# Patient Record
Sex: FEMALE | Race: WHITE | Employment: OTHER | URBAN - METROPOLITAN AREA
[De-identification: names, ages, dates, MRNs, and addresses within clinical notes are randomized per-mention and may not be internally consistent; named-entity substitution may affect disease eponyms.]

---

## 2017-03-09 PROBLEM — M06.4 INFLAMMATORY POLYARTHRITIS (HCC): Status: ACTIVE | Noted: 2017-03-09

## 2017-06-22 PROBLEM — M06.09 RHEUMATOID ARTHRITIS OF MULTIPLE SITES WITH NEGATIVE RHEUMATOID FACTOR (HCC): Status: ACTIVE | Noted: 2017-06-22

## 2017-07-05 PROCEDURE — 86200 CCP ANTIBODY: CPT | Performed by: INTERNAL MEDICINE

## 2017-07-05 PROCEDURE — 86480 TB TEST CELL IMMUN MEASURE: CPT | Performed by: INTERNAL MEDICINE

## 2018-03-04 PROBLEM — S53.104S: Status: ACTIVE | Noted: 2018-03-04

## 2018-03-19 PROBLEM — M06.9: Status: ACTIVE | Noted: 2018-03-19

## 2018-07-12 PROCEDURE — 86480 TB TEST CELL IMMUN MEASURE: CPT | Performed by: INTERNAL MEDICINE

## 2018-08-02 PROCEDURE — 89051 BODY FLUID CELL COUNT: CPT | Performed by: INTERNAL MEDICINE

## 2018-08-02 PROCEDURE — 89060 EXAM SYNOVIAL FLUID CRYSTALS: CPT | Performed by: INTERNAL MEDICINE

## 2018-10-17 PROBLEM — M54.50 CHRONIC BILATERAL LOW BACK PAIN WITHOUT SCIATICA: Status: RESOLVED | Noted: 2018-10-17 | Resolved: 2018-10-17

## 2018-10-17 PROBLEM — G89.29 CHRONIC BILATERAL LOW BACK PAIN WITHOUT SCIATICA: Status: RESOLVED | Noted: 2018-10-17 | Resolved: 2018-10-17

## 2018-10-17 PROBLEM — M54.50 CHRONIC BILATERAL LOW BACK PAIN WITHOUT SCIATICA: Status: ACTIVE | Noted: 2018-10-17

## 2018-10-17 PROBLEM — G89.29 CHRONIC BILATERAL LOW BACK PAIN WITHOUT SCIATICA: Status: ACTIVE | Noted: 2018-10-17

## 2018-11-15 PROCEDURE — 89051 BODY FLUID CELL COUNT: CPT | Performed by: INTERNAL MEDICINE

## 2018-11-15 PROCEDURE — 89060 EXAM SYNOVIAL FLUID CRYSTALS: CPT | Performed by: INTERNAL MEDICINE

## 2019-02-13 PROBLEM — Z96.641 S/P HIP REPLACEMENT, RIGHT: Status: ACTIVE | Noted: 2019-02-13

## 2019-02-28 PROCEDURE — 83010 ASSAY OF HAPTOGLOBIN QUANT: CPT | Performed by: INTERNAL MEDICINE

## 2019-04-05 ENCOUNTER — APPOINTMENT (OUTPATIENT)
Dept: LAB | Age: 59
End: 2019-04-05
Attending: ORTHOPAEDIC SURGERY
Payer: MEDICARE

## 2019-04-05 DIAGNOSIS — Z01.818 PREOP TESTING: ICD-10-CM

## 2019-04-05 PROCEDURE — 87641 MR-STAPH DNA AMP PROBE: CPT

## 2019-04-05 RX ORDER — MULTIVIT WITH MINERALS/LUTEIN
1000 TABLET ORAL DAILY
COMMUNITY

## 2019-04-12 PROCEDURE — 89051 BODY FLUID CELL COUNT: CPT | Performed by: INTERNAL MEDICINE

## 2019-04-12 PROCEDURE — 89060 EXAM SYNOVIAL FLUID CRYSTALS: CPT | Performed by: INTERNAL MEDICINE

## 2019-04-14 PROBLEM — D84.9 IMMUNOSUPPRESSED STATUS (HCC): Status: ACTIVE | Noted: 2019-04-14

## 2019-04-16 NOTE — PAT NURSING NOTE
Discussed pt's history of MRSA with Tashi Infante in Dr Rancho Schneider office. Per Tashi Armendariz wants pt to have 2 gm of Ancef and 1 gm of Vancomycin preop.

## 2019-04-17 ENCOUNTER — HOSPITAL ENCOUNTER (OUTPATIENT)
Facility: HOSPITAL | Age: 59
Discharge: HOME HEALTH | End: 2019-04-18
Attending: ORTHOPAEDIC SURGERY | Admitting: ORTHOPAEDIC SURGERY
Payer: MEDICARE

## 2019-04-17 ENCOUNTER — ANESTHESIA (OUTPATIENT)
Dept: SURGERY | Facility: HOSPITAL | Age: 59
End: 2019-04-17
Payer: MEDICARE

## 2019-04-17 ENCOUNTER — APPOINTMENT (OUTPATIENT)
Dept: GENERAL RADIOLOGY | Facility: HOSPITAL | Age: 59
End: 2019-04-17
Attending: ORTHOPAEDIC SURGERY
Payer: MEDICARE

## 2019-04-17 ENCOUNTER — ANESTHESIA EVENT (OUTPATIENT)
Dept: SURGERY | Facility: HOSPITAL | Age: 59
End: 2019-04-17
Payer: MEDICARE

## 2019-04-17 DIAGNOSIS — Z01.818 PREOP TESTING: Primary | ICD-10-CM

## 2019-04-17 DIAGNOSIS — M06.9: ICD-10-CM

## 2019-04-17 DIAGNOSIS — M06.09 RHEUMATOID ARTHRITIS OF MULTIPLE SITES WITH NEGATIVE RHEUMATOID FACTOR (HCC): ICD-10-CM

## 2019-04-17 DIAGNOSIS — M19.029 ARTHRITIS OF ELBOW: ICD-10-CM

## 2019-04-17 PROCEDURE — 64417 NJX AA&/STRD AX NERVE IMG: CPT | Performed by: ORTHOPAEDIC SURGERY

## 2019-04-17 PROCEDURE — 76942 ECHO GUIDE FOR BIOPSY: CPT | Performed by: ORTHOPAEDIC SURGERY

## 2019-04-17 PROCEDURE — 0RRL0JZ REPLACEMENT OF RIGHT ELBOW JOINT WITH SYNTHETIC SUBSTITUTE, OPEN APPROACH: ICD-10-PCS | Performed by: ORTHOPAEDIC SURGERY

## 2019-04-17 PROCEDURE — 01N40ZZ RELEASE ULNAR NERVE, OPEN APPROACH: ICD-10-PCS | Performed by: ORTHOPAEDIC SURGERY

## 2019-04-17 PROCEDURE — 99152 MOD SED SAME PHYS/QHP 5/>YRS: CPT | Performed by: ORTHOPAEDIC SURGERY

## 2019-04-17 PROCEDURE — 3E0T3BZ INTRODUCTION OF ANESTHETIC AGENT INTO PERIPHERAL NERVES AND PLEXI, PERCUTANEOUS APPROACH: ICD-10-PCS | Performed by: ANESTHESIOLOGY

## 2019-04-17 DEVICE — CEMENT BONE RADIOPAQ HOWMEDICA: Type: IMPLANTABLE DEVICE | Site: ELBOW | Status: FUNCTIONAL

## 2019-04-17 DEVICE — IMPLANTABLE DEVICE: Type: IMPLANTABLE DEVICE | Site: ELBOW | Status: FUNCTIONAL

## 2019-04-17 RX ORDER — ONDANSETRON 2 MG/ML
INJECTION INTRAMUSCULAR; INTRAVENOUS AS NEEDED
Status: DISCONTINUED | OUTPATIENT
Start: 2019-04-17 | End: 2019-04-17 | Stop reason: SURG

## 2019-04-17 RX ORDER — SODIUM CHLORIDE, SODIUM LACTATE, POTASSIUM CHLORIDE, CALCIUM CHLORIDE 600; 310; 30; 20 MG/100ML; MG/100ML; MG/100ML; MG/100ML
INJECTION, SOLUTION INTRAVENOUS CONTINUOUS
Status: DISCONTINUED | OUTPATIENT
Start: 2019-04-17 | End: 2019-04-17

## 2019-04-17 RX ORDER — ACETAMINOPHEN 325 MG/1
650 TABLET ORAL EVERY 4 HOURS PRN
Status: DISCONTINUED | OUTPATIENT
Start: 2019-04-17 | End: 2019-04-18

## 2019-04-17 RX ORDER — MORPHINE SULFATE 10 MG/ML
6 INJECTION, SOLUTION INTRAMUSCULAR; INTRAVENOUS EVERY 10 MIN PRN
Status: DISCONTINUED | OUTPATIENT
Start: 2019-04-17 | End: 2019-04-17 | Stop reason: HOSPADM

## 2019-04-17 RX ORDER — BISACODYL 10 MG
10 SUPPOSITORY, RECTAL RECTAL
Status: DISCONTINUED | OUTPATIENT
Start: 2019-04-17 | End: 2019-04-18

## 2019-04-17 RX ORDER — AMLODIPINE BESYLATE 5 MG/1
5 TABLET ORAL DAILY
Status: DISCONTINUED | OUTPATIENT
Start: 2019-04-18 | End: 2019-04-18

## 2019-04-17 RX ORDER — FLUPHENAZINE HYDROCHLORIDE 2.5 MG/1
5 TABLET ORAL 2 TIMES DAILY WITH MEALS
Status: DISCONTINUED | OUTPATIENT
Start: 2019-04-17 | End: 2019-04-18

## 2019-04-17 RX ORDER — MELATONIN
325
Status: DISCONTINUED | OUTPATIENT
Start: 2019-04-17 | End: 2019-04-18

## 2019-04-17 RX ORDER — PANTOPRAZOLE SODIUM 40 MG/1
40 TABLET, DELAYED RELEASE ORAL
Status: DISCONTINUED | OUTPATIENT
Start: 2019-04-18 | End: 2019-04-18

## 2019-04-17 RX ORDER — DEXAMETHASONE SODIUM PHOSPHATE 10 MG/ML
INJECTION, SOLUTION INTRAMUSCULAR; INTRAVENOUS
Status: COMPLETED | OUTPATIENT
Start: 2019-04-17 | End: 2019-04-17

## 2019-04-17 RX ORDER — SODIUM CHLORIDE 0.9 % (FLUSH) 0.9 %
10 SYRINGE (ML) INJECTION AS NEEDED
Status: DISCONTINUED | OUTPATIENT
Start: 2019-04-17 | End: 2019-04-18

## 2019-04-17 RX ORDER — SODIUM PHOSPHATE, DIBASIC AND SODIUM PHOSPHATE, MONOBASIC 7; 19 G/133ML; G/133ML
1 ENEMA RECTAL ONCE AS NEEDED
Status: DISCONTINUED | OUTPATIENT
Start: 2019-04-17 | End: 2019-04-18

## 2019-04-17 RX ORDER — ONDANSETRON 2 MG/ML
4 INJECTION INTRAMUSCULAR; INTRAVENOUS ONCE AS NEEDED
Status: DISCONTINUED | OUTPATIENT
Start: 2019-04-17 | End: 2019-04-17 | Stop reason: HOSPADM

## 2019-04-17 RX ORDER — CEFAZOLIN SODIUM/WATER 2 G/20 ML
2 SYRINGE (ML) INTRAVENOUS EVERY 8 HOURS
Status: COMPLETED | OUTPATIENT
Start: 2019-04-17 | End: 2019-04-17

## 2019-04-17 RX ORDER — HYDROCODONE BITARTRATE AND ACETAMINOPHEN 5; 325 MG/1; MG/1
1 TABLET ORAL EVERY 4 HOURS PRN
Status: DISCONTINUED | OUTPATIENT
Start: 2019-04-17 | End: 2019-04-18

## 2019-04-17 RX ORDER — MORPHINE SULFATE 4 MG/ML
2 INJECTION, SOLUTION INTRAMUSCULAR; INTRAVENOUS EVERY 10 MIN PRN
Status: DISCONTINUED | OUTPATIENT
Start: 2019-04-17 | End: 2019-04-17 | Stop reason: HOSPADM

## 2019-04-17 RX ORDER — DOCUSATE SODIUM 100 MG/1
100 CAPSULE, LIQUID FILLED ORAL 2 TIMES DAILY
Status: DISCONTINUED | OUTPATIENT
Start: 2019-04-17 | End: 2019-04-18

## 2019-04-17 RX ORDER — MAGNESIUM HYDROXIDE 1200 MG/15ML
LIQUID ORAL CONTINUOUS PRN
Status: COMPLETED | OUTPATIENT
Start: 2019-04-17 | End: 2019-04-17

## 2019-04-17 RX ORDER — HYDROCODONE BITARTRATE AND ACETAMINOPHEN 5; 325 MG/1; MG/1
1 TABLET ORAL EVERY 6 HOURS PRN
Qty: 30 TABLET | Refills: 0 | Status: SHIPPED | OUTPATIENT
Start: 2019-04-17 | End: 2019-04-18

## 2019-04-17 RX ORDER — POLYETHYLENE GLYCOL 3350 17 G/17G
17 POWDER, FOR SOLUTION ORAL DAILY PRN
Status: DISCONTINUED | OUTPATIENT
Start: 2019-04-17 | End: 2019-04-18

## 2019-04-17 RX ORDER — LORAZEPAM 0.5 MG/1
0.5 TABLET ORAL
Status: DISCONTINUED | OUTPATIENT
Start: 2019-04-17 | End: 2019-04-18

## 2019-04-17 RX ORDER — BENZTROPINE MESYLATE 0.5 MG/1
0.5 TABLET ORAL 2 TIMES DAILY
Status: DISCONTINUED | OUTPATIENT
Start: 2019-04-17 | End: 2019-04-18

## 2019-04-17 RX ORDER — NALOXONE HYDROCHLORIDE 0.4 MG/ML
80 INJECTION, SOLUTION INTRAMUSCULAR; INTRAVENOUS; SUBCUTANEOUS AS NEEDED
Status: DISCONTINUED | OUTPATIENT
Start: 2019-04-17 | End: 2019-04-17 | Stop reason: HOSPADM

## 2019-04-17 RX ORDER — MORPHINE SULFATE 4 MG/ML
4 INJECTION, SOLUTION INTRAMUSCULAR; INTRAVENOUS EVERY 10 MIN PRN
Status: DISCONTINUED | OUTPATIENT
Start: 2019-04-17 | End: 2019-04-17 | Stop reason: HOSPADM

## 2019-04-17 RX ORDER — ROPIVACAINE HYDROCHLORIDE 5 MG/ML
INJECTION, SOLUTION EPIDURAL; INFILTRATION; PERINEURAL
Status: COMPLETED | OUTPATIENT
Start: 2019-04-17 | End: 2019-04-17

## 2019-04-17 RX ORDER — PREDNISONE 1 MG/1
5 TABLET ORAL DAILY
Status: DISCONTINUED | OUTPATIENT
Start: 2019-04-17 | End: 2019-04-18

## 2019-04-17 RX ORDER — HALOPERIDOL 5 MG/ML
0.25 INJECTION INTRAMUSCULAR ONCE AS NEEDED
Status: DISCONTINUED | OUTPATIENT
Start: 2019-04-17 | End: 2019-04-17 | Stop reason: HOSPADM

## 2019-04-17 RX ORDER — HYDROCODONE BITARTRATE AND ACETAMINOPHEN 5; 325 MG/1; MG/1
1 TABLET ORAL AS NEEDED
Status: DISCONTINUED | OUTPATIENT
Start: 2019-04-17 | End: 2019-04-17 | Stop reason: HOSPADM

## 2019-04-17 RX ORDER — LIDOCAINE HYDROCHLORIDE 10 MG/ML
INJECTION, SOLUTION INFILTRATION; PERINEURAL
Status: COMPLETED | OUTPATIENT
Start: 2019-04-17 | End: 2019-04-17

## 2019-04-17 RX ORDER — ACETAMINOPHEN 500 MG
1000 TABLET ORAL ONCE
Status: COMPLETED | OUTPATIENT
Start: 2019-04-17 | End: 2019-04-17

## 2019-04-17 RX ORDER — DIVALPROEX SODIUM 500 MG/1
500 TABLET, EXTENDED RELEASE ORAL NIGHTLY
Status: DISCONTINUED | OUTPATIENT
Start: 2019-04-17 | End: 2019-04-18

## 2019-04-17 RX ORDER — ROCURONIUM BROMIDE 10 MG/ML
INJECTION, SOLUTION INTRAVENOUS AS NEEDED
Status: DISCONTINUED | OUTPATIENT
Start: 2019-04-17 | End: 2019-04-17 | Stop reason: SURG

## 2019-04-17 RX ORDER — GLYCOPYRROLATE 0.2 MG/ML
INJECTION INTRAMUSCULAR; INTRAVENOUS AS NEEDED
Status: DISCONTINUED | OUTPATIENT
Start: 2019-04-17 | End: 2019-04-17 | Stop reason: SURG

## 2019-04-17 RX ORDER — HYDROCODONE BITARTRATE AND ACETAMINOPHEN 5; 325 MG/1; MG/1
2 TABLET ORAL AS NEEDED
Status: DISCONTINUED | OUTPATIENT
Start: 2019-04-17 | End: 2019-04-17 | Stop reason: HOSPADM

## 2019-04-17 RX ORDER — FAMOTIDINE 20 MG/1
20 TABLET ORAL ONCE
Status: DISCONTINUED | OUTPATIENT
Start: 2019-04-17 | End: 2019-04-17 | Stop reason: HOSPADM

## 2019-04-17 RX ORDER — TRAMADOL HYDROCHLORIDE 50 MG/1
50 TABLET ORAL EVERY 6 HOURS PRN
Status: DISCONTINUED | OUTPATIENT
Start: 2019-04-17 | End: 2019-04-18

## 2019-04-17 RX ORDER — METOCLOPRAMIDE 10 MG/1
10 TABLET ORAL ONCE
Status: DISCONTINUED | OUTPATIENT
Start: 2019-04-17 | End: 2019-04-17 | Stop reason: HOSPADM

## 2019-04-17 RX ORDER — MIDAZOLAM HYDROCHLORIDE 1 MG/ML
INJECTION INTRAMUSCULAR; INTRAVENOUS
Status: COMPLETED | OUTPATIENT
Start: 2019-04-17 | End: 2019-04-17

## 2019-04-17 RX ORDER — HYDROCODONE BITARTRATE AND ACETAMINOPHEN 5; 325 MG/1; MG/1
2 TABLET ORAL EVERY 4 HOURS PRN
Status: DISCONTINUED | OUTPATIENT
Start: 2019-04-17 | End: 2019-04-18

## 2019-04-17 RX ORDER — NEOSTIGMINE METHYLSULFATE 0.5 MG/ML
INJECTION INTRAVENOUS AS NEEDED
Status: DISCONTINUED | OUTPATIENT
Start: 2019-04-17 | End: 2019-04-17 | Stop reason: SURG

## 2019-04-17 RX ORDER — CEFAZOLIN SODIUM/WATER 2 G/20 ML
2 SYRINGE (ML) INTRAVENOUS ONCE
Status: DISCONTINUED | OUTPATIENT
Start: 2019-04-17 | End: 2019-04-17 | Stop reason: HOSPADM

## 2019-04-17 RX ORDER — EPHEDRINE SULFATE 50 MG/ML
INJECTION, SOLUTION INTRAVENOUS AS NEEDED
Status: DISCONTINUED | OUTPATIENT
Start: 2019-04-17 | End: 2019-04-17 | Stop reason: SURG

## 2019-04-17 RX ORDER — LIDOCAINE HYDROCHLORIDE 10 MG/ML
INJECTION, SOLUTION EPIDURAL; INFILTRATION; INTRACAUDAL; PERINEURAL AS NEEDED
Status: DISCONTINUED | OUTPATIENT
Start: 2019-04-17 | End: 2019-04-17 | Stop reason: SURG

## 2019-04-17 RX ORDER — CEFAZOLIN SODIUM/WATER 2 G/20 ML
SYRINGE (ML) INTRAVENOUS AS NEEDED
Status: DISCONTINUED | OUTPATIENT
Start: 2019-04-17 | End: 2019-04-17 | Stop reason: SURG

## 2019-04-17 RX ADMIN — ROCURONIUM BROMIDE 5 MG: 10 INJECTION, SOLUTION INTRAVENOUS at 08:32:00

## 2019-04-17 RX ADMIN — MIDAZOLAM HYDROCHLORIDE 1 MG: 1 INJECTION INTRAMUSCULAR; INTRAVENOUS at 07:16:00

## 2019-04-17 RX ADMIN — ROCURONIUM BROMIDE 5 MG: 10 INJECTION, SOLUTION INTRAVENOUS at 09:09:00

## 2019-04-17 RX ADMIN — LIDOCAINE HYDROCHLORIDE 3 ML: 10 INJECTION, SOLUTION INFILTRATION; PERINEURAL at 07:16:00

## 2019-04-17 RX ADMIN — DEXAMETHASONE SODIUM PHOSPHATE 4 MG: 10 INJECTION, SOLUTION INTRAMUSCULAR; INTRAVENOUS at 07:16:00

## 2019-04-17 RX ADMIN — ONDANSETRON 4 MG: 2 INJECTION INTRAMUSCULAR; INTRAVENOUS at 10:42:00

## 2019-04-17 RX ADMIN — LIDOCAINE HYDROCHLORIDE 25 MG: 10 INJECTION, SOLUTION EPIDURAL; INFILTRATION; INTRACAUDAL; PERINEURAL at 07:45:00

## 2019-04-17 RX ADMIN — NEOSTIGMINE METHYLSULFATE 2.5 MG: 0.5 INJECTION INTRAVENOUS at 10:35:00

## 2019-04-17 RX ADMIN — EPHEDRINE SULFATE 5 MG: 50 INJECTION, SOLUTION INTRAVENOUS at 09:37:00

## 2019-04-17 RX ADMIN — ROPIVACAINE HYDROCHLORIDE 30 ML: 5 INJECTION, SOLUTION EPIDURAL; INFILTRATION; PERINEURAL at 07:16:00

## 2019-04-17 RX ADMIN — GLYCOPYRROLATE 0.5 MG: 0.2 INJECTION INTRAMUSCULAR; INTRAVENOUS at 10:35:00

## 2019-04-17 RX ADMIN — EPHEDRINE SULFATE 10 MG: 50 INJECTION, SOLUTION INTRAVENOUS at 08:08:00

## 2019-04-17 RX ADMIN — SODIUM CHLORIDE, SODIUM LACTATE, POTASSIUM CHLORIDE, CALCIUM CHLORIDE: 600; 310; 30; 20 INJECTION, SOLUTION INTRAVENOUS at 07:34:00

## 2019-04-17 RX ADMIN — ROCURONIUM BROMIDE 25 MG: 10 INJECTION, SOLUTION INTRAVENOUS at 07:46:00

## 2019-04-17 RX ADMIN — SODIUM CHLORIDE, SODIUM LACTATE, POTASSIUM CHLORIDE, CALCIUM CHLORIDE: 600; 310; 30; 20 INJECTION, SOLUTION INTRAVENOUS at 10:45:00

## 2019-04-17 RX ADMIN — CEFAZOLIN SODIUM/WATER 2 G: 2 G/20 ML SYRINGE (ML) INTRAVENOUS at 07:40:00

## 2019-04-17 NOTE — ANESTHESIA PROCEDURE NOTES
Peripheral Block  Date/Time: 4/17/2019 7:16 AM    Anesthesiologist:  Ella Fields MD  Patient Location:  PACU  Start Time:  4/17/2019 7:11 AM  End Time:  4/17/2019 7:16 AM  Site Identification: ultrasound guided, real time ultrasound guided and nerve sti

## 2019-04-17 NOTE — H&P
Update, no changes noted    Expand All Collapse All     CHIEF COMPLAINT  Patient presents with:  Elbow Pain: right elbow         HISTORY OF PRESENT ILLNESS     Ashely Blackman is a 65year old female with a history of rheumatoid arthritis, depression, who pr History  Occupation          Employer            Comment               unemployed                                   Social History Main Topics    Smoking status: Former Smoker                                                                Packs/day: 0.00 identified.     Sensory Exam:  Light touch was intact in both forearms and hands.       Motor Exam:  There was no extrinsic or intrinsic muscular atrophy.   Active motion of the fingers was intact     IMAGING  Reviewed      IMPRESSIONS     Aleshia Andrade is

## 2019-04-17 NOTE — OPERATIVE REPORT
Operative Note    Patient: Mitra Born MRN: [id number]     YOB: 1960  Age: 63year old  Sex: female    Unit: [address] [place] [address] PACU Room/Bed: [address] [place] [address] PACU/[facility] PACU Location: [address] [place] [address]     Date of Procedure: 4/17/2019   Preoperative Diagnosis: Severe Rheu distal humerus was then evaluated, known to be significantly denuded of cartilage, as well as loss of the medial condyle,  The canal was then opened,  A  was utilized, for positioning of the humeral component And sequential reaming was performed 3IN XSML RT - [id number]  C/M Mariah Renae RT  [employer] [id number] Right 1 Implanted

## 2019-04-17 NOTE — CM/SW NOTE
4/18 1134am: Additional updated orders have been sent to [facility] and SW notified them of discharge home today 4/18.    ------------------------------------------  4/18 810am: The pt. Has been accepted by [facility].   [facility] orders and face to face have b

## 2019-04-17 NOTE — ANESTHESIA POSTPROCEDURE EVALUATION
Patient: Hemal Shoulders    Procedure Summary     Date:  04/17/19 Room / Location:  [address] [place] [address] MAIN OR 02 / [address] [place] [address] OR    Anesthesia Start:  2555 Anesthesia Stop:  2988    Procedure:  ELBOW TOTAL REPLACEMENT (Right Elbow) Diagnosis:       Arthritis of elbow      R

## 2019-04-17 NOTE — H&P
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 Disp:  Rfl:    Ascorbic Acid (VITAMIN C) 1000 MG Oral Tab Take 1,000 mg by mouth daily. Disp:  Rfl:    Multiple Vitamins-Minerals (MULTIVITAL) Oral Tab Take 1 tablet by mouth daily.  Disp:  Rfl:    FERROUS SULFATE OR Take 1 tablet by mouth jessica 04/12/19  1249   WBC 8.80   HGB 11.9*   MCV 90.5          Recent Labs   Lab 04/12/19  1249   BUN 7.0*   CREATSERUM 0.56       Recent Labs   Lab 04/12/19  1249   ALT 23   AST 18   ALB 4.1       No results for input(s): TROP in the last 168 hours.

## 2019-04-17 NOTE — ANESTHESIA PROCEDURE NOTES
Airway  Urgency: elective      General Information and Staff    Patient location during procedure: OR  Anesthesiologist: Stan Newton MD  Resident/CRNA: Grecia Ojeda CRNA  Performed: CRNA     Indications and Patient Condition  Indications for airwa

## 2019-04-18 VITALS
WEIGHT: 99 LBS | HEIGHT: 57 IN | OXYGEN SATURATION: 100 % | TEMPERATURE: 98 F | HEART RATE: 60 BPM | SYSTOLIC BLOOD PRESSURE: 136 MMHG | RESPIRATION RATE: 16 BRPM | BODY MASS INDEX: 21.36 KG/M2 | DIASTOLIC BLOOD PRESSURE: 67 MMHG

## 2019-04-18 PROCEDURE — 80048 BASIC METABOLIC PNL TOTAL CA: CPT | Performed by: NURSE PRACTITIONER

## 2019-04-18 PROCEDURE — 97161 PT EVAL LOW COMPLEX 20 MIN: CPT

## 2019-04-18 PROCEDURE — 97166 OT EVAL MOD COMPLEX 45 MIN: CPT

## 2019-04-18 PROCEDURE — 97116 GAIT TRAINING THERAPY: CPT

## 2019-04-18 PROCEDURE — 85025 COMPLETE CBC W/AUTO DIFF WBC: CPT | Performed by: NURSE PRACTITIONER

## 2019-04-18 PROCEDURE — 97535 SELF CARE MNGMENT TRAINING: CPT

## 2019-04-18 RX ORDER — POTASSIUM CHLORIDE 20 MEQ/1
40 TABLET, EXTENDED RELEASE ORAL EVERY 4 HOURS
Status: DISCONTINUED | OUTPATIENT
Start: 2019-04-18 | End: 2019-04-18

## 2019-04-18 RX ORDER — HYDROCODONE BITARTRATE AND ACETAMINOPHEN 5; 325 MG/1; MG/1
1 TABLET ORAL EVERY 6 HOURS PRN
Qty: 30 TABLET | Refills: 0 | Status: SHIPPED | OUTPATIENT
Start: 2019-04-18 | End: 2019-05-22 | Stop reason: ALTCHOICE

## 2019-04-18 NOTE — OCCUPATIONAL THERAPY NOTE
OCCUPATIONAL THERAPY EVALUATION - INPATIENT     Room Number: 438/438-A  Evaluation Date: 4/18/2019  Type of Evaluation: Initial  Presenting Problem: (closed dislocation of R elbow)    Physician Order: IP Consult to Occupational Therapy  Reason for Therapy: to have someone nearby as well. Toilet hygiene at mod I level. The patient's Approx Degree of Impairment: 38.32% has been calculated based on documentation in the [facility] '6 clicks' Inpatient Daily Activity Short Form.   Research supports that patients Right  Drives: No  Patient Regularly Uses: None    Stairs in Home: 0  Use of Assistive Device(s): none, owns a cane and RW     Prior Level of Wright: pt lives in an apartment alone, independent with self care and fx mobility, does not drive/own a car good  Dynamic Sitting: good  Static Standing: good   Dynamic Standing: good    FUNCTIONAL ADL ASSESSMENT  Grooming: mod I   Feeding: mod I   Bathing: min a   Toileting: spv  Upper Extremity Dressing: min a   Lower Extremity Dressing: spv    Patient End of

## 2019-04-18 NOTE — FACE TO FACE NOTE
[facility]   Face to Face Encounter Note    Humaira Escalera Patient Status:  Outpatient in a Bed    1960 MRN [id number]   Location [facility] 4W/SW/SE Attending Radha Tomlinson MD   Hosp Day # 0 PCP Sanya Puentes MD       I, or a non-physic will periodically review the plan of care. The findings from this face-to-face encounter have been communicated with the patient's community-based physician who will be assuming this patient's home health plan of care.     Mónica Hooks RN, NP  [facility]

## 2019-04-18 NOTE — PLAN OF CARE
Problem: Patient Centered Care  Goal: Patient preferences are identified and integrated in the patient's plan of care  Description  Interventions:    - Provide timely, complete, and accurate information to patient/family  - Incorporate patient and family period  Description  INTERVENTIONS  - Monitor WBC  - Administer growth factors as ordered  - Implement neutropenic guidelines  Outcome: Progressing    VSS, afebrile. Ancef given. Tramadol covering pain.  Rt arm in sling, CMS intact, although  is still w

## 2019-04-18 NOTE — PHYSICAL THERAPY NOTE
PHYSICAL THERAPY QUICK EVALUATION - INPATIENT    Room Number: 438/438-A  Evaluation Date: 4/18/2019  Presenting Problem: Closed dislocation of right elbow s/p total elbow replacement  Physician Order: PT Eval and Treat    Problem List  Principal Problem: strength are within functional limits ; right elbow not tested    Lower extremity ROM is within functional limits     Lower extremity strength is within functional limits    NEUROLOGICAL FINDINGS  Neurological Findings: None                   ACTIVITY TOLE with decreased step length, foot flat contact, and narrow base of support. Patient with no loss of balance while ambulating. Ambulates with slower pace for safety.  She was left in bedside chair at end of session with all needs in reach, chair alarm applied

## 2019-04-18 NOTE — PLAN OF CARE
Problem: Patient/Family Goals  Goal: Patient/Family Long Term Goal  Description  Patient's Long Term Goal: achieve independence again    Interventions:  - OT and PT today  -encourage mobility  -review care with patient with lt arm  - See additional Care Encourage pt to monitor pain and request assistance  - Assess pain using appropriate pain scale  - Administer analgesics based on type and severity of pain and evaluate response  - Implement non-pharmacological measures as appropriate and evaluate response care.  Last dose of zosyn given this am.  Chair and bed alarm in use. Needs reminders to call for assistance.

## 2019-04-18 NOTE — DISCHARGE SUMMARY
[facility] Hospitalist Discharge Summary   Patient ID:  Loy Guardian  [id number]  60 year old  9/28/1960    Admit date: 4/17/2019  Discharge date: 4/18/2019    Primary Care Physician: Carine Muller MD   Attending Physician: Vita Cerda MD   Consults:   Shmuel Lockwood Disorder/Anxiety  -continue home meds     GERD  -PPI     EXAM:   GENERAL: no apparent distress, overweight  NEURO: A/A Ox3  RESP: non labored, CTA  CARDIO: Regular, no murmur  ABD: soft, NT, ND, BS+  EXTREMITIES:right arm with sling    Operative Procedures medications  · HYDROcodone-acetaminophen 5-325 MG Tabs  · methotrexate 2.5 MG Tabs       Important follow up: Follow-up Information     Karyle Linear, MD. Schedule an appointment as soon as possible for a visit in 2 weeks.     Specialty:  SURGERY, ORTHOPEDIC sling  Neuro: Grossly normal, CN intact, sensory intact  Psych: Affect- normal  SKIN: warm, dry  EXT: no edema    Assessment/Plan     S/P Right Total Elbow Replacement  -pathology pending  -IV/PO prn pain meds.   Monitor mental status and vitals closely  -e

## 2019-05-15 ENCOUNTER — ORDER TRANSCRIPTION (OUTPATIENT)
Dept: WOUND CARE | Facility: HOSPITAL | Age: 59
End: 2019-05-15

## 2019-05-15 DIAGNOSIS — S81.802A OPEN WOUND OF BOTH LEGS WITH COMPLICATION: Primary | ICD-10-CM

## 2019-05-15 DIAGNOSIS — S81.801A OPEN WOUND OF BOTH LEGS WITH COMPLICATION: Primary | ICD-10-CM

## 2019-05-17 ENCOUNTER — OFFICE VISIT (OUTPATIENT)
Dept: WOUND CARE | Facility: HOSPITAL | Age: 59
End: 2019-05-17
Attending: CLINICAL NURSE SPECIALIST
Payer: MEDICARE

## 2019-05-17 DIAGNOSIS — M06.9: ICD-10-CM

## 2019-05-17 DIAGNOSIS — M06.9 RHEUMATOID ARTHRITIS INVOLVING BOTH ELBOWS, UNSPECIFIED RHEUMATOID FACTOR PRESENCE: ICD-10-CM

## 2019-05-17 DIAGNOSIS — S51.001A OPEN WOUND OF RIGHT ELBOW, INITIAL ENCOUNTER: Primary | ICD-10-CM

## 2019-05-17 DIAGNOSIS — M24.321: ICD-10-CM

## 2019-05-17 PROCEDURE — 99214 OFFICE O/P EST MOD 30 MIN: CPT

## 2019-05-17 NOTE — PROGRESS NOTES
Subjective    Chief Complaint  This information was obtained from the patient  The patient was seen today for follow up and management of difficult to heal wound(s).  5/17/19 patient has right elbow surgery 4/17/19 the wound has been draining and now referr Former smoker - quit 3 years ago 2 packs per days 37 years, Marital Status - single, Occupation - disability, Children - none, Caffeine Use - 4-5 cups per day, Alcohol Use - no, Smokeless Tobacco (deprecated) - never used, Financial Concerns - Celena pascal Additional Information  PHQ2 Depression Screen scale: 0=not at all, 1=several days, 2=more than half the days, 3=nearly every day: 0  Little interest or pleasure in doing things (over the last 2 weeks)?: 0  Feeling down, depressed, or hopeless (over the la Height/Length: 58 in (147.32 cm), Weight: 107 lbs (48.64 kgs), BMI: 22.4, Temperature: 97.0 °F (36.11 °C), Pulse: 62 bpm, Respiratory Rate: 17 breaths/min, Blood Pressure: 120/85 mmHg, Pulse Oximetry: 100 %. Respiratory:  Respiration regular.  clear on Patient presents to the outpatient wound clinic with her mother. Patient has a right elbow trauma wound from fall. Right elbow wound dehiscence with 2 sutures in place. Linear long incision site proximal and distal to wound healed.   Old bloody drainage

## 2019-05-23 ENCOUNTER — TELEPHONE (OUTPATIENT)
Dept: SURGERY | Facility: CLINIC | Age: 59
End: 2019-05-23

## 2019-05-23 NOTE — TELEPHONE ENCOUNTER
Spoke with ROXIE and he stated that this is not his pt and to call the Wound clinic to investigate why they are send this msg to him. I called [facility] [phone] and EMANUEL for VICENTE Brown to c/b.

## 2019-05-24 ENCOUNTER — OFFICE VISIT (OUTPATIENT)
Dept: WOUND CARE | Facility: HOSPITAL | Age: 59
End: 2019-05-24
Attending: CLINICAL NURSE SPECIALIST
Payer: MEDICARE

## 2019-05-24 DIAGNOSIS — S51.001D OPEN WOUND OF RIGHT ELBOW, SUBSEQUENT ENCOUNTER: Primary | ICD-10-CM

## 2019-05-24 PROBLEM — S51.001A OPEN WOUND OF RIGHT ELBOW: Status: ACTIVE | Noted: 2019-05-24

## 2019-05-24 PROCEDURE — 99214 OFFICE O/P EST MOD 30 MIN: CPT

## 2019-05-24 NOTE — PROGRESS NOTES
Subjective    Chief Complaint  This information was obtained from the patient  The patient was seen today for follow up and management of difficult to heal wound(s). 5/24/19 no additional concerns for today.      Allergies  nuts, peanut    HPI  This informa Musculoskeletal: Assistive Devices (walker/cane as needed), Joint Swelling (Left elbow/ wears bilateral arm braces), Joint Pain (HX: Rheumatoid arthritis /Osteoarthritis), Other (HX: bilateral hip replacement), Decreased Activity, Muscle Wasting, Muscle We Appropriate judgement and insight. Oriented to time, place and person. Appropriate mood and affect.     Assessment    Active Problems    ICD-10  (Encounter Diagnosis) M24.321 - Pathological dislocation of right elbow, not elsewhere classified  (Encounter Graeme Memory 5/24/2019 11:42:09 AM Version Signed By: Date:  John Steele 5/24/2019 11:42:50 AM    Entered By: John Steele on 05/24/2019 11:48:17 AM

## 2019-05-31 ENCOUNTER — OFFICE VISIT (OUTPATIENT)
Dept: WOUND CARE | Facility: HOSPITAL | Age: 59
End: 2019-05-31
Attending: CLINICAL NURSE SPECIALIST
Payer: MEDICARE

## 2019-05-31 DIAGNOSIS — S51.001D ELBOW WOUND, RIGHT, SUBSEQUENT ENCOUNTER: Primary | ICD-10-CM

## 2019-05-31 PROCEDURE — 99213 OFFICE O/P EST LOW 20 MIN: CPT

## 2019-05-31 NOTE — PROGRESS NOTES
Subjective    Chief Complaint  This information was obtained from the patient  The patient was seen today for follow up and management of difficult to heal wound(s). 5/24/19 no additional concerns for today.  5/31/19: No additional concerns today    Allergi Musculoskeletal: Assistive Devices (walker/cane as needed), Joint Swelling (Left elbow/ wears bilateral arm braces), Joint Pain (HX: Rheumatoid arthritis /Osteoarthritis), Other (HX: bilateral hip replacement), Decreased Activity, Muscle Wasting, Muscle We The periwound skin texture is normal. The periwound skin moisture is normal. The periwound skin color is normal. The temperature of the periwound skin is WNL. Periwound skin does not exhibit signs or symptoms of infection.     Psychiatric:  Appropriate mood Return Appointment in 1 week. - APN visit weekly 30 minutes weekly    Wound Cleansing & Dressings  Clean wound with Normal Saline or Wound Cleanser.   Collagen - fibracol moisten with saline  Hydrofera ready  Other: - secure with medipore tape  Change dress

## 2019-06-07 ENCOUNTER — OFFICE VISIT (OUTPATIENT)
Dept: WOUND CARE | Facility: HOSPITAL | Age: 59
End: 2019-06-07
Attending: CLINICAL NURSE SPECIALIST
Payer: MEDICARE

## 2019-06-07 DIAGNOSIS — S51.001D OPEN WOUND OF RIGHT ELBOW, SUBSEQUENT ENCOUNTER: ICD-10-CM

## 2019-06-07 PROCEDURE — 99213 OFFICE O/P EST LOW 20 MIN: CPT

## 2019-06-07 NOTE — PROGRESS NOTES
Subjective    Chief Complaint  This information was obtained from the patient  The patient was seen today for follow up and management of difficult to heal wound(s).  6/7/19: No additional concerns today    Allergies  nuts, peanut    HPI  This information w Musculoskeletal: Assistive Devices (walker/cane as needed), Joint Swelling (Left elbow/ wears bilateral arm braces), Joint Pain (HX: Rheumatoid arthritis /Osteoarthritis), Other (HX: bilateral hip replacement), Decreased Activity, Muscle Wasting, Muscle We Assessment    Active Problems    ICD-10  (Encounter Diagnosis) M24.321 - Pathological dislocation of right elbow, not elsewhere classified  (Encounter Diagnosis) M06.9 - Rheumatoid arthritis, unspecified  (Encounter Diagnosis) S51.001D - Unspecified open w Entered By: Jonelle Donis on 06/07/2019 12:01:47 PM   Signature(s): Date(s):

## 2019-06-14 ENCOUNTER — OFFICE VISIT (OUTPATIENT)
Dept: WOUND CARE | Facility: HOSPITAL | Age: 59
End: 2019-06-14
Attending: CLINICAL NURSE SPECIALIST
Payer: MEDICARE

## 2019-06-14 DIAGNOSIS — S51.001D OPEN WOUND OF RIGHT ELBOW, SUBSEQUENT ENCOUNTER: ICD-10-CM

## 2019-06-14 PROCEDURE — 99213 OFFICE O/P EST LOW 20 MIN: CPT

## 2019-06-14 NOTE — PROGRESS NOTES
Subjective    Chief Complaint  This information was obtained from the patient  The patient was seen today for follow up and management of difficult to heal wound(s).  6/14/19: No additional concerns today    Allergies  nuts, peanut    HPI  This information Musculoskeletal: Assistive Devices (walker/cane as needed), Joint Swelling (Left elbow/ wears bilateral arm braces), Joint Pain (HX: Rheumatoid arthritis /Osteoarthritis), Other (HX: bilateral hip replacement), Decreased Activity, Muscle Wasting, Muscle We Active Problems    ICD-10  (Encounter Diagnosis) M24.321 - Pathological dislocation of right elbow, not elsewhere classified  (Encounter Diagnosis) M06.9 - Rheumatoid arthritis, unspecified  (Encounter Diagnosis) S51.001D - Unspecified open wound of right Entered By: Tracie Pierce on 06/14/2019 12:17:58 PM   Signature(s): Date(s):

## 2019-06-21 ENCOUNTER — OFFICE VISIT (OUTPATIENT)
Dept: WOUND CARE | Facility: HOSPITAL | Age: 59
End: 2019-06-21
Attending: INTERNAL MEDICINE
Payer: MEDICARE

## 2019-06-21 DIAGNOSIS — S51.001D OPEN WOUND OF RIGHT ELBOW, SUBSEQUENT ENCOUNTER: Primary | ICD-10-CM

## 2019-06-21 PROCEDURE — 99213 OFFICE O/P EST LOW 20 MIN: CPT

## 2019-06-21 NOTE — PROGRESS NOTES
Subjective    Chief Complaint  This information was obtained from the patient  The patient was seen today for follow up and management of difficult to heal wound(s).  6/14/19: No additional concerns today 6/21/19: No additional concerns for today    Kimberlyi Musculoskeletal: Assistive Devices (walker/cane as needed), Joint Swelling (Left elbow/ wears bilateral arm braces), Joint Pain (HX: Rheumatoid arthritis /Osteoarthritis), Other (HX: bilateral hip replacement), Decreased Activity, Muscle Wasting, Muscle We (Encounter Diagnosis) M06.9 - Rheumatoid arthritis, unspecified  (Encounter Diagnosis) S51.001D - Unspecified open wound of right elbow, subsequent encounter    Diagnoses    ICD-10  M24.321: Pathological dislocation of right elbow, not elsewhere classified

## 2019-07-11 PROCEDURE — 83930 ASSAY OF BLOOD OSMOLALITY: CPT | Performed by: INTERNAL MEDICINE

## 2019-07-11 PROCEDURE — 83935 ASSAY OF URINE OSMOLALITY: CPT | Performed by: INTERNAL MEDICINE

## 2019-07-11 PROCEDURE — 84300 ASSAY OF URINE SODIUM: CPT | Performed by: INTERNAL MEDICINE

## 2019-09-04 PROCEDURE — 83930 ASSAY OF BLOOD OSMOLALITY: CPT | Performed by: INTERNAL MEDICINE

## 2019-09-04 PROCEDURE — 84300 ASSAY OF URINE SODIUM: CPT | Performed by: INTERNAL MEDICINE

## 2019-09-04 PROCEDURE — 83935 ASSAY OF URINE OSMOLALITY: CPT | Performed by: INTERNAL MEDICINE

## 2019-09-09 PROBLEM — S53.104S: Status: RESOLVED | Noted: 2018-03-04 | Resolved: 2019-09-09

## 2019-09-09 PROBLEM — Z96.641 S/P HIP REPLACEMENT, RIGHT: Status: RESOLVED | Noted: 2019-02-13 | Resolved: 2019-09-09

## 2019-09-09 PROBLEM — S51.001A OPEN WOUND OF RIGHT ELBOW: Status: RESOLVED | Noted: 2019-05-24 | Resolved: 2019-09-09

## 2019-09-23 NOTE — PAT NURSING NOTE
Dr. Pietro Dancer office notified and Kwadwo Blanco in the OR aware that patient has a reaction to metals of unknown type.

## 2019-09-24 NOTE — H&P
[facility]    PATIENT'S NAME: Joeljoseph Nirali   ATTENDING PHYSICIAN: Pilo Villegas MD   PATIENT ACCOUNT#:   [id number]    LOCATION:    MEDICAL RECORD #:   [id number]       YOB: 1960  ADMISSION DATE:       09/26/2019    HISTORY A EXAMINATION:    VITAL SIGNS:  Blood pressure 120/80, pulse 61. Weight 96 pounds. BMI 21. HEENT:  Age appropriate, within normal limits. NECK:  There is no pain with range of motion of the cervical spine. No radicular symptoms.   EXTREMITIES:  Upper ext

## 2019-09-26 ENCOUNTER — APPOINTMENT (OUTPATIENT)
Dept: GENERAL RADIOLOGY | Facility: HOSPITAL | Age: 59
DRG: 470 | End: 2019-09-26
Attending: ORTHOPAEDIC SURGERY
Payer: MEDICARE

## 2019-09-26 ENCOUNTER — ANESTHESIA EVENT (OUTPATIENT)
Dept: SURGERY | Facility: HOSPITAL | Age: 59
DRG: 470 | End: 2019-09-26
Payer: MEDICARE

## 2019-09-26 ENCOUNTER — HOSPITAL ENCOUNTER (INPATIENT)
Facility: HOSPITAL | Age: 59
LOS: 4 days | Discharge: NURSING, REHAB OR LONG-TERM CARE | DRG: 470 | End: 2019-09-30
Attending: ORTHOPAEDIC SURGERY | Admitting: ORTHOPAEDIC SURGERY
Payer: MEDICARE

## 2019-09-26 ENCOUNTER — ANESTHESIA (OUTPATIENT)
Dept: SURGERY | Facility: HOSPITAL | Age: 59
DRG: 470 | End: 2019-09-26
Payer: MEDICARE

## 2019-09-26 DIAGNOSIS — M17.12 PRIMARY OSTEOARTHRITIS OF LEFT KNEE: ICD-10-CM

## 2019-09-26 PROCEDURE — 86850 RBC ANTIBODY SCREEN: CPT | Performed by: ORTHOPAEDIC SURGERY

## 2019-09-26 PROCEDURE — 84295 ASSAY OF SERUM SODIUM: CPT | Performed by: NURSE ANESTHETIST, CERTIFIED REGISTERED

## 2019-09-26 PROCEDURE — 86901 BLOOD TYPING SEROLOGIC RH(D): CPT | Performed by: ORTHOPAEDIC SURGERY

## 2019-09-26 PROCEDURE — 86900 BLOOD TYPING SEROLOGIC ABO: CPT | Performed by: ORTHOPAEDIC SURGERY

## 2019-09-26 PROCEDURE — 73560 X-RAY EXAM OF KNEE 1 OR 2: CPT | Performed by: ORTHOPAEDIC SURGERY

## 2019-09-26 PROCEDURE — 0SRD0J9 REPLACEMENT OF LEFT KNEE JOINT WITH SYNTHETIC SUBSTITUTE, CEMENTED, OPEN APPROACH: ICD-10-PCS | Performed by: ORTHOPAEDIC SURGERY

## 2019-09-26 PROCEDURE — 88311 DECALCIFY TISSUE: CPT | Performed by: ORTHOPAEDIC SURGERY

## 2019-09-26 PROCEDURE — 88305 TISSUE EXAM BY PATHOLOGIST: CPT | Performed by: ORTHOPAEDIC SURGERY

## 2019-09-26 PROCEDURE — 36415 COLL VENOUS BLD VENIPUNCTURE: CPT | Performed by: ORTHOPAEDIC SURGERY

## 2019-09-26 DEVICE — PSN STR HYB ST 14X+30 M: Type: IMPLANTABLE DEVICE | Site: KNEE | Status: FUNCTIONAL

## 2019-09-26 DEVICE — BIOMET BC R 1X40 US: Type: IMPLANTABLE DEVICE | Site: KNEE | Status: FUNCTIONAL

## 2019-09-26 DEVICE — PSNA CM FM/CM TB/CPS VE SUR/ST: Type: IMPLANTABLE DEVICE | Status: FUNCTIONAL

## 2019-09-26 DEVICE — PSN TIB STM 5 DEG SZ C L: Type: IMPLANTABLE DEVICE | Site: KNEE | Status: FUNCTIONAL

## 2019-09-26 DEVICE — PSN ALL POLY PAT PLY 32MM: Type: IMPLANTABLE DEVICE | Site: KNEE | Status: FUNCTIONAL

## 2019-09-26 RX ORDER — DIPHENHYDRAMINE HYDROCHLORIDE 50 MG/ML
12.5 INJECTION INTRAMUSCULAR; INTRAVENOUS EVERY 4 HOURS PRN
Status: DISCONTINUED | OUTPATIENT
Start: 2019-09-26 | End: 2019-09-30

## 2019-09-26 RX ORDER — ONDANSETRON 2 MG/ML
4 INJECTION INTRAMUSCULAR; INTRAVENOUS EVERY 4 HOURS PRN
Status: DISCONTINUED | OUTPATIENT
Start: 2019-09-26 | End: 2019-09-30

## 2019-09-26 RX ORDER — DIVALPROEX SODIUM 500 MG/1
500 TABLET, EXTENDED RELEASE ORAL NIGHTLY
Status: DISCONTINUED | OUTPATIENT
Start: 2019-09-26 | End: 2019-09-30

## 2019-09-26 RX ORDER — HYDROCODONE BITARTRATE AND ACETAMINOPHEN 10; 325 MG/1; MG/1
1 TABLET ORAL EVERY 4 HOURS PRN
Status: DISCONTINUED | OUTPATIENT
Start: 2019-09-26 | End: 2019-09-30

## 2019-09-26 RX ORDER — SODIUM CHLORIDE 0.9 % (FLUSH) 0.9 %
10 SYRINGE (ML) INJECTION AS NEEDED
Status: DISCONTINUED | OUTPATIENT
Start: 2019-09-26 | End: 2019-09-30

## 2019-09-26 RX ORDER — METOPROLOL TARTRATE 5 MG/5ML
1 INJECTION INTRAVENOUS
Status: ACTIVE | OUTPATIENT
Start: 2019-09-26 | End: 2019-09-26

## 2019-09-26 RX ORDER — CELECOXIB 200 MG/1
200 CAPSULE ORAL EVERY 12 HOURS SCHEDULED
Status: DISCONTINUED | OUTPATIENT
Start: 2019-09-27 | End: 2019-09-30

## 2019-09-26 RX ORDER — ACETAMINOPHEN 500 MG
1000 TABLET ORAL ONCE
Status: DISCONTINUED | OUTPATIENT
Start: 2019-09-26 | End: 2019-09-26 | Stop reason: HOSPADM

## 2019-09-26 RX ORDER — FAMOTIDINE 20 MG/1
20 TABLET ORAL ONCE
Status: DISCONTINUED | OUTPATIENT
Start: 2019-09-26 | End: 2019-09-26 | Stop reason: HOSPADM

## 2019-09-26 RX ORDER — SODIUM CHLORIDE, SODIUM LACTATE, POTASSIUM CHLORIDE, CALCIUM CHLORIDE 600; 310; 30; 20 MG/100ML; MG/100ML; MG/100ML; MG/100ML
INJECTION, SOLUTION INTRAVENOUS CONTINUOUS
Status: DISCONTINUED | OUTPATIENT
Start: 2019-09-26 | End: 2019-09-30

## 2019-09-26 RX ORDER — HYDROCODONE BITARTRATE AND ACETAMINOPHEN 5; 325 MG/1; MG/1
1 TABLET ORAL AS NEEDED
Status: DISCONTINUED | OUTPATIENT
Start: 2019-09-26 | End: 2019-09-26 | Stop reason: HOSPADM

## 2019-09-26 RX ORDER — POLYETHYLENE GLYCOL 3350 17 G/17G
17 POWDER, FOR SOLUTION ORAL DAILY PRN
Status: DISCONTINUED | OUTPATIENT
Start: 2019-09-26 | End: 2019-09-30

## 2019-09-26 RX ORDER — ONDANSETRON 2 MG/ML
4 INJECTION INTRAMUSCULAR; INTRAVENOUS ONCE AS NEEDED
Status: DISCONTINUED | OUTPATIENT
Start: 2019-09-26 | End: 2019-09-26 | Stop reason: HOSPADM

## 2019-09-26 RX ORDER — MORPHINE SULFATE 10 MG/ML
6 INJECTION, SOLUTION INTRAMUSCULAR; INTRAVENOUS EVERY 10 MIN PRN
Status: DISCONTINUED | OUTPATIENT
Start: 2019-09-26 | End: 2019-09-26 | Stop reason: HOSPADM

## 2019-09-26 RX ORDER — HYDROMORPHONE HYDROCHLORIDE 1 MG/ML
0.6 INJECTION, SOLUTION INTRAMUSCULAR; INTRAVENOUS; SUBCUTANEOUS EVERY 5 MIN PRN
Status: DISCONTINUED | OUTPATIENT
Start: 2019-09-26 | End: 2019-09-26 | Stop reason: HOSPADM

## 2019-09-26 RX ORDER — MORPHINE SULFATE 2 MG/ML
2 INJECTION, SOLUTION INTRAMUSCULAR; INTRAVENOUS EVERY 10 MIN PRN
Status: DISCONTINUED | OUTPATIENT
Start: 2019-09-26 | End: 2019-09-26 | Stop reason: HOSPADM

## 2019-09-26 RX ORDER — ONDANSETRON 2 MG/ML
INJECTION INTRAMUSCULAR; INTRAVENOUS AS NEEDED
Status: DISCONTINUED | OUTPATIENT
Start: 2019-09-26 | End: 2019-09-26 | Stop reason: SURG

## 2019-09-26 RX ORDER — HYDROMORPHONE HYDROCHLORIDE 1 MG/ML
1.2 INJECTION, SOLUTION INTRAMUSCULAR; INTRAVENOUS; SUBCUTANEOUS EVERY 2 HOUR PRN
Status: DISCONTINUED | OUTPATIENT
Start: 2019-09-26 | End: 2019-09-30

## 2019-09-26 RX ORDER — BISACODYL 10 MG
10 SUPPOSITORY, RECTAL RECTAL
Status: DISCONTINUED | OUTPATIENT
Start: 2019-09-26 | End: 2019-09-30

## 2019-09-26 RX ORDER — MORPHINE SULFATE 4 MG/ML
4 INJECTION, SOLUTION INTRAMUSCULAR; INTRAVENOUS EVERY 10 MIN PRN
Status: DISCONTINUED | OUTPATIENT
Start: 2019-09-26 | End: 2019-09-26 | Stop reason: HOSPADM

## 2019-09-26 RX ORDER — CEFAZOLIN SODIUM/WATER 2 G/20 ML
2 SYRINGE (ML) INTRAVENOUS EVERY 8 HOURS
Status: COMPLETED | OUTPATIENT
Start: 2019-09-26 | End: 2019-09-27

## 2019-09-26 RX ORDER — GABAPENTIN 300 MG/1
300 CAPSULE ORAL NIGHTLY
Status: DISCONTINUED | OUTPATIENT
Start: 2019-09-26 | End: 2019-09-30

## 2019-09-26 RX ORDER — SODIUM CHLORIDE 9 MG/ML
INJECTION, SOLUTION INTRAVENOUS CONTINUOUS
Status: DISCONTINUED | OUTPATIENT
Start: 2019-09-26 | End: 2019-09-27

## 2019-09-26 RX ORDER — HYDROCODONE BITARTRATE AND ACETAMINOPHEN 5; 325 MG/1; MG/1
1 TABLET ORAL EVERY 4 HOURS PRN
Status: DISCONTINUED | OUTPATIENT
Start: 2019-09-26 | End: 2019-09-30

## 2019-09-26 RX ORDER — ASPIRIN 325 MG
325 TABLET ORAL 2 TIMES DAILY
Status: DISCONTINUED | OUTPATIENT
Start: 2019-09-26 | End: 2019-09-30

## 2019-09-26 RX ORDER — HYDROMORPHONE HYDROCHLORIDE 1 MG/ML
0.4 INJECTION, SOLUTION INTRAMUSCULAR; INTRAVENOUS; SUBCUTANEOUS EVERY 2 HOUR PRN
Status: DISCONTINUED | OUTPATIENT
Start: 2019-09-26 | End: 2019-09-30

## 2019-09-26 RX ORDER — HYDROMORPHONE HYDROCHLORIDE 1 MG/ML
0.4 INJECTION, SOLUTION INTRAMUSCULAR; INTRAVENOUS; SUBCUTANEOUS EVERY 5 MIN PRN
Status: DISCONTINUED | OUTPATIENT
Start: 2019-09-26 | End: 2019-09-26 | Stop reason: HOSPADM

## 2019-09-26 RX ORDER — HYDROCODONE BITARTRATE AND ACETAMINOPHEN 5; 325 MG/1; MG/1
2 TABLET ORAL AS NEEDED
Status: DISCONTINUED | OUTPATIENT
Start: 2019-09-26 | End: 2019-09-26 | Stop reason: HOSPADM

## 2019-09-26 RX ORDER — METOCLOPRAMIDE 10 MG/1
10 TABLET ORAL ONCE
Status: DISCONTINUED | OUTPATIENT
Start: 2019-09-26 | End: 2019-09-26 | Stop reason: HOSPADM

## 2019-09-26 RX ORDER — PROCHLORPERAZINE EDISYLATE 5 MG/ML
10 INJECTION INTRAMUSCULAR; INTRAVENOUS EVERY 6 HOURS PRN
Status: ACTIVE | OUTPATIENT
Start: 2019-09-26 | End: 2019-09-28

## 2019-09-26 RX ORDER — METOCLOPRAMIDE HYDROCHLORIDE 5 MG/ML
10 INJECTION INTRAMUSCULAR; INTRAVENOUS EVERY 6 HOURS PRN
Status: ACTIVE | OUTPATIENT
Start: 2019-09-26 | End: 2019-09-28

## 2019-09-26 RX ORDER — DIPHENHYDRAMINE HCL 25 MG
25 CAPSULE ORAL EVERY 4 HOURS PRN
Status: DISCONTINUED | OUTPATIENT
Start: 2019-09-26 | End: 2019-09-30

## 2019-09-26 RX ORDER — NALOXONE HYDROCHLORIDE 0.4 MG/ML
80 INJECTION, SOLUTION INTRAMUSCULAR; INTRAVENOUS; SUBCUTANEOUS AS NEEDED
Status: DISCONTINUED | OUTPATIENT
Start: 2019-09-26 | End: 2019-09-26 | Stop reason: HOSPADM

## 2019-09-26 RX ORDER — HYDROMORPHONE HYDROCHLORIDE 1 MG/ML
0.2 INJECTION, SOLUTION INTRAMUSCULAR; INTRAVENOUS; SUBCUTANEOUS EVERY 5 MIN PRN
Status: DISCONTINUED | OUTPATIENT
Start: 2019-09-26 | End: 2019-09-26 | Stop reason: HOSPADM

## 2019-09-26 RX ORDER — SENNOSIDES 8.6 MG
17.2 TABLET ORAL NIGHTLY
Status: DISCONTINUED | OUTPATIENT
Start: 2019-09-26 | End: 2019-09-30

## 2019-09-26 RX ORDER — SODIUM PHOSPHATE, DIBASIC AND SODIUM PHOSPHATE, MONOBASIC 7; 19 G/133ML; G/133ML
1 ENEMA RECTAL ONCE AS NEEDED
Status: DISCONTINUED | OUTPATIENT
Start: 2019-09-26 | End: 2019-09-30

## 2019-09-26 RX ORDER — DIPHENHYDRAMINE HYDROCHLORIDE 50 MG/ML
25 INJECTION INTRAMUSCULAR; INTRAVENOUS ONCE AS NEEDED
Status: ACTIVE | OUTPATIENT
Start: 2019-09-26 | End: 2019-09-26

## 2019-09-26 RX ORDER — EPHEDRINE SULFATE 50 MG/ML
INJECTION, SOLUTION INTRAVENOUS AS NEEDED
Status: DISCONTINUED | OUTPATIENT
Start: 2019-09-26 | End: 2019-09-26 | Stop reason: SURG

## 2019-09-26 RX ORDER — LABETALOL HYDROCHLORIDE 5 MG/ML
INJECTION, SOLUTION INTRAVENOUS AS NEEDED
Status: DISCONTINUED | OUTPATIENT
Start: 2019-09-26 | End: 2019-09-26 | Stop reason: SURG

## 2019-09-26 RX ORDER — SODIUM CHLORIDE, SODIUM LACTATE, POTASSIUM CHLORIDE, CALCIUM CHLORIDE 600; 310; 30; 20 MG/100ML; MG/100ML; MG/100ML; MG/100ML
INJECTION, SOLUTION INTRAVENOUS CONTINUOUS
Status: DISCONTINUED | OUTPATIENT
Start: 2019-09-26 | End: 2019-09-26 | Stop reason: HOSPADM

## 2019-09-26 RX ORDER — ACETAMINOPHEN 500 MG
1000 TABLET ORAL EVERY 6 HOURS PRN
COMMUNITY
End: 2020-02-26

## 2019-09-26 RX ORDER — MAGNESIUM HYDROXIDE 1200 MG/15ML
LIQUID ORAL CONTINUOUS PRN
Status: COMPLETED | OUTPATIENT
Start: 2019-09-26 | End: 2019-09-26

## 2019-09-26 RX ORDER — DOCUSATE SODIUM 100 MG/1
100 CAPSULE, LIQUID FILLED ORAL 2 TIMES DAILY
Status: DISCONTINUED | OUTPATIENT
Start: 2019-09-26 | End: 2019-09-30

## 2019-09-26 RX ORDER — HYDROMORPHONE HYDROCHLORIDE 1 MG/ML
0.8 INJECTION, SOLUTION INTRAMUSCULAR; INTRAVENOUS; SUBCUTANEOUS EVERY 2 HOUR PRN
Status: DISCONTINUED | OUTPATIENT
Start: 2019-09-26 | End: 2019-09-30

## 2019-09-26 RX ORDER — LIDOCAINE HYDROCHLORIDE 10 MG/ML
INJECTION, SOLUTION EPIDURAL; INFILTRATION; INTRACAUDAL; PERINEURAL AS NEEDED
Status: DISCONTINUED | OUTPATIENT
Start: 2019-09-26 | End: 2019-09-26 | Stop reason: SURG

## 2019-09-26 RX ORDER — FLUPHENAZINE HYDROCHLORIDE 2.5 MG/1
5 TABLET ORAL 2 TIMES DAILY WITH MEALS
Status: DISCONTINUED | OUTPATIENT
Start: 2019-09-26 | End: 2019-09-30

## 2019-09-26 RX ORDER — AMLODIPINE BESYLATE 5 MG/1
5 TABLET ORAL DAILY
Status: DISCONTINUED | OUTPATIENT
Start: 2019-09-26 | End: 2019-09-30

## 2019-09-26 RX ORDER — DEXAMETHASONE SODIUM PHOSPHATE 4 MG/ML
VIAL (ML) INJECTION AS NEEDED
Status: DISCONTINUED | OUTPATIENT
Start: 2019-09-26 | End: 2019-09-26 | Stop reason: SURG

## 2019-09-26 RX ADMIN — DEXAMETHASONE SODIUM PHOSPHATE 4 MG: 4 MG/ML VIAL (ML) INJECTION at 12:17:00

## 2019-09-26 RX ADMIN — LIDOCAINE HYDROCHLORIDE 50 MG: 10 INJECTION, SOLUTION EPIDURAL; INFILTRATION; INTRACAUDAL; PERINEURAL at 12:03:00

## 2019-09-26 RX ADMIN — ONDANSETRON 4 MG: 2 INJECTION INTRAMUSCULAR; INTRAVENOUS at 12:17:00

## 2019-09-26 RX ADMIN — EPHEDRINE SULFATE 10 MG: 50 INJECTION, SOLUTION INTRAVENOUS at 13:37:00

## 2019-09-26 RX ADMIN — SODIUM CHLORIDE, SODIUM LACTATE, POTASSIUM CHLORIDE, CALCIUM CHLORIDE: 600; 310; 30; 20 INJECTION, SOLUTION INTRAVENOUS at 13:46:00

## 2019-09-26 RX ADMIN — LABETALOL HYDROCHLORIDE 5 MG: 5 INJECTION, SOLUTION INTRAVENOUS at 12:44:00

## 2019-09-26 NOTE — H&P
[facility] Hospitalist H&P       CC: No chief complaint on file.        PCP: Christine Vasquez MD    History of Present Illness:   64 y/o f w x of depression, htn, gerd, hl, bipolar, hx of cdiff, RA on mtx s/p left total knee replacement for OA, denies cp/sob/f/c/s, SIP OF WATER, 1 CAP DAILY AFTER SURGERY Disp: 34 capsule Rfl: 0   gabapentin 300 MG Oral Cap 1 capsule the morning prior to surgery, 1 capsule the morning of surgery, 1 capsule at bedtime each evening after surgery.  Disp: 32 capsule Rfl: 0   HYDROcodone-ac No      Alcohol/week: 0.0 standard drinks       Fam Hx  History reviewed. No pertinent family history. Review of Systems  Comprehensive ROS reviewed and negative except for what's stated above.      OBJECTIVE:  BP (!) 160/99   Pulse 78   Temp 98.5 °F (36 s/p L TKR  -monitor for acute blood loss  -cont iv/po pain meds    RA: hold mtx    Bipolar: cont olanzapine/prolixin and other psych meds    HTN: norvasc    Hx of Cdiff    FN:  - IVF: per ortho  - Diet:cardiac    DVT Prophy:per ortho      Dispo: pending cl

## 2019-09-26 NOTE — PLAN OF CARE
Problem: PAIN - ADULT  Goal: Verbalizes/displays adequate comfort level or patient's stated pain goal  Description  INTERVENTIONS:  - Encourage pt to monitor pain and request assistance  - Assess pain using appropriate pain scale  - Administer analgesics patient's ability to be responsible for managing their own health  - Refer to Case Management Department for coordinating discharge planning if the patient needs post-hospital services based on physician/LIP order or complex needs related to functional sta mobility and gait  - Educate and engage patient/family in tolerated activity level and precautions    Outcome: Progressing     Problem: Impaired Activities of Daily Living  Goal: Achieve highest/safest level of independence in self care  Description  Inter

## 2019-09-26 NOTE — INTERVAL H&P NOTE
Pre-op Diagnosis: Primary osteoarthritis of left knee [M17.12]    The above referenced H&P was reviewed by Olga Jones MD on 9/26/2019, the patient was examined and no significant changes have occurred in the patient's condition since the H&P was pe

## 2019-09-26 NOTE — ANESTHESIA PROCEDURE NOTES
Airway  Urgency: elective    Airway not difficult    General Information and Staff    Patient location during procedure: OR  Anesthesiologist: Demetrice Alba MD  Resident/CRNA: Mika Milian CRNA  Performed: anesthesiologist and CRNA     [place]

## 2019-09-26 NOTE — ANESTHESIA POSTPROCEDURE EVALUATION
Patient: Aleshia Andrade    Procedure Summary     Date:  09/26/19 Room / Location:  [facility] OR  / [facility] OR    Anesthesia Start:  1108 Anesthesia Stop:  1687    Procedure:  KNEE TOTAL REPLACEMENT (Left Knee) Diagnosis:       Primary osteoarthritis of le

## 2019-09-26 NOTE — OPERATIVE REPORT
[facility]    PATIENT'S NAME: Mulu Mahmood   ATTENDING PHYSICIAN: Pilo Santiago MD   OPERATING PHYSICIAN: Pilo Santiago MD   PATIENT ACCOUNT#:   [id number]    LOCATION:  [facility] [address] PACU [address] [place] [address]  MEDICAL RECORD #:   [id number]       D by the placement of a Grissom catheter. Smooth Webril was then placed around the left thigh, followed by a pneumatic pressure Tourni-Cot. The left lower extremity was prepped and draped in the usual sterile fashion. A time-out was performed.   Preoperative polyethylene. With the trials in place, it was found a 14 mm poly provided good stability with the constraint and full extension. The appropriate orientation of the tibia was assured and the tibial tray was marked.   With the components in place, patella

## 2019-09-26 NOTE — ANESTHESIA PREPROCEDURE EVALUATION
Anesthesia PreOp Note    HPI:     David Viera is a 63year old female who presents for preoperative consultation requested by: Jv Sky MD    Date of Surgery: 9/26/2019    Procedure(s):  KNEE TOTAL REPLACEMENT  Indication: Primary osteoarthrit • MRSA (methicillin resistant Staphylococcus aureus) infection 5/20/2013   • OSTEOARTHRITIS     hands and right hip   • Osteoarthritis    • OTHER DISEASES     bipolar disorder   • Prolonged Q-T interval on ECG 1/12/2016   • S/P hip replacement, right 2/13/ Calcium Citrate (CITRACAL OR) Take 2 capsules by mouth daily. Disp:  Rfl:  9/19/2019   Cholecalciferol (D3 VITAMIN OR) Take 1 tablet by mouth daily. Disp:  Rfl:  9/19/2019   Ascorbic Acid (VITAMIN C) 1000 MG Oral Tab Take 1,000 mg by mouth daily.  Disp:  Rf Tranexamic Acid (CYKLOKAPRON) 3,000 mg in sodium chloride 0.9% 100 mL IRRIGATION ONLY (NOT FOR IV USE) 3,000 mg Irrigation Once (Intra-Op) Jose Luis Chan MD       No current [facility]-ordered outpatient medications on file.       Nuts                    OTH Emotionally abused: Not on file        Physically abused: Not on file        Forced sexual activity: Not on file    Other Topics      Concerns:        Not on file    Social History Narrative      Unemployed. Single.       Available pre-op labs reviewe Post-op Pain Management: IV analgesics  Plan Comments: Spinal vs general discussed. Pt refuses spinal, states she had a bad experience with it in the past.   Will proceed with GA.    Informed Consent Plan and Risks Discussed With:  Patient  Use of Blood Pro

## 2019-09-27 PROCEDURE — 97530 THERAPEUTIC ACTIVITIES: CPT

## 2019-09-27 PROCEDURE — 97166 OT EVAL MOD COMPLEX 45 MIN: CPT

## 2019-09-27 PROCEDURE — 80048 BASIC METABOLIC PNL TOTAL CA: CPT | Performed by: PHYSICIAN ASSISTANT

## 2019-09-27 PROCEDURE — 85027 COMPLETE CBC AUTOMATED: CPT | Performed by: PHYSICIAN ASSISTANT

## 2019-09-27 PROCEDURE — 84132 ASSAY OF SERUM POTASSIUM: CPT | Performed by: HOSPITALIST

## 2019-09-27 PROCEDURE — 97162 PT EVAL MOD COMPLEX 30 MIN: CPT

## 2019-09-27 PROCEDURE — 97116 GAIT TRAINING THERAPY: CPT

## 2019-09-27 RX ORDER — POTASSIUM CHLORIDE 20 MEQ/1
40 TABLET, EXTENDED RELEASE ORAL EVERY 4 HOURS
Status: COMPLETED | OUTPATIENT
Start: 2019-09-27 | End: 2019-09-27

## 2019-09-27 NOTE — PROGRESS NOTES
POD#1 s/p Left TKA  Pain well controlled on orals  NO nausea    Xray reviewed  Lab with acute on chronic anemia do to acute blood loss    Dressing clean and dry  Calf's non tender, DNVI    Imp: good early POD#1 recovery from Left TKA    Plan: 325 ASA for 4

## 2019-09-27 NOTE — PHYSICAL THERAPY NOTE
PHYSICAL THERAPY KNEE EVALUATION - INPATIENT       Room Number: 413/413-A  Evaluation Date: 9/27/2019  Type of Evaluation: Initial  Physician Order: PT Eval and Treat    Presenting Problem: pt with severe end stage left knee OA ---pt is s/p left TKA.  pt is and WB limiting tolerance for ambulation to 25 ft for PM .    Pt also with new onset in Intermittent pain at left knee / patella area with observed  increase ? ?  Abnormal appearing lateral tracking of patella with attempt at knee flexion exercise therefore poor tolerance due to pain and symptoms. Pt is reluctant and nervous to WB on left LE with mobility. Pt with a step to gait pattern,  decrease step length B and decrease stance time on left. .      In AM pt tolerated 3-4 ft .       In PM session pt gregory lives alone . Pt with mild cogn deficit noted and decrease safety awareness. Pt does report her mother lives close by but unable to provide support she would need at d/c . At this time therapy does recommend GABBY as next level of care .   Pt will require From primary care MD Note  62 y/o f w x of depression, htn, gerd, hl, bipolar, hx of cdiff, RA on mtx s/p left total knee replacement for OA     OA s/p L TKR  -cont iv/po pain meds     Anemia: acute blood loss     RA: hold mtx     Bipolar: cont olanzap Pt reports recent fall    Pt reports indep ambulation with intermittent use of AD prior to admission .  Pt does not drive    SUBJECTIVE  See assessment and pain reports     Pt reports agreeable to GABBY as next level of care   Pt does report in pain some \"kn +  Static Standing: Poor +  Dynamic Standing: Poor +                                                                       ADDITIONAL TESTS      left Knee in ace wrapped dressing   In AM session did report numbness directly over knee   Intact sensation at patient questions and concerns addressed;Call light within reach;RN aware of session/findings; Alarm set;SCDs in place    CURRENT GOALS    Goals to be met by: 1 -2 weeks   Patient Goal Patient's self-stated goal is: agreeable to rehab    Goal #1 Patient is

## 2019-09-27 NOTE — PLAN OF CARE
Problem: Patient/Family Goals  Goal: Patient/Family Long Term Goal  Description  Patient's Long Term Goal:     Interventions:  -   - See additional Care Plan goals for specific interventions  Outcome: Progressing  Goal: Patient/Family Short Term Goal  Josias Prime falls.  - [place] fall precautions as indicated by assessment.  - Educate pt/family on patient safety including physical limitations  - Instruct pt to call for assistance with activity based on assessment  - Modify environment to reduce risk of injury  - patient stability and activity tolerance for standing, transferring and ambulating w/ or w/o assistive devices  - Assist with transfers and ambulation using safe patient handling equipment as needed  - Ensure adequate protection for wounds/incisions during

## 2019-09-27 NOTE — PLAN OF CARE
Mattie Niño, noticed her knee cap would move side to side when working with therapy. Per Je Snadoval, physical therapist, Dr. Mili Interiano said to avoid knee flexion at this time. No change in WB status. No knee immobilizer needed.  Dr. Mili Interiano will assess pt in morning

## 2019-09-27 NOTE — CM/SW NOTE
SW received MDO to discuss discharge planning with pt. SW met with pt in pt's room. Pt stated she lives in an apartment alone.  Pt states she has no friends or family around that would be helpful to her in the form of support    Pt is agreeable to [facility]

## 2019-09-27 NOTE — PROGRESS NOTES
[facility] Hospitalist Progress Note     CC: Hospital Follow up    PCP: Dianne Edwards MD       Assessment/Plan:     Active Problems:    Osteoarthritis    64 y/o f w x of depression, htn, gerd, hl, bipolar, hx of cdiff, RA on mtx s/p left total knee replacement for 98  --  117*   BUN 5.0*  --  16   CREATSERUM 0.30*  --  0.82   GFRAA  --   --  92   GFRNAA  --   --  80   CA 9.9  --  8.7   * 137 139   K 3.76  --  3.5   CL 89*  --  102   CO2 28.2  --  28.0       No results for input(s): ALT, AST, ALB, AMYLASE, LIPA

## 2019-09-27 NOTE — PLAN OF CARE
Problem: PAIN - ADULT  Goal: Verbalizes/displays adequate comfort level or patient's stated pain goal  Description  INTERVENTIONS:  - Encourage pt to monitor pain and request assistance  - Assess pain using appropriate pain scale  - Administer analgesics b patient's ability to be responsible for managing their own health  - Refer to Case Management Department for coordinating discharge planning if the patient needs post-hospital services based on physician/LIP order or complex needs related to functional sta mobility and gait  - Educate and engage patient/family in tolerated activity level and precautions  Outcome: Progressing     Problem: Impaired Activities of Daily Living  Goal: Achieve highest/safest level of independence in self care  Description  Interve

## 2019-09-27 NOTE — OCCUPATIONAL THERAPY NOTE
OCCUPATIONAL THERAPY EVALUATION - INPATIENT      Room Number: 413/413-A  Evaluation Date: 9/27/2019  Type of Evaluation: Initial  Presenting Problem: (l tkr)    Physician Order: IP Consult to Occupational Therapy  Reason for Therapy: ADL/IADL Dysfunction a rehabilitation  OT Device Recommendations: TBD    PLAN  OT Treatment Plan: Patient/Family education;Patient/Family training; Endurance training;Functional transfer training;ADL training    OCCUPATIONAL THERAPY MEDICAL/SOCIAL HISTORY     Problem List   Activ for ambulation.  Pt struggled w/ bathing and dressing and has been sponge bathing because she was unable to perform tub transfers    SUBJECTIVE  \"My thighs are so fat\"    OCCUPATIONAL THERAPY EXAMINATION     OBJECTIVE  Precautions: Bed/chair alarm;Limb al i  Lower Body Dressing: supervision    Education Provided: Tim Couch dressing and transfer techniques discussed    Patient End of Session: Up in chair;Needs met;Call light within reach;RN aware of session/findings; All patient questions and concerns addressed; Charles

## 2019-09-28 PROCEDURE — 85027 COMPLETE CBC AUTOMATED: CPT | Performed by: PHYSICIAN ASSISTANT

## 2019-09-28 PROCEDURE — 97116 GAIT TRAINING THERAPY: CPT

## 2019-09-28 PROCEDURE — 97110 THERAPEUTIC EXERCISES: CPT

## 2019-09-28 RX ORDER — ASPIRIN 325 MG
325 TABLET ORAL 2 TIMES DAILY
Qty: 60 TABLET | Refills: 0 | Status: SHIPPED | OUTPATIENT
Start: 2019-09-28 | End: 2019-10-22 | Stop reason: ALTCHOICE

## 2019-09-28 NOTE — PROGRESS NOTES
POD#2 s/p Left TKA  Some problems with lateral tracking of patella with flexion in PT    Pain controled on orals    Labs stable    Slight drainage on dressing  Incision clean and dry, minimal edema  Calf's non tender, DNVI  Patella tracking well with ROM,

## 2019-09-28 NOTE — PLAN OF CARE
Taking  Norco prn pain control. Up with one assist and walker. Call light and belongings in reach. Instructed not to get up without staff assist.  Call light and belongings in reach.   Plan is to go to rehab [address] [place] [address] when discharged  Problem evaluate response  - Consider cultural and social influences on pain and pain management  - Manage/alleviate anxiety  - Utilize distraction and/or relaxation techniques  - Monitor for opioid side effects  - Notify MD/LIP if interventions unsuccessful or pa ADULT  Goal: Incision(s), wounds(s) or drain site(s) healing without S/S of infection  Description  INTERVENTIONS:  - Assess and document risk factors for pressure ulcer development  - Assess and document skin integrity  - Assess and document dressing/inci position while performing ADLs such as feeding, grooming, and bathing  - Educate and encourage patient/family in tolerated functional activity level and precautions during self-care    Outcome: Progressing     Problem: Patient/Family Goals  Goal: Patient/F management  - Manage/alleviate anxiety  - Utilize distraction and/or relaxation techniques  - Monitor for opioid side effects  - Notify MD/LIP if interventions unsuccessful or patient reports new pain  - Anticipate increased pain with activity and pre-medi infection  Description  INTERVENTIONS:  - Assess and document risk factors for pressure ulcer development  - Assess and document skin integrity  - Assess and document dressing/incision, wound bed, drain sites and surrounding tissue  - Implement wound care Educate and encourage patient/family in tolerated functional activity level and precautions during self-care    Outcome: Progressing

## 2019-09-28 NOTE — PHYSICAL THERAPY NOTE
PHYSICAL THERAPY KNEE TREATMENT NOTE - INPATIENT     Room Number: 423/423-A             Presenting Problem: pt with severe end stage left knee OA ---pt is s/p left TKA. pt is WBAT left LE post sx .  PMH sign  for Bipolar / Schizophrenia   B RICHARD and Right el motion;Transfer training    SUBJECTIVE  I like to work with you. You so nice to me.     OBJECTIVE  Precautions: Bed/chair alarm;Limb alert - left(after PM session  per ortho no knee flexion )    WEIGHT BEARING STATUS  Weight Bearing Restriction: L lower ext knee flexion 0 reps 0 reps   Extension stretch  1x 1x         Knee ROM      L Knee Flexion (degrees): 95     L Knee Extension (degrees): -12    Patient End of Session: Needs met; In bed;RN aware of session/findings; All patient questions and concerns address

## 2019-09-28 NOTE — PROGRESS NOTES
[facility] Hospitalist Progress Note     CC: Hospital Follow up    PCP: Amrita Geronimo MD       Assessment/Plan:     Active Problems:    Osteoarthritis    62 y/o f w x of depression, htn, gerd, hl, bipolar, hx of cdiff, RA on mtx s/p left total knee replacement for Labs   Lab 09/24/19  1148 09/26/19  1412 09/27/19  0442 09/27/19  0931   GLU 98  --  117*  --    BUN 5.0*  --  16  --    CREATSERUM 0.30*  --  0.82  --    GFRAA  --   --  92  --    GFRNAA  --   --  80  --    CA 9.9  --  8.7  --    * 137 139  --    K

## 2019-09-29 PROCEDURE — 97110 THERAPEUTIC EXERCISES: CPT

## 2019-09-29 PROCEDURE — 85027 COMPLETE CBC AUTOMATED: CPT | Performed by: PHYSICIAN ASSISTANT

## 2019-09-29 PROCEDURE — 97116 GAIT TRAINING THERAPY: CPT

## 2019-09-29 NOTE — PROGRESS NOTES
[facility] Hospitalist Progress Note     CC: Hospital Follow up    PCP: Jose Mejia MD       Assessment/Plan:     Active Problems:    Osteoarthritis    62 y/o f w x of depression, htn, gerd, hl, bipolar, hx of cdiff, RA on mtx s/p left total knee replacement for 33.0 32.9   RDW 15.0 15.3* 15.0   WBC 9.6 7.5 7.3   .0 301.0 309.0         Recent Labs   Lab 09/24/19  1148 09/26/19  1412 09/27/19  0442 09/27/19  1751   GLU 98  --  117*  --    BUN 5.0*  --  16  --    CREATSERUM 0.30*  --  0.82  --    GFRAA  --

## 2019-09-29 NOTE — PLAN OF CARE
Problem: PAIN - ADULT  Goal: Verbalizes/displays adequate comfort level or patient's stated pain goal  Description  INTERVENTIONS:  - Encourage pt to monitor pain and request assistance  - Assess pain using appropriate pain scale  - Administer analgesics patient's ability to be responsible for managing their own health  - Refer to Case Management Department for coordinating discharge planning if the patient needs post-hospital services based on physician/LIP order or complex needs related to functional sta mobility and gait  - Educate and engage patient/family in tolerated activity level and precautions  Outcome: Progressing     Problem: Impaired Activities of Daily Living  Goal: Achieve highest/safest level of independence in self care  Description  Interve

## 2019-09-29 NOTE — PROGRESS NOTES
POD#3 s/p Left TKA  Pain well controlled on orals  Rehab progressing    Labs pending    Dressing clean and dry  Calf's non tender, DNVI    Imp: ortho stable POD#3 s/p Left TKA    Plan: Rehab as tolerated s/p Left TKA  Transfer to rehab when bed available

## 2019-09-29 NOTE — PHYSICAL THERAPY NOTE
PHYSICAL THERAPY KNEE TREATMENT NOTE - INPATIENT     Room Number: 423/423-A             Presenting Problem: pt with severe end stage left knee OA ---pt is s/p left TKA. pt is WBAT left LE post sx .  PMH sign  for Bipolar / Schizophrenia   B RICHARD and Right el Sitting: Good  Dynamic Sitting: Fair +  Static Standing: Fair -  Dynamic Standing: Poor +    ACTIVITY TOLERANCE                         O2 WALK                  AM-PAC '6-Clicks' INPATIENT SHORT FORM - BASIC MOBILITY  How much difficulty does the patient c #3 Patient is able to ambulate  150  feet with assistive device at assistance level: modified independent    Goal #3   Current Status Min assist 60 ft with RW   Goal #4    Goal #4   Current Status Gentle ROM L knee by MD.    In progress   Goal #5   Current

## 2019-09-30 VITALS
BODY MASS INDEX: 19.17 KG/M2 | RESPIRATION RATE: 16 BRPM | SYSTOLIC BLOOD PRESSURE: 136 MMHG | DIASTOLIC BLOOD PRESSURE: 93 MMHG | HEIGHT: 58 IN | WEIGHT: 91.31 LBS | OXYGEN SATURATION: 99 % | HEART RATE: 74 BPM | TEMPERATURE: 98 F

## 2019-09-30 PROCEDURE — 97530 THERAPEUTIC ACTIVITIES: CPT

## 2019-09-30 PROCEDURE — 97116 GAIT TRAINING THERAPY: CPT

## 2019-09-30 NOTE — PAYOR COMM NOTE
--------------  ADMISSION REVIEW     Payor: [address] #:  [id number]  Authorization Number: [id number]    Admit date: 9/26/19  Admit time: 200       Admitting Physician: Archana Frank MD  Attending Physician:  Ace Atkinson Rfl: 0   traMADol HCl 50 MG Oral Tab Take 1 tablet (50 mg total) by mouth every 6 (six) hours as needed for Pain.  Disp: 90 tablet Rfl: 2   AMLODIPINE BESYLATE 5 MG Oral Tab TAKE 1 TABLET BY MOUTH EVERY DAY Disp: 90 tablet Rfl: 1   OMEPRAZOLE 40 MG Oral Cap enlargment/tenderness/nodules appreciated   Lungs:   Clear to auscultation bilaterally. Normal effort   Chest wall:  No tenderness or deformity.    Heart:  Regular rate and rhythm, S1, S2 normal, no murmur, rub or gallop appreciated   Abdomen:   Soft, non-t    Active Problems:    Osteoarthritis     62 y/o f w x of depression, htn, gerd, hl, bipolar, hx of cdiff, RA on mtx s/p left total knee replacement for OA     OA s/p L TKR  -cont iv/po pain meds     Anemia: acute blood loss, hgb ok     RA: hold mtx    309.0                   Recent Labs   Lab 09/24/19  1148 09/26/19  1412 09/27/19  0442 09/27/19  1751   GLU 98  --  117*  --    BUN 5.0*  --  16  --    CREATSERUM 0.30*  --  0.82  --    GFRAA  --   --  92  --    GFRNAA  --   --  80  --    CA 9.9  --  8.7 well with ROM, no subluxation on exam     Imp: POD#2 s/p Left TKA - stable      Plan: Discussed rehab and need to keep foot pointed forward and prevent Valgus stress to TKA  Dressing changed  325 ASA for 4 weeks  Follow up in 2 weeks  Transfer to rehab whe bladder.     EQUIPMENT USED:  Persona Sydnie Total Knee system with a size 3 narrow left femoral component, a size C tibial component, a 14 mm constrained polyethylene, a 30 mm tibial stem, and a 32 mm round patella.     COMPLICATIONS:  None.     INDICATIO the valgus alignment and abnormalities in the lateral femoral condyle, 5 degrees of external rotation was selected for rotation using [employer]. Distal cuts were made.   Attention was then directed to the tibia where intramedullary and extramedullary followed by the constrained polyethylene being locked into position. The leg was brought out to full extension. The patella was clamped on a fresh bed of cement. All components were held under compression until the cement fully hardened.   During this ti

## 2019-09-30 NOTE — CM/SW NOTE
4:36pm----------------------    MONTSERRAT received call from RN who stated pt is ready for discharge today. MONTSERRAT received insurance authorization for the pt to discharge. Insurance [place] number is [id number]    MONTSERRAT called and spoke with Adrian Stein to arrange a time for dis

## 2019-09-30 NOTE — PLAN OF CARE
Pt pain is best managed by rest, straightening knee, and ice packs. Pt ambulated over 100ft total today. Tolerated well. Pt plan to discharge to [facility]. Authorization complete.  Pt is resting in bed, ice packs on knee, call light within Assess pain using appropriate pain scale  - Administer analgesics based on type and severity of pain and evaluate response  - Implement non-pharmacological measures as appropriate and evaluate response  - Consider cultural and social influences on pain and POLST form as appropriate  - Assess patient's ability to be responsible for managing their own health  - Refer to Case Management Department for coordinating discharge planning if the patient needs post-hospital services based on physician/LIP order or com with patient and family use of appropriate assistive device and precautions (e.g. spinal or hip dislocation precautions)  9/30/2019 1622 by Radha Cai RN  Outcome: Adequate for Discharge  9/30/2019 1622 by Radha Cai RN  Outcome: Progressing

## 2019-09-30 NOTE — PROGRESS NOTES
[facility] Hospitalist Progress Note     CC: Hospital Follow up    PCP: Tony Newsome MD       Assessment/Plan:     Active Problems:    Osteoarthritis    62 y/o f w x of depression, htn, gerd, hl, bipolar, hx of cdiff, RA on mtx s/p left total knee replacement for 33.0 32.9   RDW 15.0 15.3* 15.0   WBC 9.6 7.5 7.3   .0 301.0 309.0         Recent Labs   Lab 09/24/19  1148 09/26/19  1412 09/27/19  0442 09/27/19  1751   GLU 98  --  117*  --    BUN 5.0*  --  16  --    CREATSERUM 0.30*  --  0.82  --    GFRAA  --

## 2019-09-30 NOTE — PLAN OF CARE
Maggi Turner, noticed her knee cap would move side to side when working with therapy. Per Kel mendoza, physical therapist, Dr. Samuel Green said to avoid knee flexion at this time. No change in WB status. No knee immobilizer needed.  Dr. Samuel Green will assess pt in morning

## 2019-09-30 NOTE — PLAN OF CARE
Report given to nurse at [facility].     Problem: Patient/Family Goals  Goal: Patient/Family Long Term Goal  Description  Patient's Long Term Goal:     Interventions:  -   - See additional Care Plan goals for specific interventions  9/30/2019 6010 evaluate response  - Consider cultural and social influences on pain and pain management  - Manage/alleviate anxiety  - Utilize distraction and/or relaxation techniques  - Monitor for opioid side effects  - Notify MD/LIP if interventions unsuccessful or pa patient's ability to be responsible for managing their own health  - Refer to Case Management Department for coordinating discharge planning if the patient needs post-hospital services based on physician/LIP order or complex needs related to functional sta Progressing  Goal: Maintain proper alignment of affected body part  Description  INTERVENTIONS:  - Support and protect limb and body alignment per provider's orders  - Instruct and reinforce with patient and family use of appropriate assistive device and p

## 2019-09-30 NOTE — PHYSICAL THERAPY NOTE
PHYSICAL THERAPY KNEE TREATMENT NOTE - INPATIENT     Room Number: 423/423-A             Presenting Problem: pt with severe end stage left knee OA ---pt is s/p left TKA. pt is WBAT left LE post sx .  PMH sign  for Bipolar / Schizophrenia   B RICHARD and Right el patient currently have. ..  -   Turning over in bed (including adjusting bedclothes, sheets and blankets)?: A Little   -   Sitting down on and standing up from a chair with arms (e.g., wheelchair, bedside commode, etc.): A Little   -   Moving from lying on exercises as appropriate    Goal #6  Current Status IN PROGRESS

## 2019-09-30 NOTE — DIETARY NOTE
Brief Nutrition Note:    Chart review and visit due to BMI of 19.08 kg/m2 and noted wt loss hx. Pt eating 100% of meals. Pt reports she has intentionally been trying to lose wt. \"I am only 4'10\". \" Looks well nourished per visual exam--thin, mild defici

## 2019-09-30 NOTE — PLAN OF CARE
Problem: Patient Centered Care  Goal: Patient preferences are identified and integrated in the patient's plan of care  Description  Interventions  - What would you like us to know as we care for you?   - Provide timely, complete, and accurate informatio Encourage toileting schedule  Outcome: Progressing     Problem: DISCHARGE PLANNING  Goal: Discharge to home or other facility with appropriate resources  Description  INTERVENTIONS:  - Identify barriers to discharge w/pt and caregiver  - Include patient/fa mobilization  - Obtain PT/OT consults as needed  - Advance activity as appropriate  - Communicate ordered activity level and limitations with patient/family  Outcome: Progressing  Goal: Maintain proper alignment of affected body part  Description  INTERVEN

## 2019-10-01 NOTE — PAYOR COMM NOTE
--------------  DISCHARGE REVIEW    Payor: Shannon Arcos [address] [place] [address] #:  [id number]  Authorization Number: [id number]    Admit date: 9/26/19  Admit time:  9761  Discharge Date: 9/30/2019  7:33 PM    Requesting extension to 9/30. Thank you.       Ad MONTSERRAT followed up with [facility] this morning regarding insurance authorization.  [facility] stated they did not receive additional PT/OT information therefore would need SW to fax it over to [phone].      MONTSERRAT faxed over the PT/OT information again for

## 2019-10-09 PROBLEM — Z96.652 S/P TOTAL KNEE REPLACEMENT, LEFT: Status: ACTIVE | Noted: 2019-10-09

## 2019-10-23 NOTE — DISCHARGE SUMMARY
General Medicine Discharge Summary     Patient ID:  Aleshia Andrade  63year old  9/28/1960    Admit date: 9/26/2019    Discharge date and time: 9/30/2019  7:33 PM     Attending Physician: No att. providers found     Consults: IP CONSULT TO CASE MANAGEMENT daily.     gabapentin 300 MG Caps  Commonly known as:  NEURONTIN  1 capsule the morning prior to surgery, 1 capsule the morning of surgery, 1 capsule at bedtime each evening after surgery.      HYDROcodone-acetaminophen 5-325 MG Tabs  Commonly known as:  NO

## 2019-11-18 NOTE — PAT NURSING NOTE
Spoke with patient about need for preadmission testing and went over medication instructions with her.  She also asked that I call Meggan who will be giving her a ride to hospital. A message was left on her VM with instructions of where to check in on day

## 2019-11-19 ENCOUNTER — LAB ENCOUNTER (OUTPATIENT)
Dept: LAB | Age: 59
DRG: 489 | End: 2019-11-19
Attending: ORTHOPAEDIC SURGERY
Payer: MEDICARE

## 2019-11-19 DIAGNOSIS — Z01.818 PREOP TESTING: ICD-10-CM

## 2019-11-19 PROCEDURE — 87641 MR-STAPH DNA AMP PROBE: CPT

## 2019-11-19 PROCEDURE — 86901 BLOOD TYPING SEROLOGIC RH(D): CPT

## 2019-11-19 PROCEDURE — 86850 RBC ANTIBODY SCREEN: CPT

## 2019-11-19 PROCEDURE — 36415 COLL VENOUS BLD VENIPUNCTURE: CPT

## 2019-11-19 PROCEDURE — 86900 BLOOD TYPING SEROLOGIC ABO: CPT

## 2019-11-19 NOTE — H&P
[facility]    PATIENT'S NAME: Елена Fierro   ATTENDING PHYSICIAN: Pilo Eldridge MD   PATIENT ACCOUNT#:   [id number]    LOCATION:    MEDICAL RECORD #:   [id number]       YOB: 1960  ADMISSION DATE:       11/21/2019    HISTORY A Surgeries include the recent left total knee replacement and a left elbow replacement. She has also had bilateral total hips replaced without surgical or anesthetic complications.     MEDICATIONS:  Her medications include Tylenol, Celebrex, gabapentin, and strain. IMPRESSION:  Unstable left knee, status post left total knee replacement, with lateral dislocation of the patella and some medial collateral ligament laxity.     PLAN:  Revision of the left knee, including patellar realignment, possible revision

## 2019-11-21 ENCOUNTER — ANESTHESIA (OUTPATIENT)
Dept: SURGERY | Facility: HOSPITAL | Age: 59
DRG: 489 | End: 2019-11-21
Payer: MEDICARE

## 2019-11-21 ENCOUNTER — ANESTHESIA EVENT (OUTPATIENT)
Dept: SURGERY | Facility: HOSPITAL | Age: 59
DRG: 489 | End: 2019-11-21
Payer: MEDICARE

## 2019-11-21 ENCOUNTER — HOSPITAL ENCOUNTER (INPATIENT)
Facility: HOSPITAL | Age: 59
LOS: 1 days | Discharge: HOME HEALTH | DRG: 489 | End: 2019-11-22
Attending: ORTHOPAEDIC SURGERY | Admitting: ORTHOPAEDIC SURGERY
Payer: MEDICARE

## 2019-11-21 ENCOUNTER — APPOINTMENT (OUTPATIENT)
Dept: GENERAL RADIOLOGY | Facility: HOSPITAL | Age: 59
DRG: 489 | End: 2019-11-21
Attending: PHYSICIAN ASSISTANT
Payer: MEDICARE

## 2019-11-21 DIAGNOSIS — Z96.652 STATUS POST TOTAL KNEE REPLACEMENT, LEFT: ICD-10-CM

## 2019-11-21 DIAGNOSIS — S83.005D DISLOCATION OF LEFT PATELLA, SUBSEQUENT ENCOUNTER: ICD-10-CM

## 2019-11-21 DIAGNOSIS — Z01.818 PREOP TESTING: Primary | ICD-10-CM

## 2019-11-21 DIAGNOSIS — M23.8X2 JOINT LAXITY OF LEFT KNEE: ICD-10-CM

## 2019-11-21 PROBLEM — T84.84XA PAINFUL TOTAL KNEE REPLACEMENT, LEFT (HCC): Status: ACTIVE | Noted: 2019-11-21

## 2019-11-21 PROBLEM — T84.84XA PAINFUL TOTAL KNEE REPLACEMENT, LEFT: Status: ACTIVE | Noted: 2019-11-21

## 2019-11-21 PROCEDURE — 0SPD09Z REMOVAL OF LINER FROM LEFT KNEE JOINT, OPEN APPROACH: ICD-10-PCS | Performed by: ORTHOPAEDIC SURGERY

## 2019-11-21 PROCEDURE — 0QSF0ZZ REPOSITION LEFT PATELLA, OPEN APPROACH: ICD-10-PCS | Performed by: ORTHOPAEDIC SURGERY

## 2019-11-21 PROCEDURE — 88300 SURGICAL PATH GROSS: CPT | Performed by: ORTHOPAEDIC SURGERY

## 2019-11-21 PROCEDURE — 0SUW09Z SUPPLEMENT LEFT KNEE JOINT, TIBIAL SURFACE WITH LINER, OPEN APPROACH: ICD-10-PCS | Performed by: ORTHOPAEDIC SURGERY

## 2019-11-21 PROCEDURE — 73560 X-RAY EXAM OF KNEE 1 OR 2: CPT | Performed by: PHYSICIAN ASSISTANT

## 2019-11-21 RX ORDER — ONDANSETRON 2 MG/ML
4 INJECTION INTRAMUSCULAR; INTRAVENOUS ONCE AS NEEDED
Status: DISCONTINUED | OUTPATIENT
Start: 2019-11-21 | End: 2019-11-21 | Stop reason: HOSPADM

## 2019-11-21 RX ORDER — SODIUM PHOSPHATE, DIBASIC AND SODIUM PHOSPHATE, MONOBASIC 7; 19 G/133ML; G/133ML
1 ENEMA RECTAL ONCE AS NEEDED
Status: DISCONTINUED | OUTPATIENT
Start: 2019-11-21 | End: 2019-11-22

## 2019-11-21 RX ORDER — ASPIRIN 325 MG
325 TABLET ORAL 2 TIMES DAILY
Status: DISCONTINUED | OUTPATIENT
Start: 2019-11-21 | End: 2019-11-22

## 2019-11-21 RX ORDER — HYDROMORPHONE HYDROCHLORIDE 1 MG/ML
0.8 INJECTION, SOLUTION INTRAMUSCULAR; INTRAVENOUS; SUBCUTANEOUS EVERY 2 HOUR PRN
Status: DISCONTINUED | OUTPATIENT
Start: 2019-11-21 | End: 2019-11-22

## 2019-11-21 RX ORDER — ONDANSETRON 2 MG/ML
INJECTION INTRAMUSCULAR; INTRAVENOUS AS NEEDED
Status: DISCONTINUED | OUTPATIENT
Start: 2019-11-21 | End: 2019-11-21 | Stop reason: SURG

## 2019-11-21 RX ORDER — HYDROCODONE BITARTRATE AND ACETAMINOPHEN 10; 325 MG/1; MG/1
1 TABLET ORAL EVERY 4 HOURS PRN
Status: DISCONTINUED | OUTPATIENT
Start: 2019-11-21 | End: 2019-11-22

## 2019-11-21 RX ORDER — CELECOXIB 200 MG/1
200 CAPSULE ORAL EVERY 12 HOURS SCHEDULED
Status: DISCONTINUED | OUTPATIENT
Start: 2019-11-22 | End: 2019-11-22

## 2019-11-21 RX ORDER — HYDROMORPHONE HYDROCHLORIDE 1 MG/ML
0.4 INJECTION, SOLUTION INTRAMUSCULAR; INTRAVENOUS; SUBCUTANEOUS EVERY 2 HOUR PRN
Status: DISCONTINUED | OUTPATIENT
Start: 2019-11-21 | End: 2019-11-22

## 2019-11-21 RX ORDER — SODIUM CHLORIDE 0.9 % (FLUSH) 0.9 %
10 SYRINGE (ML) INJECTION AS NEEDED
Status: DISCONTINUED | OUTPATIENT
Start: 2019-11-21 | End: 2019-11-22

## 2019-11-21 RX ORDER — HYDROMORPHONE HYDROCHLORIDE 1 MG/ML
0.4 INJECTION, SOLUTION INTRAMUSCULAR; INTRAVENOUS; SUBCUTANEOUS EVERY 5 MIN PRN
Status: DISCONTINUED | OUTPATIENT
Start: 2019-11-21 | End: 2019-11-21 | Stop reason: HOSPADM

## 2019-11-21 RX ORDER — SENNOSIDES 8.6 MG
17.2 TABLET ORAL NIGHTLY
Status: DISCONTINUED | OUTPATIENT
Start: 2019-11-21 | End: 2019-11-22

## 2019-11-21 RX ORDER — HYDROCODONE BITARTRATE AND ACETAMINOPHEN 5; 325 MG/1; MG/1
2 TABLET ORAL AS NEEDED
Status: DISCONTINUED | OUTPATIENT
Start: 2019-11-21 | End: 2019-11-21 | Stop reason: HOSPADM

## 2019-11-21 RX ORDER — MORPHINE SULFATE 4 MG/ML
4 INJECTION, SOLUTION INTRAMUSCULAR; INTRAVENOUS EVERY 10 MIN PRN
Status: DISCONTINUED | OUTPATIENT
Start: 2019-11-21 | End: 2019-11-21 | Stop reason: HOSPADM

## 2019-11-21 RX ORDER — PROCHLORPERAZINE EDISYLATE 5 MG/ML
10 INJECTION INTRAMUSCULAR; INTRAVENOUS EVERY 6 HOURS PRN
Status: DISCONTINUED | OUTPATIENT
Start: 2019-11-21 | End: 2019-11-22

## 2019-11-21 RX ORDER — HYDROCODONE BITARTRATE AND ACETAMINOPHEN 10; 325 MG/1; MG/1
2 TABLET ORAL EVERY 4 HOURS PRN
Status: DISCONTINUED | OUTPATIENT
Start: 2019-11-21 | End: 2019-11-22

## 2019-11-21 RX ORDER — HYDROMORPHONE HYDROCHLORIDE 1 MG/ML
1.2 INJECTION, SOLUTION INTRAMUSCULAR; INTRAVENOUS; SUBCUTANEOUS EVERY 2 HOUR PRN
Status: DISCONTINUED | OUTPATIENT
Start: 2019-11-21 | End: 2019-11-22

## 2019-11-21 RX ORDER — CEFAZOLIN SODIUM/WATER 2 G/20 ML
2 SYRINGE (ML) INTRAVENOUS ONCE
Status: COMPLETED | OUTPATIENT
Start: 2019-11-21 | End: 2019-11-21

## 2019-11-21 RX ORDER — LIDOCAINE HYDROCHLORIDE 10 MG/ML
INJECTION, SOLUTION EPIDURAL; INFILTRATION; INTRACAUDAL; PERINEURAL AS NEEDED
Status: DISCONTINUED | OUTPATIENT
Start: 2019-11-21 | End: 2019-11-21 | Stop reason: SURG

## 2019-11-21 RX ORDER — MAGNESIUM HYDROXIDE 1200 MG/15ML
LIQUID ORAL CONTINUOUS PRN
Status: COMPLETED | OUTPATIENT
Start: 2019-11-21 | End: 2019-11-21

## 2019-11-21 RX ORDER — MORPHINE SULFATE 4 MG/ML
2 INJECTION, SOLUTION INTRAMUSCULAR; INTRAVENOUS EVERY 10 MIN PRN
Status: DISCONTINUED | OUTPATIENT
Start: 2019-11-21 | End: 2019-11-21 | Stop reason: HOSPADM

## 2019-11-21 RX ORDER — POLYETHYLENE GLYCOL 3350 17 G/17G
17 POWDER, FOR SOLUTION ORAL DAILY PRN
Status: DISCONTINUED | OUTPATIENT
Start: 2019-11-21 | End: 2019-11-22

## 2019-11-21 RX ORDER — HALOPERIDOL 5 MG/ML
0.25 INJECTION INTRAMUSCULAR ONCE AS NEEDED
Status: DISCONTINUED | OUTPATIENT
Start: 2019-11-21 | End: 2019-11-21 | Stop reason: HOSPADM

## 2019-11-21 RX ORDER — DIPHENHYDRAMINE HYDROCHLORIDE 50 MG/ML
25 INJECTION INTRAMUSCULAR; INTRAVENOUS ONCE AS NEEDED
Status: ACTIVE | OUTPATIENT
Start: 2019-11-21 | End: 2019-11-21

## 2019-11-21 RX ORDER — ONDANSETRON 2 MG/ML
4 INJECTION INTRAMUSCULAR; INTRAVENOUS EVERY 4 HOURS PRN
Status: DISCONTINUED | OUTPATIENT
Start: 2019-11-21 | End: 2019-11-22

## 2019-11-21 RX ORDER — SODIUM CHLORIDE, SODIUM LACTATE, POTASSIUM CHLORIDE, CALCIUM CHLORIDE 600; 310; 30; 20 MG/100ML; MG/100ML; MG/100ML; MG/100ML
INJECTION, SOLUTION INTRAVENOUS CONTINUOUS
Status: DISCONTINUED | OUTPATIENT
Start: 2019-11-21 | End: 2019-11-22

## 2019-11-21 RX ORDER — METOCLOPRAMIDE HYDROCHLORIDE 5 MG/ML
10 INJECTION INTRAMUSCULAR; INTRAVENOUS EVERY 6 HOURS PRN
Status: DISCONTINUED | OUTPATIENT
Start: 2019-11-21 | End: 2019-11-22

## 2019-11-21 RX ORDER — CEFAZOLIN SODIUM/WATER 2 G/20 ML
2 SYRINGE (ML) INTRAVENOUS EVERY 8 HOURS
Status: COMPLETED | OUTPATIENT
Start: 2019-11-21 | End: 2019-11-22

## 2019-11-21 RX ORDER — GABAPENTIN 300 MG/1
300 CAPSULE ORAL NIGHTLY
Status: DISCONTINUED | OUTPATIENT
Start: 2019-11-21 | End: 2019-11-22

## 2019-11-21 RX ORDER — HYDROMORPHONE HYDROCHLORIDE 1 MG/ML
0.6 INJECTION, SOLUTION INTRAMUSCULAR; INTRAVENOUS; SUBCUTANEOUS EVERY 5 MIN PRN
Status: DISCONTINUED | OUTPATIENT
Start: 2019-11-21 | End: 2019-11-21 | Stop reason: HOSPADM

## 2019-11-21 RX ORDER — ACETAMINOPHEN 500 MG
1000 TABLET ORAL ONCE
Status: COMPLETED | OUTPATIENT
Start: 2019-11-21 | End: 2019-11-21

## 2019-11-21 RX ORDER — DIPHENHYDRAMINE HYDROCHLORIDE 50 MG/ML
12.5 INJECTION INTRAMUSCULAR; INTRAVENOUS EVERY 4 HOURS PRN
Status: DISCONTINUED | OUTPATIENT
Start: 2019-11-21 | End: 2019-11-22

## 2019-11-21 RX ORDER — MORPHINE SULFATE 10 MG/ML
6 INJECTION, SOLUTION INTRAMUSCULAR; INTRAVENOUS EVERY 10 MIN PRN
Status: DISCONTINUED | OUTPATIENT
Start: 2019-11-21 | End: 2019-11-21 | Stop reason: HOSPADM

## 2019-11-21 RX ORDER — HYDROCODONE BITARTRATE AND ACETAMINOPHEN 5; 325 MG/1; MG/1
1 TABLET ORAL AS NEEDED
Status: DISCONTINUED | OUTPATIENT
Start: 2019-11-21 | End: 2019-11-21 | Stop reason: HOSPADM

## 2019-11-21 RX ORDER — FAMOTIDINE 20 MG/1
20 TABLET ORAL ONCE
Status: DISCONTINUED | OUTPATIENT
Start: 2019-11-21 | End: 2019-11-21 | Stop reason: HOSPADM

## 2019-11-21 RX ORDER — NALOXONE HYDROCHLORIDE 0.4 MG/ML
80 INJECTION, SOLUTION INTRAMUSCULAR; INTRAVENOUS; SUBCUTANEOUS AS NEEDED
Status: DISCONTINUED | OUTPATIENT
Start: 2019-11-21 | End: 2019-11-21 | Stop reason: HOSPADM

## 2019-11-21 RX ORDER — HYDROMORPHONE HYDROCHLORIDE 1 MG/ML
0.2 INJECTION, SOLUTION INTRAMUSCULAR; INTRAVENOUS; SUBCUTANEOUS EVERY 5 MIN PRN
Status: DISCONTINUED | OUTPATIENT
Start: 2019-11-21 | End: 2019-11-21 | Stop reason: HOSPADM

## 2019-11-21 RX ORDER — SODIUM CHLORIDE 9 MG/ML
INJECTION, SOLUTION INTRAVENOUS CONTINUOUS
Status: DISCONTINUED | OUTPATIENT
Start: 2019-11-21 | End: 2019-11-22

## 2019-11-21 RX ORDER — PROCHLORPERAZINE EDISYLATE 5 MG/ML
5 INJECTION INTRAMUSCULAR; INTRAVENOUS ONCE AS NEEDED
Status: DISCONTINUED | OUTPATIENT
Start: 2019-11-21 | End: 2019-11-21 | Stop reason: HOSPADM

## 2019-11-21 RX ORDER — DIPHENHYDRAMINE HCL 25 MG
25 CAPSULE ORAL EVERY 4 HOURS PRN
Status: DISCONTINUED | OUTPATIENT
Start: 2019-11-21 | End: 2019-11-22

## 2019-11-21 RX ORDER — DOCUSATE SODIUM 100 MG/1
100 CAPSULE, LIQUID FILLED ORAL 2 TIMES DAILY
Status: DISCONTINUED | OUTPATIENT
Start: 2019-11-21 | End: 2019-11-22

## 2019-11-21 RX ORDER — MIDAZOLAM HYDROCHLORIDE 1 MG/ML
INJECTION INTRAMUSCULAR; INTRAVENOUS AS NEEDED
Status: DISCONTINUED | OUTPATIENT
Start: 2019-11-21 | End: 2019-11-21 | Stop reason: SURG

## 2019-11-21 RX ORDER — BISACODYL 10 MG
10 SUPPOSITORY, RECTAL RECTAL
Status: DISCONTINUED | OUTPATIENT
Start: 2019-11-21 | End: 2019-11-22

## 2019-11-21 RX ORDER — DEXAMETHASONE SODIUM PHOSPHATE 4 MG/ML
VIAL (ML) INJECTION AS NEEDED
Status: DISCONTINUED | OUTPATIENT
Start: 2019-11-21 | End: 2019-11-21 | Stop reason: SURG

## 2019-11-21 RX ORDER — SODIUM CHLORIDE, SODIUM LACTATE, POTASSIUM CHLORIDE, CALCIUM CHLORIDE 600; 310; 30; 20 MG/100ML; MG/100ML; MG/100ML; MG/100ML
INJECTION, SOLUTION INTRAVENOUS CONTINUOUS
Status: DISCONTINUED | OUTPATIENT
Start: 2019-11-21 | End: 2019-11-21 | Stop reason: HOSPADM

## 2019-11-21 RX ADMIN — CEFAZOLIN SODIUM/WATER 2 G: 2 G/20 ML SYRINGE (ML) INTRAVENOUS at 11:38:00

## 2019-11-21 RX ADMIN — DEXAMETHASONE SODIUM PHOSPHATE 4 MG: 4 MG/ML VIAL (ML) INJECTION at 11:39:00

## 2019-11-21 RX ADMIN — MIDAZOLAM HYDROCHLORIDE 1 MG: 1 INJECTION INTRAMUSCULAR; INTRAVENOUS at 11:32:00

## 2019-11-21 RX ADMIN — SODIUM CHLORIDE, SODIUM LACTATE, POTASSIUM CHLORIDE, CALCIUM CHLORIDE: 600; 310; 30; 20 INJECTION, SOLUTION INTRAVENOUS at 12:52:00

## 2019-11-21 RX ADMIN — ONDANSETRON 4 MG: 2 INJECTION INTRAMUSCULAR; INTRAVENOUS at 12:31:00

## 2019-11-21 RX ADMIN — LIDOCAINE HYDROCHLORIDE 50 MG: 10 INJECTION, SOLUTION EPIDURAL; INFILTRATION; INTRACAUDAL; PERINEURAL at 11:36:00

## 2019-11-21 NOTE — ANESTHESIA PREPROCEDURE EVALUATION
Anesthesia PreOp Note    HPI:     Kirk Donovan is a 63year old female who presents for preoperative consultation requested by: Godwin Pratt MD    Date of Surgery: 11/21/2019    Procedure(s):  KNEE TOTAL REVISION  Indication: Dislocation of left pa manic depressive & bypolar disease,, [address]   • Depression    • Esophageal reflux    • GERD (gastroesophageal reflux disease) 8/15/2011   • High blood pressure    • High cholesterol    • MRSA (methicillin resistant Staphylococcus aur AMLODIPINE BESYLATE 5 MG Oral Tab, TAKE 1 TABLET BY MOUTH EVERY DAY, Disp: 90 tablet, Rfl: 1, 11/20/2019 at Unknown time  OMEPRAZOLE 40 MG Oral Capsule Delayed Release, TAKE 1 CAPSULE BY MOUTH EVERY DAY, Disp: 90 capsule, Rfl: 1, 11/21/2019 at Unknown time Tranexamic Acid (CYKLOKAPRON) 3,000 mg in sodium chloride 0.9% 100 mL IRRIGATION ONLY (NOT FOR IV USE), 3,000 mg, Irrigation, Once (Intra-Op), Bernabe Chan MD    No current [facility]-ordered outpatient medications on file.         Nuts Fear of current or ex partner: Not on file        Emotionally abused: Not on file        Physically abused: Not on file        Forced sexual activity: Not on file    Other Topics      Concerns:        Not on file    Social History Narrative      Unem (-) pacemaker, valvular problems/murmurs, past MI, CAD, CABG/stent, dysrhythmias, angina, CHF    Rhythm: regular  Rate: normal    Neuro/Psych    (+)  bipolar disorder, depression,     (-) seizures, TIA, CVA    GI/Hepatic/Renal    (+) GERD,   (-) liver dise

## 2019-11-21 NOTE — ANESTHESIA POSTPROCEDURE EVALUATION
Patient: Laura Bhagat    Procedure Summary     Date:  11/21/19 Room / Location:  [facility] OR  / [facility] OR    Anesthesia Start:  4439 Anesthesia Stop:      Procedure:  KNEE TOTAL REVISION (Left Knee) Diagnosis:       Dislocation of left patella, subseq

## 2019-11-21 NOTE — H&P
[facility] Hospitalist H&P       CC: No chief complaint on file.        PCP: Gilma Jenkins MD    History of Present Illness: 64 y/o f w w hx of depression, htn, gerd, hl, bipolar, hx of cdiff, RA on mtx s/p left total knee replacement for OA 10/23/19 now here for for Pain., Disp: , Rfl:   celecoxib (CELEBREX) 200 MG Oral Cap, TAKE 2 CAPS THE DAY PRIOR TO SURGERY, 2 CAPS THE MORNING OF SURGERY WITH A SIP OF WATER, 1 CAP DAILY AFTER SURGERY, Disp: 34 capsule, Rfl: 0  gabapentin 300 MG Oral Cap, 1 capsule the morning standard drinks       Fam Hx  History reviewed. No pertinent family history. Review of Systems  Comprehensive ROS reviewed and negative except for what's stated above.      OBJECTIVE:  BP (!) 166/95   Pulse 88   Temp 97.9 °F (36.6 °C)   Resp 18   Ht 4' 9 anemia  -monitor while on iv/po pain meds      RA: hold mtx     Bipolar: cont olanzapine/prolixin and other psych meds     HTN: norvasc     Hx of Cdiff      FN:  - IVF:none  - Diet:cardiac    DVT Prophy:per ortho      Dispo: pending clinical course    Outp

## 2019-11-21 NOTE — INTERVAL H&P NOTE
Pre-op Diagnosis: Dislocation of left patella, subsequent encounter [S83.005D]  Status post total knee replacement, left [Z96.652]  Joint laxity of left knee [M23.8X2]    The above referenced H&P was reviewed by Juan Allen MD on 11/21/2019, the pat

## 2019-11-21 NOTE — ANESTHESIA PROCEDURE NOTES
Airway  Date/Time: 11/21/2019 11:40 AM  Urgency: Elective    Airway not difficult    General Information and Staff    Patient location during procedure: OR  Anesthesiologist: Yajaira Torres MD  Performed: anesthesiologist     Indications and Patient Condi

## 2019-11-22 VITALS
TEMPERATURE: 99 F | DIASTOLIC BLOOD PRESSURE: 81 MMHG | SYSTOLIC BLOOD PRESSURE: 141 MMHG | RESPIRATION RATE: 16 BRPM | OXYGEN SATURATION: 97 % | HEIGHT: 57 IN | BODY MASS INDEX: 20.06 KG/M2 | WEIGHT: 93 LBS | HEART RATE: 58 BPM

## 2019-11-22 PROCEDURE — 97116 GAIT TRAINING THERAPY: CPT

## 2019-11-22 PROCEDURE — 97535 SELF CARE MNGMENT TRAINING: CPT

## 2019-11-22 PROCEDURE — 97161 PT EVAL LOW COMPLEX 20 MIN: CPT

## 2019-11-22 PROCEDURE — 80048 BASIC METABOLIC PNL TOTAL CA: CPT | Performed by: PHYSICIAN ASSISTANT

## 2019-11-22 PROCEDURE — 97165 OT EVAL LOW COMPLEX 30 MIN: CPT

## 2019-11-22 PROCEDURE — 85027 COMPLETE CBC AUTOMATED: CPT | Performed by: PHYSICIAN ASSISTANT

## 2019-11-22 RX ORDER — ASPIRIN 325 MG
325 TABLET ORAL 2 TIMES DAILY
Qty: 60 TABLET | Refills: 0 | Status: SHIPPED | OUTPATIENT
Start: 2019-11-22 | End: 2019-12-22

## 2019-11-22 NOTE — PHYSICAL THERAPY NOTE
PHYSICAL THERAPY EVALUATION - INPATIENT     Room Number: 432/432-A  Evaluation Date: 11/22/2019  Type of Evaluation: Initial   Physician Order: PT Eval and Treat    Presenting Problem: Painful total knee replacement s/p Left TKA Revision  Reason for [place] education; Endurance; Energy conservation; Family education;Gait training;Transfer training;Balance training  Rehab Potential : Good  Frequency (Obs): Daily       PHYSICAL THERAPY MEDICAL/SOCIAL HISTORY     History related to current admission: Patient with h of [place]: Prior to admission, patient was independent with functional mobility, ADLs, and driving. Does not have a vehicle. Patient lives alone.  Reports having a walker at home    SUBJECTIVE  Patient agreeable to therapy services     PHYSICAL THERAP Scale): 56.93   CMS Modifier (G-Code): CI    FUNCTIONAL ABILITY STATUS  Gait Assessment   Gait Assistance: Supervision;Modified independent  Distance (ft): 120  Assistive Device: Rolling walker  Pattern: L Decreased stance time;R Steppage;L Steppage;L Foot

## 2019-11-22 NOTE — OCCUPATIONAL THERAPY NOTE
OCCUPATIONAL THERAPY EVALUATION - INPATIENT     Room Number: 432/432-A  Evaluation Date: 11/22/2019  Type of Evaluation: Quick Eval  Presenting Problem: (s/p L TKA revision)    Physician Order: IP Consult to Occupational Therapy  Reason for Therapy: ADL/ Therapy needs  at this time. Patient will be discharged from Occupational Therapy services. Please re-order if a new functional limitation presents during this admission.     OCCUPATIONAL THERAPY MEDICAL/SOCIAL HISTORY     Problem List  Active Problems: )          Drives: No(friends assist or taxi)  Patient Regularly Uses: Glasses    Stairs in Home: 0  Use of Assistive Device(s): RW    Prior Level of [place]: pt was mod I for adls use of RW for balance, her friend ghazal Broderick helps with driving a fair+  Dynamic Standing: fair+    FUNCTIONAL ADL ASSESSMENT  Grooming: SPV standing at sink level   Feeding: INDEP  Bathing: SPV  Toileting: SPV  Upper Extremity Dressing: INDEP  Lower Extremity Dressing: SPV using AE         Patient End of Session: Up in

## 2019-11-22 NOTE — DISCHARGE SUMMARY
General Medicine Discharge Summary     Patient ID:  Chu Valadez  63year old  9/28/1960    Admit date: 11/21/2019    Discharge date and time: 11/22/19.      Attending Physician: Sergio aZbala MD     Consults: IP CONSULT TO HOSPITALIST  IP CONSULT TO STRENGTH     amLODIPine Besylate 5 MG Tabs  Commonly known as:  NORVASC  TAKE 1 TABLET BY MOUTH EVERY DAY     Benztropine Mesylate 1 MG Tabs  Commonly known as:  COGENTIN     celecoxib 200 MG Caps  Commonly known as:  CELEBREX  TAKE 2 CAPS THE DAY PRIOR TO

## 2019-11-22 NOTE — PLAN OF CARE
Problem: PAIN - ADULT  Goal: Verbalizes/displays adequate comfort level or patient's stated pain goal  Description  INTERVENTIONS:  - Encourage pt to monitor pain and request assistance  - Assess pain using appropriate pain scale  - Administer analgesics assist at discharge as needed  - Consider post-discharge preferences of patient/family/discharge partner  - Complete POLST form as appropriate  - Assess patient's ability to be responsible for managing their own health  - Refer to Case Management Renate Gain

## 2019-11-22 NOTE — CM/SW NOTE
MONTSERRAT received MDO for discharge planning with the pt. SW met with the pt at bedside. Pt is known to SW from prior recent visit. SW confirmed pt's information. Pt lives alone in an apt with one level. Pt used a rolling walker prior to hospitalization.  Pt stat

## 2019-11-22 NOTE — PAYOR COMM NOTE
--------------  ADMISSION REVIEW     Payor: [id number] [place] [address] #:  [id number]  Authorization Number: [id number]    Admit date: 11/21/19  Admit time: 5       Admitting Physician: Godwin Pratt MD  Attending Physician:  Lyle Torres reflux    • GERD (gastroesophageal reflux disease) 8/15/2011   • High blood pressure    • High cholesterol    • MRSA (methicillin resistant Staphylococcus aureus) infection 5/20/2013   • OSTEOARTHRITIS     hands and right hip   • Osteoarthritis    • OTHER (50 mg total) by mouth every 6 (six) hours as needed for Pain., Disp: 90 tablet, Rfl: 2  AMLODIPINE BESYLATE 5 MG Oral Tab, TAKE 1 TABLET BY MOUTH EVERY DAY, Disp: 90 tablet, Rfl: 1  OMEPRAZOLE 40 MG Oral Capsule Delayed Release, TAKE 1 CAPSULE BY MOUTH EV Nares normal. Septum midline. Mucosa normal. No drainage.    Throat: Lips, mucosa, and tongue normal. Teeth and gums normal.   Neck: Supple, symmetrical, trachea midline, no cervical or supraclavicular lymph adenopathy, thyroid: no enlargment/tenderness/nod understanding and agreeing with therapeutic plan as outlined    Thank Roland Jean-Baptiste MD    [facility] Hospitalist  Answering Service number: [phone]      Electronically signed by Lia Fierro MD on 11/21/2019  2:10 PM         MEDICATIONS ADMINISTERED IN L 11/21/2019 1355 Given 25 mcg Intravenous Giselle Hector RN    11/21/2019 1335 Given 25 mcg Intravenous Giselle Hector RN      gabapentin (NEURONTIN) cap 300 mg     Date Action Dose Route User    11/21/2019 2004 Given 300 mg Oral Anneliese Wheat, HOMERO      H 2004 Given 17.2 mg Oral Sandi Wheat, RN      sodium chloride 0.9 % irrigation     Date Action Dose Route User    11/21/2019 1236 New Bag 250 mL Irrigation (Left Knee) Donna Chan MD      0.9% NaCl infusion     Date Action Dose Route User

## 2019-11-22 NOTE — OPERATIVE REPORT
[facility]    PATIENT'S NAME: Vickruth ann Nino   ATTENDING PHYSICIAN: Pilo Jiménez MD   OPERATING PHYSICIAN: Pilo Jiménez MD   PATIENT ACCOUNT#:   [id number]    LOCATION:  [address] [place] [address] #:   [id number]       DATE OF BI preoperative antibiotics running. She elected for general anesthesia and underwent general anesthesia after she was carefully positioned on the operating room table in the supine position and all pressure points well padded.   After adequate anesthesia was release was performed. Position to the tibial and femoral components was found to be appropriate and no revision of the components was necessary.   The VMO was reefed and several sutures were placed while the knee was flexed and extended, and the patella t

## 2019-11-22 NOTE — PROGRESS NOTES
POD#1 s/p Left TKA revision  Pain well controlled on orals  No nausea    Lab with mild acute on chronic post op anemia - tolerated    Dressing clean and dry  Calf's non tender,   DNVI    Imp: Good early POD#1 s/p revision of Left TKA    Plan: Rehab in knee

## 2019-11-22 NOTE — PLAN OF CARE
Problem: Patient/Family Goals  Goal: Patient/Family Long Term Goal  Description  Patient's Long Term Goal:     Interventions:  -  - See additional Care Plan goals for specific interventions  Outcome: Progressing  Goal: Patient/Family Short Term Goal  Dustin [place] fall precautions as indicated by assessment.  - Educate pt/family on patient safety including physical limitations  - Instruct pt to call for assistance with activity based on assessment  - Modify environment to reduce risk of injury  - Provide a

## 2019-11-26 NOTE — PAYOR COMM NOTE
--------------  DISCHARGE REVIEW    Payor: [address] [place] [address] #:  [id number]  Authorization Number: [id number]    Admit date: 11/21/19  Admit time:  6757  Discharge Date: 11/22/2019  3:59 PM     Admitting Physician: Yashira Crocker MD (Left)     Imaging: Xr Knee (1 Or 2 Views), Left (cpt=73560)    Result Date: 11/21/2019  CONCLUSION:  1. Left knee arthroplasty.     Dictated by (CST): Tom Nogueira MD on 11/21/2019 at 16:03     Approved by (CST): Tom Nogueira MD on 11/21/2019 at 16:10 [id number] - [address] [facility] AT [address] [place] [address], [phone], [id number] [place] [address], [facility] Valentin Ruiz [address]    Phone:  [phone]   · aspirin 325 MG Tabs         FU      DC instructions:         Total Time Coordinat knee total knee replacement was discussed. Consent has been obtained.       OPERATIVE TECHNIQUE:    The patient was prepared in the preoperative staging area where the surgical site was confirmed and marked. All questions answered.   She was taken to the this.  Copious irrigation was performed. The 18 mm CCK polyethylene was locked into position and an additional locking screw was placed. Stability was excellent with this component and full extension was achievable.   Attention was directed to the patella

## 2020-01-03 RX ORDER — ASPIRIN 325 MG
TABLET, DELAYED RELEASE (ENTERIC COATED) ORAL
Qty: 60 TABLET | Refills: 0 | OUTPATIENT
Start: 2020-01-03

## 2020-01-03 NOTE — TELEPHONE ENCOUNTER
Medication(s) to Refill:   Requested Prescriptions     Pending Prescriptions Disp Refills   • ASPIRIN  MG Oral Tab EC [Pharmacy Med Name: ASPIRIN 325MG EC TABLETS] 60 tablet 0     Sig: TAKE 1 TABLET BY MOUTH TWICE DAILY.          Reason for Medication

## 2020-01-04 NOTE — TELEPHONE ENCOUNTER
Requested Prescriptions     Refused Prescriptions Disp Refills   • ASPIRIN  MG Oral Tab EC [Pharmacy Med Name: ASPIRIN 325MG EC TABLETS] 60 tablet 0     Sig: TAKE 1 TABLET BY MOUTH TWICE DAILY.      Refused By: Jillian Ornelas     Reason for Refusa

## 2020-02-03 PROBLEM — M06.09 RHEUMATOID ARTHRITIS OF MULTIPLE SITES WITH NEGATIVE RHEUMATOID FACTOR (HCC): Status: ACTIVE | Noted: 2020-02-03

## 2020-02-24 ENCOUNTER — HOSPITAL ENCOUNTER (EMERGENCY)
Facility: HOSPITAL | Age: 60
Discharge: HOME | End: 2020-02-24
Attending: EMERGENCY MEDICINE
Payer: MEDICARE

## 2020-02-24 ENCOUNTER — APPOINTMENT (OUTPATIENT)
Dept: GENERAL RADIOLOGY | Facility: HOSPITAL | Age: 60
End: 2020-02-24
Attending: EMERGENCY MEDICINE
Payer: MEDICARE

## 2020-02-24 VITALS
SYSTOLIC BLOOD PRESSURE: 133 MMHG | DIASTOLIC BLOOD PRESSURE: 82 MMHG | TEMPERATURE: 98 F | HEART RATE: 65 BPM | HEIGHT: 58 IN | RESPIRATION RATE: 16 BRPM | WEIGHT: 107 LBS | OXYGEN SATURATION: 100 % | BODY MASS INDEX: 22.46 KG/M2

## 2020-02-24 DIAGNOSIS — M79.631 RIGHT FOREARM PAIN: Primary | ICD-10-CM

## 2020-02-24 DIAGNOSIS — Z87.81 HX OF FRACTURE: ICD-10-CM

## 2020-02-24 PROCEDURE — 73090 X-RAY EXAM OF FOREARM: CPT | Performed by: EMERGENCY MEDICINE

## 2020-02-24 PROCEDURE — 99283 EMERGENCY DEPT VISIT LOW MDM: CPT

## 2020-02-24 RX ORDER — HYDROCODONE BITARTRATE AND ACETAMINOPHEN 5; 325 MG/1; MG/1
1 TABLET ORAL ONCE
Status: COMPLETED | OUTPATIENT
Start: 2020-02-24 | End: 2020-02-24

## 2020-02-25 NOTE — ED NOTES
Assumed care of patient from triage. Patient to ED from home for right elbow pain. Patient reports hx of fracture, scheduled for surgery on 3/4/20. Patient denies known injury.  Patient has +3 swelling to right arm, states that it has not changed since she

## 2020-02-25 NOTE — ED NOTES
Patient left prior to reviewing discharge information. This RN did not see patient leave department.  No IV in.

## 2020-02-25 NOTE — ED INITIAL ASSESSMENT (HPI)
C/o R elbow since today, states she fractured it about a month ago, scheduled surgery on march 4th, pain has increased, radial pulse palpable

## 2020-02-25 NOTE — ED PROVIDER NOTES
Patient Seen in: [facility] AND [facility] Emergency Department      History   Patient presents with:  Pain    Stated Complaint: broken right arm,    HPI    46-year-old female presents the ER with complaint of right forearm pain.   Patient states that she has a h Use      Smoking status: Former Smoker        Packs/day: 1.00        Years: 15.00        Pack years: 13        Quit date: 2000        Years since quittin.9      Smokeless tobacco: Never Used    Alcohol use: No      Alcohol/week: 0.0 standard drink Status: She is alert and oriented to person, place, and time. Mental status is at baseline.    Psychiatric:         Mood and Affect: Mood normal.               ED Course   Labs Reviewed - No data to display       Right forearm x-ray unchanged from previous

## 2020-02-28 ENCOUNTER — APPOINTMENT (OUTPATIENT)
Dept: ULTRASOUND IMAGING | Facility: HOSPITAL | Age: 60
End: 2020-02-28
Attending: EMERGENCY MEDICINE
Payer: MEDICARE

## 2020-02-28 ENCOUNTER — HOSPITAL ENCOUNTER (EMERGENCY)
Facility: HOSPITAL | Age: 60
Discharge: HOME | End: 2020-02-28
Attending: EMERGENCY MEDICINE
Payer: MEDICARE

## 2020-02-28 ENCOUNTER — APPOINTMENT (OUTPATIENT)
Dept: CT IMAGING | Facility: HOSPITAL | Age: 60
End: 2020-02-28
Attending: EMERGENCY MEDICINE
Payer: MEDICARE

## 2020-02-28 VITALS
HEART RATE: 66 BPM | OXYGEN SATURATION: 98 % | RESPIRATION RATE: 18 BRPM | SYSTOLIC BLOOD PRESSURE: 133 MMHG | TEMPERATURE: 98 F | DIASTOLIC BLOOD PRESSURE: 70 MMHG

## 2020-02-28 DIAGNOSIS — T14.8XXA HEMATOMA: Primary | ICD-10-CM

## 2020-02-28 DIAGNOSIS — S52.91XK CLOSED FRACTURE OF RIGHT RADIUS AND ULNA WITH NONUNION, SUBSEQUENT ENCOUNTER: ICD-10-CM

## 2020-02-28 DIAGNOSIS — S52.201K CLOSED FRACTURE OF RIGHT RADIUS AND ULNA WITH NONUNION, SUBSEQUENT ENCOUNTER: ICD-10-CM

## 2020-02-28 DIAGNOSIS — T84.039A PROSTHETIC JOINT LOOSENING, INITIAL ENCOUNTER (HCC): ICD-10-CM

## 2020-02-28 PROCEDURE — 99284 EMERGENCY DEPT VISIT MOD MDM: CPT

## 2020-02-28 PROCEDURE — 96372 THER/PROPH/DIAG INJ SC/IM: CPT

## 2020-02-28 PROCEDURE — 73200 CT UPPER EXTREMITY W/O DYE: CPT | Performed by: EMERGENCY MEDICINE

## 2020-02-28 PROCEDURE — 93971 EXTREMITY STUDY: CPT | Performed by: EMERGENCY MEDICINE

## 2020-02-28 RX ORDER — HYDROCODONE BITARTRATE AND ACETAMINOPHEN 5; 325 MG/1; MG/1
1 TABLET ORAL EVERY 6 HOURS PRN
Qty: 10 TABLET | Refills: 0 | Status: SHIPPED | OUTPATIENT
Start: 2020-02-28 | End: 2020-03-24

## 2020-02-28 RX ORDER — HYDROCODONE BITARTRATE AND ACETAMINOPHEN 5; 325 MG/1; MG/1
1 TABLET ORAL ONCE
Status: COMPLETED | OUTPATIENT
Start: 2020-02-28 | End: 2020-02-28

## 2020-02-28 RX ORDER — MORPHINE SULFATE 4 MG/ML
4 INJECTION, SOLUTION INTRAMUSCULAR; INTRAVENOUS ONCE
Status: COMPLETED | OUTPATIENT
Start: 2020-02-28 | End: 2020-02-28

## 2020-02-28 NOTE — ED INITIAL ASSESSMENT (HPI)
Right comminuted proximal ulna fx sustained about a month ago and scheduled for surgery Monday to address fracture. Pt seen 2/24 at [address] [place] [address] for increased pain to the forearm and swelling. Pt reports pain and swelling has increased since.  Brisk cap refill, palp

## 2020-02-29 NOTE — ED PROVIDER NOTES
Patient Seen in: [facility] AND [facility] Emergency Department      History   Patient presents with:  Swelling Edema  Arm Pain    Stated Complaint: arm swelling after post mold placement monday    HPI    64 y/o female w/ known R radius/ulna fx here for eval of 15.00        Pack years: 13        Quit date: 2000        Years since quittin.9      Smokeless tobacco: Never Used    Alcohol use: No      Alcohol/week: 0.0 standard drinks    Drug use:  No             Review of Systems    Positive for stated compl the proximal ulna with extrusion of the ulnar component. There appears to be subtle areas of callus formation. There is no bony bridging. There is extensive osseous debris seen in the region of the proximal ulna.   There appears to be a Madelung deformit There is a comminuted fracture seen involving the proximal ulna with apex radial angulation. There has been cortical breakthrough and protrusion of the ulnar stem of  the arthroplasty which is now seen within the dorsal soft tissues.   There has been resec venous ultrasound of the right upper extremity was performed in the usual manner. FINDINGS: The internal jugular, subclavian, axillary, brachial, cephalic and basilic veins appear normal.  Flow was demonstrated with color and pulsed Doppler.   Comparison s intact after splint placement. Patient comfortable. Instructed on risk and elevation. Sling placed for comfort as well. Patient tolerated procedure well.

## 2020-03-02 ENCOUNTER — APPOINTMENT (OUTPATIENT)
Dept: GENERAL RADIOLOGY | Facility: HOSPITAL | Age: 60
End: 2020-03-02
Attending: ORTHOPAEDIC SURGERY
Payer: MEDICARE

## 2020-03-02 ENCOUNTER — ANESTHESIA (OUTPATIENT)
Dept: SURGERY | Facility: HOSPITAL | Age: 60
End: 2020-03-02
Payer: MEDICARE

## 2020-03-02 ENCOUNTER — ANESTHESIA EVENT (OUTPATIENT)
Dept: SURGERY | Facility: HOSPITAL | Age: 60
End: 2020-03-02
Payer: MEDICARE

## 2020-03-02 ENCOUNTER — HOSPITAL ENCOUNTER (OUTPATIENT)
Facility: HOSPITAL | Age: 60
Setting detail: OBSERVATION
Discharge: HOME | End: 2020-03-03
Attending: ORTHOPAEDIC SURGERY | Admitting: ORTHOPAEDIC SURGERY
Payer: MEDICARE

## 2020-03-02 DIAGNOSIS — S52.221S: ICD-10-CM

## 2020-03-02 DIAGNOSIS — Z01.818 PREOP TESTING: Primary | ICD-10-CM

## 2020-03-02 PROCEDURE — 87641 MR-STAPH DNA AMP PROBE: CPT | Performed by: ORTHOPAEDIC SURGERY

## 2020-03-02 PROCEDURE — 0PUK0KZ SUPPLEMENT RIGHT ULNA WITH NONAUTOLOGOUS TISSUE SUBSTITUTE, OPEN APPROACH: ICD-10-PCS | Performed by: ORTHOPAEDIC SURGERY

## 2020-03-02 PROCEDURE — 64415 NJX AA&/STRD BRCH PLXS IMG: CPT | Performed by: ORTHOPAEDIC SURGERY

## 2020-03-02 PROCEDURE — 3E0T3BZ INTRODUCTION OF ANESTHETIC AGENT INTO PERIPHERAL NERVES AND PLEXI, PERCUTANEOUS APPROACH: ICD-10-PCS | Performed by: ANESTHESIOLOGY

## 2020-03-02 PROCEDURE — 88311 DECALCIFY TISSUE: CPT | Performed by: ORTHOPAEDIC SURGERY

## 2020-03-02 PROCEDURE — 0RRL0JZ REPLACEMENT OF RIGHT ELBOW JOINT WITH SYNTHETIC SUBSTITUTE, OPEN APPROACH: ICD-10-PCS | Performed by: ORTHOPAEDIC SURGERY

## 2020-03-02 PROCEDURE — 76000 FLUOROSCOPY <1 HR PHYS/QHP: CPT | Performed by: ORTHOPAEDIC SURGERY

## 2020-03-02 PROCEDURE — 88305 TISSUE EXAM BY PATHOLOGIST: CPT | Performed by: ORTHOPAEDIC SURGERY

## 2020-03-02 PROCEDURE — 99152 MOD SED SAME PHYS/QHP 5/>YRS: CPT | Performed by: ORTHOPAEDIC SURGERY

## 2020-03-02 PROCEDURE — 0RPL0JZ REMOVAL OF SYNTHETIC SUBSTITUTE FROM RIGHT ELBOW JOINT, OPEN APPROACH: ICD-10-PCS | Performed by: ORTHOPAEDIC SURGERY

## 2020-03-02 PROCEDURE — 76942 ECHO GUIDE FOR BIOPSY: CPT | Performed by: ORTHOPAEDIC SURGERY

## 2020-03-02 PROCEDURE — 88300 SURGICAL PATH GROSS: CPT | Performed by: ORTHOPAEDIC SURGERY

## 2020-03-02 DEVICE — PLATE LOCK COMM 3.5X14H: Type: IMPLANTABLE DEVICE | Status: FUNCTIONAL

## 2020-03-02 DEVICE — SCREW CORT NON LOCK 3.5X16: Type: IMPLANTABLE DEVICE | Status: FUNCTIONAL

## 2020-03-02 DEVICE — SCREW CORT LOCK PURPLE 3.5X12: Type: IMPLANTABLE DEVICE | Status: FUNCTIONAL

## 2020-03-02 DEVICE — CEMENT BONE RADIOPAQ HOWMEDICA: Type: IMPLANTABLE DEVICE | Status: FUNCTIONAL

## 2020-03-02 DEVICE — IMPLANTABLE DEVICE: Type: IMPLANTABLE DEVICE | Status: FUNCTIONAL

## 2020-03-02 DEVICE — SCREW CORT LOCK PURPLE 3.5X16: Type: IMPLANTABLE DEVICE | Status: FUNCTIONAL

## 2020-03-02 DEVICE — SCREW CORT LOCK PURPLE 3.5X18: Type: IMPLANTABLE DEVICE | Status: FUNCTIONAL

## 2020-03-02 DEVICE — SCREW CORT LOCK PURPLE 3.5X10: Type: IMPLANTABLE DEVICE | Status: FUNCTIONAL

## 2020-03-02 RX ORDER — MORPHINE SULFATE 10 MG/ML
6 INJECTION, SOLUTION INTRAMUSCULAR; INTRAVENOUS EVERY 10 MIN PRN
Status: DISCONTINUED | OUTPATIENT
Start: 2020-03-02 | End: 2020-03-02 | Stop reason: HOSPADM

## 2020-03-02 RX ORDER — FAMOTIDINE 20 MG/1
20 TABLET ORAL ONCE
Status: DISCONTINUED | OUTPATIENT
Start: 2020-03-02 | End: 2020-03-02 | Stop reason: HOSPADM

## 2020-03-02 RX ORDER — DOCUSATE SODIUM 100 MG/1
100 CAPSULE, LIQUID FILLED ORAL 2 TIMES DAILY
Status: DISCONTINUED | OUTPATIENT
Start: 2020-03-02 | End: 2020-03-03

## 2020-03-02 RX ORDER — LIDOCAINE HYDROCHLORIDE 10 MG/ML
INJECTION, SOLUTION EPIDURAL; INFILTRATION; INTRACAUDAL; PERINEURAL AS NEEDED
Status: DISCONTINUED | OUTPATIENT
Start: 2020-03-02 | End: 2020-03-02 | Stop reason: SURG

## 2020-03-02 RX ORDER — HYDROCODONE BITARTRATE AND ACETAMINOPHEN 5; 325 MG/1; MG/1
2 TABLET ORAL AS NEEDED
Status: DISCONTINUED | OUTPATIENT
Start: 2020-03-02 | End: 2020-03-02 | Stop reason: HOSPADM

## 2020-03-02 RX ORDER — GLYCOPYRROLATE 0.2 MG/ML
INJECTION INTRAMUSCULAR; INTRAVENOUS AS NEEDED
Status: DISCONTINUED | OUTPATIENT
Start: 2020-03-02 | End: 2020-03-02 | Stop reason: SURG

## 2020-03-02 RX ORDER — ONDANSETRON 2 MG/ML
4 INJECTION INTRAMUSCULAR; INTRAVENOUS ONCE AS NEEDED
Status: DISCONTINUED | OUTPATIENT
Start: 2020-03-02 | End: 2020-03-02 | Stop reason: HOSPADM

## 2020-03-02 RX ORDER — NALOXONE HYDROCHLORIDE 0.4 MG/ML
80 INJECTION, SOLUTION INTRAMUSCULAR; INTRAVENOUS; SUBCUTANEOUS AS NEEDED
Status: DISCONTINUED | OUTPATIENT
Start: 2020-03-02 | End: 2020-03-02 | Stop reason: HOSPADM

## 2020-03-02 RX ORDER — HYDROMORPHONE HYDROCHLORIDE 1 MG/ML
0.2 INJECTION, SOLUTION INTRAMUSCULAR; INTRAVENOUS; SUBCUTANEOUS EVERY 5 MIN PRN
Status: DISCONTINUED | OUTPATIENT
Start: 2020-03-02 | End: 2020-03-02 | Stop reason: HOSPADM

## 2020-03-02 RX ORDER — PANTOPRAZOLE SODIUM 20 MG/1
20 TABLET, DELAYED RELEASE ORAL
Status: DISCONTINUED | OUTPATIENT
Start: 2020-03-03 | End: 2020-03-03

## 2020-03-02 RX ORDER — LORAZEPAM 1 MG/1
1 TABLET ORAL EVERY 8 HOURS PRN
Status: DISCONTINUED | OUTPATIENT
Start: 2020-03-02 | End: 2020-03-03

## 2020-03-02 RX ORDER — ACETAMINOPHEN 500 MG
1000 TABLET ORAL ONCE
Status: DISCONTINUED | OUTPATIENT
Start: 2020-03-02 | End: 2020-03-02 | Stop reason: HOSPADM

## 2020-03-02 RX ORDER — CEFAZOLIN SODIUM/WATER 2 G/20 ML
2 SYRINGE (ML) INTRAVENOUS ONCE
Status: DISCONTINUED | OUTPATIENT
Start: 2020-03-02 | End: 2020-03-02 | Stop reason: HOSPADM

## 2020-03-02 RX ORDER — POLYETHYLENE GLYCOL 3350 17 G/17G
17 POWDER, FOR SOLUTION ORAL DAILY PRN
Status: DISCONTINUED | OUTPATIENT
Start: 2020-03-02 | End: 2020-03-03

## 2020-03-02 RX ORDER — SODIUM CHLORIDE, SODIUM LACTATE, POTASSIUM CHLORIDE, CALCIUM CHLORIDE 600; 310; 30; 20 MG/100ML; MG/100ML; MG/100ML; MG/100ML
INJECTION, SOLUTION INTRAVENOUS CONTINUOUS
Status: DISCONTINUED | OUTPATIENT
Start: 2020-03-02 | End: 2020-03-03

## 2020-03-02 RX ORDER — AMLODIPINE BESYLATE 5 MG/1
5 TABLET ORAL
Status: DISCONTINUED | OUTPATIENT
Start: 2020-03-03 | End: 2020-03-03

## 2020-03-02 RX ORDER — BENZTROPINE MESYLATE 0.5 MG/1
0.5 TABLET ORAL 2 TIMES DAILY
Status: DISCONTINUED | OUTPATIENT
Start: 2020-03-02 | End: 2020-03-03

## 2020-03-02 RX ORDER — HYDROMORPHONE HYDROCHLORIDE 1 MG/ML
0.4 INJECTION, SOLUTION INTRAMUSCULAR; INTRAVENOUS; SUBCUTANEOUS EVERY 5 MIN PRN
Status: DISCONTINUED | OUTPATIENT
Start: 2020-03-02 | End: 2020-03-02 | Stop reason: HOSPADM

## 2020-03-02 RX ORDER — ACETAMINOPHEN 325 MG/1
650 TABLET ORAL EVERY 4 HOURS PRN
Status: DISCONTINUED | OUTPATIENT
Start: 2020-03-02 | End: 2020-03-03

## 2020-03-02 RX ORDER — SODIUM CHLORIDE, SODIUM LACTATE, POTASSIUM CHLORIDE, CALCIUM CHLORIDE 600; 310; 30; 20 MG/100ML; MG/100ML; MG/100ML; MG/100ML
INJECTION, SOLUTION INTRAVENOUS CONTINUOUS
Status: DISCONTINUED | OUTPATIENT
Start: 2020-03-02 | End: 2020-03-02 | Stop reason: HOSPADM

## 2020-03-02 RX ORDER — MORPHINE SULFATE 4 MG/ML
4 INJECTION, SOLUTION INTRAMUSCULAR; INTRAVENOUS EVERY 10 MIN PRN
Status: DISCONTINUED | OUTPATIENT
Start: 2020-03-02 | End: 2020-03-02 | Stop reason: HOSPADM

## 2020-03-02 RX ORDER — HALOPERIDOL 5 MG/ML
0.25 INJECTION INTRAMUSCULAR ONCE AS NEEDED
Status: DISCONTINUED | OUTPATIENT
Start: 2020-03-02 | End: 2020-03-02 | Stop reason: HOSPADM

## 2020-03-02 RX ORDER — METOCLOPRAMIDE 10 MG/1
10 TABLET ORAL ONCE
Status: DISCONTINUED | OUTPATIENT
Start: 2020-03-02 | End: 2020-03-02 | Stop reason: HOSPADM

## 2020-03-02 RX ORDER — PROCHLORPERAZINE EDISYLATE 5 MG/ML
5 INJECTION INTRAMUSCULAR; INTRAVENOUS ONCE AS NEEDED
Status: DISCONTINUED | OUTPATIENT
Start: 2020-03-02 | End: 2020-03-02 | Stop reason: HOSPADM

## 2020-03-02 RX ORDER — MIDAZOLAM HYDROCHLORIDE 1 MG/ML
INJECTION INTRAMUSCULAR; INTRAVENOUS AS NEEDED
Status: DISCONTINUED | OUTPATIENT
Start: 2020-03-02 | End: 2020-03-02 | Stop reason: SURG

## 2020-03-02 RX ORDER — VANCOMYCIN HYDROCHLORIDE 1 G/20ML
INJECTION, POWDER, LYOPHILIZED, FOR SOLUTION INTRAVENOUS AS NEEDED
Status: DISCONTINUED | OUTPATIENT
Start: 2020-03-02 | End: 2020-03-02 | Stop reason: HOSPADM

## 2020-03-02 RX ORDER — DIVALPROEX SODIUM 500 MG/1
500 TABLET, EXTENDED RELEASE ORAL NIGHTLY
Status: DISCONTINUED | OUTPATIENT
Start: 2020-03-02 | End: 2020-03-03

## 2020-03-02 RX ORDER — MORPHINE SULFATE 2 MG/ML
1 INJECTION, SOLUTION INTRAMUSCULAR; INTRAVENOUS EVERY 2 HOUR PRN
Status: DISCONTINUED | OUTPATIENT
Start: 2020-03-02 | End: 2020-03-03

## 2020-03-02 RX ORDER — DEXAMETHASONE SODIUM PHOSPHATE 10 MG/ML
INJECTION, SOLUTION INTRAMUSCULAR; INTRAVENOUS AS NEEDED
Status: DISCONTINUED | OUTPATIENT
Start: 2020-03-02 | End: 2020-03-02 | Stop reason: SURG

## 2020-03-02 RX ORDER — SODIUM CHLORIDE 0.9 % (FLUSH) 0.9 %
10 SYRINGE (ML) INJECTION AS NEEDED
Status: DISCONTINUED | OUTPATIENT
Start: 2020-03-02 | End: 2020-03-03

## 2020-03-02 RX ORDER — DEXAMETHASONE SODIUM PHOSPHATE 4 MG/ML
VIAL (ML) INJECTION AS NEEDED
Status: DISCONTINUED | OUTPATIENT
Start: 2020-03-02 | End: 2020-03-02 | Stop reason: SURG

## 2020-03-02 RX ORDER — ROPIVACAINE HYDROCHLORIDE 5 MG/ML
INJECTION, SOLUTION EPIDURAL; INFILTRATION; PERINEURAL AS NEEDED
Status: DISCONTINUED | OUTPATIENT
Start: 2020-03-02 | End: 2020-03-02 | Stop reason: SURG

## 2020-03-02 RX ORDER — MORPHINE SULFATE 4 MG/ML
2 INJECTION, SOLUTION INTRAMUSCULAR; INTRAVENOUS EVERY 10 MIN PRN
Status: DISCONTINUED | OUTPATIENT
Start: 2020-03-02 | End: 2020-03-02 | Stop reason: HOSPADM

## 2020-03-02 RX ORDER — HYDROCODONE BITARTRATE AND ACETAMINOPHEN 5; 325 MG/1; MG/1
2 TABLET ORAL EVERY 4 HOURS PRN
Status: DISCONTINUED | OUTPATIENT
Start: 2020-03-02 | End: 2020-03-03

## 2020-03-02 RX ORDER — LIDOCAINE HYDROCHLORIDE 40 MG/ML
SOLUTION TOPICAL AS NEEDED
Status: DISCONTINUED | OUTPATIENT
Start: 2020-03-02 | End: 2020-03-02 | Stop reason: SURG

## 2020-03-02 RX ORDER — HYDROCODONE BITARTRATE AND ACETAMINOPHEN 5; 325 MG/1; MG/1
1 TABLET ORAL EVERY 4 HOURS PRN
Status: DISCONTINUED | OUTPATIENT
Start: 2020-03-02 | End: 2020-03-03

## 2020-03-02 RX ORDER — CEFAZOLIN SODIUM 1 G/3ML
INJECTION, POWDER, FOR SOLUTION INTRAMUSCULAR; INTRAVENOUS AS NEEDED
Status: DISCONTINUED | OUTPATIENT
Start: 2020-03-02 | End: 2020-03-02 | Stop reason: SURG

## 2020-03-02 RX ORDER — SODIUM PHOSPHATE, DIBASIC AND SODIUM PHOSPHATE, MONOBASIC 7; 19 G/133ML; G/133ML
1 ENEMA RECTAL ONCE AS NEEDED
Status: DISCONTINUED | OUTPATIENT
Start: 2020-03-02 | End: 2020-03-03

## 2020-03-02 RX ORDER — ROCURONIUM BROMIDE 10 MG/ML
INJECTION, SOLUTION INTRAVENOUS AS NEEDED
Status: DISCONTINUED | OUTPATIENT
Start: 2020-03-02 | End: 2020-03-02 | Stop reason: SURG

## 2020-03-02 RX ORDER — HYDROMORPHONE HYDROCHLORIDE 1 MG/ML
0.6 INJECTION, SOLUTION INTRAMUSCULAR; INTRAVENOUS; SUBCUTANEOUS EVERY 5 MIN PRN
Status: DISCONTINUED | OUTPATIENT
Start: 2020-03-02 | End: 2020-03-02 | Stop reason: HOSPADM

## 2020-03-02 RX ORDER — CEFAZOLIN SODIUM/WATER 2 G/20 ML
2 SYRINGE (ML) INTRAVENOUS EVERY 8 HOURS
Status: COMPLETED | OUTPATIENT
Start: 2020-03-02 | End: 2020-03-03

## 2020-03-02 RX ORDER — BISACODYL 10 MG
10 SUPPOSITORY, RECTAL RECTAL
Status: DISCONTINUED | OUTPATIENT
Start: 2020-03-02 | End: 2020-03-03

## 2020-03-02 RX ORDER — HYDROCODONE BITARTRATE AND ACETAMINOPHEN 5; 325 MG/1; MG/1
1 TABLET ORAL AS NEEDED
Status: DISCONTINUED | OUTPATIENT
Start: 2020-03-02 | End: 2020-03-02 | Stop reason: HOSPADM

## 2020-03-02 RX ORDER — ONDANSETRON 2 MG/ML
INJECTION INTRAMUSCULAR; INTRAVENOUS AS NEEDED
Status: DISCONTINUED | OUTPATIENT
Start: 2020-03-02 | End: 2020-03-02 | Stop reason: SURG

## 2020-03-02 RX ORDER — FLUPHENAZINE HYDROCHLORIDE 2.5 MG/1
5 TABLET ORAL 2 TIMES DAILY WITH MEALS
Status: DISCONTINUED | OUTPATIENT
Start: 2020-03-02 | End: 2020-03-03

## 2020-03-02 RX ORDER — FOLIC ACID 1 MG/1
1 TABLET ORAL DAILY
Status: DISCONTINUED | OUTPATIENT
Start: 2020-03-03 | End: 2020-03-03

## 2020-03-02 RX ADMIN — ONDANSETRON 4 MG: 2 INJECTION INTRAMUSCULAR; INTRAVENOUS at 17:05:00

## 2020-03-02 RX ADMIN — ROCURONIUM BROMIDE 10 MG: 10 INJECTION, SOLUTION INTRAVENOUS at 13:44:00

## 2020-03-02 RX ADMIN — GLYCOPYRROLATE 0.2 MG: 0.2 INJECTION INTRAMUSCULAR; INTRAVENOUS at 13:44:00

## 2020-03-02 RX ADMIN — CEFAZOLIN SODIUM 2 G: 1 INJECTION, POWDER, FOR SOLUTION INTRAMUSCULAR; INTRAVENOUS at 13:50:00

## 2020-03-02 RX ADMIN — LIDOCAINE HYDROCHLORIDE 4 ML: 40 SOLUTION TOPICAL at 13:44:00

## 2020-03-02 RX ADMIN — SODIUM CHLORIDE, SODIUM LACTATE, POTASSIUM CHLORIDE, CALCIUM CHLORIDE: 600; 310; 30; 20 INJECTION, SOLUTION INTRAVENOUS at 13:44:00

## 2020-03-02 RX ADMIN — MIDAZOLAM HYDROCHLORIDE 2 MG: 1 INJECTION INTRAMUSCULAR; INTRAVENOUS at 12:39:00

## 2020-03-02 RX ADMIN — DEXAMETHASONE SODIUM PHOSPHATE 4 MG: 10 INJECTION, SOLUTION INTRAMUSCULAR; INTRAVENOUS at 12:45:00

## 2020-03-02 RX ADMIN — SODIUM CHLORIDE, SODIUM LACTATE, POTASSIUM CHLORIDE, CALCIUM CHLORIDE: 600; 310; 30; 20 INJECTION, SOLUTION INTRAVENOUS at 17:33:00

## 2020-03-02 RX ADMIN — ROPIVACAINE HYDROCHLORIDE 20 ML: 5 INJECTION, SOLUTION EPIDURAL; INFILTRATION; PERINEURAL at 12:45:00

## 2020-03-02 RX ADMIN — LIDOCAINE HYDROCHLORIDE 5 ML: 10 INJECTION, SOLUTION EPIDURAL; INFILTRATION; INTRACAUDAL; PERINEURAL at 12:42:00

## 2020-03-02 RX ADMIN — LIDOCAINE HYDROCHLORIDE 50 MG: 10 INJECTION, SOLUTION EPIDURAL; INFILTRATION; INTRACAUDAL; PERINEURAL at 13:44:00

## 2020-03-02 RX ADMIN — DEXAMETHASONE SODIUM PHOSPHATE 4 MG: 4 MG/ML VIAL (ML) INJECTION at 13:44:00

## 2020-03-02 NOTE — ANESTHESIA PROCEDURE NOTES
Peripheral Block  Performed by: Leanne Cummins DO  Authorized by: Leanne Cummins DO       General Information and Staff    Start Time:   Anesthesiologist: Leanne Cummins DO  Performed by:   Anesthesiologist  Patient Location:  PACU      Site Identification:

## 2020-03-02 NOTE — ANESTHESIA POSTPROCEDURE EVALUATION
Patient: Olivier Bolden    Procedure Summary     Date:  03/02/20 Room / Location:  [facility] OR  / [facility] OR    Anesthesia Start:  8486 Anesthesia Stop:  6831    Procedure:  ELBOW TOTAL REPLACEMENT (Right ) Diagnosis:       Closed displaced transverse fr

## 2020-03-02 NOTE — ANESTHESIA PREPROCEDURE EVALUATION
Anesthesia PreOp Note    HPI:     Hai Osorio is a 63year old female who presents for preoperative consultation requested by: Peyton Demarco MD    Date of Surgery: 3/2/2020    Procedure(s):  ELBOW TOTAL REPLACEMENT  Indication: Closed displaced transverse Past Medical History:   Diagnosis Date   • Bipolar affective ([facility] [address]    • C. difficile colitis 5/20/2013   • DEPRESSION     manic depressive & bypolar disease,[facility]   • Depression    • Esophageal reflux    • GERD (gastroesophageal re OMEPRAZOLE 40 MG Oral Capsule Delayed Release, TAKE 1 CAPSULE BY MOUTH EVERY DAY, Disp: 90 capsule, Rfl: 1, 3/2/2020 at 0830  Calcium Citrate (CITRACAL OR), Take 2 capsules by mouth daily. , Disp: , Rfl: , Taking  Cholecalciferol (D3 VITAMIN OR), Take 1 tab ceFAZolin sodium (ANCEF/KEFZOL) 2 GM/20ML premix IV syringe 2 g, 2 g, Intravenous, Once, Kwesi Donis MD  mupirocin (BACTROBAN) 2% nasal ointment OINT 1 Application, 1 Application, Each Nare, BID, Kwesi Donis MD    No current [facility]-ordered outpatient medicat Fear of current or ex partner: Not on file        Emotionally abused: Not on file        Physically abused: Not on file        Forced sexual activity: Not on file    Other Topics      Concerns:        Not on file    Social History Narrative      Unem Plan Comments: I have discussed the anesthetic plan, major risks and alternatives with the patient and answered all questions. The patient desires to proceed with surgery and anesthesia as planned.    I have informed Hemal Shoulders  of the risks of regional

## 2020-03-02 NOTE — ANESTHESIA PROCEDURE NOTES
Airway  Date/Time: 3/2/2020 1:46 PM  Urgency: Elective      General Information and Staff    Patient location during procedure: OR  Anesthesiologist: Sharon Ramachandran MD  Performed: anesthesiologist     Indications and Patient Condition  Indications for airway

## 2020-03-03 VITALS
DIASTOLIC BLOOD PRESSURE: 84 MMHG | RESPIRATION RATE: 16 BRPM | BODY MASS INDEX: 22.01 KG/M2 | HEART RATE: 87 BPM | OXYGEN SATURATION: 94 % | HEIGHT: 57 IN | TEMPERATURE: 99 F | WEIGHT: 102 LBS | SYSTOLIC BLOOD PRESSURE: 147 MMHG

## 2020-03-03 PROCEDURE — 80048 BASIC METABOLIC PNL TOTAL CA: CPT | Performed by: INTERNAL MEDICINE

## 2020-03-03 PROCEDURE — 97161 PT EVAL LOW COMPLEX 20 MIN: CPT

## 2020-03-03 PROCEDURE — 85027 COMPLETE CBC AUTOMATED: CPT | Performed by: INTERNAL MEDICINE

## 2020-03-03 PROCEDURE — 97530 THERAPEUTIC ACTIVITIES: CPT

## 2020-03-03 RX ORDER — HYDROCODONE BITARTRATE AND ACETAMINOPHEN 5; 325 MG/1; MG/1
1-2 TABLET ORAL EVERY 6 HOURS PRN
Qty: 30 TABLET | Refills: 0 | OUTPATIENT
Start: 2020-03-03

## 2020-03-03 NOTE — OPERATIVE REPORT
Operative Note    Patient: Ebonie Arriola MRN: [id number]     YOB: 1960  Age: 63year old  Sex: female    Unit: [address]// Room/Bed: 425425-A Location: [facility]     Date of Procedure: 3/2/2020   Preoperative Diagnosis: Right elbow betsy distally to allow for access to the ulnar shaft, skin flaps were elevated, the interval between the ECU and FCU was identified and elongated, this extended proximally, triceps on approach was then utilized to gain access to the ulna proximally, the triceps placed around the area of comminution.      X-rays were taken of the elbow demonstrated stable unchanged positionOf revision total elbow arthroplasty with no sign of loosening, stable fixation of the ulnar stem     Vancomycin powder was then placed into the [employer] N/A Right 2 Implanted   SCREW GELY LOCK PURPLE 3.5X18 - [id number]  SCREW GELY LOCK PURPLE 3.5X18   RU[employer]US [employer] N/A Right 1 Implanted   MULTIDIRECTIONAL SCREW 3.5X14 - [id number]  MULTIDIRECTIONAL SCREW 3.5X14   [employer] N/A Rig

## 2020-03-03 NOTE — PLAN OF CARE
VSS, no acute events overnight. Pt extremely anxious/agitated at start of shift. Pt expressing concerns that her \"genitals aren't right\" and that everything hurts. MD aware, pt medicated with Norco 5mg x2 Q4H PRN with morphine 1mg IV for breakthrough.  Pt appropriate  - Initiate Pressure Ulcer prevention bundle as indicated  Outcome: Progressing     Problem: Impaired Functional Mobility  Goal: Achieve highest/safest level of mobility/gait  Description  Interventions:  - Assess patient's functional ability a fall precautions as indicated by assessment.  - Educate pt/family on patient safety including physical limitations  - Instruct pt to call for assistance with activity based on assessment  - Modify environment to reduce risk of injury  - Provide assistive d

## 2020-03-03 NOTE — H&P
[facility] Hospitalist H&P     CC: No chief complaint on file.        PCP: Dianne Horne MD    Admission Date: 3/2/2020    ASSESSMENT / PLAN:   Ms is a 63year old female with PMH of c diff, bipolar, depression, GERD, HTN, schizophrenia, RA who presented for plan OSTEOARTHRITIS     hands and right hip   • Osteoarthritis    • OTHER DISEASES     bipolar disorder   • Prolonged Q-T interval on ECG 1/12/2016   • Rheumatoid arthritis ([facility] [address]    • S/P hip replacement, right 2/13/2019   • Schizophrenic disorder ([facility]    • Un Cholecalciferol (D3 VITAMIN OR), Take 1 tablet by mouth daily. , Disp: , Rfl:   Ascorbic Acid (VITAMIN C) 1000 MG Oral Tab, Take 1,000 mg by mouth daily. , Disp: , Rfl:   Multiple Vitamins-Minerals (MULTIVITAL) Oral Tab, Take 1 tablet by mouth daily. , Disp for input(s): TROP in the last 168 hours. Radiology: Xr Fluoroscopy C-arm Time <1 Hour  (cpt=76000)    Result Date: 3/2/2020  CONCLUSION:  Intraoperative fluoroscopy utilized during elbow arthroplasty revision.  Please see operative report for furthe

## 2020-03-03 NOTE — PLAN OF CARE
Problem: Patient Centered Care  Goal: Patient preferences are identified and integrated in the patient's plan of care  Description  Interventions:  - What would you like us to know as we care for you?  I live at home alone  - Provide timely, complete, and care  Description  Interventions:  - Assess ability and encourage patient to participate in ADLs to maximize function  - Promote sitting position while performing ADLs such as feeding, grooming, and bathing  - Educate and encourage patient/family in [place] Include patient/family/discharge partner in discharge planning  - Arrange for needed discharge resources and transportation as appropriate  - Identify discharge learning needs (meds, wound care, etc)  - Arrange for interpreters to assist at discharge as ne

## 2020-03-03 NOTE — CM/SW NOTE
10: 08AM MONTSERRAT received MDO for discharge planning. Pt is known to MONTSERRAT from previous visits. Pt lives alone in an apt with 1 level. Pt uses a rolling walker to ambulate.  MONTSERRAT spoke with RN who states after speaking with pt's mother, pt would like services to [employer]

## 2020-03-03 NOTE — PHYSICAL THERAPY NOTE
PHYSICAL THERAPY EVALUATION - INPATIENT     Room Number: 425/425-A  Evaluation Date: 3/3/2020  Type of Evaluation: Initial   Physician Order: PT Eval and Treat    Presenting Problem: right elbow athroplasty revision  Reason for Therapy: Mobility Dysfuncti disease) 8/15/2011   • High blood pressure    • High cholesterol    • MRSA (methicillin resistant Staphylococcus aureus) infection 5/20/2013   • OSTEOARTHRITIS     hands and right hip   • Osteoarthritis    • OTHER DISEASES     bipolar disorder   • Prolonge OF MOTION AND STRENGTH ASSESSMENT  Upper extremity ROM and strength are within functional limits except for the following:  right ue  Not evaluated, in sling, nwb.      Lower extremity ROM is within functional limits\    Lower extremity strength is within f

## 2020-03-03 NOTE — PROGRESS NOTES
Patient seen at bedside. Doing well and medically stable this morning.    Pain is well controlled     Post op day 1 s/p ORIF of right elbow and revision of right total elbow arthroplasty   Dressing is wet and intact  Dressing was changed by me today She w

## 2020-03-05 NOTE — H&P
Update to history of physical performed 3/2/2020, No changes    No acute distress, lungs nonlabored, heart regular rate and rhythm    Plan for revision right total elbow arthroplasty

## 2020-03-07 ENCOUNTER — HOSPITAL ENCOUNTER (EMERGENCY)
Facility: HOSPITAL | Age: 60
Discharge: HOME | End: 2020-03-08
Attending: EMERGENCY MEDICINE
Payer: MEDICARE

## 2020-03-07 DIAGNOSIS — L02.416 ABSCESS OF LEFT KNEE: Primary | ICD-10-CM

## 2020-03-07 PROCEDURE — 85025 COMPLETE CBC W/AUTO DIFF WBC: CPT | Performed by: EMERGENCY MEDICINE

## 2020-03-07 PROCEDURE — 80048 BASIC METABOLIC PNL TOTAL CA: CPT | Performed by: EMERGENCY MEDICINE

## 2020-03-07 PROCEDURE — 96365 THER/PROPH/DIAG IV INF INIT: CPT

## 2020-03-07 PROCEDURE — 99284 EMERGENCY DEPT VISIT MOD MDM: CPT

## 2020-03-07 PROCEDURE — 10061 I&D ABSCESS COMP/MULTIPLE: CPT

## 2020-03-08 VITALS
SYSTOLIC BLOOD PRESSURE: 127 MMHG | BODY MASS INDEX: 22.65 KG/M2 | DIASTOLIC BLOOD PRESSURE: 81 MMHG | TEMPERATURE: 98 F | WEIGHT: 105 LBS | OXYGEN SATURATION: 99 % | RESPIRATION RATE: 20 BRPM | HEIGHT: 57 IN | HEART RATE: 72 BPM

## 2020-03-08 LAB
ANION GAP SERPL CALC-SCNC: 6 MMOL/L (ref 0–18)
BASOPHILS # BLD AUTO: 0.09 X10(3) UL (ref 0–0.2)
BASOPHILS NFR BLD AUTO: 0.9 %
BUN BLD-MCNC: 19 MG/DL (ref 7–18)
BUN/CREAT SERPL: 33.9 (ref 10–20)
CALCIUM BLD-MCNC: 9.2 MG/DL (ref 8.5–10.1)
CHLORIDE SERPL-SCNC: 98 MMOL/L (ref 98–112)
CO2 SERPL-SCNC: 27 MMOL/L (ref 21–32)
CREAT BLD-MCNC: 0.56 MG/DL (ref 0.55–1.02)
DEPRECATED RDW RBC AUTO: 56.9 FL (ref 35.1–46.3)
EOSINOPHIL # BLD AUTO: 0.46 X10(3) UL (ref 0–0.7)
EOSINOPHIL NFR BLD AUTO: 4.4 %
ERYTHROCYTE [DISTWIDTH] IN BLOOD BY AUTOMATED COUNT: 19.8 % (ref 11–15)
GLUCOSE BLD-MCNC: 93 MG/DL (ref 70–99)
HCT VFR BLD AUTO: 28.3 % (ref 35–48)
HGB BLD-MCNC: 8.9 G/DL (ref 12–16)
IMM GRANULOCYTES # BLD AUTO: 0.05 X10(3) UL (ref 0–1)
IMM GRANULOCYTES NFR BLD: 0.5 %
LYMPHOCYTES # BLD AUTO: 2.26 X10(3) UL (ref 1–4)
LYMPHOCYTES NFR BLD AUTO: 21.7 %
MCH RBC QN AUTO: 25.2 PG (ref 26–34)
MCHC RBC AUTO-ENTMCNC: 31.4 G/DL (ref 31–37)
MCV RBC AUTO: 80.2 FL (ref 80–100)
MONOCYTES # BLD AUTO: 1.6 X10(3) UL (ref 0.1–1)
MONOCYTES NFR BLD AUTO: 15.4 %
NEUTROPHILS # BLD AUTO: 5.96 X10 (3) UL (ref 1.5–7.7)
NEUTROPHILS # BLD AUTO: 5.96 X10(3) UL (ref 1.5–7.7)
NEUTROPHILS NFR BLD AUTO: 57.1 %
OSMOLALITY SERPL CALC.SUM OF ELEC: 274 MOSM/KG (ref 275–295)
PLATELET # BLD AUTO: 615 10(3)UL (ref 150–450)
POTASSIUM SERPL-SCNC: 4.9 MMOL/L (ref 3.5–5.1)
RBC # BLD AUTO: 3.53 X10(6)UL (ref 3.8–5.3)
SODIUM SERPL-SCNC: 131 MMOL/L (ref 136–145)
WBC # BLD AUTO: 10.4 X10(3) UL (ref 4–11)

## 2020-03-08 PROCEDURE — 87205 SMEAR GRAM STAIN: CPT | Performed by: EMERGENCY MEDICINE

## 2020-03-08 PROCEDURE — 87070 CULTURE OTHR SPECIMN AEROBIC: CPT | Performed by: EMERGENCY MEDICINE

## 2020-03-08 RX ORDER — CEPHALEXIN 500 MG/1
500 CAPSULE ORAL 4 TIMES DAILY
Qty: 40 CAPSULE | Refills: 0 | Status: SHIPPED | OUTPATIENT
Start: 2020-03-08 | End: 2020-03-18

## 2020-03-08 NOTE — ED PROVIDER NOTES
Patient Seen in: [facility] AND [facility] Emergency Department      History   Patient presents with:  Cellulitis    Stated Complaint: swelling, redness to L knee    HPI    55-year-old female with history of hypertension, hypercholesterolemia, GERD, rheumatoid a REVISION Left 11/21/2019    Performed by Jv Sky MD at [facility] OR   • OTHER Right 04/17/2019    total elbow replacement   • OTHER SURGICAL HISTORY  5/12/13    REMOVAL OF HARDWARE L HIP WITH INSERTION OF CEMENT SPACER                    Social noted.  The area is tender to palpation. The patient has full range of motion of the joint with minimal tenderness. Bilateral dorsalis pedis pulses intact and symmetric.   Psychiatric: patient is oriented X 3, there is no agitation    ED Course     Labs R Will discharge home with antibiotics and plans to follow-up with her primary physician as scheduled. We will give a dose of IV antibiotics prior to discharge.             Disposition and Plan     Clinical Impression:  Abscess of left knee  (primary encount

## 2020-03-08 NOTE — DISCHARGE SUMMARY
General Medicine Discharge Summary     Patient ID:  Jayleen Muir  63year old  9/28/1960    Admit date: 3/2/2020    Discharge date and time: 3/3/2020 12:23 PM     Attending Physician:  Jose Alvarenga STRENGTH) 500 MG Oral Tab  TAKE 1 TABLET BY MOUTH EVERY 4 HOURS AS NEEDED FOR PAIN.  DO NOT TAKE MORE THAN 6 PILLS PER DAY., Normal, Disp-180 tablet, R-5    METHOTREXATE 2.5 MG Oral Tab  TAKE 8 TABLET BY MOUTH EVERY 7 DAYS, Normal, Disp-96 tablet, R-0    OM

## 2020-03-08 NOTE — ED NOTES
PT STS THAT SHE HAD 2 SURGERIES DONE, LAST ON ON November OF 2019. PER PT'S MOM, PT WAS TOLD THAT HER KNEE WILL BE SWOLLEN FOR A YEAR. PER PT AND HER MOM, THE KNEE HAD SWELLING AND REDNESS X 2D. DENIED FEVERS.

## 2020-03-23 NOTE — H&P
[facility]    PATIENT'S NAME: Sunshine Ricardo   ATTENDING PHYSICIAN: Pilo Green MD   PATIENT ACCOUNT#:   [id number]    LOCATION:    MEDICAL RECORD #:   [id number]       YOB: 1960  ADMISSION DATE:       03/26/2020    HISTORY A recommendations. PAST MEDICAL HISTORY:  Bipolar affective disorder, esophageal reflux disorder, rheumatoid arthritis with multiple joints involved, rheumatoid factor negative.       PAST SURGICAL HISTORY:  A right total hip replacement; history in 2013 o aspect of the left knee. She comes out to near full extension, flexes to greater than 90 degrees with reports of little pain in the left knee. Her calves are nontender. She is neurologically intact distally.   Right lower extremity shows no acute abnorma

## 2020-03-24 NOTE — CM/SW NOTE
MONTSERRAT received call from pt's mother Meggan who was concerned about the pt being able to receive services after pt's surgery. Pt is scheduled for surgery with Dr. Damon Necessary on Thursday. MONTSERRAT informed Meggan it would depend on pt's mobility after her surgery.  P

## 2020-03-26 ENCOUNTER — ANESTHESIA (OUTPATIENT)
Dept: SURGERY | Facility: HOSPITAL | Age: 60
DRG: 486 | End: 2020-03-26
Payer: MEDICARE

## 2020-03-26 ENCOUNTER — ANESTHESIA EVENT (OUTPATIENT)
Dept: SURGERY | Facility: HOSPITAL | Age: 60
DRG: 486 | End: 2020-03-26
Payer: MEDICARE

## 2020-03-26 ENCOUNTER — HOSPITAL ENCOUNTER (INPATIENT)
Facility: HOSPITAL | Age: 60
LOS: 4 days | Discharge: HOME HEALTH | DRG: 486 | End: 2020-03-30
Attending: ORTHOPAEDIC SURGERY | Admitting: ORTHOPAEDIC SURGERY
Payer: MEDICARE

## 2020-03-26 DIAGNOSIS — M06.09 RHEUMATOID ARTHRITIS OF MULTIPLE SITES WITH NEGATIVE RHEUMATOID FACTOR (HCC): ICD-10-CM

## 2020-03-26 DIAGNOSIS — Z96.652 STATUS POST TOTAL KNEE REPLACEMENT, LEFT: ICD-10-CM

## 2020-03-26 DIAGNOSIS — Z01.818 PREOP TESTING: ICD-10-CM

## 2020-03-26 DIAGNOSIS — E87.1 HYPONATREMIA: ICD-10-CM

## 2020-03-26 DIAGNOSIS — T84.54XD INFECTION ASSOCIATED WITH INTERNAL LEFT KNEE PROSTHESIS, SUBSEQUENT ENCOUNTER: Primary | ICD-10-CM

## 2020-03-26 DIAGNOSIS — M00.9 PYOGENIC ARTHRITIS OF LEFT KNEE JOINT, DUE TO UNSPECIFIED ORGANISM (HCC): ICD-10-CM

## 2020-03-26 PROBLEM — T84.54XA INFECTION OF PROSTHETIC LEFT KNEE JOINT (HCC): Status: ACTIVE | Noted: 2020-03-26

## 2020-03-26 LAB — MRSA DNA SPEC QL NAA+PROBE: NEGATIVE

## 2020-03-26 PROCEDURE — 0SPD09Z REMOVAL OF LINER FROM LEFT KNEE JOINT, OPEN APPROACH: ICD-10-PCS | Performed by: ORTHOPAEDIC SURGERY

## 2020-03-26 PROCEDURE — 0QDC0ZZ EXTRACTION OF LEFT LOWER FEMUR, OPEN APPROACH: ICD-10-PCS | Performed by: ORTHOPAEDIC SURGERY

## 2020-03-26 PROCEDURE — 97530 THERAPEUTIC ACTIVITIES: CPT

## 2020-03-26 PROCEDURE — 97162 PT EVAL MOD COMPLEX 30 MIN: CPT

## 2020-03-26 PROCEDURE — 87176 TISSUE HOMOGENIZATION CULTR: CPT | Performed by: ORTHOPAEDIC SURGERY

## 2020-03-26 PROCEDURE — 87641 MR-STAPH DNA AMP PROBE: CPT | Performed by: ORTHOPAEDIC SURGERY

## 2020-03-26 PROCEDURE — 87205 SMEAR GRAM STAIN: CPT | Performed by: ORTHOPAEDIC SURGERY

## 2020-03-26 PROCEDURE — 87186 SC STD MICRODIL/AGAR DIL: CPT | Performed by: ORTHOPAEDIC SURGERY

## 2020-03-26 PROCEDURE — 87070 CULTURE OTHR SPECIMN AEROBIC: CPT | Performed by: ORTHOPAEDIC SURGERY

## 2020-03-26 PROCEDURE — 87075 CULTR BACTERIA EXCEPT BLOOD: CPT | Performed by: ORTHOPAEDIC SURGERY

## 2020-03-26 PROCEDURE — 0SUW09Z SUPPLEMENT LEFT KNEE JOINT, TIBIAL SURFACE WITH LINER, OPEN APPROACH: ICD-10-PCS | Performed by: ORTHOPAEDIC SURGERY

## 2020-03-26 PROCEDURE — 87102 FUNGUS ISOLATION CULTURE: CPT | Performed by: INTERNAL MEDICINE

## 2020-03-26 PROCEDURE — 87077 CULTURE AEROBIC IDENTIFY: CPT | Performed by: ORTHOPAEDIC SURGERY

## 2020-03-26 PROCEDURE — 87206 SMEAR FLUORESCENT/ACID STAI: CPT | Performed by: INTERNAL MEDICINE

## 2020-03-26 PROCEDURE — 97116 GAIT TRAINING THERAPY: CPT

## 2020-03-26 PROCEDURE — 87116 MYCOBACTERIA CULTURE: CPT | Performed by: INTERNAL MEDICINE

## 2020-03-26 PROCEDURE — 88300 SURGICAL PATH GROSS: CPT | Performed by: ORTHOPAEDIC SURGERY

## 2020-03-26 RX ORDER — BENZTROPINE MESYLATE 0.5 MG/1
0.5 TABLET ORAL 2 TIMES DAILY
Status: DISCONTINUED | OUTPATIENT
Start: 2020-03-26 | End: 2020-03-30

## 2020-03-26 RX ORDER — MORPHINE SULFATE 4 MG/ML
4 INJECTION, SOLUTION INTRAMUSCULAR; INTRAVENOUS EVERY 10 MIN PRN
Status: DISCONTINUED | OUTPATIENT
Start: 2020-03-26 | End: 2020-03-26 | Stop reason: HOSPADM

## 2020-03-26 RX ORDER — HYDROMORPHONE HYDROCHLORIDE 1 MG/ML
0.2 INJECTION, SOLUTION INTRAMUSCULAR; INTRAVENOUS; SUBCUTANEOUS EVERY 5 MIN PRN
Status: DISCONTINUED | OUTPATIENT
Start: 2020-03-26 | End: 2020-03-26 | Stop reason: HOSPADM

## 2020-03-26 RX ORDER — VANCOMYCIN HYDROCHLORIDE 125 MG/1
250 CAPSULE ORAL DAILY
Status: DISCONTINUED | OUTPATIENT
Start: 2020-03-26 | End: 2020-03-30

## 2020-03-26 RX ORDER — POLYETHYLENE GLYCOL 3350 17 G/17G
17 POWDER, FOR SOLUTION ORAL DAILY PRN
Status: DISCONTINUED | OUTPATIENT
Start: 2020-03-26 | End: 2020-03-30

## 2020-03-26 RX ORDER — CEFAZOLIN SODIUM/WATER 2 G/20 ML
2 SYRINGE (ML) INTRAVENOUS ONCE
Status: DISCONTINUED | OUTPATIENT
Start: 2020-03-26 | End: 2020-03-26

## 2020-03-26 RX ORDER — DOCUSATE SODIUM 100 MG/1
100 CAPSULE, LIQUID FILLED ORAL 2 TIMES DAILY
Status: DISCONTINUED | OUTPATIENT
Start: 2020-03-26 | End: 2020-03-30

## 2020-03-26 RX ORDER — MAGNESIUM HYDROXIDE 1200 MG/15ML
LIQUID ORAL CONTINUOUS PRN
Status: COMPLETED | OUTPATIENT
Start: 2020-03-26 | End: 2020-03-26

## 2020-03-26 RX ORDER — ASPIRIN 325 MG
325 TABLET ORAL 2 TIMES DAILY
Status: DISCONTINUED | OUTPATIENT
Start: 2020-03-26 | End: 2020-03-30

## 2020-03-26 RX ORDER — METOCLOPRAMIDE 10 MG/1
10 TABLET ORAL ONCE
Status: DISCONTINUED | OUTPATIENT
Start: 2020-03-26 | End: 2020-03-26 | Stop reason: HOSPADM

## 2020-03-26 RX ORDER — HYDROCODONE BITARTRATE AND ACETAMINOPHEN 5; 325 MG/1; MG/1
2 TABLET ORAL AS NEEDED
Status: DISCONTINUED | OUTPATIENT
Start: 2020-03-26 | End: 2020-03-26 | Stop reason: HOSPADM

## 2020-03-26 RX ORDER — METOCLOPRAMIDE HYDROCHLORIDE 5 MG/ML
10 INJECTION INTRAMUSCULAR; INTRAVENOUS EVERY 6 HOURS PRN
Status: ACTIVE | OUTPATIENT
Start: 2020-03-26 | End: 2020-03-28

## 2020-03-26 RX ORDER — SODIUM CHLORIDE 9 MG/ML
INJECTION, SOLUTION INTRAVENOUS CONTINUOUS
Status: DISCONTINUED | OUTPATIENT
Start: 2020-03-26 | End: 2020-03-27

## 2020-03-26 RX ORDER — VANCOMYCIN HYDROCHLORIDE 500 MG/10ML
INJECTION, POWDER, LYOPHILIZED, FOR SOLUTION INTRAVENOUS AS NEEDED
Status: DISCONTINUED | OUTPATIENT
Start: 2020-03-26 | End: 2020-03-26 | Stop reason: SURG

## 2020-03-26 RX ORDER — HYDROCODONE BITARTRATE AND ACETAMINOPHEN 5; 325 MG/1; MG/1
1 TABLET ORAL EVERY 4 HOURS PRN
Status: DISCONTINUED | OUTPATIENT
Start: 2020-03-26 | End: 2020-03-30

## 2020-03-26 RX ORDER — PROCHLORPERAZINE EDISYLATE 5 MG/ML
10 INJECTION INTRAMUSCULAR; INTRAVENOUS EVERY 6 HOURS PRN
Status: ACTIVE | OUTPATIENT
Start: 2020-03-26 | End: 2020-03-28

## 2020-03-26 RX ORDER — PANTOPRAZOLE SODIUM 40 MG/1
40 TABLET, DELAYED RELEASE ORAL
Status: DISCONTINUED | OUTPATIENT
Start: 2020-03-27 | End: 2020-03-30

## 2020-03-26 RX ORDER — ONDANSETRON 2 MG/ML
4 INJECTION INTRAMUSCULAR; INTRAVENOUS ONCE AS NEEDED
Status: DISCONTINUED | OUTPATIENT
Start: 2020-03-26 | End: 2020-03-26 | Stop reason: HOSPADM

## 2020-03-26 RX ORDER — BISACODYL 10 MG
10 SUPPOSITORY, RECTAL RECTAL
Status: DISCONTINUED | OUTPATIENT
Start: 2020-03-26 | End: 2020-03-30

## 2020-03-26 RX ORDER — TRAMADOL HYDROCHLORIDE 50 MG/1
50 TABLET ORAL EVERY 6 HOURS PRN
Status: DISCONTINUED | OUTPATIENT
Start: 2020-03-26 | End: 2020-03-30

## 2020-03-26 RX ORDER — FAMOTIDINE 20 MG/1
20 TABLET ORAL ONCE
Status: DISCONTINUED | OUTPATIENT
Start: 2020-03-26 | End: 2020-03-26 | Stop reason: HOSPADM

## 2020-03-26 RX ORDER — SODIUM CHLORIDE 0.9 % (FLUSH) 0.9 %
10 SYRINGE (ML) INJECTION AS NEEDED
Status: DISCONTINUED | OUTPATIENT
Start: 2020-03-26 | End: 2020-03-30

## 2020-03-26 RX ORDER — HYDROCODONE BITARTRATE AND ACETAMINOPHEN 5; 325 MG/1; MG/1
1 TABLET ORAL AS NEEDED
Status: DISCONTINUED | OUTPATIENT
Start: 2020-03-26 | End: 2020-03-26 | Stop reason: HOSPADM

## 2020-03-26 RX ORDER — DIVALPROEX SODIUM 500 MG/1
500 TABLET, EXTENDED RELEASE ORAL NIGHTLY
Status: DISCONTINUED | OUTPATIENT
Start: 2020-03-26 | End: 2020-03-30

## 2020-03-26 RX ORDER — SODIUM CHLORIDE, SODIUM LACTATE, POTASSIUM CHLORIDE, CALCIUM CHLORIDE 600; 310; 30; 20 MG/100ML; MG/100ML; MG/100ML; MG/100ML
INJECTION, SOLUTION INTRAVENOUS CONTINUOUS
Status: DISCONTINUED | OUTPATIENT
Start: 2020-03-26 | End: 2020-03-26 | Stop reason: HOSPADM

## 2020-03-26 RX ORDER — MORPHINE SULFATE 10 MG/ML
6 INJECTION, SOLUTION INTRAMUSCULAR; INTRAVENOUS EVERY 10 MIN PRN
Status: DISCONTINUED | OUTPATIENT
Start: 2020-03-26 | End: 2020-03-26 | Stop reason: HOSPADM

## 2020-03-26 RX ORDER — DIPHENHYDRAMINE HCL 25 MG
25 CAPSULE ORAL EVERY 4 HOURS PRN
Status: DISCONTINUED | OUTPATIENT
Start: 2020-03-26 | End: 2020-03-30

## 2020-03-26 RX ORDER — SODIUM CHLORIDE, SODIUM LACTATE, POTASSIUM CHLORIDE, CALCIUM CHLORIDE 600; 310; 30; 20 MG/100ML; MG/100ML; MG/100ML; MG/100ML
INJECTION, SOLUTION INTRAVENOUS CONTINUOUS
Status: DISCONTINUED | OUTPATIENT
Start: 2020-03-26 | End: 2020-03-27

## 2020-03-26 RX ORDER — SENNOSIDES 8.6 MG
17.2 TABLET ORAL NIGHTLY
Status: DISCONTINUED | OUTPATIENT
Start: 2020-03-26 | End: 2020-03-30

## 2020-03-26 RX ORDER — HYDROMORPHONE HYDROCHLORIDE 1 MG/ML
0.4 INJECTION, SOLUTION INTRAMUSCULAR; INTRAVENOUS; SUBCUTANEOUS EVERY 5 MIN PRN
Status: DISCONTINUED | OUTPATIENT
Start: 2020-03-26 | End: 2020-03-26 | Stop reason: HOSPADM

## 2020-03-26 RX ORDER — MORPHINE SULFATE 4 MG/ML
2 INJECTION, SOLUTION INTRAMUSCULAR; INTRAVENOUS EVERY 10 MIN PRN
Status: DISCONTINUED | OUTPATIENT
Start: 2020-03-26 | End: 2020-03-26 | Stop reason: HOSPADM

## 2020-03-26 RX ORDER — HYDROMORPHONE HYDROCHLORIDE 1 MG/ML
0.4 INJECTION, SOLUTION INTRAMUSCULAR; INTRAVENOUS; SUBCUTANEOUS EVERY 2 HOUR PRN
Status: DISCONTINUED | OUTPATIENT
Start: 2020-03-26 | End: 2020-03-27

## 2020-03-26 RX ORDER — NALOXONE HYDROCHLORIDE 0.4 MG/ML
80 INJECTION, SOLUTION INTRAMUSCULAR; INTRAVENOUS; SUBCUTANEOUS AS NEEDED
Status: DISCONTINUED | OUTPATIENT
Start: 2020-03-26 | End: 2020-03-26 | Stop reason: HOSPADM

## 2020-03-26 RX ORDER — DIPHENHYDRAMINE HYDROCHLORIDE 50 MG/ML
25 INJECTION INTRAMUSCULAR; INTRAVENOUS ONCE AS NEEDED
Status: ACTIVE | OUTPATIENT
Start: 2020-03-26 | End: 2020-03-26

## 2020-03-26 RX ORDER — MELATONIN
325 2 TIMES DAILY
Status: DISCONTINUED | OUTPATIENT
Start: 2020-03-26 | End: 2020-03-30

## 2020-03-26 RX ORDER — LIDOCAINE HYDROCHLORIDE 10 MG/ML
INJECTION, SOLUTION EPIDURAL; INFILTRATION; INTRACAUDAL; PERINEURAL AS NEEDED
Status: DISCONTINUED | OUTPATIENT
Start: 2020-03-26 | End: 2020-03-26 | Stop reason: SURG

## 2020-03-26 RX ORDER — ONDANSETRON 2 MG/ML
4 INJECTION INTRAMUSCULAR; INTRAVENOUS EVERY 4 HOURS PRN
Status: DISCONTINUED | OUTPATIENT
Start: 2020-03-26 | End: 2020-03-30

## 2020-03-26 RX ORDER — HYDROMORPHONE HYDROCHLORIDE 1 MG/ML
0.8 INJECTION, SOLUTION INTRAMUSCULAR; INTRAVENOUS; SUBCUTANEOUS EVERY 2 HOUR PRN
Status: DISCONTINUED | OUTPATIENT
Start: 2020-03-26 | End: 2020-03-27

## 2020-03-26 RX ORDER — HALOPERIDOL 5 MG/ML
0.25 INJECTION INTRAMUSCULAR ONCE AS NEEDED
Status: DISCONTINUED | OUTPATIENT
Start: 2020-03-26 | End: 2020-03-26 | Stop reason: HOSPADM

## 2020-03-26 RX ORDER — HYDROMORPHONE HYDROCHLORIDE 1 MG/ML
0.6 INJECTION, SOLUTION INTRAMUSCULAR; INTRAVENOUS; SUBCUTANEOUS EVERY 5 MIN PRN
Status: DISCONTINUED | OUTPATIENT
Start: 2020-03-26 | End: 2020-03-26 | Stop reason: HOSPADM

## 2020-03-26 RX ORDER — FLUPHENAZINE HYDROCHLORIDE 2.5 MG/1
5 TABLET ORAL 2 TIMES DAILY WITH MEALS
Status: DISCONTINUED | OUTPATIENT
Start: 2020-03-26 | End: 2020-03-30

## 2020-03-26 RX ORDER — PROCHLORPERAZINE EDISYLATE 5 MG/ML
5 INJECTION INTRAMUSCULAR; INTRAVENOUS ONCE AS NEEDED
Status: DISCONTINUED | OUTPATIENT
Start: 2020-03-26 | End: 2020-03-26 | Stop reason: HOSPADM

## 2020-03-26 RX ORDER — HYDROMORPHONE HYDROCHLORIDE 1 MG/ML
1.2 INJECTION, SOLUTION INTRAMUSCULAR; INTRAVENOUS; SUBCUTANEOUS EVERY 2 HOUR PRN
Status: DISCONTINUED | OUTPATIENT
Start: 2020-03-26 | End: 2020-03-27

## 2020-03-26 RX ORDER — TRAMADOL HYDROCHLORIDE 50 MG/1
100 TABLET ORAL EVERY 6 HOURS PRN
Status: DISCONTINUED | OUTPATIENT
Start: 2020-03-26 | End: 2020-03-30

## 2020-03-26 RX ORDER — GABAPENTIN 300 MG/1
300 CAPSULE ORAL NIGHTLY
Status: DISCONTINUED | OUTPATIENT
Start: 2020-03-26 | End: 2020-03-30

## 2020-03-26 RX ORDER — DIPHENHYDRAMINE HYDROCHLORIDE 50 MG/ML
12.5 INJECTION INTRAMUSCULAR; INTRAVENOUS EVERY 4 HOURS PRN
Status: DISCONTINUED | OUTPATIENT
Start: 2020-03-26 | End: 2020-03-30

## 2020-03-26 RX ORDER — DEXAMETHASONE SODIUM PHOSPHATE 4 MG/ML
VIAL (ML) INJECTION AS NEEDED
Status: DISCONTINUED | OUTPATIENT
Start: 2020-03-26 | End: 2020-03-26 | Stop reason: SURG

## 2020-03-26 RX ORDER — ACETAMINOPHEN 500 MG
1000 TABLET ORAL ONCE
Status: COMPLETED | OUTPATIENT
Start: 2020-03-26 | End: 2020-03-26

## 2020-03-26 RX ORDER — SODIUM PHOSPHATE, DIBASIC AND SODIUM PHOSPHATE, MONOBASIC 7; 19 G/133ML; G/133ML
1 ENEMA RECTAL ONCE AS NEEDED
Status: DISCONTINUED | OUTPATIENT
Start: 2020-03-26 | End: 2020-03-30

## 2020-03-26 RX ADMIN — LIDOCAINE HYDROCHLORIDE 50 MG: 10 INJECTION, SOLUTION EPIDURAL; INFILTRATION; INTRACAUDAL; PERINEURAL at 07:36:00

## 2020-03-26 RX ADMIN — VANCOMYCIN HYDROCHLORIDE 750 MG: 500 INJECTION, POWDER, LYOPHILIZED, FOR SOLUTION INTRAVENOUS at 07:50:00

## 2020-03-26 RX ADMIN — SODIUM CHLORIDE, SODIUM LACTATE, POTASSIUM CHLORIDE, CALCIUM CHLORIDE: 600; 310; 30; 20 INJECTION, SOLUTION INTRAVENOUS at 09:46:00

## 2020-03-26 RX ADMIN — DEXAMETHASONE SODIUM PHOSPHATE 4 MG: 4 MG/ML VIAL (ML) INJECTION at 09:17:00

## 2020-03-26 NOTE — OPERATIVE REPORT
[facility]    PATIENT'S NAME: Colten Miguel   ATTENDING PHYSICIAN: Pilo Mason MD   OPERATING PHYSICIAN: Pilo Mason MD   PATIENT ACCOUNT#:   [id number]    LOCATION:  [address]  MEDICAL RECORD #:   [id number] until cultures were completed. She was taken to the operating room and was self-positioned in the supine position. All pressure points were well padded.   General anesthesia was introduced and after adequate anesthesia was assured, a Grissom catheter was pl tissue within the knee as well as careful curetting and cleaning of the soft tissue bone interface surrounding the components. This included the femoral flange, which was carefully debrided.   Copious irrigation was performed and additional mechanical debr

## 2020-03-26 NOTE — CONSULTS
[facility]    PATIENT'S NAME: Елена Friar   ATTENDING PHYSICIAN: Pilo Eldridge MD   CONSULTING PHYSICIAN: Eliana Erickson MD   PATIENT ACCOUNT#:   [id number]    LOCATION:  [address] [place] [address] #:   [id number]       DATE OF BI somewhat older than her stated age but in no acute distress. VITAL SIGNS:  She is afebrile. Vital signs stable. HEENT:  Pale conjunctivae. No oral lesions. NECK:  Supple. No JVD or adenopathy. LUNGS:  Clear.   CARDIOVASCULAR:  A 1/6 systolic ejecti

## 2020-03-26 NOTE — PLAN OF CARE
Maia Rodriguez \"Dianna\" is aware of POC at this time. Pain managed with PRN dilaudid with plan to transition to oral tramadol. Surgical dressing in place. CMS intact. Hemovac in place.      Plan for patient to go home with possible IV abx pending results fro Communicate ordered activity level and limitations with patient/family  Outcome: Progressing  Goal: Maintain proper alignment of affected body part  Description  INTERVENTIONS:  - Support and protect limb and body alignment per provider's orders  - Instruc to discharge w/pt and caregiver  - Include patient/family/discharge partner in discharge planning  - Arrange for needed discharge resources and transportation as appropriate  - Identify discharge learning needs (meds, wound care, etc)  - Arrange for interp

## 2020-03-26 NOTE — ANESTHESIA PREPROCEDURE EVALUATION
Anesthesia PreOp Note    HPI:     Kim Bryant is a 63year old female who presents for preoperative consultation requested by: Roz Mccain MD    Date of Surgery: 3/26/2020    Procedure(s):  KNEE TOTAL REVISION  Indication: Pyogenic arthritis of l Bipolar affective disorder [facility])         Date Noted: 05/05/2011        Past Medical History:   Diagnosis Date   • Bipolar affective ([facility] [address]    • C. difficile colitis 5/20/2013   • DEPRESSION     manic depressive & bypolar disease,, [address] Ferrous Sulfate (IRON) 325 (65 Fe) MG Oral Tab, Take 325 mg by mouth 2 (two) times daily.  Take in between meals, not with meals, Disp: 60 tablet, Rfl: 3, 3/24/2020 at Unknown time  AMLODIPINE BESYLATE 5 MG Oral Tab, TAKE 1 TABLET BY MOUTH EVERY DAY, Disp: famoTIDine (PEPCID) tab 20 mg, 20 mg, Oral, Once, Pilo Chan MD  Metoclopramide HCl (REGLAN) tab 10 mg, 10 mg, Oral, Once, Pilo Chan MD  mupirocin (BACTROBAN) 2% nasal ointment OINT 1 Application, 1 Application, Each Nare, BID, Dustin Sexual activity: Not on file    Lifestyle      Physical activity:        Days per week: Not on file        Minutes per session: Not on file      Stress: Not on file    Relationships      Social connections:        Talks on phone: Not on file        Get height is 1.473 m (4' 10\") and weight is 49.9 kg (110 lb). Her oral temperature is 97.9 °F (36.6 °C). Her blood pressure is 131/80 and her pulse is 69. Her respiration is 20 and oxygen saturation is 100%.     03/24/20  1605 03/26/20  0600   BP:  131/80

## 2020-03-26 NOTE — CONSULTS
Tayla Bowers is a 63year old female. No chief complaint on file. HPI:    tka 9/26/19 then patella realignment 11/21 after dislocation, now poly exchange for infection  Immunocompromised with hx r.a.     REVIEW OF SYSTEMS:   A comprehensive 11 point Performed by Martha Ken MD at [facility] OR   • KNEE TOTAL REVISION Left 11/21/2019    Performed by Martha Ken MD at [facility]   • OTHER Right 04/17/2019    total elbow replacement   • OTHER SURGICAL HISTORY  5/12/13    REMOVAL OF HARDWAR Authorizing Provider:  Vinny Mg MD     Collected:           03/26/2020 08:27 AM          Ordering Location:     [facility] AND [facility]          Received:            03/26/2020 10:06 AM                                 Operating Room AEROBIC CULTURE   Result Value Ref Range    Tissue Smear 3+ WBCs seen     Tissue Smear No organisms seen    BODY FLUID CULT,AEROBIC AND ANAEROBIC   Result Value Ref Range    Body Fld Smear 3+ WBCs seen     Body Fld Smear No organisms seen     Body Fld Smea

## 2020-03-26 NOTE — BRIEF OP NOTE
Pre-Operative Diagnosis: Pyogenic arthritis of left knee joint, due to unspecified organism [facility]) [M00.9]  Status post total knee replacement, left [Z96.652]     Post-Operative Diagnosis: Pyogenic arthritis of left knee joint, due to unspecified organism (

## 2020-03-26 NOTE — ANESTHESIA PROCEDURE NOTES
Airway  Date/Time: 3/26/2020 7:47 AM  Urgency: Elective    Airway not difficult    General Information and Staff    Patient location during procedure: OR  Anesthesiologist: Dae Lilly MD  Resident/CRNA: Bola Villalpando CRNA  Performed: CRNA     I

## 2020-03-26 NOTE — PROGRESS NOTES
[facility] Dosing Service    Initial Pharmacokinetic Consult for Vancomycin Dosing     Giovani Garcia is a 63year old female who is being treated for pyogenic knee .   Pharmacy has been asked to dose Vancomycin by MUNA Lai    She is allergic to nu

## 2020-03-26 NOTE — INTERVAL H&P NOTE
Pre-op Diagnosis: Pyogenic arthritis of left knee joint, due to unspecified organism [facility]) [M00.9]  Status post total knee replacement, left [Z96.652]    The above referenced H&P was reviewed by Roxie Jimenez MD on 3/26/2020, the patient was examined

## 2020-03-26 NOTE — CM/SW NOTE
MONTSERRAT intern received an MDO for s/p joint. SW intern spoke with pt to discuss eventual discharge planning needs. Pt is currently living alone in an apartment with access to an elevator.  Prior to admission, pt was independent was independent with all ADL's

## 2020-03-26 NOTE — H&P
[facility] Hospitalist Team  History and Physical  Admit Date:  3/26/20     ASSESSMENT / PLAN:   Ms is a 63year old female with PMH of c diff, bipolar, depression, GERD, HTN, schizophrenia, OA, iron def anemia, RA, S/P right elbow replacement and recent left kne °C) (Oral)   Resp 20   Ht 4' 10\" (1.473 m)   Wt 110 lb (49.9 kg)   LMP  (LMP Unknown)   SpO2 99%   BMI 22.99 kg/m²     GENERAL: no apparent distress  NEUROLOGIC: A/A;  Ox3  SKIN: no rashes  HEENT: normocephalic, normal nose, pharynx and TM's, sclera anicte INSERTION OF CEMENT SPACER        ALL:    Nuts                    OTHER (SEE COMMENTS)    Comment:PIMPLES ON FACE AND NECK  Other                   OTHER (SEE COMMENTS)    Comment:METAL JEWELRY CAUSES ITCHY RASH---can't specify             what metal?  Pea daily with meals.   , Disp: , Rfl:   DIVALPROEX SODIUM, MIGRAINE, (DEPAKOTE ER) 500 MG Oral Tablet SR 24 Hr, Take 1 tablet by mouth nightly.  , Disp: , Rfl:         Soc Hx  Social History    Tobacco Use      Smoking status: Former Smoker        Packs/day: 1 who presented for left knee I&D. Follow up OR cultures, ID consulted. Lives alone but may be able to be transported to infusion center if this is a possibility. Rest as above.     Hilda Arriaga MD  [facility] hospitalist

## 2020-03-26 NOTE — PHYSICAL THERAPY NOTE
PHYSICAL THERAPY EVALUATION - INPATIENT     Room Number: 467/467-A  Evaluation Date: 3/26/2020  Type of Evaluation: Initial   Physician Order: PT Eval and Treat    Presenting Problem: s/p L knee I&D and revision on 3/26/2020; recent R elbow replacement w/ Fair  Frequency (Obs): BID       PHYSICAL THERAPY MEDICAL/SOCIAL HISTORY     History related to current admission: Per H&P: \"Ms is a 63year old female with PMH of c diff, bipolar, depression, GERD, HTN, schizophrenia, OA, iron def anemia, RA, S/P right e replacement   • OTHER SURGICAL HISTORY  5/12/13    REMOVAL OF HARDWARE L HIP WITH INSERTION OF CEMENT SPACER       HOME SITUATION  Type of Home: House   Home Layout: One level  Stairs to Enter : 7  Railing: Yes  Stairs to Bedroom: 0       Lives With: (Moth A Little   -   Climbing 3-5 steps with a railing?: A Little     AM-PAC Score:  Raw Score: 18   Approx Degree of Impairment: 46.58%   Standardized Score (AM-PAC Scale): 43.63   CMS Modifier (G-Code): CK    FUNCTIONAL ABILITY STATUS  Gait Assessment   Gait A

## 2020-03-26 NOTE — HOME CARE LIAISON
Referral received from MONTSERRAT/Margaux. [facility] does not accept pt's insurance ([employer]). Notified MONTSERRAT/Margaux of this.

## 2020-03-27 ENCOUNTER — APPOINTMENT (OUTPATIENT)
Dept: PICC SERVICES | Facility: HOSPITAL | Age: 60
DRG: 486 | End: 2020-03-27
Attending: NURSE PRACTITIONER
Payer: MEDICARE

## 2020-03-27 LAB
ANION GAP SERPL CALC-SCNC: 7 MMOL/L (ref 0–18)
ANION GAP SERPL CALC-SCNC: 8 MMOL/L (ref 0–18)
BUN BLD-MCNC: 8 MG/DL (ref 7–18)
BUN BLD-MCNC: 9 MG/DL (ref 7–18)
BUN/CREAT SERPL: 15.4 (ref 10–20)
BUN/CREAT SERPL: 22 (ref 10–20)
CALCIUM BLD-MCNC: 8.3 MG/DL (ref 8.5–10.1)
CALCIUM BLD-MCNC: 8.9 MG/DL (ref 8.5–10.1)
CHLORIDE SERPL-SCNC: 97 MMOL/L (ref 98–112)
CHLORIDE SERPL-SCNC: 98 MMOL/L (ref 98–112)
CO2 SERPL-SCNC: 25 MMOL/L (ref 21–32)
CO2 SERPL-SCNC: 25 MMOL/L (ref 21–32)
CREAT BLD-MCNC: 0.41 MG/DL (ref 0.55–1.02)
CREAT BLD-MCNC: 0.52 MG/DL (ref 0.55–1.02)
CRP SERPL-MCNC: 2.03 MG/DL (ref ?–0.3)
DEPRECATED RDW RBC AUTO: 58.1 FL (ref 35.1–46.3)
ERYTHROCYTE [DISTWIDTH] IN BLOOD BY AUTOMATED COUNT: 19.3 % (ref 11–15)
ERYTHROCYTE [SEDIMENTATION RATE] IN BLOOD: 1 MM/HR (ref 0–30)
GLUCOSE BLD-MCNC: 102 MG/DL (ref 70–99)
GLUCOSE BLD-MCNC: 115 MG/DL (ref 70–99)
HCT VFR BLD AUTO: 26.3 % (ref 35–48)
HGB BLD-MCNC: 8.5 G/DL (ref 12–16)
MCH RBC QN AUTO: 26.4 PG (ref 26–34)
MCHC RBC AUTO-ENTMCNC: 32.3 G/DL (ref 31–37)
MCV RBC AUTO: 81.7 FL (ref 80–100)
OSMOLALITY SERPL CALC.SUM OF ELEC: 269 MOSM/KG (ref 275–295)
OSMOLALITY SERPL CALC.SUM OF ELEC: 270 MOSM/KG (ref 275–295)
PLATELET # BLD AUTO: 363 10(3)UL (ref 150–450)
POTASSIUM SERPL-SCNC: 3.6 MMOL/L (ref 3.5–5.1)
POTASSIUM SERPL-SCNC: 4.3 MMOL/L (ref 3.5–5.1)
POTASSIUM SERPL-SCNC: 4.3 MMOL/L (ref 3.5–5.1)
RBC # BLD AUTO: 3.22 X10(6)UL (ref 3.8–5.3)
SODIUM SERPL-SCNC: 130 MMOL/L (ref 136–145)
SODIUM SERPL-SCNC: 130 MMOL/L (ref 136–145)
WBC # BLD AUTO: 6.7 X10(3) UL (ref 4–11)

## 2020-03-27 PROCEDURE — 86140 C-REACTIVE PROTEIN: CPT | Performed by: INTERNAL MEDICINE

## 2020-03-27 PROCEDURE — 05HY33Z INSERTION OF INFUSION DEVICE INTO UPPER VEIN, PERCUTANEOUS APPROACH: ICD-10-PCS | Performed by: ORTHOPAEDIC SURGERY

## 2020-03-27 PROCEDURE — 97166 OT EVAL MOD COMPLEX 45 MIN: CPT

## 2020-03-27 PROCEDURE — 36410 VNPNXR 3YR/> PHY/QHP DX/THER: CPT

## 2020-03-27 PROCEDURE — 76937 US GUIDE VASCULAR ACCESS: CPT

## 2020-03-27 PROCEDURE — 85652 RBC SED RATE AUTOMATED: CPT | Performed by: INTERNAL MEDICINE

## 2020-03-27 PROCEDURE — 80048 BASIC METABOLIC PNL TOTAL CA: CPT | Performed by: NURSE PRACTITIONER

## 2020-03-27 PROCEDURE — 85027 COMPLETE CBC AUTOMATED: CPT | Performed by: PHYSICIAN ASSISTANT

## 2020-03-27 PROCEDURE — 84132 ASSAY OF SERUM POTASSIUM: CPT | Performed by: ORTHOPAEDIC SURGERY

## 2020-03-27 PROCEDURE — 97110 THERAPEUTIC EXERCISES: CPT

## 2020-03-27 PROCEDURE — 97116 GAIT TRAINING THERAPY: CPT

## 2020-03-27 PROCEDURE — 97530 THERAPEUTIC ACTIVITIES: CPT

## 2020-03-27 PROCEDURE — 80048 BASIC METABOLIC PNL TOTAL CA: CPT | Performed by: PHYSICIAN ASSISTANT

## 2020-03-27 RX ORDER — SODIUM CHLORIDE 0.9 % (FLUSH) 0.9 %
10 SYRINGE (ML) INJECTION AS NEEDED
Status: DISCONTINUED | OUTPATIENT
Start: 2020-03-27 | End: 2020-03-30

## 2020-03-27 RX ORDER — ACETAMINOPHEN 500 MG
1000 TABLET ORAL EVERY 8 HOURS PRN
Status: DISCONTINUED | OUTPATIENT
Start: 2020-03-27 | End: 2020-03-30

## 2020-03-27 RX ORDER — POTASSIUM CHLORIDE 20 MEQ/1
40 TABLET, EXTENDED RELEASE ORAL EVERY 4 HOURS
Status: COMPLETED | OUTPATIENT
Start: 2020-03-27 | End: 2020-03-27

## 2020-03-27 RX ORDER — LIDOCAINE HYDROCHLORIDE 10 MG/ML
0.5 INJECTION, SOLUTION INFILTRATION; PERINEURAL ONCE AS NEEDED
Status: ACTIVE | OUTPATIENT
Start: 2020-03-27 | End: 2020-03-27

## 2020-03-27 NOTE — PROGRESS NOTES
[facility] AND [facility] Infectious Disease Progress Note    Sharla Londono Patient Status:  Inpatient    1960 MRN [id number]   Location [facility] 4W/SW/SE Attending Hollis Brown MD   Hosp Day # 1 PCP Blayne Jean MD     Subjective:  Pa Vancomycin HCl (VANCOCIN) 750 mg in sodium chloride 0.9% 250 mL IVPB, 15 mg/kg, Intravenous, BID  •  gabapentin (NEURONTIN) cap 300 mg, 300 mg, Oral, Nightly  •  traMADol HCl (ULTRAM) tab 50 mg, 50 mg, Oral, Q6H PRN **OR** traMADol HCl (ULTRAM) tab 100 mg, grossly normal.  No focal neurologic deficit.     Labs:  Lab Results   Component Value Date    WBC 6.7 03/27/2020    HGB 8.5 03/27/2020    HCT 26.3 03/27/2020    .0 03/27/2020    CREATSERUM 0.52 03/27/2020    BUN 8 03/27/2020     03/27/2020

## 2020-03-27 NOTE — PROGRESS NOTES
[facility] Hospitalist Team  Progress Note    Ross Larios Patient Status:  Inpatient    1960 MRN [id number]   Location [facility] 4W/SW/SE Attending Solange Giraldo MD   Hosp Day # 1 PCP Lucy Alexandra MD     CC: Follow Up  PCP: Lucy Alexandra MD and she can drive to infusion center. She would not be able to do the infusion nor does she think Jasmyne Byers can as she wouldn't even change her own dressing   PCP: Vijay Genao MD        Concerns regarding plan of care were discussed with patient.  Patient agr mg, 0.8 mg, Intravenous, Q2H PRN    Or  HYDROmorphone HCl (DILAUDID) 1 MG/ML injection 1.2 mg, 1.2 mg, Intravenous, Q2H PRN  Senna (SENOKOT) tab 17.2 mg, 17.2 mg, Oral, Nightly  docusate sodium (COLACE) cap 100 mg, 100 mg, Oral, BID  PEG 3350 (MIRALAX) pow Pulse 63   Temp 97.8 °F (36.6 °C) (Oral)   Resp 16   Ht 4' 10\" (1.473 m)   Wt 110 lb (49.9 kg)   LMP  (LMP Unknown)   SpO2 100%   BMI 22.99 kg/m²     Exam:  GEN: NAD, frail   HEENT: EOMI, PERRLA  Neck: Supple, no JVD  Pulm: CTAB, no crackles or wheezes  C

## 2020-03-27 NOTE — PAYOR COMM NOTE
--------------  CONTINUED STAY REVIEW   3-27-20       Payor: [address] [place] [address] #:  [id number]  Authorization Number: [id number] / [id number]    Admit date: 3/26/20  Admit time: 26    Admitting Physician: Sandy Garcia MD  Attending Irrigation and debridement and polyethylene exchange of a left total knee replacement.      POD#1 s/p I&D with poly exchange left knee  Pain well controlled on orals  No nausea     100 cc's drainage last PM  Dressing clean and dry     Lab with Elevatedm wbc Lisa Franklin, HOMERO      [facility]) tab 17.2 mg     Date Action Dose Route User    3/26/2020 2114 Given 17.2 mg Oral Lisa Norton RN      traMADol HCl (ULTRAM) tab 50 mg     Date Action Dose Route User    3/26/2020 1352 Given 50 mg Oral Jayden Montalvo

## 2020-03-27 NOTE — PROGRESS NOTES
POD#1 s/p I&D with poly exchange left knee  Pain well controlled on orals  No nausea    100 cc's drainage last PM  Dressing clean and dry    Lab with Elevatedm wbc's and gram pos cocci in chains  Cultures pending  Mild post op acute anemia    Calf's non te

## 2020-03-27 NOTE — OCCUPATIONAL THERAPY NOTE
OCCUPATIONAL THERAPY EVALUATION - INPATIENT      Room Number: 467/467-A  Evaluation Date: 3/27/2020  Type of Evaluation: Initial  Presenting Problem: S/p ID poly exchange L knee    Physician Order: IP Consult to Occupational Therapy  Reason for Therapy: AD supports that patients with this level of impairment may benefit from GABBY. Pt is a fall risk. Pt is impulsive with decreased safety awareness --aside from L knee sx of this admission, pt is also NWB RUE w/ shoulder sling.  Pt requires 24 hr spv to maximize Prolonged Q-T interval on ECG 1/12/2016   • Rheumatoid arthritis (HCC)    • S/P hip replacement, right 2/13/2019   • Schizophrenic disorder ([facility] [address]    • Unspecified essential hypertension    • Visual impairment     GLASSES       Past Surgical History  Past S knee  Management Techniques: Activity promotion; Body mechanics;Repositioning    ACTIVITY TOLERANCE  Room air   denied dizziness    COGNITION  Followed simple commands well  Decreased safety awareness, insight into need for assist    RANGE OF MOTION   Upper met;Call light within reach;RN aware of session/findings    OT Goals  Patient self-stated goal is: home      Patient will complete LE dressing with SBA  Comment:     Patient will complete toilet transfer with SBA  Comment:     Patient will complete self ca

## 2020-03-27 NOTE — PLAN OF CARE
No acute events overnight. Denies pain. Denies nausea. Tolerating diet. Reports passing flatus overnight. Up with assist and hemiwalker - tolerates well. Polar Ice to Left Knee. Surgical Dressing in place with hemovac.  Plan for possible discharge home with SAFETY ADULT - FALL  Goal: Free from fall injury  Description  INTERVENTIONS:  - Assess pt frequently for physical needs  - Identify cognitive and physical deficits and behaviors that affect risk of falls.   - [place] fall precautions as indicated by asse

## 2020-03-27 NOTE — PLAN OF CARE
Problem: Patient/Family Goals  Goal: Patient/Family Long Term Goal  Description  Patient's Long Term Goal:     Go home:    -monitor vs  -work with pt/ot to find proper replacement  -apply patient centered care  - See additional Care Plan goals for specif with patient and family use of appropriate assistive device and precautions (e.g. spinal or hip dislocation precautions)  Outcome: Progressing     Problem: PAIN - ADULT  Goal: Verbalizes/displays adequate comfort level or patient's stated pain goal  Yaryri at discharge as needed  - Consider post-discharge preferences of patient/family/discharge partner  - Complete POLST form as appropriate  - Assess patient's ability to be responsible for managing their own health  - Refer to Case Management Department for c

## 2020-03-27 NOTE — PHYSICAL THERAPY NOTE
PHYSICAL THERAPY TREATMENT NOTE - INPATIENT     Room Number: 467/467-A       Presenting Problem: s/p L knee I&D and revision on 3/26/2020; recent R elbow replacement w/ ORIF ulnar shaft fracture on 3/2/2020    Problem List  Principal Problem:    Infection patient currently have. ..  -   Turning over in bed (including adjusting bedclothes, sheets and blankets)?: A Little   -   Sitting down on and standing up from a chair with arms (e.g., wheelchair, bedside commode, etc.): A Little   -   Moving from lying on discharge.    Goal #5   Current Status In progress   Goal #6    Goal #6  Current Status

## 2020-03-27 NOTE — CM/SW NOTE
1400:  Per nursing rounds, pt is having PICC line placed today, 3/27 and tentative plan for DC this weekend. Will need final Rx for discharge to ensure pt has benefit coverage (at home or infusion center).      OMNTSERRAT verified in [facility], [facility] - tentative referral in. Aware no final Rx for IV antibiotics available yet. States she will need this prior to pt discharge as prior authorization will be needed. MONTSERRAT notified RN/Arline.        [id number] Addendum:   MONTSERRAT staley

## 2020-03-28 ENCOUNTER — APPOINTMENT (OUTPATIENT)
Dept: GENERAL RADIOLOGY | Facility: HOSPITAL | Age: 60
DRG: 486 | End: 2020-03-28
Attending: INTERNAL MEDICINE
Payer: MEDICARE

## 2020-03-28 LAB
ANION GAP SERPL CALC-SCNC: 7 MMOL/L (ref 0–18)
BILIRUB UR QL: NEGATIVE
BUN BLD-MCNC: 9 MG/DL (ref 7–18)
BUN/CREAT SERPL: 19.1 (ref 10–20)
CALCIUM BLD-MCNC: 8.3 MG/DL (ref 8.5–10.1)
CHLORIDE SERPL-SCNC: 99 MMOL/L (ref 98–112)
CLARITY UR: CLEAR
CO2 SERPL-SCNC: 25 MMOL/L (ref 21–32)
COLOR UR: YELLOW
CREAT BLD-MCNC: 0.47 MG/DL (ref 0.55–1.02)
DEPRECATED RDW RBC AUTO: 58.4 FL (ref 35.1–46.3)
ERYTHROCYTE [DISTWIDTH] IN BLOOD BY AUTOMATED COUNT: 19.8 % (ref 11–15)
GLUCOSE BLD-MCNC: 92 MG/DL (ref 70–99)
GLUCOSE UR-MCNC: NEGATIVE MG/DL
HCT VFR BLD AUTO: 27.7 % (ref 35–48)
HGB BLD-MCNC: 8.9 G/DL (ref 12–16)
HGB UR QL STRIP.AUTO: NEGATIVE
KETONES UR-MCNC: NEGATIVE MG/DL
LEUKOCYTE ESTERASE UR QL STRIP.AUTO: NEGATIVE
MCH RBC QN AUTO: 26.1 PG (ref 26–34)
MCHC RBC AUTO-ENTMCNC: 32.1 G/DL (ref 31–37)
MCV RBC AUTO: 81.2 FL (ref 80–100)
NITRITE UR QL STRIP.AUTO: NEGATIVE
OSMOLALITY SERPL CALC.SUM OF ELEC: 270 MOSM/KG (ref 275–295)
PH UR: 7 [PH] (ref 5–8)
PLATELET # BLD AUTO: 374 10(3)UL (ref 150–450)
POTASSIUM SERPL-SCNC: 4.1 MMOL/L (ref 3.5–5.1)
PROT UR-MCNC: NEGATIVE MG/DL
RBC # BLD AUTO: 3.41 X10(6)UL (ref 3.8–5.3)
SODIUM SERPL-SCNC: 131 MMOL/L (ref 136–145)
SP GR UR STRIP: 1 (ref 1–1.03)
UROBILINOGEN UR STRIP-ACNC: <2
VANCOMYCIN TROUGH SERPL-MCNC: 7.1 UG/ML (ref 10–20)
WBC # BLD AUTO: 9.5 X10(3) UL (ref 4–11)

## 2020-03-28 PROCEDURE — 80048 BASIC METABOLIC PNL TOTAL CA: CPT | Performed by: NURSE PRACTITIONER

## 2020-03-28 PROCEDURE — 81003 URINALYSIS AUTO W/O SCOPE: CPT | Performed by: INTERNAL MEDICINE

## 2020-03-28 PROCEDURE — 71045 X-RAY EXAM CHEST 1 VIEW: CPT | Performed by: INTERNAL MEDICINE

## 2020-03-28 PROCEDURE — 97116 GAIT TRAINING THERAPY: CPT

## 2020-03-28 PROCEDURE — 80202 ASSAY OF VANCOMYCIN: CPT | Performed by: PHYSICIAN ASSISTANT

## 2020-03-28 PROCEDURE — 87040 BLOOD CULTURE FOR BACTERIA: CPT | Performed by: INTERNAL MEDICINE

## 2020-03-28 PROCEDURE — 97110 THERAPEUTIC EXERCISES: CPT

## 2020-03-28 PROCEDURE — 97530 THERAPEUTIC ACTIVITIES: CPT

## 2020-03-28 PROCEDURE — 85027 COMPLETE CBC AUTOMATED: CPT | Performed by: PHYSICIAN ASSISTANT

## 2020-03-28 RX ORDER — VANCOMYCIN HYDROCHLORIDE
1250 EVERY 12 HOURS
Status: DISCONTINUED | OUTPATIENT
Start: 2020-03-28 | End: 2020-03-29

## 2020-03-28 NOTE — PROGRESS NOTES
[facility] Hospitalist Progress Note     Reason for Admission: left knee washout  PCP: Lela Mckeon MD     Assessment/Plan:     Principal Problem:    Infection of prosthetic left knee joint [facility])  Ms. Jose Elias England is a 64 yo female with PMH of bipolar, depression, schizo 98.1 °F (36.7 °C), temperature source Oral, resp. rate 16, height 4' 10\" (1.473 m), weight 110 lb (49.9 kg), SpO2 100 %, not currently breastfeeding.     Temp:  [97.9 °F (36.6 °C)-102.3 °F (39.1 °C)] 98.1 °F (36.7 °C)  Pulse:  [69-81] 69  Resp:  [16-20] 16 vancomycin  1,250 mg Intravenous Q12H   • aspirin  325 mg Oral BID   • Senna  17.2 mg Oral Nightly   • docusate sodium  100 mg Oral BID   • gabapentin  300 mg Oral Nightly   • Benztropine Mesylate  0.5 mg Oral BID   • divalproex Sodium ER  500 mg Oral Nigh

## 2020-03-28 NOTE — PROGRESS NOTES
[facility] Infectious Disease  Progress Note    Falguni Dorsey Patient Status:  Inpatient    1960 MRN [id number]   Location [facility] 4W/SW/SE Attending Juliocesar Byrd MD   Hosp Day # 2 PCP Jose Mejia MD     Subjective:  Kalli Vaughn her hip  - Required 2 stage procedure in the past, cultures historically with MRSA    4. Disposition - inpatient. Not ready for d/c given fevers.   Anticipate eventual d/c on IV vancomycin for 6 weeks followed by p.o. antibiotics for some time given reten

## 2020-03-28 NOTE — PHYSICAL THERAPY NOTE
PHYSICAL THERAPY TREATMENT NOTE - INPATIENT     Room Number: 467/467-A       Presenting Problem: s/p L knee I&D and revision on 3/26/2020; recent R elbow replacement w/ ORIF ulnar shaft fracture on 3/2/2020    Problem List  Principal Problem:    Infection have...  -   Turning over in bed (including adjusting bedclothes, sheets and blankets)?: A Little   -   Sitting down on and standing up from a chair with arms (e.g., wheelchair, bedside commode, etc.): A Little   -   Moving from lying on back to sitting on Current Status In progress   Goal #6    Goal #6  Current Status

## 2020-03-28 NOTE — PROGRESS NOTES
[facility] dosing service    Follow-up Pharmacokinetic Consult for Vancomycin Dosing     Kelley Dawkins is a 63year old female who is being treated for infected L knee, s/p I&D and polyethylene liner exchange.    Patient is on day 3 of Vancomycin and is c pharmacokinetics, actual body weight of 49.9 kg and renal function). 2.  Will re-check Vancomycin trough levels prior to 4th dose on 3/29 at 2030. Goal trough level 10-15 ug/mL.     3.  Pharmacy will need Scr daily while on Vancomycin to assess renal fu

## 2020-03-28 NOTE — PROGRESS NOTES
POD#2 s/p I&D and poly exchange left knee  Pain well controlled on orals  No nausea    Post op acute anemia - tolerated and stable  Cultures with gram pos in chains- Final result pending    Drains with little discharge    Dressing changed, Clean and dry  C

## 2020-03-29 LAB
ANION GAP SERPL CALC-SCNC: 7 MMOL/L (ref 0–18)
BUN BLD-MCNC: 6 MG/DL (ref 7–18)
BUN/CREAT SERPL: 16.2 (ref 10–20)
CALCIUM BLD-MCNC: 8.4 MG/DL (ref 8.5–10.1)
CHLORIDE SERPL-SCNC: 95 MMOL/L (ref 98–112)
CO2 SERPL-SCNC: 27 MMOL/L (ref 21–32)
CREAT BLD-MCNC: 0.37 MG/DL (ref 0.55–1.02)
DEPRECATED RDW RBC AUTO: 57.7 FL (ref 35.1–46.3)
ERYTHROCYTE [DISTWIDTH] IN BLOOD BY AUTOMATED COUNT: 19.2 % (ref 11–15)
GLUCOSE BLD-MCNC: 86 MG/DL (ref 70–99)
HCT VFR BLD AUTO: 26.3 % (ref 35–48)
HGB BLD-MCNC: 8.5 G/DL (ref 12–16)
MCH RBC QN AUTO: 26.4 PG (ref 26–34)
MCHC RBC AUTO-ENTMCNC: 32.3 G/DL (ref 31–37)
MCV RBC AUTO: 81.7 FL (ref 80–100)
OSMOLALITY SERPL CALC.SUM OF ELEC: 265 MOSM/KG (ref 275–295)
OSMOLALITY UR: 266 MOSM/KG (ref 300–1100)
PLATELET # BLD AUTO: 365 10(3)UL (ref 150–450)
POTASSIUM SERPL-SCNC: 4.1 MMOL/L (ref 3.5–5.1)
RBC # BLD AUTO: 3.22 X10(6)UL (ref 3.8–5.3)
SODIUM SERPL-SCNC: 129 MMOL/L (ref 136–145)
SODIUM SERPL-SCNC: 49 MMOL/L
TSI SER-ACNC: 1.07 MIU/ML (ref 0.36–3.74)
WBC # BLD AUTO: 11.2 X10(3) UL (ref 4–11)

## 2020-03-29 PROCEDURE — A4216 STERILE WATER/SALINE, 10 ML: HCPCS | Performed by: INTERNAL MEDICINE

## 2020-03-29 PROCEDURE — 83935 ASSAY OF URINE OSMOLALITY: CPT | Performed by: INTERNAL MEDICINE

## 2020-03-29 PROCEDURE — 97110 THERAPEUTIC EXERCISES: CPT

## 2020-03-29 PROCEDURE — 84443 ASSAY THYROID STIM HORMONE: CPT | Performed by: INTERNAL MEDICINE

## 2020-03-29 PROCEDURE — 97116 GAIT TRAINING THERAPY: CPT

## 2020-03-29 PROCEDURE — 85027 COMPLETE CBC AUTOMATED: CPT | Performed by: PHYSICIAN ASSISTANT

## 2020-03-29 PROCEDURE — 84300 ASSAY OF URINE SODIUM: CPT | Performed by: INTERNAL MEDICINE

## 2020-03-29 PROCEDURE — 80048 BASIC METABOLIC PNL TOTAL CA: CPT | Performed by: INTERNAL MEDICINE

## 2020-03-29 PROCEDURE — 97530 THERAPEUTIC ACTIVITIES: CPT

## 2020-03-29 NOTE — CM/SW NOTE
IVAB orders entered in Epic for cubicin q24. [phone] being entered. Per RN, anticipated discharge Monday.  MONTSERRAT will notifiy CM/SW team to follow up on Monday regarding appointment for t

## 2020-03-29 NOTE — PHYSICAL THERAPY NOTE
PHYSICAL THERAPY TREATMENT NOTE - INPATIENT     Room Number: 467/467-A       Presenting Problem: s/p L knee I&D and revision on 3/26/2020; recent R elbow replacement w/ ORIF ulnar shaft fracture on 3/2/2020    Problem List  Principal Problem:    Infection MOBILITY  How much difficulty does the patient currently have. ..  -   Turning over in bed (including adjusting bedclothes, sheets and blankets)?: A Little   -   Sitting down on and standing up from a chair with arms (e.g., wheelchair, bedside commode, etc. provided to patient in preparation for discharge.    Goal #5   Current Status In progress   Goal #6    Goal #6  Current Status

## 2020-03-29 NOTE — PROGRESS NOTES
POD#2 s/p Left knee I&D and poly exchange  Fever yesterday, afebrile now  Pain controlled on orals  Requires assistance for daily activities    Lab with Enterococcus facialis and Staph, not aureus    Anemia stable    Exam with light erythremia and warmth l

## 2020-03-29 NOTE — PLAN OF CARE
Problem: Patient/Family Goals  Goal: Patient/Family Long Term Goal  Description  Patient's Long Term Goal:     Go home:    -monitor vs  -work with pt/ot to find proper replacement  -apply patient centered care  - See additional Care Plan goals for specific with patient and family use of appropriate assistive device and precautions (e.g. spinal or hip dislocation precautions)  Outcome: Progressing     Problem: PAIN - ADULT  Goal: Verbalizes/displays adequate comfort level or patient's stated pain goal  Yaryri at discharge as needed  - Consider post-discharge preferences of patient/family/discharge partner  - Complete POLST form as appropriate  - Assess patient's ability to be responsible for managing their own health  - Refer to Case Management Department for c

## 2020-03-29 NOTE — PROGRESS NOTES
[facility] AND [facility] Infectious Disease  Progress Note    Peg Blank Patient Status:  Inpatient    1960 MRN [id number]   Location [facility] 4W/SW/SE Attending Vinny Mg MD   Hosp Day # 3 PCP Christine Vasquez MD     Subjective:  Jose De Jesus Elias Disposition - stable for d/c from ID. As patient desires to have outpatient infusion will switch to once daily IV cubicin for ease of once daily IV infusion. D/w Dr. Ga Bright. F/u with our IDD APPs in 2 weeks. Orders entered into Epic.      Jolanta Macias

## 2020-03-29 NOTE — PROGRESS NOTES
[facility] Hospitalist Progress Note     Reason for Admission: left knee washout  PCP: Blayne Jean MD     Assessment/Plan:     Principal Problem:    Infection of prosthetic left knee joint [facility])  Ms. Jayant Blood is a 64 yo female with PMH of bipolar, depression, schizo temperature 98.9 °F (37.2 °C), temperature source Oral, resp. rate 18, height 4' 10\" (1.473 m), weight 110 lb (49.9 kg), SpO2 90 %, not currently breastfeeding.     Temp:  [98.1 °F (36.7 °C)-98.9 °F (37.2 °C)] 98.9 °F (37.2 °C)  Pulse:  [69-81] 73  Resp: Trev Salmeron MD on 3/28/2020 at 2:06 PM              Meds:     • vancomycin  1,250 mg Intravenous Q12H   • aspirin  325 mg Oral BID   • Senna  17.2 mg Oral Nightly   • docusate sodium  100 mg Oral BID   • gabapentin  300 mg Oral Nightly   • Benztropine

## 2020-03-30 VITALS
WEIGHT: 110 LBS | OXYGEN SATURATION: 100 % | DIASTOLIC BLOOD PRESSURE: 74 MMHG | HEART RATE: 77 BPM | TEMPERATURE: 97 F | RESPIRATION RATE: 16 BRPM | BODY MASS INDEX: 23.09 KG/M2 | HEIGHT: 58 IN | SYSTOLIC BLOOD PRESSURE: 138 MMHG

## 2020-03-30 LAB
ANION GAP SERPL CALC-SCNC: 7 MMOL/L (ref 0–18)
BUN BLD-MCNC: 7 MG/DL (ref 7–18)
BUN/CREAT SERPL: 20 (ref 10–20)
CALCIUM BLD-MCNC: 8.2 MG/DL (ref 8.5–10.1)
CHLORIDE SERPL-SCNC: 93 MMOL/L (ref 98–112)
CO2 SERPL-SCNC: 27 MMOL/L (ref 21–32)
CREAT BLD-MCNC: 0.35 MG/DL (ref 0.55–1.02)
DEPRECATED RDW RBC AUTO: 54.3 FL (ref 35.1–46.3)
ERYTHROCYTE [DISTWIDTH] IN BLOOD BY AUTOMATED COUNT: 18.7 % (ref 11–15)
GLUCOSE BLD-MCNC: 85 MG/DL (ref 70–99)
HAV IGM SER QL: 1.9 MG/DL (ref 1.6–2.6)
HCT VFR BLD AUTO: 24.9 % (ref 35–48)
HGB BLD-MCNC: 8.1 G/DL (ref 12–16)
MCH RBC QN AUTO: 26.3 PG (ref 26–34)
MCHC RBC AUTO-ENTMCNC: 32.5 G/DL (ref 31–37)
MCV RBC AUTO: 80.8 FL (ref 80–100)
OSMOLALITY SERPL CALC.SUM OF ELEC: 261 MOSM/KG (ref 275–295)
PLATELET # BLD AUTO: 326 10(3)UL (ref 150–450)
POTASSIUM SERPL-SCNC: 3.7 MMOL/L (ref 3.5–5.1)
RBC # BLD AUTO: 3.08 X10(6)UL (ref 3.8–5.3)
SODIUM SERPL-SCNC: 127 MMOL/L (ref 136–145)
WBC # BLD AUTO: 9.5 X10(3) UL (ref 4–11)

## 2020-03-30 PROCEDURE — 97530 THERAPEUTIC ACTIVITIES: CPT

## 2020-03-30 PROCEDURE — 83735 ASSAY OF MAGNESIUM: CPT | Performed by: NURSE PRACTITIONER

## 2020-03-30 PROCEDURE — 97116 GAIT TRAINING THERAPY: CPT

## 2020-03-30 PROCEDURE — A4216 STERILE WATER/SALINE, 10 ML: HCPCS | Performed by: INTERNAL MEDICINE

## 2020-03-30 PROCEDURE — 80048 BASIC METABOLIC PNL TOTAL CA: CPT | Performed by: INTERNAL MEDICINE

## 2020-03-30 PROCEDURE — 85027 COMPLETE CBC AUTOMATED: CPT | Performed by: INTERNAL MEDICINE

## 2020-03-30 PROCEDURE — 97535 SELF CARE MNGMENT TRAINING: CPT

## 2020-03-30 RX ORDER — HYDROCODONE BITARTRATE AND ACETAMINOPHEN 5; 325 MG/1; MG/1
1 TABLET ORAL EVERY 4 HOURS PRN
Qty: 30 TABLET | Refills: 0 | Status: SHIPPED | OUTPATIENT
Start: 2020-03-30 | End: 2020-03-30

## 2020-03-30 RX ORDER — HYDROCODONE BITARTRATE AND ACETAMINOPHEN 5; 325 MG/1; MG/1
1 TABLET ORAL EVERY 4 HOURS PRN
Qty: 30 TABLET | Refills: 0 | Status: ON HOLD | OUTPATIENT
Start: 2020-03-30 | End: 2020-04-20

## 2020-03-30 RX ORDER — VANCOMYCIN HYDROCHLORIDE 250 MG/1
250 CAPSULE ORAL DAILY
Qty: 28 CAPSULE | Refills: 0 | Status: SHIPPED | OUTPATIENT
Start: 2020-03-31 | End: 2020-03-30

## 2020-03-30 RX ORDER — AMLODIPINE BESYLATE 5 MG/1
5 TABLET ORAL
Qty: 90 TABLET | Refills: 0 | Status: SHIPPED | OUTPATIENT
Start: 2020-03-30 | End: 2020-10-13

## 2020-03-30 RX ORDER — ASPIRIN 325 MG
325 TABLET ORAL 2 TIMES DAILY
Qty: 60 TABLET | Refills: 0 | Status: SHIPPED | OUTPATIENT
Start: 2020-03-30 | End: 2020-04-28

## 2020-03-30 RX ORDER — ASPIRIN 325 MG
325 TABLET ORAL 2 TIMES DAILY
Qty: 60 TABLET | Refills: 0 | Status: SHIPPED | OUTPATIENT
Start: 2020-03-30 | End: 2020-03-30

## 2020-03-30 RX ORDER — VANCOMYCIN HYDROCHLORIDE 250 MG/1
250 CAPSULE ORAL DAILY
Qty: 43 CAPSULE | Refills: 0 | Status: SHIPPED | OUTPATIENT
Start: 2020-04-01 | End: 2020-04-15

## 2020-03-30 NOTE — PHYSICAL THERAPY NOTE
PHYSICAL THERAPY TREATMENT NOTE - INPATIENT     Room Number: 467/467-A       Presenting Problem: s/p L knee I&D and revision on 3/26/2020; recent R elbow replacement w/ ORIF ulnar shaft fracture on 3/2/2020    Problem List  Principal Problem:    Infection Recommendations: Sub-acute rehabilitation(plans for HHPT with mother to assist)     PLAN  PT Treatment Plan: Bed mobility; Endurance; Patient education    SUBJECTIVE  \"Thanks so much for helping me.  I'm nervous about the stairs\"    OBJECTIVE  Precautions: trials      Patient End of Session: Up in chair;Needs met;Call light within reach; Alarm set    CURRENT GOALS   Goals to be met by: 4/9/2020  Patient Goal Patient's self-stated goal is: to go home   Goal #1 Patient is able to demonstrate supine - sit EOB @

## 2020-03-30 NOTE — PAYOR COMM NOTE
----------------CONTINUED STAY REVIEW  3-29-20   (   3-28-20 review below)        Payor: [address] #:  [id number]  Authorization Number: [id number] / [id number]    Admit date: 3/26/20  Admit time: 26    Admitting Physician: Yadi Sen continue home iron, follow   -wound cx growing VRE and staph  -JOHNNY drain   -abx- po vanc for c diff ppx, abx per ID switching regimen today given VRE,  -DVT Prophy- ASA  -PT/OT/SW-with abx changed will need rehab  -as per surgery      Fever:  -cultures retu Route User    3/30/2020 0600 Given 40 mg Oral Thea Nettles RN      [facility]) tab 17.2 mg     Date Action Dose Route User    3/29/2020 1952 Given 17.2 mg Oral Thea Nettles RN      traMADol HCl (ULTRAM) tab 50 mg     Date Action Dose Route User    3/29 03/28/2020     CA 8.3 03/28/2020         Cultures:  Specimen Information: Synovial fluid knee left;  Body fluid, unspecified        Body Fluid Culture Result: Enterococcus faecalis VREAbnormal        Staphylococcus species, not aureusAbnormal

## 2020-03-30 NOTE — PROGRESS NOTES
[facility] AND [facility] Infectious Disease  Progress Note    Sri Torres Patient Status:  Inpatient    1960 MRN [id number]   Location [facility] 4W/SW/SE Attending Raleigh Zuleta MD   Hosp Day # 4 PCP Gilma Jenkins MD     Subjective:  Matt Burciaga  Disposition - stable for d/c from ID. As patient desires to have outpatient infusion will continue once daily IV cubicin for ease of once daily IV infusion. Orders entered into Epic for cubicin 6mg/kg IV q24h with once weekly CBC, CMP, CRP, CPK.   Jordan

## 2020-03-30 NOTE — CM/SW NOTE
MONTSERRAT was notified pt will dc today. Per discussion with Trina/VICENTE, pt will have appointment with ortho on 3/31 at [address] spoke to Tabatha/IV infusion at [address] [facility] - pt is confirmed for 4pm infusion on 3/31.      MONTSERRAT confirmed with [address] - they are able t

## 2020-03-30 NOTE — DISCHARGE SUMMARY
[facility] Hospitalist Discharge Summary   Patient ID:  Bhanu Acosta  [id number]  63year old  9/28/1960    Admit date: 3/26/2020  Discharge date: 3/30/2020    Primary Care Physician: Kasey Donato MD   Attending Physician: Tawanna Kenny MD   Consults:   Lay Ruff daily  -follow up BMP as outpt, on 4/6,  follow up with Dr Magaly Abarca     Hx C [id number]  -oral vanco, continue until 5/13     Iron Def Anemia  -pre op hgb 8.7-11.9   -post op hgb 8.1  -2/2020 iron 22 with sat 7%, ferritin 43  -continue iron      Chronic Right F Tabs  Commonly known as:  NORCO  Take 1 tablet by mouth every 4 (four) hours as needed.   What changed:    · how much to take  · when to take this  · reasons to take this     methotrexate 2.5 MG Tabs  Commonly known as:  RHEUMATREX  Start taking on:  April as scheduled for cast removal   Contact information:  [address] [facility] 222 Jose G Muller MD.    Specialty:  Internal Medicine  Contact information:  [address] [address] [facility] infusions.      GEN: NAD, frail   HEENT: EOMI, PERRLA  Neck: Supple, no JVD  Pulm: CTAB, no crackles or wheezes  CV: RRR, no murmurs, 2+ peripheral pulses  ABD: Soft, non-tender, non-distended, +BS  MSK: RUE in sling, LLE brace in place  Neuro: Grossly norm

## 2020-03-30 NOTE — OCCUPATIONAL THERAPY NOTE
OCCUPATIONAL THERAPY TREATMENT NOTE - INPATIENT    Room Number: 467/467-A         Presenting Problem: S/p ID poly exchange L knee     Problem List  Principal Problem:    Infection of prosthetic left knee joint ([address]      OCCUPATIONAL THERAPY ASSESSMENT   N ‘6-Clicks’ Inpatient Daily Activity Short Form  How much help from another person does the patient currently need…  -   Putting on and taking off regular lower body clothing?: A Little  -   Bathing (including washing, rinsing, drying)?: A Little  -   Toile

## 2020-03-30 NOTE — PROGRESS NOTES
POD#4 s/p I&D left knee  Afeb  Pain well controlled on orals and improving  Lab with post op acute anemia - stable and tolerated    No drainage from drain sight and incision  Light edema and erythremia left knee  Calf's non tender, DNVI    Imp: POD#4 s/p I

## 2020-03-31 ENCOUNTER — OFFICE VISIT (OUTPATIENT)
Dept: HEMATOLOGY/ONCOLOGY | Facility: HOSPITAL | Age: 60
End: 2020-03-31
Attending: INTERNAL MEDICINE
Payer: MEDICARE

## 2020-03-31 VITALS
HEART RATE: 74 BPM | TEMPERATURE: 98 F | DIASTOLIC BLOOD PRESSURE: 64 MMHG | RESPIRATION RATE: 16 BRPM | OXYGEN SATURATION: 99 % | SYSTOLIC BLOOD PRESSURE: 111 MMHG

## 2020-03-31 DIAGNOSIS — T84.54XD INFECTION ASSOCIATED WITH INTERNAL LEFT KNEE PROSTHESIS, SUBSEQUENT ENCOUNTER: Primary | ICD-10-CM

## 2020-03-31 PROCEDURE — 96374 THER/PROPH/DIAG INJ IV PUSH: CPT

## 2020-03-31 PROCEDURE — A4216 STERILE WATER/SALINE, 10 ML: HCPCS | Performed by: INTERNAL MEDICINE

## 2020-03-31 NOTE — PROGRESS NOTES
Pt arrived for IV Dapto for treatment of L knee infection. VRE in culture. EOT 5/7She also has steri-strips to right arm cast was removed today. Denies fevers/ chills. PICC line present to left Arm, positive blood return noted.      She arrived in [facility]

## 2020-03-31 NOTE — PATIENT INSTRUCTIONS
On weekends use side entrance and knock on side door. Call Dr. Huyen Castro for follow up appointment.  [phone] on or before 5/7/2020

## 2020-04-01 ENCOUNTER — OFFICE VISIT (OUTPATIENT)
Dept: HEMATOLOGY/ONCOLOGY | Facility: HOSPITAL | Age: 60
End: 2020-04-01
Attending: INTERNAL MEDICINE
Payer: MEDICARE

## 2020-04-01 VITALS
SYSTOLIC BLOOD PRESSURE: 129 MMHG | TEMPERATURE: 98 F | OXYGEN SATURATION: 99 % | HEART RATE: 67 BPM | DIASTOLIC BLOOD PRESSURE: 81 MMHG | RESPIRATION RATE: 16 BRPM

## 2020-04-01 DIAGNOSIS — T84.54XD INFECTION ASSOCIATED WITH INTERNAL LEFT KNEE PROSTHESIS, SUBSEQUENT ENCOUNTER: Primary | ICD-10-CM

## 2020-04-01 PROCEDURE — A4216 STERILE WATER/SALINE, 10 ML: HCPCS | Performed by: INTERNAL MEDICINE

## 2020-04-01 PROCEDURE — 96374 THER/PROPH/DIAG INJ IV PUSH: CPT

## 2020-04-01 NOTE — PROGRESS NOTES
Pt arrived for IV Dapto for treatment of L knee infection. VRE in culture. EOT 5/7 Denies fevers/ chills. PICC line present to left Arm, positive blood return noted. She arrived in wheelchair. She is currently living with her mom.      Pt discharged sta

## 2020-04-02 ENCOUNTER — OFFICE VISIT (OUTPATIENT)
Dept: HEMATOLOGY/ONCOLOGY | Facility: HOSPITAL | Age: 60
End: 2020-04-02
Attending: INTERNAL MEDICINE
Payer: MEDICARE

## 2020-04-02 VITALS
DIASTOLIC BLOOD PRESSURE: 59 MMHG | HEART RATE: 64 BPM | RESPIRATION RATE: 16 BRPM | SYSTOLIC BLOOD PRESSURE: 96 MMHG | TEMPERATURE: 99 F | OXYGEN SATURATION: 97 %

## 2020-04-02 DIAGNOSIS — T84.54XD INFECTION ASSOCIATED WITH INTERNAL LEFT KNEE PROSTHESIS, SUBSEQUENT ENCOUNTER: Primary | ICD-10-CM

## 2020-04-02 PROCEDURE — A4216 STERILE WATER/SALINE, 10 ML: HCPCS | Performed by: INTERNAL MEDICINE

## 2020-04-02 PROCEDURE — 96374 THER/PROPH/DIAG INJ IV PUSH: CPT

## 2020-04-02 NOTE — PROGRESS NOTES
Pt arrived for IV Dapto for treatment of L knee infection. VRE in culture. EOT 5/7 Arrived via wheelchair , she is trying not to walk much on her leg. Denies fevers/ chills. PICC line present to left Arm, positive blood return noted.  Daptomycin given and

## 2020-04-03 ENCOUNTER — OFFICE VISIT (OUTPATIENT)
Dept: HEMATOLOGY/ONCOLOGY | Facility: HOSPITAL | Age: 60
End: 2020-04-03
Attending: INTERNAL MEDICINE
Payer: MEDICARE

## 2020-04-03 VITALS
RESPIRATION RATE: 16 BRPM | SYSTOLIC BLOOD PRESSURE: 107 MMHG | HEART RATE: 70 BPM | OXYGEN SATURATION: 97 % | DIASTOLIC BLOOD PRESSURE: 59 MMHG | TEMPERATURE: 98 F

## 2020-04-03 DIAGNOSIS — T84.54XD INFECTION ASSOCIATED WITH INTERNAL LEFT KNEE PROSTHESIS, SUBSEQUENT ENCOUNTER: Primary | ICD-10-CM

## 2020-04-03 PROCEDURE — A4216 STERILE WATER/SALINE, 10 ML: HCPCS | Performed by: INTERNAL MEDICINE

## 2020-04-03 PROCEDURE — 96374 THER/PROPH/DIAG INJ IV PUSH: CPT

## 2020-04-04 ENCOUNTER — OFFICE VISIT (OUTPATIENT)
Dept: HEMATOLOGY/ONCOLOGY | Facility: HOSPITAL | Age: 60
End: 2020-04-04
Attending: INTERNAL MEDICINE
Payer: MEDICARE

## 2020-04-04 VITALS
OXYGEN SATURATION: 99 % | SYSTOLIC BLOOD PRESSURE: 129 MMHG | TEMPERATURE: 98 F | RESPIRATION RATE: 16 BRPM | DIASTOLIC BLOOD PRESSURE: 83 MMHG | HEART RATE: 71 BPM

## 2020-04-04 DIAGNOSIS — T84.54XD INFECTION ASSOCIATED WITH INTERNAL LEFT KNEE PROSTHESIS, SUBSEQUENT ENCOUNTER: Primary | ICD-10-CM

## 2020-04-04 PROCEDURE — A4216 STERILE WATER/SALINE, 10 ML: HCPCS | Performed by: INTERNAL MEDICINE

## 2020-04-04 PROCEDURE — 96374 THER/PROPH/DIAG INJ IV PUSH: CPT

## 2020-04-04 NOTE — PROGRESS NOTES
Pt arrived for IV Dapto for treatment of L knee infection. VRE in culture. EOT 5/7 Denies fevers/ chills. PICC line present to left Arm, positive blood return noted.    Reports pain at times to left knee; 7/10 pain at 0800, took pain pill with relief to a 4

## 2020-04-05 ENCOUNTER — OFFICE VISIT (OUTPATIENT)
Dept: HEMATOLOGY/ONCOLOGY | Facility: HOSPITAL | Age: 60
End: 2020-04-05
Attending: INTERNAL MEDICINE
Payer: MEDICARE

## 2020-04-05 VITALS
DIASTOLIC BLOOD PRESSURE: 79 MMHG | TEMPERATURE: 97 F | OXYGEN SATURATION: 100 % | HEART RATE: 62 BPM | SYSTOLIC BLOOD PRESSURE: 134 MMHG | RESPIRATION RATE: 16 BRPM

## 2020-04-05 DIAGNOSIS — T84.54XD INFECTION ASSOCIATED WITH INTERNAL LEFT KNEE PROSTHESIS, SUBSEQUENT ENCOUNTER: Primary | ICD-10-CM

## 2020-04-05 PROCEDURE — 96374 THER/PROPH/DIAG INJ IV PUSH: CPT

## 2020-04-05 NOTE — PROGRESS NOTES
Pt arrived for IV Dapto for treatment of L knee infection. VRE in culture. EOT 5/7 Denies fevers/ chills. PICC line present to left Arm, positive blood return noted. Reports pain at times to left knee; She arrived in wheelchair.  She is currently li

## 2020-04-06 ENCOUNTER — OFFICE VISIT (OUTPATIENT)
Dept: HEMATOLOGY/ONCOLOGY | Facility: HOSPITAL | Age: 60
End: 2020-04-06
Attending: INTERNAL MEDICINE
Payer: MEDICARE

## 2020-04-06 VITALS
RESPIRATION RATE: 16 BRPM | TEMPERATURE: 98 F | OXYGEN SATURATION: 99 % | HEART RATE: 64 BPM | DIASTOLIC BLOOD PRESSURE: 79 MMHG | SYSTOLIC BLOOD PRESSURE: 130 MMHG

## 2020-04-06 DIAGNOSIS — T84.54XD INFECTION ASSOCIATED WITH INTERNAL LEFT KNEE PROSTHESIS, SUBSEQUENT ENCOUNTER: Primary | ICD-10-CM

## 2020-04-06 PROCEDURE — 85025 COMPLETE CBC W/AUTO DIFF WBC: CPT

## 2020-04-06 PROCEDURE — 96374 THER/PROPH/DIAG INJ IV PUSH: CPT

## 2020-04-06 PROCEDURE — 80053 COMPREHEN METABOLIC PANEL: CPT

## 2020-04-06 PROCEDURE — 86140 C-REACTIVE PROTEIN: CPT

## 2020-04-06 PROCEDURE — A4216 STERILE WATER/SALINE, 10 ML: HCPCS | Performed by: INTERNAL MEDICINE

## 2020-04-06 PROCEDURE — 82550 ASSAY OF CK (CPK): CPT

## 2020-04-06 NOTE — PROGRESS NOTES
Pt arrived for IV Dapto for treatment of L knee infection. VRE in culture. EOT 5/7 Denies fevers/ chills. PICC line present to left Arm, positive blood return noted, labs collected per protocol and daptomycin given over 2 minutes.        She arrived in [place]

## 2020-04-07 ENCOUNTER — OFFICE VISIT (OUTPATIENT)
Dept: HEMATOLOGY/ONCOLOGY | Facility: HOSPITAL | Age: 60
End: 2020-04-07
Attending: INTERNAL MEDICINE
Payer: MEDICARE

## 2020-04-07 ENCOUNTER — TELEPHONE (OUTPATIENT)
Dept: HEMATOLOGY/ONCOLOGY | Facility: HOSPITAL | Age: 60
End: 2020-04-07

## 2020-04-07 DIAGNOSIS — T84.54XD INFECTION ASSOCIATED WITH INTERNAL LEFT KNEE PROSTHESIS, SUBSEQUENT ENCOUNTER: Primary | ICD-10-CM

## 2020-04-07 PROCEDURE — A4216 STERILE WATER/SALINE, 10 ML: HCPCS | Performed by: INTERNAL MEDICINE

## 2020-04-07 PROCEDURE — 96374 THER/PROPH/DIAG INJ IV PUSH: CPT

## 2020-04-07 NOTE — TELEPHONE ENCOUNTER
Spoke with Dr. Wesley Amaral regarding patient having a temperature of 100.3 F while being screened at [facility]. Patient denies any other s/s including respiratory symptoms.  Per Dr. Wesley Amaral if patient develops any respiratory symptoms then she needs to call

## 2020-04-07 NOTE — PROGRESS NOTES
Pt arrived for IV Dapto for treatment of L knee infection. VRE in culture. EOT 5/7  Sriram Wynne arrived ambulatory using cane. Her temp registered at  100. 3.she denies any previous fever,chills, headache or body ache, or upper respiratory symptoms.   p

## 2020-04-08 ENCOUNTER — OFFICE VISIT (OUTPATIENT)
Dept: HEMATOLOGY/ONCOLOGY | Facility: HOSPITAL | Age: 60
End: 2020-04-08
Attending: INTERNAL MEDICINE
Payer: MEDICARE

## 2020-04-08 VITALS
RESPIRATION RATE: 16 BRPM | HEART RATE: 75 BPM | TEMPERATURE: 99 F | BODY MASS INDEX: 24 KG/M2 | SYSTOLIC BLOOD PRESSURE: 115 MMHG | OXYGEN SATURATION: 98 % | WEIGHT: 111 LBS | DIASTOLIC BLOOD PRESSURE: 68 MMHG

## 2020-04-08 DIAGNOSIS — T84.54XD INFECTION ASSOCIATED WITH INTERNAL LEFT KNEE PROSTHESIS, SUBSEQUENT ENCOUNTER: Primary | ICD-10-CM

## 2020-04-08 PROCEDURE — 96374 THER/PROPH/DIAG INJ IV PUSH: CPT

## 2020-04-08 PROCEDURE — A4216 STERILE WATER/SALINE, 10 ML: HCPCS | Performed by: INTERNAL MEDICINE

## 2020-04-08 NOTE — PROGRESS NOTES
Pt arrived for IV Dapto for treatment of L knee infection. VRE in culture. EOT 5/7 Arrived via wheelchair . Denies fevers/ chills. PICC line present to left Arm, positive blood return noted. Daptomycin given and tolerated well.  Left knee with dressing and

## 2020-04-09 ENCOUNTER — OFFICE VISIT (OUTPATIENT)
Dept: HEMATOLOGY/ONCOLOGY | Facility: HOSPITAL | Age: 60
End: 2020-04-09
Attending: INTERNAL MEDICINE
Payer: MEDICARE

## 2020-04-09 VITALS
RESPIRATION RATE: 16 BRPM | OXYGEN SATURATION: 99 % | SYSTOLIC BLOOD PRESSURE: 118 MMHG | DIASTOLIC BLOOD PRESSURE: 69 MMHG | TEMPERATURE: 98 F | HEART RATE: 71 BPM

## 2020-04-09 DIAGNOSIS — T84.54XD INFECTION ASSOCIATED WITH INTERNAL LEFT KNEE PROSTHESIS, SUBSEQUENT ENCOUNTER: Primary | ICD-10-CM

## 2020-04-09 PROCEDURE — 96374 THER/PROPH/DIAG INJ IV PUSH: CPT

## 2020-04-09 PROCEDURE — A4216 STERILE WATER/SALINE, 10 ML: HCPCS | Performed by: INTERNAL MEDICINE

## 2020-04-09 NOTE — PROGRESS NOTES
Radha Hopes to infusion today for IV Dapto for treatment of L knee infection. VRE in culture. Arrived via wheelchair. Denies fevers/ chills/ pain. Left knee with dressing and ace wrap. PICC line present to left arm, positive blood return noted.  Daptomycin gi

## 2020-04-10 ENCOUNTER — OFFICE VISIT (OUTPATIENT)
Dept: HEMATOLOGY/ONCOLOGY | Facility: HOSPITAL | Age: 60
End: 2020-04-10
Attending: INTERNAL MEDICINE
Payer: MEDICARE

## 2020-04-10 VITALS
RESPIRATION RATE: 16 BRPM | OXYGEN SATURATION: 100 % | DIASTOLIC BLOOD PRESSURE: 63 MMHG | TEMPERATURE: 98 F | HEART RATE: 72 BPM | SYSTOLIC BLOOD PRESSURE: 115 MMHG

## 2020-04-10 DIAGNOSIS — T84.54XD INFECTION ASSOCIATED WITH INTERNAL LEFT KNEE PROSTHESIS, SUBSEQUENT ENCOUNTER: Primary | ICD-10-CM

## 2020-04-10 PROCEDURE — 96374 THER/PROPH/DIAG INJ IV PUSH: CPT

## 2020-04-10 PROCEDURE — A4216 STERILE WATER/SALINE, 10 ML: HCPCS | Performed by: INTERNAL MEDICINE

## 2020-04-10 NOTE — PROGRESS NOTES
Cristina Oneal to infusion today for IV Dapto for treatment of L knee infection. VRE in culture. Arrived via wheelchair. Denies fevers/ chills/ pain. Left knee with dressing and ace wrap. PICC line present to left arm, positive blood return noted.  Daptomycin gi

## 2020-04-11 ENCOUNTER — OFFICE VISIT (OUTPATIENT)
Dept: HEMATOLOGY/ONCOLOGY | Facility: HOSPITAL | Age: 60
End: 2020-04-11
Attending: INTERNAL MEDICINE
Payer: MEDICARE

## 2020-04-11 VITALS
TEMPERATURE: 98 F | HEART RATE: 79 BPM | DIASTOLIC BLOOD PRESSURE: 65 MMHG | SYSTOLIC BLOOD PRESSURE: 112 MMHG | RESPIRATION RATE: 16 BRPM | OXYGEN SATURATION: 97 %

## 2020-04-11 DIAGNOSIS — T84.54XD INFECTION ASSOCIATED WITH INTERNAL LEFT KNEE PROSTHESIS, SUBSEQUENT ENCOUNTER: Primary | ICD-10-CM

## 2020-04-11 PROCEDURE — A4216 STERILE WATER/SALINE, 10 ML: HCPCS | Performed by: INTERNAL MEDICINE

## 2020-04-11 PROCEDURE — 96374 THER/PROPH/DIAG INJ IV PUSH: CPT

## 2020-04-11 NOTE — PROGRESS NOTES
Pt here for daily abx for left knee infection. Wearing ace wrap to and using wheelchair for distances. Has cane for short ambulating. Recent ck level 67-appropriate for treatment. Appeared to tolerate treatment well- daptomycin over 2 minutes.   No S/S of a

## 2020-04-12 ENCOUNTER — OFFICE VISIT (OUTPATIENT)
Dept: HEMATOLOGY/ONCOLOGY | Facility: HOSPITAL | Age: 60
End: 2020-04-12
Attending: INTERNAL MEDICINE
Payer: MEDICARE

## 2020-04-12 VITALS
OXYGEN SATURATION: 100 % | SYSTOLIC BLOOD PRESSURE: 109 MMHG | RESPIRATION RATE: 16 BRPM | TEMPERATURE: 98 F | HEART RATE: 69 BPM | DIASTOLIC BLOOD PRESSURE: 72 MMHG

## 2020-04-12 DIAGNOSIS — T84.54XD INFECTION ASSOCIATED WITH INTERNAL LEFT KNEE PROSTHESIS, SUBSEQUENT ENCOUNTER: Primary | ICD-10-CM

## 2020-04-12 PROCEDURE — 96374 THER/PROPH/DIAG INJ IV PUSH: CPT

## 2020-04-12 PROCEDURE — A4216 STERILE WATER/SALINE, 10 ML: HCPCS | Performed by: INTERNAL MEDICINE

## 2020-04-12 NOTE — PROGRESS NOTES
Talon Zapata to infusion today for IV Dapto for treatment of L knee infection. VRE in culture. Arrived via wheelchair. Denies fevers/ chills/ pain. Left knee with dressing and ace wrap. PICC line present to left arm, positive blood return noted.  Daptomycin gi

## 2020-04-13 ENCOUNTER — OFFICE VISIT (OUTPATIENT)
Dept: HEMATOLOGY/ONCOLOGY | Facility: HOSPITAL | Age: 60
End: 2020-04-13
Attending: INTERNAL MEDICINE
Payer: MEDICARE

## 2020-04-13 VITALS
HEART RATE: 66 BPM | DIASTOLIC BLOOD PRESSURE: 69 MMHG | OXYGEN SATURATION: 100 % | TEMPERATURE: 99 F | RESPIRATION RATE: 16 BRPM | SYSTOLIC BLOOD PRESSURE: 128 MMHG

## 2020-04-13 DIAGNOSIS — T84.54XD INFECTION ASSOCIATED WITH INTERNAL LEFT KNEE PROSTHESIS, SUBSEQUENT ENCOUNTER: Primary | ICD-10-CM

## 2020-04-13 PROCEDURE — 96374 THER/PROPH/DIAG INJ IV PUSH: CPT

## 2020-04-13 PROCEDURE — 36415 COLL VENOUS BLD VENIPUNCTURE: CPT

## 2020-04-13 PROCEDURE — 85025 COMPLETE CBC W/AUTO DIFF WBC: CPT

## 2020-04-13 PROCEDURE — 82550 ASSAY OF CK (CPK): CPT

## 2020-04-13 PROCEDURE — 86140 C-REACTIVE PROTEIN: CPT

## 2020-04-13 PROCEDURE — 80053 COMPREHEN METABOLIC PANEL: CPT

## 2020-04-13 PROCEDURE — A4216 STERILE WATER/SALINE, 10 ML: HCPCS | Performed by: INTERNAL MEDICINE

## 2020-04-13 NOTE — PROGRESS NOTES
Cb Tirado to infusion today for IV Dapto for treatment of L knee infection. VRE in culture. Arrived via wheelchair. Denies fevers/ chills/ pain. Left knee with dressing and ace wrap.     PICC line present to left arm, PICC difficult to flush and no blood retur

## 2020-04-14 ENCOUNTER — OFFICE VISIT (OUTPATIENT)
Dept: HEMATOLOGY/ONCOLOGY | Facility: HOSPITAL | Age: 60
End: 2020-04-14
Attending: INTERNAL MEDICINE
Payer: MEDICARE

## 2020-04-14 DIAGNOSIS — T84.54XD INFECTION ASSOCIATED WITH INTERNAL LEFT KNEE PROSTHESIS, SUBSEQUENT ENCOUNTER: Primary | ICD-10-CM

## 2020-04-14 PROCEDURE — A4216 STERILE WATER/SALINE, 10 ML: HCPCS | Performed by: INTERNAL MEDICINE

## 2020-04-14 PROCEDURE — 96374 THER/PROPH/DIAG INJ IV PUSH: CPT

## 2020-04-14 NOTE — PROGRESS NOTES
Randell Grow to infusion today for IV Dapto for treatment of septic arthritis left knee. VRE in culture. Arrived via wheelchair. Denies fevers/ chills/ pain. Left knee with dressing and ace wrap. Labs reviewed, ok to proceed.  Patient anticipating surgery to l

## 2020-04-15 ENCOUNTER — OFFICE VISIT (OUTPATIENT)
Dept: HEMATOLOGY/ONCOLOGY | Facility: HOSPITAL | Age: 60
End: 2020-04-15
Attending: INTERNAL MEDICINE
Payer: MEDICARE

## 2020-04-15 ENCOUNTER — APPOINTMENT (OUTPATIENT)
Dept: LAB | Facility: HOSPITAL | Age: 60
DRG: 464 | End: 2020-04-15
Attending: ORTHOPAEDIC SURGERY
Payer: MEDICARE

## 2020-04-15 VITALS
TEMPERATURE: 99 F | DIASTOLIC BLOOD PRESSURE: 70 MMHG | RESPIRATION RATE: 16 BRPM | OXYGEN SATURATION: 99 % | SYSTOLIC BLOOD PRESSURE: 120 MMHG | HEART RATE: 73 BPM

## 2020-04-15 DIAGNOSIS — Z01.818 PREOP TESTING: ICD-10-CM

## 2020-04-15 DIAGNOSIS — T84.54XD INFECTION ASSOCIATED WITH INTERNAL LEFT KNEE PROSTHESIS, SUBSEQUENT ENCOUNTER: Primary | ICD-10-CM

## 2020-04-15 PROCEDURE — A4216 STERILE WATER/SALINE, 10 ML: HCPCS | Performed by: INTERNAL MEDICINE

## 2020-04-15 PROCEDURE — 87641 MR-STAPH DNA AMP PROBE: CPT

## 2020-04-15 PROCEDURE — 80048 BASIC METABOLIC PNL TOTAL CA: CPT

## 2020-04-15 PROCEDURE — 36415 COLL VENOUS BLD VENIPUNCTURE: CPT

## 2020-04-15 PROCEDURE — 96374 THER/PROPH/DIAG INJ IV PUSH: CPT

## 2020-04-15 NOTE — PAT NURSING NOTE
Vitaly Kid nurse at Dr. Gilma Ramos office notified of pt. Metal allergy and that pt. Received her Daptomycin infusion at 4pm  Per pt.s mother.   States will notify

## 2020-04-15 NOTE — PROGRESS NOTES
Meche Ireland to infusion today for IV Dapto for treatment of septic arthritis left knee. VRE in culture. Arrived via wheelchair. Denies fevers/ chills/ pain. Left knee with dressing and ace wrap. Labs reviewed, ok to proceed.  Patient anticipating surgery to l

## 2020-04-15 NOTE — PAT NURSING NOTE
Deepak at surgery control desk, notified of pt's metal allergy and that she will be in contact isolation history of VRE.

## 2020-04-16 ENCOUNTER — ANESTHESIA (OUTPATIENT)
Dept: SURGERY | Facility: HOSPITAL | Age: 60
DRG: 464 | End: 2020-04-16
Payer: MEDICARE

## 2020-04-16 ENCOUNTER — HOSPITAL ENCOUNTER (INPATIENT)
Facility: HOSPITAL | Age: 60
LOS: 5 days | Discharge: HOME | DRG: 464 | End: 2020-04-21
Attending: ORTHOPAEDIC SURGERY | Admitting: ORTHOPAEDIC SURGERY
Payer: MEDICARE

## 2020-04-16 ENCOUNTER — APPOINTMENT (OUTPATIENT)
Dept: ULTRASOUND IMAGING | Facility: HOSPITAL | Age: 60
DRG: 464 | End: 2020-04-16
Attending: NURSE PRACTITIONER
Payer: MEDICARE

## 2020-04-16 ENCOUNTER — ANESTHESIA EVENT (OUTPATIENT)
Dept: SURGERY | Facility: HOSPITAL | Age: 60
DRG: 464 | End: 2020-04-16
Payer: MEDICARE

## 2020-04-16 ENCOUNTER — APPOINTMENT (OUTPATIENT)
Dept: GENERAL RADIOLOGY | Facility: HOSPITAL | Age: 60
DRG: 464 | End: 2020-04-16
Attending: ORTHOPAEDIC SURGERY
Payer: MEDICARE

## 2020-04-16 DIAGNOSIS — Z01.818 PREOP TESTING: ICD-10-CM

## 2020-04-16 DIAGNOSIS — E87.1 HYPONATREMIA: ICD-10-CM

## 2020-04-16 DIAGNOSIS — T84.54XD INFECTION ASSOCIATED WITH INTERNAL LEFT KNEE PROSTHESIS, SUBSEQUENT ENCOUNTER: Primary | ICD-10-CM

## 2020-04-16 DIAGNOSIS — T84.54XD INFECTION OF TOTAL LEFT KNEE REPLACEMENT, SUBSEQUENT ENCOUNTER: ICD-10-CM

## 2020-04-16 PROBLEM — Z96.659 INFECTED PROSTHETIC KNEE JOINT (HCC): Status: ACTIVE | Noted: 2020-04-16

## 2020-04-16 PROBLEM — F25.9 SCHIZOAFFECTIVE DISORDER, CHRONIC CONDITION (HCC): Status: ACTIVE | Noted: 2020-04-16

## 2020-04-16 PROBLEM — T84.59XA INFECTED PROSTHETIC KNEE JOINT (HCC): Status: ACTIVE | Noted: 2020-04-16

## 2020-04-16 PROBLEM — F05 DELIRIUM DUE TO ANOTHER MEDICAL CONDITION: Status: ACTIVE | Noted: 2020-04-16

## 2020-04-16 PROBLEM — Z96.659 INFECTED PROSTHETIC KNEE JOINT: Status: ACTIVE | Noted: 2020-04-16

## 2020-04-16 PROBLEM — T84.59XA INFECTED PROSTHETIC KNEE JOINT: Status: ACTIVE | Noted: 2020-04-16

## 2020-04-16 PROCEDURE — 0SHD08Z INSERTION OF SPACER INTO LEFT KNEE JOINT, OPEN APPROACH: ICD-10-PCS | Performed by: ORTHOPAEDIC SURGERY

## 2020-04-16 PROCEDURE — 73560 X-RAY EXAM OF KNEE 1 OR 2: CPT | Performed by: ORTHOPAEDIC SURGERY

## 2020-04-16 PROCEDURE — 0SPD0JZ REMOVAL OF SYNTHETIC SUBSTITUTE FROM LEFT KNEE JOINT, OPEN APPROACH: ICD-10-PCS | Performed by: ORTHOPAEDIC SURGERY

## 2020-04-16 PROCEDURE — 93971 EXTREMITY STUDY: CPT | Performed by: NURSE PRACTITIONER

## 2020-04-16 PROCEDURE — 90792 PSYCH DIAG EVAL W/MED SRVCS: CPT | Performed by: OTHER

## 2020-04-16 DEVICE — REFOBACIN BC R 1X40 US: Type: IMPLANTABLE DEVICE | Site: KNEE | Status: FUNCTIONAL

## 2020-04-16 RX ORDER — DIPHENHYDRAMINE HYDROCHLORIDE 50 MG/ML
12.5 INJECTION INTRAMUSCULAR; INTRAVENOUS EVERY 4 HOURS PRN
Status: DISCONTINUED | OUTPATIENT
Start: 2020-04-16 | End: 2020-04-20

## 2020-04-16 RX ORDER — FLUPHENAZINE HYDROCHLORIDE 2.5 MG/1
5 TABLET ORAL 2 TIMES DAILY WITH MEALS
Status: DISCONTINUED | OUTPATIENT
Start: 2020-04-16 | End: 2020-04-18

## 2020-04-16 RX ORDER — HYDROMORPHONE HYDROCHLORIDE 1 MG/ML
0.2 INJECTION, SOLUTION INTRAMUSCULAR; INTRAVENOUS; SUBCUTANEOUS EVERY 5 MIN PRN
Status: DISCONTINUED | OUTPATIENT
Start: 2020-04-16 | End: 2020-04-16 | Stop reason: HOSPADM

## 2020-04-16 RX ORDER — SODIUM CHLORIDE, SODIUM LACTATE, POTASSIUM CHLORIDE, CALCIUM CHLORIDE 600; 310; 30; 20 MG/100ML; MG/100ML; MG/100ML; MG/100ML
INJECTION, SOLUTION INTRAVENOUS CONTINUOUS
Status: DISCONTINUED | OUTPATIENT
Start: 2020-04-16 | End: 2020-04-16 | Stop reason: HOSPADM

## 2020-04-16 RX ORDER — SODIUM CHLORIDE, SODIUM LACTATE, POTASSIUM CHLORIDE, CALCIUM CHLORIDE 600; 310; 30; 20 MG/100ML; MG/100ML; MG/100ML; MG/100ML
INJECTION, SOLUTION INTRAVENOUS CONTINUOUS
Status: DISCONTINUED | OUTPATIENT
Start: 2020-04-16 | End: 2020-04-21

## 2020-04-16 RX ORDER — HYDROMORPHONE HYDROCHLORIDE 1 MG/ML
0.8 INJECTION, SOLUTION INTRAMUSCULAR; INTRAVENOUS; SUBCUTANEOUS EVERY 2 HOUR PRN
Status: DISCONTINUED | OUTPATIENT
Start: 2020-04-16 | End: 2020-04-20

## 2020-04-16 RX ORDER — ACETAMINOPHEN 500 MG
500 TABLET ORAL EVERY 6 HOURS PRN
Status: DISCONTINUED | OUTPATIENT
Start: 2020-04-16 | End: 2020-04-20

## 2020-04-16 RX ORDER — SODIUM CHLORIDE 0.9 % (FLUSH) 0.9 %
10 SYRINGE (ML) INJECTION AS NEEDED
Status: DISCONTINUED | OUTPATIENT
Start: 2020-04-16 | End: 2020-04-21

## 2020-04-16 RX ORDER — BISACODYL 10 MG
10 SUPPOSITORY, RECTAL RECTAL
Status: DISCONTINUED | OUTPATIENT
Start: 2020-04-16 | End: 2020-04-21

## 2020-04-16 RX ORDER — HYDROCODONE BITARTRATE AND ACETAMINOPHEN 5; 325 MG/1; MG/1
2 TABLET ORAL EVERY 4 HOURS PRN
Status: DISCONTINUED | OUTPATIENT
Start: 2020-04-16 | End: 2020-04-21

## 2020-04-16 RX ORDER — SENNOSIDES 8.6 MG
17.2 TABLET ORAL NIGHTLY
Status: DISCONTINUED | OUTPATIENT
Start: 2020-04-16 | End: 2020-04-21

## 2020-04-16 RX ORDER — HYDROCODONE BITARTRATE AND ACETAMINOPHEN 5; 325 MG/1; MG/1
1 TABLET ORAL EVERY 4 HOURS PRN
Status: DISCONTINUED | OUTPATIENT
Start: 2020-04-16 | End: 2020-04-21

## 2020-04-16 RX ORDER — DIVALPROEX SODIUM 500 MG/1
500 TABLET, EXTENDED RELEASE ORAL NIGHTLY
Status: DISCONTINUED | OUTPATIENT
Start: 2020-04-16 | End: 2020-04-21

## 2020-04-16 RX ORDER — HYDROCODONE BITARTRATE AND ACETAMINOPHEN 5; 325 MG/1; MG/1
1 TABLET ORAL AS NEEDED
Status: DISCONTINUED | OUTPATIENT
Start: 2020-04-16 | End: 2020-04-16 | Stop reason: HOSPADM

## 2020-04-16 RX ORDER — HYDROMORPHONE HYDROCHLORIDE 1 MG/ML
1.2 INJECTION, SOLUTION INTRAMUSCULAR; INTRAVENOUS; SUBCUTANEOUS EVERY 2 HOUR PRN
Status: DISCONTINUED | OUTPATIENT
Start: 2020-04-16 | End: 2020-04-20

## 2020-04-16 RX ORDER — PROCHLORPERAZINE EDISYLATE 5 MG/ML
5 INJECTION INTRAMUSCULAR; INTRAVENOUS ONCE AS NEEDED
Status: DISCONTINUED | OUTPATIENT
Start: 2020-04-16 | End: 2020-04-16 | Stop reason: HOSPADM

## 2020-04-16 RX ORDER — DIPHENHYDRAMINE HCL 25 MG
25 CAPSULE ORAL EVERY 4 HOURS PRN
Status: DISCONTINUED | OUTPATIENT
Start: 2020-04-16 | End: 2020-04-21

## 2020-04-16 RX ORDER — PANTOPRAZOLE SODIUM 40 MG/1
40 TABLET, DELAYED RELEASE ORAL
Status: DISCONTINUED | OUTPATIENT
Start: 2020-04-17 | End: 2020-04-21

## 2020-04-16 RX ORDER — HYDROCODONE BITARTRATE AND ACETAMINOPHEN 5; 325 MG/1; MG/1
1 TABLET ORAL EVERY 4 HOURS PRN
Status: DISCONTINUED | OUTPATIENT
Start: 2020-04-16 | End: 2020-04-20

## 2020-04-16 RX ORDER — HYDROMORPHONE HYDROCHLORIDE 1 MG/ML
0.4 INJECTION, SOLUTION INTRAMUSCULAR; INTRAVENOUS; SUBCUTANEOUS EVERY 2 HOUR PRN
Status: DISCONTINUED | OUTPATIENT
Start: 2020-04-16 | End: 2020-04-20

## 2020-04-16 RX ORDER — SODIUM CHLORIDE 9 MG/ML
INJECTION, SOLUTION INTRAVENOUS CONTINUOUS
Status: DISCONTINUED | OUTPATIENT
Start: 2020-04-16 | End: 2020-04-16

## 2020-04-16 RX ORDER — METOCLOPRAMIDE 10 MG/1
10 TABLET ORAL ONCE
Status: DISCONTINUED | OUTPATIENT
Start: 2020-04-16 | End: 2020-04-16 | Stop reason: HOSPADM

## 2020-04-16 RX ORDER — MORPHINE SULFATE 10 MG/ML
6 INJECTION, SOLUTION INTRAMUSCULAR; INTRAVENOUS EVERY 10 MIN PRN
Status: DISCONTINUED | OUTPATIENT
Start: 2020-04-16 | End: 2020-04-16 | Stop reason: HOSPADM

## 2020-04-16 RX ORDER — METOCLOPRAMIDE HYDROCHLORIDE 5 MG/ML
10 INJECTION INTRAMUSCULAR; INTRAVENOUS EVERY 6 HOURS PRN
Status: ACTIVE | OUTPATIENT
Start: 2020-04-16 | End: 2020-04-18

## 2020-04-16 RX ORDER — ACETAMINOPHEN 500 MG
1000 TABLET ORAL ONCE
Status: DISCONTINUED | OUTPATIENT
Start: 2020-04-16 | End: 2020-04-16 | Stop reason: HOSPADM

## 2020-04-16 RX ORDER — HYDROMORPHONE HYDROCHLORIDE 1 MG/ML
0.4 INJECTION, SOLUTION INTRAMUSCULAR; INTRAVENOUS; SUBCUTANEOUS EVERY 5 MIN PRN
Status: DISCONTINUED | OUTPATIENT
Start: 2020-04-16 | End: 2020-04-16 | Stop reason: HOSPADM

## 2020-04-16 RX ORDER — AMLODIPINE BESYLATE 5 MG/1
5 TABLET ORAL DAILY
Status: DISCONTINUED | OUTPATIENT
Start: 2020-04-17 | End: 2020-04-21

## 2020-04-16 RX ORDER — DOCUSATE SODIUM 100 MG/1
100 CAPSULE, LIQUID FILLED ORAL 2 TIMES DAILY
Status: DISCONTINUED | OUTPATIENT
Start: 2020-04-16 | End: 2020-04-21

## 2020-04-16 RX ORDER — MELATONIN
325 2 TIMES DAILY
Status: DISCONTINUED | OUTPATIENT
Start: 2020-04-16 | End: 2020-04-21

## 2020-04-16 RX ORDER — SODIUM CHLORIDE 0.9 % (FLUSH) 0.9 %
3 SYRINGE (ML) INJECTION AS NEEDED
Status: DISCONTINUED | OUTPATIENT
Start: 2020-04-16 | End: 2020-04-21

## 2020-04-16 RX ORDER — MORPHINE SULFATE 4 MG/ML
2 INJECTION, SOLUTION INTRAMUSCULAR; INTRAVENOUS EVERY 10 MIN PRN
Status: DISCONTINUED | OUTPATIENT
Start: 2020-04-16 | End: 2020-04-16 | Stop reason: HOSPADM

## 2020-04-16 RX ORDER — ACETAMINOPHEN 325 MG/1
650 TABLET ORAL EVERY 4 HOURS PRN
Status: DISCONTINUED | OUTPATIENT
Start: 2020-04-16 | End: 2020-04-21

## 2020-04-16 RX ORDER — ONDANSETRON 2 MG/ML
4 INJECTION INTRAMUSCULAR; INTRAVENOUS ONCE AS NEEDED
Status: DISCONTINUED | OUTPATIENT
Start: 2020-04-16 | End: 2020-04-16 | Stop reason: HOSPADM

## 2020-04-16 RX ORDER — CELECOXIB 200 MG/1
200 CAPSULE ORAL EVERY 12 HOURS SCHEDULED
Status: DISCONTINUED | OUTPATIENT
Start: 2020-04-17 | End: 2020-04-17

## 2020-04-16 RX ORDER — PROCHLORPERAZINE EDISYLATE 5 MG/ML
10 INJECTION INTRAMUSCULAR; INTRAVENOUS EVERY 6 HOURS PRN
Status: ACTIVE | OUTPATIENT
Start: 2020-04-16 | End: 2020-04-18

## 2020-04-16 RX ORDER — LIDOCAINE HYDROCHLORIDE 40 MG/ML
SOLUTION TOPICAL AS NEEDED
Status: DISCONTINUED | OUTPATIENT
Start: 2020-04-16 | End: 2020-04-16 | Stop reason: SURG

## 2020-04-16 RX ORDER — HYDROMORPHONE HYDROCHLORIDE 1 MG/ML
0.6 INJECTION, SOLUTION INTRAMUSCULAR; INTRAVENOUS; SUBCUTANEOUS EVERY 5 MIN PRN
Status: DISCONTINUED | OUTPATIENT
Start: 2020-04-16 | End: 2020-04-16 | Stop reason: HOSPADM

## 2020-04-16 RX ORDER — FAMOTIDINE 20 MG/1
20 TABLET ORAL ONCE
Status: DISCONTINUED | OUTPATIENT
Start: 2020-04-16 | End: 2020-04-16 | Stop reason: HOSPADM

## 2020-04-16 RX ORDER — SODIUM PHOSPHATE, DIBASIC AND SODIUM PHOSPHATE, MONOBASIC 7; 19 G/133ML; G/133ML
1 ENEMA RECTAL ONCE AS NEEDED
Status: DISCONTINUED | OUTPATIENT
Start: 2020-04-16 | End: 2020-04-21

## 2020-04-16 RX ORDER — ASPIRIN 325 MG
325 TABLET ORAL 2 TIMES DAILY
Status: DISCONTINUED | OUTPATIENT
Start: 2020-04-16 | End: 2020-04-21

## 2020-04-16 RX ORDER — BENZTROPINE MESYLATE 0.5 MG/1
0.5 TABLET ORAL 2 TIMES DAILY
Status: DISCONTINUED | OUTPATIENT
Start: 2020-04-16 | End: 2020-04-21

## 2020-04-16 RX ORDER — MORPHINE SULFATE 4 MG/ML
4 INJECTION, SOLUTION INTRAMUSCULAR; INTRAVENOUS EVERY 10 MIN PRN
Status: DISCONTINUED | OUTPATIENT
Start: 2020-04-16 | End: 2020-04-16 | Stop reason: HOSPADM

## 2020-04-16 RX ORDER — ONDANSETRON 2 MG/ML
4 INJECTION INTRAMUSCULAR; INTRAVENOUS EVERY 4 HOURS PRN
Status: ACTIVE | OUTPATIENT
Start: 2020-04-16 | End: 2020-04-18

## 2020-04-16 RX ORDER — DIPHENHYDRAMINE HYDROCHLORIDE 50 MG/ML
25 INJECTION INTRAMUSCULAR; INTRAVENOUS ONCE AS NEEDED
Status: ACTIVE | OUTPATIENT
Start: 2020-04-16 | End: 2020-04-16

## 2020-04-16 RX ORDER — POLYETHYLENE GLYCOL 3350 17 G/17G
17 POWDER, FOR SOLUTION ORAL DAILY PRN
Status: DISCONTINUED | OUTPATIENT
Start: 2020-04-16 | End: 2020-04-21

## 2020-04-16 RX ORDER — VANCOMYCIN HYDROCHLORIDE 125 MG/1
125 CAPSULE ORAL DAILY
Status: DISCONTINUED | OUTPATIENT
Start: 2020-04-16 | End: 2020-04-21

## 2020-04-16 RX ORDER — HYDROCODONE BITARTRATE AND ACETAMINOPHEN 5; 325 MG/1; MG/1
2 TABLET ORAL AS NEEDED
Status: DISCONTINUED | OUTPATIENT
Start: 2020-04-16 | End: 2020-04-16 | Stop reason: HOSPADM

## 2020-04-16 RX ORDER — NALOXONE HYDROCHLORIDE 0.4 MG/ML
80 INJECTION, SOLUTION INTRAMUSCULAR; INTRAVENOUS; SUBCUTANEOUS AS NEEDED
Status: DISCONTINUED | OUTPATIENT
Start: 2020-04-16 | End: 2020-04-16 | Stop reason: HOSPADM

## 2020-04-16 RX ADMIN — SODIUM CHLORIDE: 9 INJECTION, SOLUTION INTRAVENOUS at 12:24:00

## 2020-04-16 RX ADMIN — LIDOCAINE HYDROCHLORIDE 4 ML: 40 SOLUTION TOPICAL at 10:23:00

## 2020-04-16 NOTE — OPERATIVE REPORT
[facility]    PATIENT'S NAME: Evalene Blizzard   ATTENDING PHYSICIAN: Pilo Vuong MD   OPERATING PHYSICIAN: Pilo Vuong MD   PATIENT ACCOUNT#:   [id number]    LOCATION:  [address] [place] [address] #:   [id number]       DATE OF BI doses prophylactically. She was self-positioned on the operating room table. All pressure points were well padded.   General anesthesia was introduced, followed by a Grissom catheter, followed by smooth Webril placed to the left proximal thigh, followed by and soft tissue were thoroughly debrided, 3 L of bacitracin pulsatile lavage was used followed by 3 L of normal saline. Again, mechanical debridement was performed and assured adequate debridement of material had been performed.   The [employer] cement space

## 2020-04-16 NOTE — CONSULTS
Ashely Blackman is a 63year old female. No chief complaint on file. HPI:    Infected left tka removed    REVIEW OF SYSTEMS:   A comprehensive 11 point review of systems was completed. Pertinent positives and negatives noted in the the HPI.     Allerg REVISION Left 3/26/2020    Performed by Tawanna Kenny MD at [address] [facility] MAIN OR   • KNEE TOTAL REVISION Left 11/21/2019    Performed by Tawanna Kenny MD at [address] [facility]   • OTHER Right 04/17/2019    total elbow replacement   • OTHER SURGICAL HISTORY  5/ AEROBIC CULTURE In process    TISSUE AEROBIC CULTURE Preliminary result           Results for orders placed or performed during the hospital encounter of 05/60/18   BASIC METABOLIC PANEL (8)   Result Value Ref Range    Glucose 91 70 - 99 mg/dL    Sodium 13 have not been displayed. A complete set of results can be found in Results Review.

## 2020-04-16 NOTE — ANESTHESIA PREPROCEDURE EVALUATION
Anesthesia PreOp Note    HPI:     Humaira Escalera is a 63year old female who presents for preoperative consultation requested by: Wilver Baumann MD    Date of Surgery: 4/16/2020    Procedure(s):  KNEE TOTAL REVISION  EXTREMITY LOWER HARDWARE REMOVAL  I Noted: 08/15/2011      Osteoarthritis         Date Noted: 05/05/2011      Bipolar affective disorder [facility])         Date Noted: 05/05/2011        Past Medical History:   Diagnosis Date   • Bipolar affective ([facility] [address]    • C. difficile colitis 05/20/2013    per tablet, Rfl: 0, 4/16/2020 at 0600  DAPTOmycin 50 mg/mL in Sterile Water for Injection, Inject 6 mL (300 mg total) into the vein daily. (Patient taking differently: Inject 6 mg/kg into the vein daily.  Takes at 3 or 4pm daily ), Disp: 1 Application, Rfl: 0, MIGRAINE, (DEPAKOTE ER) 500 MG Oral Tablet SR 24 Hr, Take 1 tablet by mouth nightly.  , Disp: , Rfl: , 4/15/2020 at 2100  methotrexate 2.5 MG Oral Tab, Take 2.5 mg by mouth.  Hold for 2 weeks, resume 4/13  Takes 8 tablets every Saturday but not to take 4/18 , Intra-articular, Once (Intra-Op), Pilo Chan MD  Tranexamic Acid (CYKLOKAPRON) 3,000 mg in sodium chloride 0.9% 100 mL IRRIGATION ONLY (NOT FOR IV USE), 3,000 mg, Irrigation, Once (Intra-Op), Jaimee Chan MD    No current Epic-ordered out partner violence:        Fear of current or ex partner: Not on file        Emotionally abused: Not on file        Physically abused: Not on file        Forced sexual activity: Not on file    Other Topics      Concerns:        Not on file    Social History Anesthesia Plan:   ASA:  3      I have informed Elvira Carter and/or legal guardian or family member of the nature of the anesthetic plan, benefits, risks including possible dental damage if relevant, major complications, and any alternative forms of ane

## 2020-04-16 NOTE — ANESTHESIA PREPROCEDURE EVALUATION
Anesthesia PreOp Note    HPI:     Parth Trejo is a 63year old female who presents for preoperative consultation requested by: Yashira Crocker MD    Date of Surgery: 4/16/2020    Procedure(s):  KNEE TOTAL REVISION  EXTREMITY LOWER HARDWARE REMOVAL  I Noted: 08/15/2011      Osteoarthritis         Date Noted: 05/05/2011      Bipolar affective disorder [facility])         Date Noted: 05/05/2011        Past Medical History:   Diagnosis Date   • Bipolar affective ([facility] [address]    • C. difficile colitis 05/20/2013    per tablet, Rfl: 0, 4/16/2020 at 0600  DAPTOmycin 50 mg/mL in Sterile Water for Injection, Inject 6 mL (300 mg total) into the vein daily. (Patient taking differently: Inject 6 mg/kg into the vein daily.  Takes at 3 or 4pm daily ), Disp: 1 Application, Rfl: 0, MIGRAINE, (DEPAKOTE ER) 500 MG Oral Tablet SR 24 Hr, Take 1 tablet by mouth nightly.  , Disp: , Rfl: , 4/15/2020 at 2100  methotrexate 2.5 MG Oral Tab, Take 2.5 mg by mouth.  Hold for 2 weeks, resume 4/13  Takes 8 tablets every Saturday but not to take 4/18 , Intra-articular, Once (Intra-Op), Pilo Chan MD  Tranexamic Acid (CYKLOKAPRON) 3,000 mg in sodium chloride 0.9% 100 mL IRRIGATION ONLY (NOT FOR IV USE), 3,000 mg, Irrigation, Once (Intra-Op), Rocco Chan Cha, MD    No current Epic-ordered out partner violence:        Fear of current or ex partner: Not on file        Emotionally abused: Not on file        Physically abused: Not on file        Forced sexual activity: Not on file    Other Topics      Concerns:        Not on file    Social History controlled,     NYHA Classification: II    Neuro/Psych    (+)  psychiatric history, bipolar disorder, depression,       GI/Hepatic/Renal    (+) GERD,     Endo/Other    (+) arthritis  Abdominal  - normal exam               Anesthesia Plan:   ASA:  3  Inform

## 2020-04-16 NOTE — BH PROGRESS NOTE
Behavioral Health Note:  CHIEF COMPLAINT:   Left knee replacement     REASON FOR ADMISSION:   Left knee replacement     SOCIAL HISTORY:  Roshni Rodriguez lives alone in her [facility] apartment, she has been on disability for ~10 years d/t multiple inpt admissions f who talks and talks and doesn't shut up. He's disgusting puke and drowns dogs until they die. \" Psych Liaison asked Ayan Ramírez why she refers to Morteza Manrique as a fa^^ot and she replied \"he's flores, that's why he's a fa^^ot, they're all fa^^ots. \"     Ayan Ramírez reports

## 2020-04-16 NOTE — H&P
[facility] Hospitalist Team  History and Physical  Admit Date: 4/16/20     ASSESSMENT / PLAN:   Ms. Major Lucio is a 62 yo female with PMH of Chronic Right Forearm Fracture s/p right Elbow Arthroplasty with ORIF Ulnar Shaft fx, bipolar, depression, schizophrenia, chroni been getting dapto, last dose yesterday. She denies CP, sob, abdominal pain, N/V. Pt with ongoing issues of hyponatremia attributed to increased free water intake. Pt is staying with \"family\" at present.     OBJECTIVE:  Ht 4' 9.5\" (1.461 m)   Wt 111 lb ( 9/26/2019    Performed by Alisha Simons MD at [address] [place] [address] OR   • [id number] 3/26/2020    Performed by Alisha Simons MD at [address] [place] [address] OR   • [address] 11/21/2019    Performed by Alisha Simons MD at [facility]   • O 1  Calcium Citrate (CITRACAL OR), Take 2 capsules by mouth daily. , Disp: , Rfl:   Cholecalciferol (D3 VITAMIN OR), Take 1 tablet by mouth daily. , Disp: , Rfl:   Ascorbic Acid (VITAMIN C) 1000 MG Oral Tab, Take 1,000 mg by mouth daily. , Disp: , Rfl:   Multi

## 2020-04-16 NOTE — CONSULTS
NEPHROLOGY CONSULT NOTE     DATE: 4/16/2020    Requesting Physician: Charley Alejandra NP     Reason for Consult: Hyponatremia     HISTORY OF PRESENT ILLNESS: Shashi Uribe is a 63year old female with history of HTN, RA, Biploar schizophrenia, hyponatremia. • INCISION AND DRAINAGE Left     knee with liner exchange    • KNEE REPLACEMENT SURGERY Left 09/26/2019    @ [facility]   • KNEE TOTAL REPLACEMENT Left 9/26/2019    Performed by Loly Bradshaw MD at [address] [place] [address] OR   • [id number] Left 3/26/2020    Per Intimate partner violence:        Fear of current or ex partner: Not on file        Emotionally abused: Not on file        Physically abused: Not on file        Forced sexual activity: Not on file    Other Topics      Concerns:        Not on file    Social Rectal, Once PRN  ondansetron HCl (ZOFRAN) injection 4 mg, 4 mg, Intravenous, Q4H PRN  Metoclopramide HCl (REGLAN) injection 10 mg, 10 mg, Intravenous, Q6H PRN  Prochlorperazine Edisylate (COMPAZINE) injection 10 mg, 10 mg, Intravenous, Q6H PRN  diphenhydr Delayed Release, TAKE 1 CAPSULE BY MOUTH EVERY DAY  Calcium Citrate (CITRACAL OR), Take 2 capsules by mouth daily. Cholecalciferol (D3 VITAMIN OR), Take 1 tablet by mouth daily. Ascorbic Acid (VITAMIN C) 1000 MG Oral Tab, Take 1,000 mg by mouth daily.   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

## 2020-04-16 NOTE — INTERVAL H&P NOTE
Pre-op Diagnosis: Infection of total left knee replacement, subsequent encounter [T84.54XD]    The above referenced H&P was reviewed by Nura Jane MD on 4/16/2020, the patient was examined and no significant changes have occurred in the patient's c

## 2020-04-16 NOTE — H&P
[facility] Hospitalist Team  History and Physical  Admit Date:  4/16/20     ASSESSMENT / PLAN:   Ms. Damion Barrios is a 62 yo female with PMH of Chronic Right Forearm Fracture s/p right Elbow Arthroplasty with ORIF Ulnar Shaft fx, bipolar, depression, schizophrenia, chron increase in pain, swelling and drainage in left knee prompting need for surgery. Pt denies fevers or chills. Pt has been getting dapto, last dose yesterday. She denies CP, sob, abdominal pain, N/V.  Pt with ongoing issues of hyponatremia attributed to incre SURGERY  2/21/13    L [facility]ICHARD   • INCISION AND DRAINAGE Left     knee with liner exchange    • KNEE REPLACEMENT SURGERY Left 09/26/2019    @ [facility]   • KNEE TOTAL REPLACEMENT Left 9/26/2019    Performed by Juliocesar Byrd MD at [address] [place] [address] PAIN. DO NOT TAKE MORE THAN 6 PILLS PER DAY., Disp: 180 tablet, Rfl: 5  OMEPRAZOLE 40 MG Oral Capsule Delayed Release, TAKE 1 CAPSULE BY MOUTH EVERY DAY, Disp: 90 capsule, Rfl: 1  Calcium Citrate (CITRACAL OR), Take 2 capsules by mouth daily. , Disp: , Rfl: post left knee arthroplasty removal/revision with placement of an antibiotic cement spacer. 2. Partial fragmentation at the medial left tibial metaphysis.     Dictated by (CST): Matthew Nair MD on 4/16/2020 at 1:31 PM     Finalized by (CST): Amarjit Martinez Prophy- ASA  -abx-daptomycin, was on as outpt, getting at [facility], defer to ID  -PT/OT/SW     Intermittent Hyponatremia  -work up 3/2020 TSH WNL, urine lytes done, placed on FR.   -recent Na 127-134, last night Na 127, repeat this am 133  -navdi

## 2020-04-16 NOTE — ANESTHESIA PROCEDURE NOTES
Airway  Urgency: Elective      General Information and Staff    Patient location during procedure: OR  Anesthesiologist: Nestor Figueroa MD  Performed: anesthesiologist     Indications and Patient Condition  Indications for airway management: anesthesia

## 2020-04-16 NOTE — ANESTHESIA POSTPROCEDURE EVALUATION
Patient: Falguni Dorsey    Procedure Summary     Date:  04/16/20 Room / Location:  [address] [place] [address] MAIN OR 12 / [address] [place] [address] MAIN OR    Anesthesia Start:  0047 Anesthesia Stop:  4822    Procedures:       KNEE TOTAL REVISION (Left Knee)      EXTREMITY LOWER HARDWARE REMOVAL (Lef

## 2020-04-16 NOTE — OR NURSING
Daptomycin 2 gram was mixed into antibiotic cement used for Left knee revision with antibiotic spacer placement. Unable to document under medications.

## 2020-04-17 ENCOUNTER — APPOINTMENT (OUTPATIENT)
Dept: HEMATOLOGY/ONCOLOGY | Facility: HOSPITAL | Age: 60
End: 2020-04-17
Attending: INTERNAL MEDICINE
Payer: MEDICARE

## 2020-04-17 PROCEDURE — 99233 SBSQ HOSP IP/OBS HIGH 50: CPT | Performed by: OTHER

## 2020-04-17 RX ORDER — POTASSIUM CHLORIDE 20 MEQ/1
40 TABLET, EXTENDED RELEASE ORAL ONCE
Status: COMPLETED | OUTPATIENT
Start: 2020-04-17 | End: 2020-04-17

## 2020-04-17 NOTE — PHYSICAL THERAPY NOTE
Patient seen for PM therapy session. Discussed case w/APEUGENE Rojas and MD Giselle Bush given recent elbow surgery and now 50% PWB on the LLE post antibiotic spacer.  They have reached out to Dr Kerri Mcallister and Zaid Rojas reports \"3-5 lbs weight restriction on right arm, may use wa

## 2020-04-17 NOTE — OCCUPATIONAL THERAPY NOTE
OCCUPATIONAL THERAPY EVALUATION - INPATIENT      Room Number: 437/437-A  Evaluation Date: 4/17/2020  Type of Evaluation: Initial  Presenting Problem: (septic arthritis L knee, s/p placement of spacer)    Physician Order: IP Consult to Occupational Therapy this level of impairment [facility] - however given her PWB restrictions, stairs at home, and limited ability to physically assist from her elderly mother she may benefit from [address]. Patient also indicates she is hopeful to d/c rehab.  OT to carlitos REPLACEMENT Right 4/17/2019    Performed by Ruma Fay MD at [facility] OR   • EXTREMITY LOWER HARDWARE REMOVAL Left 4/16/2020    Performed by Yashira Crocker MD at [facility] OR   • HIP REPLACEMENT SURGERY  5/12/11    R RICHARD    • HIP REPLACEMENT SURGERY repetition    RANGE OF MOTION   Upper extremity ROM is within functional limits     STRENGTH ASSESSMENT  Upper extremity strength is within functional limits       ACTIVITIES OF DAILY LIVING ASSESSMENT  AM-PAC ‘6-Clicks’ Inpatient Daily Activity Short Form

## 2020-04-17 NOTE — PROGRESS NOTES
POD#1 s/p removal of left TKA and placement of cement spacer    Pain controlled with orals  No nausea    Lab with acute post op blood loss anemia    Cultures pending  WBC 48k    Xray reviewed - small fracture does not limit Rehab/weight bearing    Dressing

## 2020-04-17 NOTE — PROGRESS NOTES
NEPHROLOGY DAILY PROGRESS NOTE     Follow up Reason:  Hyponatremia     SUBJECTIVE:  Febrile overnight    Reports some knee soreness     OBJECTIVE:    Total Intake/Output:    Intake/Output Summary (Last 24 hours) at 4/17/2020 1411  Last data filed at 4/17/2 Nightly  docusate sodium (COLACE) cap 100 mg, 100 mg, Oral, BID  PEG 3350 (MIRALAX) powder packet 17 g, 17 g, Oral, Daily PRN  magnesium hydroxide (MILK OF MAGNESIA) 400 MG/5ML suspension 30 mL, 30 mL, Oral, Daily PRN  bisacodyl (DULCOLAX) rectal supposito Take 325 mg by mouth 2 (two) times daily. Take in between meals, not with meals  acetaminophen (ACETAMINOPHEN EXTRA STRENGTH) 500 MG Oral Tab, TAKE 1 TABLET BY MOUTH EVERY 4 HOURS AS NEEDED FOR PAIN. DO NOT TAKE MORE THAN 6 PILLS PER DAY.   OMEPRAZOLE 40 MG Dictated by (CST): Favian Lund MD on 4/16/2020 at 9:27 AM     Finalized by (CST): Favian Lund MD on 4/16/2020 at 9:32 AM             ASSESSMENT AND PLAN:   This is a 63year old female with history of HTN, RA, Biploar schizophrenia, hyponat

## 2020-04-17 NOTE — BH PROGRESS NOTE
Psych Team met with Mercedez Wilkins today for follow up with Dr. Fred Jhaveri present via video telemedicine. Mercedez Wilkins today presented with vast improvement in mood, affect, and energy. She reports regular treatment with long time psychiatrist Dr. Zane Ludwig.      Bernie

## 2020-04-17 NOTE — PHYSICAL THERAPY NOTE
PHYSICAL THERAPY EVALUATION - INPATIENT     Room Number: 437/437-A  Evaluation Date: 4/17/2020  Type of Evaluation: Initial   Physician Order: PT Eval and Treat(50% PWB LLE)    Presenting Problem: removal of septic TKA with antibiotic spacer.  Surgeon Nhaed Gold scoot to EOB, needs LLE supported in sitting. She needs mod A to come to stand at [facility] and t/f to rolling chair.  She is able to walk 20' slowly with step to pattern and maintain 50% PWB on the LLE, slowly working to place left foot flat on floor and trying t Rheumatoid arthritis ([facility] [address]    • S/P hip replacement, right 2/13/2019   • Schizophrenic disorder [facility])    • Unspecified essential hypertension    • Visual impairment     GLASSES       Past Surgical History  Past Surgical History:   Procedure Laterality Date alert - left;Limb alert - right;Drain(s)(polar care)  Fall Risk: High fall risk    WEIGHT BEARING RESTRICTION  Weight Bearing Restriction: R upper extremity;L lower extremity  R Upper Extremity: Other (Comment)(5# on RUE, recent elbow surgery)   *per Dr Liu Mahmood Impairment: 61.29%   Standardized Score (AM-PAC Scale): 38.1   CMS Modifier (G-Code): CL    FUNCTIONAL ABILITY STATUS  Gait Assessment   Gait Assistance: Minimum assistance  Distance (ft): 20'  Assistive Device: Rolling walker  Pattern: L Decreased stance demonstrate independence with home activity/exercise instructions provided to patient in preparation for discharge.    Goal #5   Current Status    Goal #6    Goal #6  Current Status

## 2020-04-17 NOTE — PAYOR COMM NOTE
--------------  CONTINUED STAY REVIEW 4-17-20     Payor: [address] [place] [address] #:  [id number]  Authorization Number: [id number]    Admit date: 4/16/20  Admit time: [address]    Admitting Physician: Azar Medina MD  Attending Physician:  Angeles Oseguera arthritis     Plan; IV antibiotics as per ID  50% weight bearing left foot  Maintain drain for additional 24 hours   for outpt antibiotic planning                      MEDICATIONS ADMINISTERED IN LAST 1 DAY:  aspirin tab 325 mg     Date Acti Dose Route User    4/16/2020 1023 Given 4 mL Endotracheal Junior Contreras MD      clonidine/epinephrine/ropivacaine/ketorolac (CERTS) pain cocktail     Date Action Dose Route User    4/16/2020 1154 Given (none) Intra-articular (Left Knee) Jose Chan

## 2020-04-17 NOTE — CM/SW NOTE
1420:  SW received MDO for discharge planning, s/p joint, outpatient IV antibiotics. Pt is familiar to this writer due to recent admission to [facility].  Pt typically lives home alone; however, last discharge went home with her mother, Anel Oates who was able to experience. Requested additional referrals sent to smaller/private facilities including [facility] and [facility]. Referrals sent via [facility] to [facility], [facility]. Awaiting to hear back if patient is accepted.      MONTSERRAT l/m for Lupis/

## 2020-04-17 NOTE — PLAN OF CARE
Problem: Patient/Family Goals  Goal: Patient/Family Long Term Goal  Description  Patient's Long Term Goal: To go home    Interventions:  - Follow tx plan, take medications, PT/OT  - See additional Care Plan goals for specific interventions  Outcome: Prog

## 2020-04-17 NOTE — BH PROGRESS NOTE
Psych Team saw Ramakrishna Noe for consultation with Dr. Rosalina Guidry present via video telemedicine with her verbal consent. Ramakrishna Noe was very drowsy and unable to fully engage in video session. Psych Team will follow up tomorrow, 4/17/20.      Landy Anders LPC x1

## 2020-04-17 NOTE — CONSULTS
[facility] HOSP - [facility]    Report of Consultation    Jayleen Muir Patient Status:  Inpatient    1960 MRN [id number]   Location [facility] 4W/SW/SE Attending Ingrid Mcadams MD   Hosp Day # 0 PCP Manny Hills MD     Date of Admissio that the voices has been calling named Laina Farleykailyn. She describes Laina Florez as a \"fa^^ot puke piece of sh^t who talks and talks and doesn't shut up. He's disgusting puke and drowns dogs until they die. \"     The patient today presented with a flat affect and some • Rheumatoid arthritis ([facility] [address]    • S/P hip replacement, right 2/13/2019   • [facility]    • Unspecified essential hypertension    • Visual impairment     GLASSES       Past Surgical History  Past Surgical History:   Procedure Laterality with meals  HYDROcodone-acetaminophen (NORCO) 5-325 MG per tab 1 tablet, 1 tablet, Oral, Q4H PRN  OLANZapine (ZYPREXA) tab 25 mg, 25 mg, Oral, Nightly  [START ON 4/17/2020] Pantoprazole Sodium (PROTONIX) EC tab 40 mg, 40 mg, Oral, QAM AC  [START ON 4/17/20 Or  HYDROmorphone HCl (DILAUDID) 1 MG/ML injection 0.8 mg, 0.8 mg, Intravenous, Q2H PRN    Or  HYDROmorphone HCl (DILAUDID) 1 MG/ML injection 1.2 mg, 1.2 mg, Intravenous, Q2H PRN      amLODIPine Besylate 5 MG Oral Tab, Take 1 tablet (5 mg total) by mouth o OTHER (SEE COMMENTS)    Comment:METAL JEWELRY CAUSES ITCHY RASH---can't specify             what metal?  Nuts                    OTHER (SEE COMMENTS)    Comment:PIMPLES ON FACE AND NECK  Peanuts                 RASH    Comment:C/o breaking out in white hea laying in hospital bed. No psychomotor agitation or retardation   Attitude/Coorperation: not receptive, minimally cooperative. Behavior: No eye contact, pretending to sleep intermittently  Speech: normal rate, rhythm, and volume - short answers .   Mono Differential      CBC, Platelet;  No Differential      Sodium, Urine, Random Once      Osmolality, Urine Once      Type and screen STAT      ABORH (Blood Type) Once      Antibody Screen Once      Specimen to Pathology, Tissue      Emergency MRSA Screen by P

## 2020-04-17 NOTE — PLAN OF CARE
Pt is alert and oriented x4. Has a history of audio and visual hallucinations, none noted so far this shift. Tolerating diet. PRN Norco given for pain management. Polar ice to left knee. CMS intact. Hemovac draining. Grissom in place. Strict Is&Os.  Call kaylene infection  Description  INTERVENTIONS:  - Assess and document risk factors for pressure ulcer development  - Assess and document skin integrity  - Assess and document dressing/incision, wound bed, drain sites and surrounding tissue  - Implement wound care precautions as indicated by assessment.  - Educate pt/family on patient safety including physical limitations  - Instruct pt to call for assistance with activity based on assessment  - Modify environment to reduce risk of injury  - Provide assistive device

## 2020-04-17 NOTE — PROGRESS NOTES
Bhanu Acosta is a 63year old female. No chief complaint on file. HPI:    Feels well    REVIEW OF SYSTEMS:   A comprehensive 11 point review of systems was completed. Pertinent positives and negatives noted in the the HPI. Allergies:     Other REPLACEMENT Left 9/26/2019    Performed by Wilver Baumann MD at [facility] OR   • KNEE TOTAL REVISION Left 4/16/2020    Performed by Wilver Baumann MD at [facility] OR   • KNEE TOTAL REVISION Left 3/26/2020    Performed by Wilver Baumann MD at  ANAEROBIC CULTURE Preliminary result    ANAEROBIC CULTURE Preliminary result    TISSUE AEROBIC CULTURE Preliminary result    TISSUE AEROBIC CULTURE Preliminary result    TISSUE AEROBIC CULTURE Preliminary result    TISSUE AEROBIC CULTURE Preliminary res - 32.0 mmol/L    Anion Gap 10 0 - 18 mmol/L    BUN 8 7 - 18 mg/dL    Creatinine 0.42 (L) 0.55 - 1.02 mg/dL    BUN/CREA Ratio 19.0 10.0 - 20.0    Calcium, Total 8.7 8.5 - 10.1 mg/dL    Calculated Osmolality 270 (L) 275 - 295 mOsm/kg    GFR, Non-African Amer polyethylene and metallic components of orthopedic knee prosthesis/implant with associated embedded bone, soft tissue, and medical cement; clinically, infected left total knee replacement (gross examination only).       B.  Soft tissue/synovial tissue #1, l measure in aggregate 4.0 x 3.2 x 1.0 cm. The first piece of hardware consists of a silver gray, metallic tibial plateau component with a tibial bone stem measuring 7.0 x 4.5 cm with a length of 6.5 cm.  A screw is threaded through the superior aspect of the organisms seen    ANAEROBIC CULTURE   Result Value Ref Range    Anaerobic Culture Pending    ANAEROBIC CULTURE   Result Value Ref Range    Anaerobic Culture Pending    ANAEROBIC CULTURE   Result Value Ref Range    Anaerobic Culture Pending    TISSUE AEROBI

## 2020-04-17 NOTE — CONSULTS
[facility]    PATIENT'S NAME: Evalene Blizzard   ATTENDING PHYSICIAN: Pilo Vuong MD   CONSULTING PHYSICIAN: Anna Schaefer MD   PATIENT ACCOUNT#:   [id number]    LOCATION:  [address] [place] [address] Road #:   [id number]       DATE OF BI cultures are pending. X-ray shows partial fragmentation at the medial left tibial metaphysis as well as antibiotic cement spacer in place. There is a leg Doppler from this morning, negative for DVT.       IMPRESSION:  A vancomycin-resistant Entero

## 2020-04-17 NOTE — PROGRESS NOTES
[facility] Hospitalist Team  Progress Note    Laura Bhagat Patient Status:  Inpatient    1960 MRN [id number]   Location [facility] 4W/SW/SE Attending Alycia Peacock MD   Hosp Day # 1 PCP Mira Barnes MD     CC: Follow Up  PCP: Mira Barnes MD    SEE improved post surgery  -appreciate psych consult      HTN  -resume home amlodipine with holding parameters  -follow BP     RA  -hold MTX  -follows with Dr Tesha Rao    Chronic Right Forearm Fracture s/p right Elbow Arthroplasty with ORIF Ulnar Shaft fx  -pt has 3.7 3.7   CL 97* 94* 101 98   CO2 24.0 24.0 24.0 23.0   BUN 9 12 8 8   CREATSERUM 0.37* 0.56 0.42* 0.42*   CA 8.4* 8.8 9.1 8.7   GLU 95 94 91 92       Recent Labs   Lab 04/13/20  1601   ALT 15   AST 15   ALB 2.9*       No results for input(s): PGLU in the Q6H PRN  Prochlorperazine Edisylate (COMPAZINE) injection 10 mg, 10 mg, Intravenous, Q6H PRN  diphenhydrAMINE (BENADRYL) cap/tab 25 mg, 25 mg, Oral, Q4H PRN    Or  diphenhydrAMINE HCl (BENADRYL) injection 12.5 mg, 12.5 mg, Intravenous, Q4H PRN  acetaminoph murmurs, 2+ peripheral pulses  ABD: Soft, non-tender, non-distended, +BS  MSK: RUE in sling, LLE brace in place  Neuro: Grossly normal, CN intact, sensory intact  Psych: Affect- normal  SKIN: warm, dry  EXT: no edema    Assessment/Plan  Ms. Kaur Favorite is a 62 yo

## 2020-04-18 ENCOUNTER — APPOINTMENT (OUTPATIENT)
Dept: HEMATOLOGY/ONCOLOGY | Facility: HOSPITAL | Age: 60
End: 2020-04-18
Attending: INTERNAL MEDICINE
Payer: MEDICARE

## 2020-04-18 PROCEDURE — 99233 SBSQ HOSP IP/OBS HIGH 50: CPT | Performed by: OTHER

## 2020-04-18 RX ORDER — FLUPHENAZINE HYDROCHLORIDE 2.5 MG/1
5 TABLET ORAL 3 TIMES DAILY
Status: DISCONTINUED | OUTPATIENT
Start: 2020-04-18 | End: 2020-04-21

## 2020-04-18 NOTE — PROGRESS NOTES
POD#2 s/p I&D and removal of hardware with cement spacer left knee    Drain with little output    Lab with post op acute on chronic blood loss anemia    Cultures pending    No nausea    Therapy progressing with 50 % weight bearing left foot and protective

## 2020-04-18 NOTE — PROGRESS NOTES
NEPHROLOGY DAILY PROGRESS NOTE     Follow up Reason:  Hyponatremia     SUBJECTIVE:    Sitting comfortably in chair not in acute distress at this time.     OBJECTIVE:    Total Intake/Output:    Intake/Output Summary (Last 24 hours) at 4/18/2020 1004  Last da hydroxide (MILK OF MAGNESIA) 400 MG/5ML suspension 30 mL, 30 mL, Oral, Daily PRN  bisacodyl (DULCOLAX) rectal suppository 10 mg, 10 mg, Rectal, Daily PRN  Fleet Enema (FLEET) 7-19 GM/118ML enema 133 mL, 1 enema, Rectal, Once PRN  ondansetron HCl (ZOFRAN) i Tab, TAKE 1 TABLET BY MOUTH EVERY 4 HOURS AS NEEDED FOR PAIN. DO NOT TAKE MORE THAN 6 PILLS PER DAY. OMEPRAZOLE 40 MG Oral Capsule Delayed Release, TAKE 1 CAPSULE BY MOUTH EVERY DAY  Calcium Citrate (CITRACAL OR), Take 2 capsules by mouth daily.   Cholecal consulted for hyponatremia.      Hyponatremia   -remains mildly low, but improved to 132 today. - Urine Na 170, urine osm 470 - however difficult to interpret, as she was receiving NS   - Per nursing staff she is not drinking a lot.   Will monitor strict I

## 2020-04-18 NOTE — BH PROGRESS NOTE
Psych Team met with Randell Pierce today for follow up with Dr. Portia Thompson present via video telemedicine.    Randell Pierce was calm, cooperative, and actively engaged in conversation with Psych Team throughout our time together, she reported interest in engagement in [address] [place] [address]

## 2020-04-18 NOTE — PROGRESS NOTES
[facility] Hospitalist Progress Note     Reason for Admission:   PCP: Jose Mejia MD     Assessment/Plan:     Active Problems:    Infected prosthetic knee joint ([facility] [address]    Schizoaffective disorder, chronic condition ([facility] [address]    Delirium due to another medical conditi    HTN  -resume home amlodipine with holding parameters  -follow BP     RA  -hold MTX  -follows with Dr Alex Chu     Chronic Right Forearm Fracture s/p right Elbow Arthroplasty with ORIF Ulnar Shaft fx  -pt has 3-5 lb wt restriction of right arm, will obtain 32.0   RDW 17.8* 17.9* 17.8* 17.2*   NEPRELIM 2.89  --   --   --    WBC 4.4 7.8 8.6 7.6   .0* 794.0* 706.0* 628.0*         Recent Labs   Lab 04/16/20  0835 04/17/20  0528 04/18/20  0546   GLU 91 92 84   BUN 8 8 9   CREATSERUM 0.42* 0.42* 0.46*   GFR diphenhydrAMINE HCl, acetaminophen **OR** HYDROcodone-acetaminophen **OR** HYDROcodone-acetaminophen, HYDROmorphone HCl **OR** HYDROmorphone HCl **OR** HYDROmorphone HCl

## 2020-04-18 NOTE — PLAN OF CARE
Pleasant and cooperative with staff. Pt noted to be hallucinating, pt stated \"Sai beat it, Sai beat it\" repeatedly, when asked if everything was ok, pt told this RN Burke Rojas keeps talking in my ear and I want him to shut up\".  Pt observed talking t

## 2020-04-18 NOTE — PROGRESS NOTES
[facility] AND [facility] Infectious Disease  Progress Note    Alex Estrada Patient Status:  Inpatient    1960 MRN [id number]   Location [facility] 4W/SW/SE Attending Trudy Franco MD   Hosp Day # 2 PCP Radha Chavez MD     Subjective:  Patient ongoing - will continue if repeat cultures remain negative    2. Disposition - inpatient. Anticipate d/c on continued IV cubicin x 6 weeks through 5/28/2020. Will trend weekly CBC, CMP, CRP, CPK. Agree with plans for ECF.   We can see her at facility if

## 2020-04-18 NOTE — PLAN OF CARE
Tolerating meals, norco for pain control, polar ice to left knee, hemovac will be removed tomorrow by surgeon, VSS, cms intact, voiding.  Up to chair and ambulating with PT/OT    Problem: Patient Centered Care  Goal: Patient preferences are identified and i tissue  - Implement wound care per orders  - Initiate isolation precautions as appropriate  - Initiate Pressure Ulcer prevention bundle as indicated  4/17/2020 1958 by Miriam Ramos RN  Outcome: Progressing  4/17/2020 1957 by Miriam Ramos, RN  Rosa King Madyson Molina, RN  Outcome: Progressing     Problem: SAFETY ADULT - FALL  Goal: Free from fall injury  Description  INTERVENTIONS:  - Assess pt frequently for physical needs  - Identify cognitive and physical deficits and behaviors that affect risk of falls.   -

## 2020-04-18 NOTE — PLAN OF CARE
Pt is alert and oriented x4. Has a history of audio and visual hallucinations, none noted so far this shift. Tolerating diet. PRN Norco given for pain management. Polar ice to left knee. CMS intact. Hemovac draining. Voiding. Call light within reach.  Fall document risk factors for pressure ulcer development  - Assess and document skin integrity  - Assess and document dressing/incision, wound bed, drain sites and surrounding tissue  - Implement wound care per orders  - Initiate isolation precautions as appro pt/family on patient safety including physical limitations  - Instruct pt to call for assistance with activity based on assessment  - Modify environment to reduce risk of injury  - Provide assistive devices as appropriate  - Consider OT/PT consult to tino

## 2020-04-18 NOTE — PHYSICAL THERAPY NOTE
PHYSICAL THERAPY KNEE TREATMENT NOTE - INPATIENT     Room Number: 437/437-A             Presenting Problem: removal of septic TKA with antibiotic spacer.  Surgeon reviewed post-op x-ray and reports 50% PWB to be maintained    Problem List  Active Problems: training;Stair training;Transfer training;Balance training    SUBJECTIVE  Pt was agreeable to therapy session.      OBJECTIVE  Precautions: Limb alert - left;Limb alert - right;Drain(s)(polar care)    WEIGHT BEARING STATUS  Weight Bearing Restriction: R upp elbow protection after discussion w/Trina and Dr Zulma Donald who spoke with Dr Adelaide Mendez about new orthopedic issues.         Knee ROM      L Knee Flexion (degrees): 60         Patient End of Session: Up in chair;Needs met;Call light within reach;RN aware of session/fin

## 2020-04-18 NOTE — PROGRESS NOTES
David Viera is a 63year old  female with history of hypertension, rheumatoid arthritis with multiple joint replacement, schizoaffective disorder and recently with admission for left knee replacement.   The patient seen today via virtual [facility],, [address]   • Depression    • Esophageal reflux    • GERD (gastroesophageal reflux disease) 8/15/2011   • High blood pressure    • High cholesterol    • MRSA (methicillin resistant Staphylococcus aureus) infection 5/20/ Never Used    Alcohol use: No      Alcohol/week: 0.0 standard drinks    Drug use: No       Medications (Active prior to today's visit):  lactated ringers infusion, , Intravenous, Continuous  acetaminophen (TYLENOL EXTRA STRENGTH) tab 500 mg, 500 mg, Oral, 12.5 mg, Intravenous, Q4H PRN  acetaminophen (TYLENOL) tab 650 mg, 650 mg, Oral, Q4H PRN    Or  HYDROcodone-acetaminophen (NORCO) 5-325 MG per tab 1 tablet, 1 tablet, Oral, Q4H PRN    Or  HYDROcodone-acetaminophen (NORCO) 5-325 MG per tab 2 tablet, 2 table [facility] (cpt=93971)    Result Date: 4/16/2020  CONCLUSION:  1.  Negative left leg venous duplex exam.  No deep venous thrombosis    Dictated by (CST): Mathew Palmer MD on 4/16/2020 at 9:27 AM     Finalized by (CST): Mathew Palmer MD on 4/16/2020 at nightly  4. Continue Zyprexa 25 mg nightly. 5.  Continue Prolixin 5 mg twice daily. 6.  Continue Cogentin 0.5 mg twice daily.   7.  Follow-up during this admission      Orders This Visit:  Orders Placed This Encounter      Basic Metabolic Panel (8)

## 2020-04-18 NOTE — PROGRESS NOTES
Sharla Londono is a 63year old  female with history of hypertension, rheumatoid arthritis with multiple joint replacement, schizoaffective disorder and recently with admission for left knee replacement.   The patient seen today via virtual [facility]heal (methicillin resistant Staphylococcus aureus) infection 5/20/2013   • OSTEOARTHRITIS     hands and right hip   • Osteoarthritis    • OTHER DISEASES     bipolar disorder   • Prolonged Q-T interval on ECG 1/12/2016   • Rheumatoid arthritis ([address]    • S/P hip TID  lactated ringers infusion, , Intravenous, Continuous  acetaminophen (TYLENOL EXTRA STRENGTH) tab 500 mg, 500 mg, Oral, Q6H PRN  amLODIPine Besylate (NORVASC) tab 5 mg, 5 mg, Oral, Daily  Benztropine Mesylate (COGENTIN) tab 0.5 mg, 0.5 mg, Oral, BID  d injection 1.2 mg, 1.2 mg, Intravenous, Q2H PRN        Allergies:     Other                   OTHER (SEE COMMENTS)    Comment:METAL JEWELRY CAUSES ITCHY RASH---can't specify             what metal?  Nuts                    OTHER (SEE COMMENTS)    Comment:PIM function  Attitude/Coorperation: receptive, friendly and cooperative. Behavior: Good eye contact, and well attentive. Speech: normal rate, rhythm, and volume  Mood: \"good\"  Affect: Pleasant and appropriate.   Conscious/Orientation: alert and oriented Differential      Sodium, Urine, Random Once      Osmolality, Urine Once      Basic Metabolic Panel (8)      Magnesium      Renal Function Panel      Phosphorus      Valproic Acid, (Depakene)      Type and screen STAT      ABORH (Blood Type) Once      Anti

## 2020-04-19 ENCOUNTER — APPOINTMENT (OUTPATIENT)
Dept: HEMATOLOGY/ONCOLOGY | Facility: HOSPITAL | Age: 60
End: 2020-04-19
Attending: INTERNAL MEDICINE
Payer: MEDICARE

## 2020-04-19 RX ORDER — SODIUM BICARBONATE 650 MG/1
1000 TABLET ORAL ONCE
Status: DISCONTINUED | OUTPATIENT
Start: 2020-04-19 | End: 2020-04-19

## 2020-04-19 RX ORDER — SODIUM CHLORIDE 1000 MG
1 TABLET, SOLUBLE MISCELLANEOUS ONCE
Status: COMPLETED | OUTPATIENT
Start: 2020-04-19 | End: 2020-04-19

## 2020-04-19 NOTE — PHYSICAL THERAPY NOTE
PHYSICAL THERAPY KNEE TREATMENT NOTE - INPATIENT     Room Number: 437/437-A             Presenting Problem: removal of septic TKA with antibiotic spacer.  Surgeon reviewed post-op x-ray and reports 50% PWB to be maintained    Problem List  Active Problems: Other (Comment)(5# on RUE, recent elbow surgery)        L Lower Extremity: Partial Weight Bearing 50%    PAIN ASSESSMENT   Ratin  Location: L knee  Management Techniques: Activity promotion; Body mechanics; Relaxation;Repositioning    BALANCE  Static Sit Patient is able to demonstrate transfers Sit to/from Stand at assistance level: minimum assistance with walker - rolling and able to maintain 50% PWB LLE     Goal #2  Current Status SBA with the RW   Goal #3 Patient is able to ambulate 50 feet with assist

## 2020-04-19 NOTE — PLAN OF CARE
Problem: Patient/Family Goals  Goal: Patient/Family Long Term Goal  Description  Patient's Long Term Goal: discharge home    Interventions:  - Monitor vital signs  - Monitor labs  - Administer medications per order  - Pain management  - Assist with ADLs without S/S of infection  Description  INTERVENTIONS:  - Assess and document risk factors for pressure ulcer development  - Assess and document skin integrity  - Assess and document dressing/incision, wound bed, drain sites and surrounding tissue  - Implem [place] fall precautions as indicated by assessment.  - Educate pt/family on patient safety including physical limitations  - Instruct pt to call for assistance with activity based on assessment  - Modify environment to reduce risk of injury  - Provide a

## 2020-04-19 NOTE — PROGRESS NOTES
[facility] Hospitalist Progress Note     Reason for Admission:   PCP: Tony Newsome MD     Assessment/Plan:     Active Problems:    Infected prosthetic knee joint ([facility] [address]    Schizoaffective disorder, chronic condition ([facility] [address]    Delirium due to another medical conditi arm, will obtain arm platform  -follow up with Dr Elias Santiago 4/28/20    Checklist:   DVT with Ecotin   Lines/Monitors: PICC  Dispo: home with [facility] vs rehab Monday   Code status: FULL    Questions/concerns were discussed with patient and/or family by bedside.     Tot in this interval not displayed.          Recent Labs   Lab 04/17/20  0528 04/18/20  0546 04/19/20  0542 04/19/20  1208   GLU 92 84 93  --    BUN 8 9 10  --    CREATSERUM 0.42* 0.46* 0.40*  --    GFRAA 131 127 133  --    GFRNAA 113 110 115  --    CA 8.7 8.7

## 2020-04-19 NOTE — PROGRESS NOTES
NEPHROLOGY DAILY PROGRESS NOTE     Follow up Reason:  Hyponatremia     SUBJECTIVE:    Sitting comfortably in chair not in acute distress at this time.     OBJECTIVE:    Total Intake/Output:    Intake/Output Summary (Last 24 hours) at 4/19/2020 1120  Last da OF MAGNESIA) 400 MG/5ML suspension 30 mL, 30 mL, Oral, Daily PRN  bisacodyl (DULCOLAX) rectal suppository 10 mg, 10 mg, Rectal, Daily PRN  Fleet Enema (FLEET) 7-19 GM/118ML enema 133 mL, 1 enema, Rectal, Once PRN  diphenhydrAMINE (BENADRYL) cap/tab 25 mg, daily.  Cholecalciferol (D3 VITAMIN OR), Take 1 tablet by mouth daily. Ascorbic Acid (VITAMIN C) 1000 MG Oral Tab, Take 1,000 mg by mouth daily. Multiple Vitamins-Minerals (MULTIVITAL) Oral Tab, Take 1 tablet by mouth daily.   Benztropine Mesylate 1 MG Or chronically   - monitor strict I/Os   - Sodium levels dropped overnight likely from increased water intake. - 4/19/2020:  Will repeat BMP now, will check urine osms and urine Na.     HTN   - amlodipine       Septic arthritis of the left knee secondary to V

## 2020-04-19 NOTE — PROGRESS NOTES
[facility] AND [facility] Infectious Disease  Progress Note    Kim Bryant Patient Status:  Inpatient    1960 MRN [id number]   Location [facility] 4W/SW/SE Attending Braeden Marroquin MD   Hosp Day # 3 PCP Miriam Chambers MD     Subjective:  Patient negative     2. Disposition - inpatient. Anticipate d/c on continued IV cubicin x 6 weeks through 5/28/2020. Will trend weekly CBC, CMP, CRP, CPK. ECF vs. Home pending PT/OT re-eval.  We can see her at facility if it is one of the facilities we attend.

## 2020-04-19 NOTE — PROGRESS NOTES
POD#3 s/p I&D and placement of cement spacer left knee  Pain well controlled on orals  No nausea    Noted ID and Psych recommendations    H/H stable  WBC's falling  Cultures pending    Dressing changed  Incision clean and dry  Little erythremia, calf's non

## 2020-04-19 NOTE — OCCUPATIONAL THERAPY NOTE
OCCUPATIONAL THERAPY TREATMENT NOTE - INPATIENT    Room Number: 437/437-A         Presenting Problem: (septic arthritis L knee, s/p placement of spacer)     Problem List  Active Problems:    Infected prosthetic knee joint ([facility] [address]    Schizoaffective disorder, training;Patient/Family education;Patient/Family training;Equipment eval/education; Compensatory technique education    SUBJECTIVE  \"thank you honey\"     OBJECTIVE  Precautions: Limb alert - left;Limb alert - right;Drain(s)([facility] care)    WEIGHT BEARING R

## 2020-04-19 NOTE — CM/SW NOTE
SW spoke with RN and notified patient does not want GABBY and prefer's d/c home when medically cleared. SW informed RN orders needed for IVABX and wound care. SW will continue to follow for d/c planning.

## 2020-04-20 ENCOUNTER — APPOINTMENT (OUTPATIENT)
Dept: HEMATOLOGY/ONCOLOGY | Facility: HOSPITAL | Age: 60
End: 2020-04-20
Attending: INTERNAL MEDICINE
Payer: MEDICARE

## 2020-04-20 PROBLEM — F05 DELIRIUM DUE TO ANOTHER MEDICAL CONDITION: Status: RESOLVED | Noted: 2020-04-16 | Resolved: 2020-04-20

## 2020-04-20 PROCEDURE — 99233 SBSQ HOSP IP/OBS HIGH 50: CPT | Performed by: OTHER

## 2020-04-20 RX ORDER — MAGNESIUM OXIDE 400 MG (241.3 MG MAGNESIUM) TABLET
400 TABLET ONCE
Status: COMPLETED | OUTPATIENT
Start: 2020-04-20 | End: 2020-04-20

## 2020-04-20 RX ORDER — SODIUM CHLORIDE 1000 MG
1 TABLET, SOLUBLE MISCELLANEOUS 2 TIMES DAILY WITH MEALS
Status: DISCONTINUED | OUTPATIENT
Start: 2020-04-20 | End: 2020-04-21

## 2020-04-20 RX ORDER — HYDROCODONE BITARTRATE AND ACETAMINOPHEN 5; 325 MG/1; MG/1
TABLET ORAL
Qty: 30 TABLET | Refills: 0 | Status: ON HOLD | OUTPATIENT
Start: 2020-04-20 | End: 2020-06-04

## 2020-04-20 RX ORDER — FLUPHENAZINE HYDROCHLORIDE 5 MG/1
5 TABLET ORAL 3 TIMES DAILY
Qty: 90 TABLET | Refills: 1 | Status: SHIPPED | OUTPATIENT
Start: 2020-04-20 | End: 2020-04-23

## 2020-04-20 RX ORDER — FLUPHENAZINE HYDROCHLORIDE 5 MG/1
5 TABLET ORAL 3 TIMES DAILY
Qty: 90 TABLET | Refills: 0 | Status: SHIPPED | OUTPATIENT
Start: 2020-04-20 | End: 2020-04-20

## 2020-04-20 RX ORDER — VANCOMYCIN HYDROCHLORIDE 125 MG/1
125 CAPSULE ORAL DAILY
Qty: 44 CAPSULE | Refills: 0 | Status: ON HOLD | OUTPATIENT
Start: 2020-04-21 | End: 2020-06-04

## 2020-04-20 RX ORDER — SODIUM CHLORIDE 1000 MG
1 TABLET, SOLUBLE MISCELLANEOUS 2 TIMES DAILY WITH MEALS
Qty: 60 TABLET | Refills: 0 | Status: SHIPPED | OUTPATIENT
Start: 2020-04-20 | End: 2020-04-21

## 2020-04-20 NOTE — PLAN OF CARE
Patient cooperative and pleasant in the morning. Having auditory hallucinations throughout day. Denies thoughts of self harm. Pain controlled with Norco prn. Polar ice in place, surgical dressings CDI. Continued 50% wt bearing on L leg.  Educated pt to [employer] SKIN/TISSUE INTEGRITY - ADULT  Goal: Incision(s), wounds(s) or drain site(s) healing without S/S of infection  Description  INTERVENTIONS:  - Assess and document risk factors for pressure ulcer development  - Assess and document skin integrity  - Assess an Identify cognitive and physical deficits and behaviors that affect risk of falls.   - [place] fall precautions as indicated by assessment.  - Educate pt/family on patient safety including physical limitations  - Instruct pt to call for assistance with act including active listening and acknowledgement of concerns of patient and caregivers  - Reduce environmental stimuli, as able  - Instruct patient/family in relaxation techniques, as appropriate  - Assess for spiritual and psychosocial needs and initiate Sp

## 2020-04-20 NOTE — PHYSICAL THERAPY NOTE
PHYSICAL THERAPY KNEE TREATMENT NOTE - INPATIENT     Room Number: 437/437-A             Presenting Problem: removal of septic TKA with antibiotic spacer.  Surgeon reviewed post-op x-ray and reports 50% PWB to be maintained    Problem List  Active Problems: Extremity: Other (Comment)(5# on RUE, recent elbow surgery)        L Lower Extremity: Partial Weight Bearing 50%    PAIN ASSESSMENT   Ratin  Location: L knee  Management Techniques: Activity promotion; Body mechanics; Relaxation;Repositioning    BALANCE demonstrate transfers Sit to/from Stand at assistance level: minimum assistance with walker - rolling and able to maintain 50% PWB LLE     Goal #2  Current Status SBA with the RW   Goal #3 Patient is able to ambulate 50 feet with assist device: walker - ro

## 2020-04-20 NOTE — PAYOR COMM NOTE
--------------  CONTINUED STAY REVIEW  4-19-20        Payor: [address] [place] [address] #:  [id number]  Authorization Number: [id number]    Admit date: 4/16/20  Admit time: [address]    Admitting Physician: Godwin Pratt MD  Attending Physician: Intravenous Navneet Frankel RN      divalproex Sodium ER (DEPAKOTE) 24 hr tab 500 mg     Date Action Dose Route User    4/19/2020 2016 Given 500 mg Oral Rosy Nj RN      docusate sodium (COLACE) cap 100 mg     Date Action Dose Route User    4/19/20 1.5L fluid restriction.     - pain and nausea stimulate ADH release, so these symptoms should be controlled as best as possible  - albumin is low - increase protein intake   - would try to minimize NSAID use (celebrex), which can worsen hyponatremia   - mon

## 2020-04-20 NOTE — DISCHARGE SUMMARY
[facility] Hospitalist Discharge Summary   Patient ID:  Kelley Dawkins  [id number]  63year old  9/28/1960    Admit date: 4/16/2020  Discharge date: 4/21/2020    Primary Care Physician: Dianne Horne MD   Attending Physician: Isi Mcgovern MD   Consults:   Charline Gupta date extended to 5/28/20, weekly CBC, CMP, CRP and CK  -HCC at D/C  -follow up with ID and ortho      Intermittent Hyponatremia  -work up 3/2020 TSH WNL  -D/C Na level 134  -1.5 L fluid restriction   -salt tabs bid  -CMP Friday at infusion center  -follow amLODIPine Besylate 5 MG Tabs  Commonly known as:  NORVASC  Take 1 tablet (5 mg total) by mouth once daily. Resume when systolic blood pressure is greater than 140     aspirin 325 MG Tabs  Take 1 tablet (325 mg total) by mouth 2 (two) times daily.      Be than 30 minutes    Patient had opportunity to ask questions and state understand and agree with therapeutic plan as outlined    Carlos Owens RN, NP   [facility] Team  Pager [phone]  Answering Service number: [phone]  4/21/2020

## 2020-04-20 NOTE — PROGRESS NOTES
[facility] AND [facility] Infectious Disease  Progress Note    Kimberly Schroeder Patient Status:  Inpatient    1960 MRN [id number]   Location [facility] 4W/SW/SE Attending Vita Bryant MD   Hosp Day # 4 PCP Vijay Genao MD     Subjective:  Patient weeks through 5/28/2020. Ania Baires trend weekly CBC, CMP, CRP, CPK. Will be going home so orders entered for [facility] infusion. Patient to f/u with ID in 3 weeks or sooner if needed. All questions answered. Jolanta Urbina DO, [facility] Infectious Disease

## 2020-04-20 NOTE — PROGRESS NOTES
[facility] Hospitalist Team  Progress Note    Bhanu Acosta Patient Status:  Inpatient    1960 MRN [id number]   Location [facility] 4W/SW/SE Attending Slava Graves MD   Hosp Day # 4 PCP Kasey Donato MD     CC: Follow Up  PCP: Kasey Donato MD    SEE Right Forearm Fracture s/p right Elbow Arthroplasty with ORIF Ulnar Shaft fx  -pt has 3-5 lb wt restriction of right arm, will obtain arm platform  -follow up with Dr Scar Lopez 4/28/20    KAREN smith in am  -diet-FR    Prophy  -SCD  -ASA    Dispo  -possible D/C Lab 04/13/20  1601 04/19/20  0542 04/20/20  0423   ALT 15  --   --    AST 15  --   --    ALB 2.9* 2.3* 2.2*       No results for input(s): PGLU in the last 168 hours. No results for input(s): TROP in the last 168 hours.         MEDICATIONS      sodium tab 1 tablet, 1 tablet, Oral, Q4H PRN    Or  HYDROcodone-acetaminophen (NORCO) 5-325 MG per tab 2 tablet, 2 tablet, Oral, Q4H PRN  HYDROmorphone HCl (DILAUDID) 1 MG/ML injection 0.4 mg, 0.4 mg, Intravenous, Q2H PRN    Or  HYDROmorphone HCl (DILAUDID) 1 MG/

## 2020-04-20 NOTE — CM/SW NOTE
4/21 907am:  confirmed with Rachelle Nickerson the pt. Will be discharging home today 4/21. The pt. Has an appointment with the infusion center tomorrow 4/22 at 430pm.  The pt's mother would prefer the pt. Be transported home via [address] [employer].   [facility] arranged for today

## 2020-04-20 NOTE — PROGRESS NOTES
[facility] Hospitalist Team  Progress Note    Jeovany Keating Patient Status:  Inpatient    1960 MRN [id number]   Location [facility] 4W/SW/SE Attending Mehreen Price MD   Hosp Day # 4 PCP Tony Newsome MD     CC: Follow Up  PCP: Tony Newsome MD    SEE Right Forearm Fracture s/p right Elbow Arthroplasty with ORIF Ulnar Shaft fx  -pt has 3-5 lb wt restriction of right arm, will obtain arm platform  -follow up with Dr Lilliam Arana 4/28/20    KAREN smith in am  -diet-FR    Prophy  -SCD  -ASA    Dispo  -possible D/C 4.3  --    GLU 91 92 84 93  --   --  85  --        Recent Labs   Lab 04/13/20  1601 04/19/20  0542 04/20/20  0423   ALT 15  --   --    AST 15  --   --    ALB 2.9* 2.3* 2.2*       No results for input(s): PGLU in the last 168 hours.     No results for input( Patient examined and assessed independently. Agree with above APN assessment. No overnight events. Excited to go home.      Objective  /74 (BP Location: Right arm)   Pulse 65   Temp 98.3 °F (36.8 °C) (Oral)   Resp 16   Ht 4' 9.5\" (1.461 m)   Wt

## 2020-04-20 NOTE — PROGRESS NOTES
POD#4 s/p I&D with cement spacer left knee  Pain well controlled on orals  Therapy progressing well    Cultures neg at this time    IV antibiotics set up for outpatient treatments    Dressing changed  Clean and dry  Calf's non tender, DNVI    Imp: septic a

## 2020-04-20 NOTE — CM/SW NOTE
MD order received regarding IV abx at home vs. Infusion center. MONTSERRAT spoke with the pt's mother Ana Hahn who confirmed the plan is for the pt. To return to her home and she will transport her to and from the infusion center. MONTSERRAT also confirmed the pt.  Is cur

## 2020-04-20 NOTE — PLAN OF CARE
Problem: Patient/Family Goals  Goal: Patient/Family Long Term Goal  Description  Patient's Long Term Goal: discharge home    Interventions:  - Monitor vital signs  - Monitor labs  - Administer medications per order  - Pain management  - Assist with ADLs without S/S of infection  Description  INTERVENTIONS:  - Assess and document risk factors for pressure ulcer development  - Assess and document skin integrity  - Assess and document dressing/incision, wound bed, drain sites and surrounding tissue  - Implem [place] fall precautions as indicated by assessment.  - Educate pt/family on patient safety including physical limitations  - Instruct pt to call for assistance with activity based on assessment  - Modify environment to reduce risk of injury  - Provide a

## 2020-04-20 NOTE — BH PROGRESS NOTE
Psych Team met with Randell Pierce for follow up today with Dr. Portia Thompson present via video telemedicine. Randell Pierce was excited to get to go home soon and denied pain.  She had questions regarding changes in her medication and we discussed changes we made to aid in d

## 2020-04-20 NOTE — PAYOR COMM NOTE
--------------  CONTINUED STAY REVIEW    4-20-20        Payor: [id number] [place] [address] #:  [id number]  Authorization Number: [id number]    Admit date: 4/16/20  Admit time: 8968    Admitting Physician: Wilver Baumann MD  Attending Physician: Rosy LEMUS RN      docusate sodium (COLACE) cap 100 mg     Date Action Dose Route User    4/19/2020 2016 Given 100 mg Oral Naz Nj RN    4/19/2020 0908 Given 100 mg Oral Liss Olsen RN      ferrous sulfate EC tab 325 mg     Date Action Dose Ro - Sodium levels dropped overnight likely from increased water intake.

## 2020-04-20 NOTE — CM/SW NOTE
MONTSERRAT received call from Tova with [facility] PAS screen services ([phone]) - PAS screen is complete. SW/CALDERON to remain available for support and/or discharge planning.      [address], [place] Q[address]

## 2020-04-21 ENCOUNTER — APPOINTMENT (OUTPATIENT)
Dept: HEMATOLOGY/ONCOLOGY | Facility: HOSPITAL | Age: 60
End: 2020-04-21
Attending: INTERNAL MEDICINE
Payer: MEDICARE

## 2020-04-21 VITALS
TEMPERATURE: 98 F | WEIGHT: 111 LBS | BODY MASS INDEX: 23.62 KG/M2 | HEIGHT: 57.5 IN | HEART RATE: 70 BPM | SYSTOLIC BLOOD PRESSURE: 105 MMHG | DIASTOLIC BLOOD PRESSURE: 75 MMHG | RESPIRATION RATE: 16 BRPM | OXYGEN SATURATION: 99 %

## 2020-04-21 DIAGNOSIS — T84.54XD INFECTION ASSOCIATED WITH INTERNAL LEFT KNEE PROSTHESIS, SUBSEQUENT ENCOUNTER: Primary | ICD-10-CM

## 2020-04-21 PROCEDURE — A4216 STERILE WATER/SALINE, 10 ML: HCPCS | Performed by: INTERNAL MEDICINE

## 2020-04-21 RX ORDER — ASPIRIN 325 MG
TABLET, DELAYED RELEASE (ENTERIC COATED) ORAL
Qty: 60 TABLET | Refills: 0 | OUTPATIENT
Start: 2020-04-21

## 2020-04-21 RX ORDER — SODIUM CHLORIDE 1000 MG
1 TABLET, SOLUBLE MISCELLANEOUS 2 TIMES DAILY WITH MEALS
Qty: 60 TABLET | Refills: 0 | Status: SHIPPED | OUTPATIENT
Start: 2020-04-21 | End: 2020-05-21

## 2020-04-21 RX ORDER — SODIUM CHLORIDE 1000 MG
1 TABLET, SOLUBLE MISCELLANEOUS 2 TIMES DAILY WITH MEALS
Qty: 60 TABLET | Refills: 0 | Status: SHIPPED | OUTPATIENT
Start: 2020-04-21 | End: 2020-04-21

## 2020-04-21 NOTE — PAYOR COMM NOTE
--------------  CONTINUED STAY REVIEW   4-21-20      Payor: [address] [place] [address] #:  [id number]  Authorization Number: [id number]    Admit date: 4/16/20  Admit time: [address]    Admitting Physician: Solange Giraldo MD  Attending Physician:  Yosi James Johanna Forrester RN         HYDROcodone-acetaminophen [facility]) 5-325 MG per tab 1 tablet     Date Action Dose Route User    4/20/2020 1045 Given 1 tablet Oral Liz Ramirez RN      HYDROcodone-acetaminophen (NORCO) 5-325 MG per tab 1 tablet     Date Action Dose

## 2020-04-21 NOTE — PLAN OF CARE
Problem: Patient/Family Goals  Goal: Patient/Family Long Term Goal  Description  Patient's Long Term Goal: discharge home    Interventions:  - Monitor vital signs  - Monitor labs  - Administer medications per order  - Pain management  - Assist with ADLs without S/S of infection  Description  INTERVENTIONS:  - Assess and document risk factors for pressure ulcer development  - Assess and document skin integrity  - Assess and document dressing/incision, wound bed, drain sites and surrounding tissue  - Implem [place] fall precautions as indicated by assessment.  - Educate pt/family on patient safety including physical limitations  - Instruct pt to call for assistance with activity based on assessment  - Modify environment to reduce risk of injury  - Provide a management agreement  - Implement a Health Care Agreement if patient meets criteria  - If a patient’s behavior jeopardizes the safety of the patient, staff, or others refer to organization policy.  If a visitor’s behavior poses a threat to safety call refer

## 2020-04-21 NOTE — PROGRESS NOTES
NEPHROLOGY DAILY PROGRESS NOTE     Follow up Reason:  Hyponatremia     SUBJECTIVE:    Sitting comfortably in chair not in acute distress at this time.     OBJECTIVE:    Total Intake/Output:    Intake/Output Summary (Last 24 hours) at 4/21/2020 0360  Last da suppository 10 mg, 10 mg, Rectal, Daily PRN  Fleet Enema (FLEET) 7-19 GM/118ML enema 133 mL, 1 enema, Rectal, Once PRN  diphenhydrAMINE (BENADRYL) cap/tab 25 mg, 25 mg, Oral, Q4H PRN  acetaminophen (TYLENOL) tab 650 mg, 650 mg, Oral, Q4H PRN    Or  HYDROco HYDROcodone-acetaminophen 5-325 MG Oral Tab, Take 1 tablet by mouth every 4 (four) hours as needed. [DISCONTINUED] acetaminophen (ACETAMINOPHEN EXTRA STRENGTH) 500 MG Oral Tab, TAKE 1 TABLET BY MOUTH EVERY 4 HOURS AS NEEDED FOR PAIN.  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

## 2020-04-21 NOTE — PLAN OF CARE
Patient cooperative and pleasant this morning. Surgical dressing CDI, polar ice on. Pain managed with norco prn. Tolerating diet, denies N/V. Worked with PT in AM, tolerated well. Plan to d/c home with PICC, will get LT abx at infusion center outpatient.  D and assess patient for signs and symptoms of electrolyte imbalances  - Administer electrolyte replacement as ordered  - Monitor response to electrolyte replacements, including rhythm and repeat lab results as appropriate  - Fluid restriction as ordered  - Utilize distraction and/or relaxation techniques  - Monitor for opioid side effects  - Notify MD/LIP if interventions unsuccessful or patient reports new pain  - Anticipate increased pain with activity and pre-medicate as appropriate  Outcome: Adequate for implemented  Description  INTERVENTIONS:  - Assess patient/family’s coping skills and  non-compliant behavior (including use of illegal substances)  - Notify security of behavior or suspected illegal substances which indicate the need for search of the pat

## 2020-04-21 NOTE — PROGRESS NOTES
Mitra Born is a 63year old  female with history of hypertension, rheumatoid arthritis with multiple joint replacement, schizoaffective disorder and recently with admission for left knee replacement.   The patient seen today via virtual App [employer] will do well and go home otherwise she is open to go back to rehab. The patient reported good sleep, no side effect of the medication, deny any suicidal ideation, deny any hallucination or bizarre thoughts.         HISTORY:  Past Medical History:   Diagn total elbow replacement   • OTHER SURGICAL HISTORY  5/12/13    REMOVAL OF HARDWARE L HIP WITH INSERTION OF CEMENT SPACER      History reviewed. No pertinent family history.    Social History: Social History    Tobacco Use      Smoking status: [employer] Or  HYDROcodone-acetaminophen (NORCO) 5-325 MG per tab 1 tablet, 1 tablet, Oral, Q4H PRN    Or  HYDROcodone-acetaminophen (NORCO) 5-325 MG per tab 2 tablet, 2 tablet, Oral, Q4H PRN        Allergies:     Other                   OTHER (SEE COMMENTS)    Commen ear.  Insight: Appropriate. Judgment: Appropriate as evidenced by her ability to follow on her treatment physically and psychologically. ASSESSMENT/PLAN:       Delirium due to other medical condition.   Resolving schizoaffective disorder, chronic condit Specimen to Pathology, Tissue      Emergency MRSA Screen by PCR      Anaerobic Culture      Tissue Aerobic Culture      Anaerobic Culture      Anaerobic Culture      Anaerobic Culture      Tissue Aerobic Culture      Tissue Aerobic Culture      Tissue Aero

## 2020-04-21 NOTE — PHYSICAL THERAPY NOTE
PHYSICAL THERAPY KNEE TREATMENT NOTE - INPATIENT     Room Number: 437/437-A             Presenting Problem: removal of septic TKA with antibiotic spacer.  Surgeon reviewed post-op x-ray and reports 50% PWB to be maintained    Problem List  Active Problems: Rating: Unable to rate  Location: L knee  Management Techniques: Activity promotion; Body mechanics; Relaxation;Repositioning    BALANCE  Static Sitting: Good  Dynamic Sitting: Fair +  Static Standing: Fair  Dynamic Standing: Fair    ACTIVITY TOLERANCE rolling and able to maintain 50% PWB LLE     Goal #2  Current Status SBA with the RW   Goal #3 Patient is able to ambulate 50 feet with assist device: walker - rolling at assistance level: minimum assistance and able to maintain 50% PWB LLE   Goal #3   Cur

## 2020-04-22 ENCOUNTER — OFFICE VISIT (OUTPATIENT)
Dept: HEMATOLOGY/ONCOLOGY | Facility: HOSPITAL | Age: 60
End: 2020-04-22
Attending: INTERNAL MEDICINE
Payer: MEDICARE

## 2020-04-22 DIAGNOSIS — T84.54XD INFECTION ASSOCIATED WITH INTERNAL LEFT KNEE PROSTHESIS, SUBSEQUENT ENCOUNTER: Primary | ICD-10-CM

## 2020-04-22 PROCEDURE — A4216 STERILE WATER/SALINE, 10 ML: HCPCS | Performed by: INTERNAL MEDICINE

## 2020-04-22 PROCEDURE — 96374 THER/PROPH/DIAG INJ IV PUSH: CPT

## 2020-04-22 NOTE — ED NOTES
Pt noncompliant, refuses to undress, sts she does not want to stay in hospital, denies knowing why she is here, only sts that her mother dropped her off here by her mother apparently for no reason, after being taken to her knee injection appointment.  Admit

## 2020-04-22 NOTE — PROGRESS NOTES
Pt arrived for IV Dapto for treatment of L knee infection. Arrived via wheelchair . Denies fevers/ chills Denies pain. Latosha Crespo about recent surgery and hospitalizaion. Stevenson Ortiz PICC line present to left Arm, positive blood return noted.  Daptomycin given and gregory

## 2020-04-22 NOTE — ED NOTES
2nd RN attempted to redirect pt w/ extended conversation and pt continued to refuse any interventions, including changing out of clothing. Pt w/ increasingly raised voice and denying any problems @ this time.  Security was called to assist.

## 2020-04-22 NOTE — ED INITIAL ASSESSMENT (HPI)
Pt dropped off by mother for fear of paranoia and inc delusions. Pt is a&o x  And has no clue why she has been brought here. Hx of schizo but pt states shes currently taking all meds properly.

## 2020-04-22 NOTE — ED PROVIDER NOTES
Patient Seen in: [facility] AND [facility] Emergency Department      History   Patient presents with:  Eval-P    Stated Complaint: Knee pain. HPI    Pt is 64 yo F who arrives in ED for psychiatric assessment.  Pt lives with her mother and per mother pt has ha KNEE TOTAL REVISION Left 4/16/2020    Performed by Delgado Cote MD at [facility] OR   • [address] Left 3/26/2020    Performed by Delgado Cote MD at [facility] OR   • [address] Left 11/21/2019    Performed by Delgado Cote normal limits   ACETAMINOPHEN (TYLENOL), S - Abnormal; Notable for the following components:    Acetaminophen <2.0 (*)     All other components within normal limits   SALICYLATE, SERUM - Abnormal; Notable for the following components:    Salicylate <3.9 [place] procedures. Pt talked with Dr. Ana Cevallos via phone. He may be able to adjust pt's medications and send home tomorrow. He would like her to remain in ED tonight and he will talk with her again in AM. Care endorsed to incoming physician.        Disposition a

## 2020-04-22 NOTE — PAYOR COMM NOTE
--------------  DISCHARGE REVIEW    Payor: [address] #:  [id number]  Authorization Number: [id number]    Admit date: 4/16/20  Admit time:  0750  Discharge Date: 4/21/2020 11:00 AM     Admitting Physician: Katy Jackson MD  At with new drainage who presented for repeat left knee I&D with hardware removal and abx spacer placement. Plan to extend end date of daptomycin to 5/28. Hospital coarse with hyponatremia, plans for salt tabs and FR at D/C with close BMP follow up.  Pt D/C wi (two) times daily with meals. vancomycin HCl 125 MG Caps  Commonly known as:  VANCOCIN  Take 1 capsule (125 mg total) by mouth daily.         CHANGE how you take these medications    DAPTOmycin 50 mg/mL in Sterile Water for Injection  What changed:    · Tabs  · sodium chloride 1 g Tabs  · vancomycin HCl 125 MG Caps       Important follow up: Follow-up Information     Carlos Chan MD In 2 weeks.     Specialty:  SURGERY, ORTHOPEDIC  Contact information:  [address]

## 2020-04-22 NOTE — ED NOTES
Taking fluids well, asked for 2nd cup, asking for walker but sts she does not need to use the bathroom yet. Remains verbally hostile and uncooperative and yelling slurs @ staff as noted above.

## 2020-04-22 NOTE — ED NOTES
Pt uncooperative when this RN began to explain our process/procedure in regards to CRISIS intention. Pt continually looping conversation back to her mother, [place] metzger are just playing Local[employer]'s game, she's a fucking loser and a piece of shit. \" Pt states

## 2020-04-22 NOTE — PROGRESS NOTES
Cultures negative at this point from the left knee. Patient was on antibiotics at the time of culture.

## 2020-04-22 NOTE — ED NOTES
Pt's belongings collected and inventoried by security and locked away safely. Pt changed clothes by self after RNs x 2, security x 2, and tech were at bedside to explain process to pt.  Pt still refusing to give urine sample, instructed sample is required f

## 2020-04-22 NOTE — ED NOTES
Mother states pt has long standing hx of schiz and has been getting worse with delusions and aggression over the last several months, however she does not feel safe with her at home anymore.  States she was yelling at her when she attempted to bring pt in h

## 2020-04-23 ENCOUNTER — APPOINTMENT (OUTPATIENT)
Dept: HEMATOLOGY/ONCOLOGY | Facility: HOSPITAL | Age: 60
End: 2020-04-23
Attending: INTERNAL MEDICINE
Payer: MEDICARE

## 2020-04-23 NOTE — ED NOTES
Patient's surgical dressing continues to leak with bright red blood. ER-T at bedside for direct pressure per Dr. Tammie Hernandez.

## 2020-04-23 NOTE — ED NOTES
Mother reportedly left--unable to get a hold of her at this time.  called for assistance in getting [facility].

## 2020-04-23 NOTE — ED NOTES
Spoke with mother, Hiren Phillip, explained dressing change and went over medication changes. Informed Aaliyah patient will be returning via [address]. Patient given all discharge paperwork with follow up and verbalized understanding. Awaiting Superior.

## 2020-04-23 NOTE — ED NOTES
Pt has an Informed Consent for Telemedicine on file from 4/17/20. Verified with SHELDON that a new one is not needed since we have the one file. Pt met with Dr. Tanika aCrpenter for a psychiatric consult through Telemedicine.

## 2020-04-23 NOTE — CM/SW NOTE
Referral made to [facility] in [facility] per mothers request.  [facility] insurance auth started. DON screen and PAS screen completed last admission. [employer] case #[id number]    Waiting on acceptance/insurance auth.      [phone]

## 2020-04-23 NOTE — ED NOTES
Mother Artmichael Wadsworth) called to inform patient's antibiotics will be completed within 20min.  States she will leave shortly to head to the hospital.

## 2020-04-23 NOTE — ED NOTES
Updated mother on POC. Patient's mother is ok with bringing her home but feels a nursing home rehab would be more appropriate. Patient denies HI but reports she continues to hear voices.

## 2020-04-23 NOTE — H&P
Tree Rivero is a 63year old   female with history of schizoaffective disorder, rheumatoid arthritis with multiple joint replacement who brought back by her mom due to increased hallucination and mom afraid from her.   The patient seen to Patient repeatedly denied any homicidal or suicidal ideation. Patient reported that her relationship with mom is so-so. But patient denied ever having any harmful thoughts toward her mom. Patient happy that she is going to go home.   Patient aware of the MD at [facility] MAIN OR   • KNEE TOTAL REVISION Left 11/21/2019    Performed by Ingrid Mcadams MD at [facility]   • OTHER Right 04/17/2019    total elbow replacement   • OTHER SURGICAL HISTORY  5/12/13    REMOVAL OF HARDWARE L HIP WITH INSERTION OF CEMENT today and slow speech. Otherwise patient did not exhibit any agitation or retardation. Patient has not been talking to herself by staff observation and patient reporting the voices today in her ears are not bad at all.   Patient deny any homicidal or suic daily.  8.  Communicate with ED and agree to this plan.         Orders This Visit:  Orders Placed This Encounter      Urinalysis with Culture Reflex STAT      CBC With Differential With Platelet      Basic Metabolic Panel (8)      Ethyl Alcohol      Acetami

## 2020-04-23 NOTE — ED PROVIDER NOTES
Case endorsed to me by Dr. Gigi Roque. Patient's mother is comfortable caring for this patient at home and will plan for discharge in her mother's care. Dr. Brayan George has adjusted her psychiatric medications.   Return precautions given and her mother is agreeable

## 2020-04-23 NOTE — ED NOTES
Report received from Cyn Desai. Patient currently resting on cart with ER-T at bedside for direct observation.

## 2020-04-23 NOTE — ED NOTES
Pt refused injection hence olanzapine cancelled.  Pt requesting pain meds (typically norco) for knee pain

## 2020-04-23 NOTE — CM/SW NOTE
Spoke with patient's mom Remi Griffin and she is requesting placement at [facility]. She states, \"I really only want her to go there because I know they only have one COVID patient and that's all she needs at this point. \"     Referral will be ma

## 2020-04-23 NOTE — CM/SW NOTE
Spoke with Jonathan Vázquez at [facility] and they are not accepting any new admissions at this time.

## 2020-04-23 NOTE — ED PROVIDER NOTES
Pt without advers events during my shift. Will need her Daptomycin dose at 3:00pm. Endorsed to  On next shift.

## 2020-04-23 NOTE — CM/SW NOTE
[facility] called for medicar transport of pt to her moms home since her mom left. Pt going to [facility].     ETA: 1 hour
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
## 2020-04-23 NOTE — CM/SW NOTE
Met with patient at this time she is requesting to go home. Notified Chet Gar and Dr. Caresse Dancer. Ceht Gar and [facility] to call mom to discuss next steps.

## 2020-04-23 NOTE — CM/SW NOTE
Aaliyah updated on [employer] she is declining additional placement attempts at this time. She states, \"I need to keep her safe at home- I don't want her to get COVID at one of those places. \"

## 2020-04-23 NOTE — ED NOTES
[facility] arrived to transport. Patient left ED in stable condition with all paperwork and belongings.

## 2020-04-24 ENCOUNTER — APPOINTMENT (OUTPATIENT)
Dept: HEMATOLOGY/ONCOLOGY | Facility: HOSPITAL | Age: 60
End: 2020-04-24
Attending: INTERNAL MEDICINE
Payer: MEDICARE

## 2020-04-24 VITALS
TEMPERATURE: 98 F | OXYGEN SATURATION: 99 % | RESPIRATION RATE: 16 BRPM | DIASTOLIC BLOOD PRESSURE: 71 MMHG | HEART RATE: 69 BPM | SYSTOLIC BLOOD PRESSURE: 114 MMHG

## 2020-04-24 DIAGNOSIS — T84.54XD INFECTION ASSOCIATED WITH INTERNAL LEFT KNEE PROSTHESIS, SUBSEQUENT ENCOUNTER: Primary | ICD-10-CM

## 2020-04-24 PROCEDURE — A4216 STERILE WATER/SALINE, 10 ML: HCPCS | Performed by: INTERNAL MEDICINE

## 2020-04-24 PROCEDURE — 96374 THER/PROPH/DIAG INJ IV PUSH: CPT

## 2020-04-24 NOTE — PROGRESS NOTES
Patient arrives for daily antibiotics for left knee infection. Reports she is well, complains of some pain in the left knee. Midline present to left upper arm, Midline flushed with saline, positive blood return noted. Dressing changed per protocol.  Jakobo g

## 2020-04-25 ENCOUNTER — APPOINTMENT (OUTPATIENT)
Dept: HEMATOLOGY/ONCOLOGY | Facility: HOSPITAL | Age: 60
End: 2020-04-25
Attending: INTERNAL MEDICINE
Payer: MEDICARE

## 2020-04-25 VITALS
SYSTOLIC BLOOD PRESSURE: 116 MMHG | DIASTOLIC BLOOD PRESSURE: 65 MMHG | HEART RATE: 73 BPM | OXYGEN SATURATION: 97 % | TEMPERATURE: 98 F | RESPIRATION RATE: 16 BRPM

## 2020-04-25 DIAGNOSIS — T84.54XD INFECTION ASSOCIATED WITH INTERNAL LEFT KNEE PROSTHESIS, SUBSEQUENT ENCOUNTER: Primary | ICD-10-CM

## 2020-04-25 PROCEDURE — A4216 STERILE WATER/SALINE, 10 ML: HCPCS | Performed by: INTERNAL MEDICINE

## 2020-04-25 PROCEDURE — 96374 THER/PROPH/DIAG INJ IV PUSH: CPT

## 2020-04-25 NOTE — PROGRESS NOTES
Pt to infusion for daily daptomycin to treat L knee infection. Pt reports pain in knee, taking hydrocodone. Arrived via wheelchair. Dapto given over 2 mins and tolerated well.  Discharged to parking [facility] via wheelchair with assist. Future appointments schedu

## 2020-04-26 ENCOUNTER — APPOINTMENT (OUTPATIENT)
Dept: HEMATOLOGY/ONCOLOGY | Facility: HOSPITAL | Age: 60
End: 2020-04-26
Attending: INTERNAL MEDICINE
Payer: MEDICARE

## 2020-04-26 VITALS
SYSTOLIC BLOOD PRESSURE: 112 MMHG | HEART RATE: 63 BPM | OXYGEN SATURATION: 100 % | DIASTOLIC BLOOD PRESSURE: 74 MMHG | RESPIRATION RATE: 16 BRPM | TEMPERATURE: 98 F

## 2020-04-26 DIAGNOSIS — T84.54XD INFECTION ASSOCIATED WITH INTERNAL LEFT KNEE PROSTHESIS, SUBSEQUENT ENCOUNTER: Primary | ICD-10-CM

## 2020-04-26 PROCEDURE — 96374 THER/PROPH/DIAG INJ IV PUSH: CPT

## 2020-04-26 PROCEDURE — A4216 STERILE WATER/SALINE, 10 ML: HCPCS | Performed by: INTERNAL MEDICINE

## 2020-04-26 NOTE — PROGRESS NOTES
Juan Carlos Garcia to infusion today for IV Dapto for treatment of septic arthritis left knee. VRE in culture. Arrived via wheelchair. Denies fevers/ chills/ pain. Left knee with dressing and ace wrap. Labs reviewed, ok to proceed.  Patient anticipating surgery to l

## 2020-04-27 ENCOUNTER — OFFICE VISIT (OUTPATIENT)
Dept: HEMATOLOGY/ONCOLOGY | Facility: HOSPITAL | Age: 60
End: 2020-04-27
Attending: INTERNAL MEDICINE
Payer: MEDICARE

## 2020-04-27 VITALS
DIASTOLIC BLOOD PRESSURE: 69 MMHG | OXYGEN SATURATION: 99 % | RESPIRATION RATE: 16 BRPM | SYSTOLIC BLOOD PRESSURE: 112 MMHG | TEMPERATURE: 98 F | HEART RATE: 79 BPM

## 2020-04-27 DIAGNOSIS — T84.54XD INFECTION ASSOCIATED WITH INTERNAL LEFT KNEE PROSTHESIS, SUBSEQUENT ENCOUNTER: Primary | ICD-10-CM

## 2020-04-27 PROCEDURE — 82550 ASSAY OF CK (CPK): CPT

## 2020-04-27 PROCEDURE — A4216 STERILE WATER/SALINE, 10 ML: HCPCS | Performed by: INTERNAL MEDICINE

## 2020-04-27 PROCEDURE — 86140 C-REACTIVE PROTEIN: CPT

## 2020-04-27 PROCEDURE — 85025 COMPLETE CBC W/AUTO DIFF WBC: CPT

## 2020-04-27 PROCEDURE — 96374 THER/PROPH/DIAG INJ IV PUSH: CPT

## 2020-04-27 PROCEDURE — 80053 COMPREHEN METABOLIC PANEL: CPT

## 2020-04-27 NOTE — PROGRESS NOTES
Pt arrived for IV Daptomycin for treatment of L knee infection. Arrived via wheelchair . Denies fevers/ chills. .She did have recent surgery on her left knee and states it has become more painful.  She states she had spoken with the doctor and she is suppose

## 2020-04-28 ENCOUNTER — APPOINTMENT (OUTPATIENT)
Dept: HEMATOLOGY/ONCOLOGY | Facility: HOSPITAL | Age: 60
End: 2020-04-28
Attending: INTERNAL MEDICINE
Payer: MEDICARE

## 2020-04-28 VITALS
HEART RATE: 66 BPM | OXYGEN SATURATION: 99 % | DIASTOLIC BLOOD PRESSURE: 79 MMHG | SYSTOLIC BLOOD PRESSURE: 122 MMHG | TEMPERATURE: 99 F | RESPIRATION RATE: 16 BRPM

## 2020-04-28 DIAGNOSIS — T84.54XD INFECTION ASSOCIATED WITH INTERNAL LEFT KNEE PROSTHESIS, SUBSEQUENT ENCOUNTER: Primary | ICD-10-CM

## 2020-04-28 PROCEDURE — 96374 THER/PROPH/DIAG INJ IV PUSH: CPT

## 2020-04-28 PROCEDURE — A4216 STERILE WATER/SALINE, 10 ML: HCPCS | Performed by: INTERNAL MEDICINE

## 2020-04-28 RX ORDER — ASPIRIN 325 MG
TABLET, DELAYED RELEASE (ENTERIC COATED) ORAL
Qty: 60 TABLET | Refills: 0 | Status: SHIPPED | OUTPATIENT
Start: 2020-04-28 | End: 2020-05-07 | Stop reason: ALTCHOICE

## 2020-04-28 NOTE — TELEPHONE ENCOUNTER
Medication(s) to Refill:   Requested Prescriptions     Pending Prescriptions Disp Refills   • ASPIRIN  MG Oral Tab EC [Pharmacy Med Name: ASPIRIN 325MG EC TABLETS] 60 tablet 0     Sig: TAKE 1 TABLET BY MOUTH TWICE DAILY         Reason for Medication EMO   5/18/2020  3:00 PM EM CC INFRN 5 [facility] CHEMO EMO   5/19/2020  2:30 PM EM CC INFRN 5 [facility] CHEMO EMO   5/20/2020  3:30 PM EM CC INFRN 5 [facility] CHEMO EMO   5/21/2020  2:00 PM EM CC INFRN 3 [facility] CHEMO EMO   5/22/2020  2:00 PM EM CC INFRN 3 [facility] CHEMO EMO   5/23/

## 2020-04-28 NOTE — PROGRESS NOTES
Pt arrived for IV Daptomycin for treatment of L knee infection. Arrived via wheelchair . PICC line present to left Arm, positive blood return noted. Daptomycin given over 2 minutes and tolerated well. Assisted to her mothers car for discharge.     EOT 5/28

## 2020-04-29 ENCOUNTER — APPOINTMENT (OUTPATIENT)
Dept: HEMATOLOGY/ONCOLOGY | Facility: HOSPITAL | Age: 60
End: 2020-04-29
Attending: INTERNAL MEDICINE
Payer: MEDICARE

## 2020-04-29 VITALS
HEART RATE: 72 BPM | DIASTOLIC BLOOD PRESSURE: 71 MMHG | TEMPERATURE: 98 F | OXYGEN SATURATION: 99 % | SYSTOLIC BLOOD PRESSURE: 111 MMHG | RESPIRATION RATE: 16 BRPM

## 2020-04-29 DIAGNOSIS — T84.54XD INFECTION ASSOCIATED WITH INTERNAL LEFT KNEE PROSTHESIS, SUBSEQUENT ENCOUNTER: Primary | ICD-10-CM

## 2020-04-29 PROCEDURE — A4216 STERILE WATER/SALINE, 10 ML: HCPCS | Performed by: INTERNAL MEDICINE

## 2020-04-29 PROCEDURE — 96374 THER/PROPH/DIAG INJ IV PUSH: CPT

## 2020-04-29 NOTE — PROGRESS NOTES
Pt arrived for IV Daptomycin for treatment of L knee infection. Arrived via wheelchair . PICC line present to left Arm, positive blood return noted. Daptomycin given over 2 minutes and tolerated well. Discharged stable via wheelchair to [facility].   EOT 5/28/2

## 2020-04-30 ENCOUNTER — OFFICE VISIT (OUTPATIENT)
Dept: HEMATOLOGY/ONCOLOGY | Facility: HOSPITAL | Age: 60
End: 2020-04-30
Attending: INTERNAL MEDICINE
Payer: MEDICARE

## 2020-04-30 VITALS
RESPIRATION RATE: 16 BRPM | SYSTOLIC BLOOD PRESSURE: 123 MMHG | DIASTOLIC BLOOD PRESSURE: 82 MMHG | TEMPERATURE: 98 F | OXYGEN SATURATION: 98 % | HEART RATE: 76 BPM

## 2020-04-30 DIAGNOSIS — T84.54XD INFECTION ASSOCIATED WITH INTERNAL LEFT KNEE PROSTHESIS, SUBSEQUENT ENCOUNTER: Primary | ICD-10-CM

## 2020-04-30 PROCEDURE — A4216 STERILE WATER/SALINE, 10 ML: HCPCS | Performed by: INTERNAL MEDICINE

## 2020-04-30 PROCEDURE — 96374 THER/PROPH/DIAG INJ IV PUSH: CPT

## 2020-04-30 NOTE — PROGRESS NOTES
Pt arrived for IV Daptomycin for treatment of L knee infection. Arrived via wheelchair. Pt offers no complaints today. \"Knee is looking better\". PICC line present to left arm, positive blood return noted.   Daptomycin given over 2 minutes and tolerate

## 2020-05-01 ENCOUNTER — APPOINTMENT (OUTPATIENT)
Dept: HEMATOLOGY/ONCOLOGY | Facility: HOSPITAL | Age: 60
End: 2020-05-01
Attending: INTERNAL MEDICINE
Payer: MEDICARE

## 2020-05-01 VITALS
TEMPERATURE: 98 F | RESPIRATION RATE: 16 BRPM | OXYGEN SATURATION: 98 % | HEART RATE: 70 BPM | DIASTOLIC BLOOD PRESSURE: 74 MMHG | SYSTOLIC BLOOD PRESSURE: 119 MMHG

## 2020-05-01 DIAGNOSIS — T84.54XD INFECTION ASSOCIATED WITH INTERNAL LEFT KNEE PROSTHESIS, SUBSEQUENT ENCOUNTER: Primary | ICD-10-CM

## 2020-05-01 PROCEDURE — A4216 STERILE WATER/SALINE, 10 ML: HCPCS | Performed by: INTERNAL MEDICINE

## 2020-05-01 PROCEDURE — 96374 THER/PROPH/DIAG INJ IV PUSH: CPT

## 2020-05-02 ENCOUNTER — OFFICE VISIT (OUTPATIENT)
Dept: HEMATOLOGY/ONCOLOGY | Facility: HOSPITAL | Age: 60
End: 2020-05-02
Attending: INTERNAL MEDICINE
Payer: MEDICARE

## 2020-05-02 VITALS
OXYGEN SATURATION: 100 % | RESPIRATION RATE: 16 BRPM | TEMPERATURE: 98 F | SYSTOLIC BLOOD PRESSURE: 125 MMHG | HEART RATE: 73 BPM | DIASTOLIC BLOOD PRESSURE: 86 MMHG

## 2020-05-02 DIAGNOSIS — T84.54XD INFECTION ASSOCIATED WITH INTERNAL LEFT KNEE PROSTHESIS, SUBSEQUENT ENCOUNTER: Primary | ICD-10-CM

## 2020-05-02 PROCEDURE — 96374 THER/PROPH/DIAG INJ IV PUSH: CPT

## 2020-05-02 PROCEDURE — A4216 STERILE WATER/SALINE, 10 ML: HCPCS | Performed by: INTERNAL MEDICINE

## 2020-05-02 NOTE — PROGRESS NOTES
Patient arrives for daily antibiotics for left knee infection. Reports she is well, complains of some pain in the left knee. Midline present to left upper arm, Midline flushed with saline, positive blood return noted. Dapto given and tolerated well.  [place]

## 2020-05-03 ENCOUNTER — OFFICE VISIT (OUTPATIENT)
Dept: HEMATOLOGY/ONCOLOGY | Facility: HOSPITAL | Age: 60
End: 2020-05-03
Attending: INTERNAL MEDICINE
Payer: MEDICARE

## 2020-05-03 VITALS
RESPIRATION RATE: 16 BRPM | HEART RATE: 72 BPM | TEMPERATURE: 99 F | DIASTOLIC BLOOD PRESSURE: 71 MMHG | SYSTOLIC BLOOD PRESSURE: 115 MMHG | OXYGEN SATURATION: 100 %

## 2020-05-03 DIAGNOSIS — T84.54XD INFECTION ASSOCIATED WITH INTERNAL LEFT KNEE PROSTHESIS, SUBSEQUENT ENCOUNTER: Primary | ICD-10-CM

## 2020-05-03 PROCEDURE — 96374 THER/PROPH/DIAG INJ IV PUSH: CPT

## 2020-05-03 PROCEDURE — A4216 STERILE WATER/SALINE, 10 ML: HCPCS | Performed by: INTERNAL MEDICINE

## 2020-05-03 NOTE — PROGRESS NOTES
Patient arrives for daily antibiotics for left knee infection. Recently had elbow surgeryl. Took one norco this am with breakfast for pain, awaiting relief. Denies fever or chills. Denies any GI upsets or muscle fatigue, pain.   Midline present to left up

## 2020-05-04 ENCOUNTER — OFFICE VISIT (OUTPATIENT)
Dept: HEMATOLOGY/ONCOLOGY | Facility: HOSPITAL | Age: 60
End: 2020-05-04
Attending: INTERNAL MEDICINE
Payer: MEDICARE

## 2020-05-04 VITALS
DIASTOLIC BLOOD PRESSURE: 73 MMHG | OXYGEN SATURATION: 100 % | SYSTOLIC BLOOD PRESSURE: 122 MMHG | HEART RATE: 60 BPM | RESPIRATION RATE: 16 BRPM | TEMPERATURE: 99 F

## 2020-05-04 DIAGNOSIS — T84.54XD INFECTION ASSOCIATED WITH INTERNAL LEFT KNEE PROSTHESIS, SUBSEQUENT ENCOUNTER: Primary | ICD-10-CM

## 2020-05-04 PROCEDURE — A4216 STERILE WATER/SALINE, 10 ML: HCPCS | Performed by: INTERNAL MEDICINE

## 2020-05-04 PROCEDURE — 96374 THER/PROPH/DIAG INJ IV PUSH: CPT

## 2020-05-04 PROCEDURE — 82550 ASSAY OF CK (CPK): CPT

## 2020-05-04 PROCEDURE — 86140 C-REACTIVE PROTEIN: CPT

## 2020-05-04 PROCEDURE — 80053 COMPREHEN METABOLIC PANEL: CPT

## 2020-05-04 PROCEDURE — 85025 COMPLETE CBC W/AUTO DIFF WBC: CPT

## 2020-05-04 NOTE — PROGRESS NOTES
Jv Winters to infusion today for IV Dapto for treatment of L knee infection. VRE in culture. Arrived via wheelchair. Denies fevers/ chills. Reports mild pain to left knee - took pain pill at hs, prefers not to take pain pill too often.   Knee is swollen, warm,

## 2020-05-05 ENCOUNTER — OFFICE VISIT (OUTPATIENT)
Dept: HEMATOLOGY/ONCOLOGY | Facility: HOSPITAL | Age: 60
End: 2020-05-05
Attending: INTERNAL MEDICINE
Payer: MEDICARE

## 2020-05-05 VITALS
SYSTOLIC BLOOD PRESSURE: 112 MMHG | HEART RATE: 70 BPM | TEMPERATURE: 98 F | DIASTOLIC BLOOD PRESSURE: 69 MMHG | OXYGEN SATURATION: 100 % | RESPIRATION RATE: 16 BRPM

## 2020-05-05 DIAGNOSIS — T84.54XD INFECTION ASSOCIATED WITH INTERNAL LEFT KNEE PROSTHESIS, SUBSEQUENT ENCOUNTER: Primary | ICD-10-CM

## 2020-05-05 PROCEDURE — 96374 THER/PROPH/DIAG INJ IV PUSH: CPT

## 2020-05-05 PROCEDURE — A4216 STERILE WATER/SALINE, 10 ML: HCPCS | Performed by: INTERNAL MEDICINE

## 2020-05-06 ENCOUNTER — OFFICE VISIT (OUTPATIENT)
Dept: HEMATOLOGY/ONCOLOGY | Facility: HOSPITAL | Age: 60
End: 2020-05-06
Attending: INTERNAL MEDICINE
Payer: MEDICARE

## 2020-05-06 VITALS
DIASTOLIC BLOOD PRESSURE: 76 MMHG | HEART RATE: 76 BPM | TEMPERATURE: 96 F | RESPIRATION RATE: 16 BRPM | SYSTOLIC BLOOD PRESSURE: 126 MMHG | OXYGEN SATURATION: 97 %

## 2020-05-06 DIAGNOSIS — T84.54XD INFECTION ASSOCIATED WITH INTERNAL LEFT KNEE PROSTHESIS, SUBSEQUENT ENCOUNTER: Primary | ICD-10-CM

## 2020-05-06 PROCEDURE — 96374 THER/PROPH/DIAG INJ IV PUSH: CPT

## 2020-05-06 PROCEDURE — A4216 STERILE WATER/SALINE, 10 ML: HCPCS | Performed by: INTERNAL MEDICINE

## 2020-05-07 ENCOUNTER — OFFICE VISIT (OUTPATIENT)
Dept: HEMATOLOGY/ONCOLOGY | Facility: HOSPITAL | Age: 60
End: 2020-05-07
Attending: INTERNAL MEDICINE
Payer: MEDICARE

## 2020-05-07 VITALS
DIASTOLIC BLOOD PRESSURE: 85 MMHG | TEMPERATURE: 98 F | OXYGEN SATURATION: 99 % | RESPIRATION RATE: 16 BRPM | HEART RATE: 62 BPM | SYSTOLIC BLOOD PRESSURE: 124 MMHG

## 2020-05-07 DIAGNOSIS — T84.54XD INFECTION ASSOCIATED WITH INTERNAL LEFT KNEE PROSTHESIS, SUBSEQUENT ENCOUNTER: Primary | ICD-10-CM

## 2020-05-07 PROCEDURE — A4216 STERILE WATER/SALINE, 10 ML: HCPCS | Performed by: INTERNAL MEDICINE

## 2020-05-07 PROCEDURE — 96374 THER/PROPH/DIAG INJ IV PUSH: CPT

## 2020-05-08 ENCOUNTER — OFFICE VISIT (OUTPATIENT)
Dept: HEMATOLOGY/ONCOLOGY | Facility: HOSPITAL | Age: 60
End: 2020-05-08
Attending: INTERNAL MEDICINE
Payer: MEDICARE

## 2020-05-08 VITALS
DIASTOLIC BLOOD PRESSURE: 80 MMHG | SYSTOLIC BLOOD PRESSURE: 109 MMHG | TEMPERATURE: 98 F | OXYGEN SATURATION: 100 % | RESPIRATION RATE: 16 BRPM | HEART RATE: 77 BPM

## 2020-05-08 DIAGNOSIS — T84.54XD INFECTION ASSOCIATED WITH INTERNAL LEFT KNEE PROSTHESIS, SUBSEQUENT ENCOUNTER: Primary | ICD-10-CM

## 2020-05-08 PROCEDURE — A4216 STERILE WATER/SALINE, 10 ML: HCPCS | Performed by: INTERNAL MEDICINE

## 2020-05-08 PROCEDURE — 96374 THER/PROPH/DIAG INJ IV PUSH: CPT

## 2020-05-08 NOTE — PROGRESS NOTES
Patient arrives for daily antibiotics for left knee infection. Reports she is well, complains of some pain in the left knee. bandage has been removed, steri strips remain in place Midline present to left upper arm, Midline flushed with saline, positive bloo

## 2020-05-09 ENCOUNTER — OFFICE VISIT (OUTPATIENT)
Dept: HEMATOLOGY/ONCOLOGY | Facility: HOSPITAL | Age: 60
End: 2020-05-09
Attending: INTERNAL MEDICINE
Payer: MEDICARE

## 2020-05-09 VITALS
RESPIRATION RATE: 16 BRPM | HEART RATE: 73 BPM | OXYGEN SATURATION: 100 % | DIASTOLIC BLOOD PRESSURE: 99 MMHG | TEMPERATURE: 98 F | SYSTOLIC BLOOD PRESSURE: 137 MMHG

## 2020-05-09 DIAGNOSIS — T84.54XD INFECTION ASSOCIATED WITH INTERNAL LEFT KNEE PROSTHESIS, SUBSEQUENT ENCOUNTER: Primary | ICD-10-CM

## 2020-05-09 PROCEDURE — 96374 THER/PROPH/DIAG INJ IV PUSH: CPT

## 2020-05-09 PROCEDURE — A4216 STERILE WATER/SALINE, 10 ML: HCPCS | Performed by: INTERNAL MEDICINE

## 2020-05-10 ENCOUNTER — OFFICE VISIT (OUTPATIENT)
Dept: HEMATOLOGY/ONCOLOGY | Facility: HOSPITAL | Age: 60
End: 2020-05-10
Attending: INTERNAL MEDICINE
Payer: MEDICARE

## 2020-05-10 VITALS
HEART RATE: 66 BPM | SYSTOLIC BLOOD PRESSURE: 122 MMHG | RESPIRATION RATE: 16 BRPM | DIASTOLIC BLOOD PRESSURE: 75 MMHG | TEMPERATURE: 98 F

## 2020-05-10 DIAGNOSIS — T84.54XD INFECTION ASSOCIATED WITH INTERNAL LEFT KNEE PROSTHESIS, SUBSEQUENT ENCOUNTER: Primary | ICD-10-CM

## 2020-05-10 PROCEDURE — A4216 STERILE WATER/SALINE, 10 ML: HCPCS | Performed by: INTERNAL MEDICINE

## 2020-05-10 PROCEDURE — 96374 THER/PROPH/DIAG INJ IV PUSH: CPT

## 2020-05-11 ENCOUNTER — OFFICE VISIT (OUTPATIENT)
Dept: HEMATOLOGY/ONCOLOGY | Facility: HOSPITAL | Age: 60
End: 2020-05-11
Attending: INTERNAL MEDICINE
Payer: MEDICARE

## 2020-05-11 VITALS
TEMPERATURE: 98 F | HEART RATE: 69 BPM | OXYGEN SATURATION: 100 % | RESPIRATION RATE: 16 BRPM | DIASTOLIC BLOOD PRESSURE: 74 MMHG | SYSTOLIC BLOOD PRESSURE: 109 MMHG

## 2020-05-11 DIAGNOSIS — T84.54XD INFECTION ASSOCIATED WITH INTERNAL LEFT KNEE PROSTHESIS, SUBSEQUENT ENCOUNTER: Primary | ICD-10-CM

## 2020-05-11 PROCEDURE — 86140 C-REACTIVE PROTEIN: CPT

## 2020-05-11 PROCEDURE — 82550 ASSAY OF CK (CPK): CPT

## 2020-05-11 PROCEDURE — 80053 COMPREHEN METABOLIC PANEL: CPT

## 2020-05-11 PROCEDURE — 96374 THER/PROPH/DIAG INJ IV PUSH: CPT

## 2020-05-11 PROCEDURE — 85025 COMPLETE CBC W/AUTO DIFF WBC: CPT

## 2020-05-11 PROCEDURE — A4216 STERILE WATER/SALINE, 10 ML: HCPCS | Performed by: INTERNAL MEDICINE

## 2020-05-12 ENCOUNTER — OFFICE VISIT (OUTPATIENT)
Dept: HEMATOLOGY/ONCOLOGY | Facility: HOSPITAL | Age: 60
End: 2020-05-12
Attending: INTERNAL MEDICINE
Payer: MEDICARE

## 2020-05-12 VITALS
DIASTOLIC BLOOD PRESSURE: 73 MMHG | SYSTOLIC BLOOD PRESSURE: 115 MMHG | RESPIRATION RATE: 16 BRPM | HEART RATE: 76 BPM | OXYGEN SATURATION: 98 % | TEMPERATURE: 98 F

## 2020-05-12 DIAGNOSIS — T84.54XD INFECTION ASSOCIATED WITH INTERNAL LEFT KNEE PROSTHESIS, SUBSEQUENT ENCOUNTER: Primary | ICD-10-CM

## 2020-05-12 PROCEDURE — 96374 THER/PROPH/DIAG INJ IV PUSH: CPT

## 2020-05-12 PROCEDURE — A4216 STERILE WATER/SALINE, 10 ML: HCPCS | Performed by: INTERNAL MEDICINE

## 2020-05-12 NOTE — PROGRESS NOTES
Patient arrives for daily antibiotics for left knee infection. Reports she is well, complains of some pain in the left knee  States she is taking Norco 1 tablet 1-2 per day, states she has 6 Norco left and needing a refill.   I encouraged Christiano Sánchez to call her

## 2020-05-13 ENCOUNTER — OFFICE VISIT (OUTPATIENT)
Dept: HEMATOLOGY/ONCOLOGY | Facility: HOSPITAL | Age: 60
End: 2020-05-13
Attending: INTERNAL MEDICINE
Payer: MEDICARE

## 2020-05-13 VITALS
HEART RATE: 69 BPM | TEMPERATURE: 99 F | OXYGEN SATURATION: 98 % | RESPIRATION RATE: 16 BRPM | SYSTOLIC BLOOD PRESSURE: 116 MMHG | DIASTOLIC BLOOD PRESSURE: 79 MMHG

## 2020-05-13 DIAGNOSIS — T84.54XD INFECTION ASSOCIATED WITH INTERNAL LEFT KNEE PROSTHESIS, SUBSEQUENT ENCOUNTER: Primary | ICD-10-CM

## 2020-05-13 PROCEDURE — A4216 STERILE WATER/SALINE, 10 ML: HCPCS | Performed by: INTERNAL MEDICINE

## 2020-05-13 PROCEDURE — 96374 THER/PROPH/DIAG INJ IV PUSH: CPT

## 2020-05-14 ENCOUNTER — OFFICE VISIT (OUTPATIENT)
Dept: HEMATOLOGY/ONCOLOGY | Facility: HOSPITAL | Age: 60
End: 2020-05-14
Attending: INTERNAL MEDICINE
Payer: MEDICARE

## 2020-05-14 VITALS
TEMPERATURE: 99 F | SYSTOLIC BLOOD PRESSURE: 113 MMHG | HEART RATE: 75 BPM | WEIGHT: 108.88 LBS | DIASTOLIC BLOOD PRESSURE: 74 MMHG | BODY MASS INDEX: 24 KG/M2 | RESPIRATION RATE: 16 BRPM | OXYGEN SATURATION: 97 %

## 2020-05-14 DIAGNOSIS — T84.54XD INFECTION ASSOCIATED WITH INTERNAL LEFT KNEE PROSTHESIS, SUBSEQUENT ENCOUNTER: Primary | ICD-10-CM

## 2020-05-14 PROCEDURE — 96374 THER/PROPH/DIAG INJ IV PUSH: CPT

## 2020-05-14 PROCEDURE — A4216 STERILE WATER/SALINE, 10 ML: HCPCS | Performed by: INTERNAL MEDICINE

## 2020-05-15 ENCOUNTER — OFFICE VISIT (OUTPATIENT)
Dept: HEMATOLOGY/ONCOLOGY | Facility: HOSPITAL | Age: 60
End: 2020-05-15
Attending: INTERNAL MEDICINE
Payer: MEDICARE

## 2020-05-15 VITALS
HEART RATE: 71 BPM | OXYGEN SATURATION: 96 % | RESPIRATION RATE: 16 BRPM | DIASTOLIC BLOOD PRESSURE: 57 MMHG | SYSTOLIC BLOOD PRESSURE: 108 MMHG | TEMPERATURE: 99 F

## 2020-05-15 DIAGNOSIS — T84.54XD INFECTION ASSOCIATED WITH INTERNAL LEFT KNEE PROSTHESIS, SUBSEQUENT ENCOUNTER: Primary | ICD-10-CM

## 2020-05-15 PROCEDURE — A4216 STERILE WATER/SALINE, 10 ML: HCPCS | Performed by: INTERNAL MEDICINE

## 2020-05-15 PROCEDURE — 96374 THER/PROPH/DIAG INJ IV PUSH: CPT

## 2020-05-15 NOTE — PROGRESS NOTES
Patient arrives for daily antibiotics for left knee infection. Reports she is well, complains of some pain in the left knee. Midline present to left upper arm, Midline flushed with saline, positive blood return noted. Dressing changed per protocol.   Dapto

## 2020-05-16 ENCOUNTER — OFFICE VISIT (OUTPATIENT)
Dept: HEMATOLOGY/ONCOLOGY | Facility: HOSPITAL | Age: 60
End: 2020-05-16
Attending: INTERNAL MEDICINE
Payer: MEDICARE

## 2020-05-16 VITALS
HEART RATE: 62 BPM | TEMPERATURE: 98 F | OXYGEN SATURATION: 100 % | RESPIRATION RATE: 16 BRPM | SYSTOLIC BLOOD PRESSURE: 137 MMHG | DIASTOLIC BLOOD PRESSURE: 83 MMHG

## 2020-05-16 DIAGNOSIS — T84.54XD INFECTION ASSOCIATED WITH INTERNAL LEFT KNEE PROSTHESIS, SUBSEQUENT ENCOUNTER: Primary | ICD-10-CM

## 2020-05-16 PROCEDURE — 96374 THER/PROPH/DIAG INJ IV PUSH: CPT

## 2020-05-16 PROCEDURE — A4216 STERILE WATER/SALINE, 10 ML: HCPCS | Performed by: INTERNAL MEDICINE

## 2020-05-17 ENCOUNTER — OFFICE VISIT (OUTPATIENT)
Dept: HEMATOLOGY/ONCOLOGY | Facility: HOSPITAL | Age: 60
End: 2020-05-17
Attending: INTERNAL MEDICINE
Payer: MEDICARE

## 2020-05-17 VITALS
HEART RATE: 71 BPM | RESPIRATION RATE: 16 BRPM | TEMPERATURE: 98 F | OXYGEN SATURATION: 99 % | SYSTOLIC BLOOD PRESSURE: 118 MMHG | DIASTOLIC BLOOD PRESSURE: 82 MMHG

## 2020-05-17 DIAGNOSIS — T84.54XD INFECTION ASSOCIATED WITH INTERNAL LEFT KNEE PROSTHESIS, SUBSEQUENT ENCOUNTER: Primary | ICD-10-CM

## 2020-05-17 PROCEDURE — 96374 THER/PROPH/DIAG INJ IV PUSH: CPT

## 2020-05-17 PROCEDURE — A4216 STERILE WATER/SALINE, 10 ML: HCPCS | Performed by: INTERNAL MEDICINE

## 2020-05-17 NOTE — PROGRESS NOTES
Patient arrives for daily antibiotics for left knee infection. Arrives ambulatory with walker with strong steady gait. Denies pain in knee, fever and chills. Appetite good, energy level fair.   Midline present to left upper arm, Midline flushed with saline,

## 2020-05-18 ENCOUNTER — OFFICE VISIT (OUTPATIENT)
Dept: HEMATOLOGY/ONCOLOGY | Facility: HOSPITAL | Age: 60
End: 2020-05-18
Attending: INTERNAL MEDICINE
Payer: MEDICARE

## 2020-05-18 VITALS
TEMPERATURE: 98 F | HEART RATE: 60 BPM | OXYGEN SATURATION: 98 % | DIASTOLIC BLOOD PRESSURE: 72 MMHG | RESPIRATION RATE: 16 BRPM | SYSTOLIC BLOOD PRESSURE: 114 MMHG

## 2020-05-18 DIAGNOSIS — T84.54XD INFECTION ASSOCIATED WITH INTERNAL LEFT KNEE PROSTHESIS, SUBSEQUENT ENCOUNTER: Primary | ICD-10-CM

## 2020-05-18 PROCEDURE — 86140 C-REACTIVE PROTEIN: CPT

## 2020-05-18 PROCEDURE — 96374 THER/PROPH/DIAG INJ IV PUSH: CPT

## 2020-05-18 PROCEDURE — 80053 COMPREHEN METABOLIC PANEL: CPT

## 2020-05-18 PROCEDURE — 85025 COMPLETE CBC W/AUTO DIFF WBC: CPT

## 2020-05-18 PROCEDURE — 82550 ASSAY OF CK (CPK): CPT

## 2020-05-18 PROCEDURE — A4216 STERILE WATER/SALINE, 10 ML: HCPCS | Performed by: INTERNAL MEDICINE

## 2020-05-18 NOTE — PROGRESS NOTES
Patient arrives for daily antibiotics for left knee infection. Reports she is well, complains of some pain in the left knee. Took pain pill this am with relief; denies pains now.       Midline present to left upper arm, Midline flushed with saline, positi

## 2020-05-19 ENCOUNTER — OFFICE VISIT (OUTPATIENT)
Dept: HEMATOLOGY/ONCOLOGY | Facility: HOSPITAL | Age: 60
End: 2020-05-19
Attending: INTERNAL MEDICINE
Payer: MEDICARE

## 2020-05-19 VITALS
SYSTOLIC BLOOD PRESSURE: 138 MMHG | HEART RATE: 69 BPM | RESPIRATION RATE: 16 BRPM | TEMPERATURE: 98 F | DIASTOLIC BLOOD PRESSURE: 79 MMHG | OXYGEN SATURATION: 98 %

## 2020-05-19 DIAGNOSIS — T84.54XD INFECTION ASSOCIATED WITH INTERNAL LEFT KNEE PROSTHESIS, SUBSEQUENT ENCOUNTER: Primary | ICD-10-CM

## 2020-05-19 PROCEDURE — 96374 THER/PROPH/DIAG INJ IV PUSH: CPT

## 2020-05-19 PROCEDURE — A4216 STERILE WATER/SALINE, 10 ML: HCPCS | Performed by: INTERNAL MEDICINE

## 2020-05-20 ENCOUNTER — OFFICE VISIT (OUTPATIENT)
Dept: HEMATOLOGY/ONCOLOGY | Facility: HOSPITAL | Age: 60
End: 2020-05-20
Attending: INTERNAL MEDICINE
Payer: MEDICARE

## 2020-05-20 VITALS
DIASTOLIC BLOOD PRESSURE: 84 MMHG | TEMPERATURE: 98 F | RESPIRATION RATE: 16 BRPM | OXYGEN SATURATION: 96 % | SYSTOLIC BLOOD PRESSURE: 139 MMHG | HEART RATE: 65 BPM

## 2020-05-20 DIAGNOSIS — T84.54XD INFECTION ASSOCIATED WITH INTERNAL LEFT KNEE PROSTHESIS, SUBSEQUENT ENCOUNTER: Primary | ICD-10-CM

## 2020-05-20 PROCEDURE — 96374 THER/PROPH/DIAG INJ IV PUSH: CPT

## 2020-05-20 PROCEDURE — A4216 STERILE WATER/SALINE, 10 ML: HCPCS | Performed by: INTERNAL MEDICINE

## 2020-05-20 RX ORDER — SODIUM CHLORIDE 1 G/1
TABLET ORAL
Qty: 60 TABLET | Refills: 0 | OUTPATIENT
Start: 2020-05-20

## 2020-05-20 NOTE — TELEPHONE ENCOUNTER
This should come from nephrology. I am not sure if she needs to continue on this medicine, she was started in the hospital on this. pls arrange for nephrology appt ASAP for her hyponatremia.

## 2020-05-20 NOTE — PROGRESS NOTES
Pt to infusion for daily daptomycin to treat L knee infection. Pt feeling \"terrific. \" Arrived via wheelchair. Dapto given over 2 mins and tolerated well. Discharged to parking lot via wheelchair with assist. Future appointments scheduled.     EOT 5/28  ID

## 2020-05-21 ENCOUNTER — OFFICE VISIT (OUTPATIENT)
Dept: HEMATOLOGY/ONCOLOGY | Facility: HOSPITAL | Age: 60
End: 2020-05-21
Attending: INTERNAL MEDICINE
Payer: MEDICARE

## 2020-05-21 VITALS
SYSTOLIC BLOOD PRESSURE: 141 MMHG | RESPIRATION RATE: 16 BRPM | DIASTOLIC BLOOD PRESSURE: 82 MMHG | HEART RATE: 65 BPM | TEMPERATURE: 99 F | OXYGEN SATURATION: 96 %

## 2020-05-21 DIAGNOSIS — T84.54XD INFECTION ASSOCIATED WITH INTERNAL LEFT KNEE PROSTHESIS, SUBSEQUENT ENCOUNTER: Primary | ICD-10-CM

## 2020-05-21 PROCEDURE — A4216 STERILE WATER/SALINE, 10 ML: HCPCS | Performed by: INTERNAL MEDICINE

## 2020-05-21 PROCEDURE — 96374 THER/PROPH/DIAG INJ IV PUSH: CPT

## 2020-05-21 RX ORDER — SODIUM CHLORIDE 1 G/1
1 TABLET ORAL 2 TIMES DAILY WITH MEALS
Qty: 60 TABLET | Refills: 0 | Status: SHIPPED | OUTPATIENT
Start: 2020-05-21 | End: 2020-06-16

## 2020-05-21 NOTE — PROGRESS NOTES
Pt to infusion for daily daptomycin to treat L knee infection. Arrives via wheelchair. Noticing more pain than usual, knee thru sweat pants feels warm to this nurse. .     Dapto given over 2 mins and tolerated well.  Discharged to parking [facility] via wheelcha

## 2020-05-22 ENCOUNTER — OFFICE VISIT (OUTPATIENT)
Dept: HEMATOLOGY/ONCOLOGY | Facility: HOSPITAL | Age: 60
End: 2020-05-22
Attending: INTERNAL MEDICINE
Payer: MEDICARE

## 2020-05-22 VITALS
HEART RATE: 58 BPM | RESPIRATION RATE: 16 BRPM | DIASTOLIC BLOOD PRESSURE: 75 MMHG | OXYGEN SATURATION: 98 % | TEMPERATURE: 98 F | SYSTOLIC BLOOD PRESSURE: 128 MMHG

## 2020-05-22 DIAGNOSIS — T84.54XD INFECTION ASSOCIATED WITH INTERNAL LEFT KNEE PROSTHESIS, SUBSEQUENT ENCOUNTER: Primary | ICD-10-CM

## 2020-05-22 PROCEDURE — A4216 STERILE WATER/SALINE, 10 ML: HCPCS | Performed by: INTERNAL MEDICINE

## 2020-05-22 PROCEDURE — 96374 THER/PROPH/DIAG INJ IV PUSH: CPT

## 2020-05-22 NOTE — PROGRESS NOTES
Pt to infusion for daily daptomycin to treat L knee infection. Arrives via wheelchair. States she is feeling well. Incision left knee approximated and dry. Dapto given over 2 mins and tolerated well. Picc dressing changed.   Discharged to [facility]

## 2020-05-23 ENCOUNTER — OFFICE VISIT (OUTPATIENT)
Dept: HEMATOLOGY/ONCOLOGY | Facility: HOSPITAL | Age: 60
End: 2020-05-23
Attending: INTERNAL MEDICINE
Payer: MEDICARE

## 2020-05-23 VITALS
TEMPERATURE: 98 F | RESPIRATION RATE: 16 BRPM | DIASTOLIC BLOOD PRESSURE: 79 MMHG | HEART RATE: 66 BPM | SYSTOLIC BLOOD PRESSURE: 151 MMHG | OXYGEN SATURATION: 100 %

## 2020-05-23 DIAGNOSIS — T84.54XD INFECTION ASSOCIATED WITH INTERNAL LEFT KNEE PROSTHESIS, SUBSEQUENT ENCOUNTER: Primary | ICD-10-CM

## 2020-05-23 PROCEDURE — 96374 THER/PROPH/DIAG INJ IV PUSH: CPT

## 2020-05-23 PROCEDURE — A4216 STERILE WATER/SALINE, 10 ML: HCPCS | Performed by: INTERNAL MEDICINE

## 2020-05-24 ENCOUNTER — OFFICE VISIT (OUTPATIENT)
Dept: HEMATOLOGY/ONCOLOGY | Facility: HOSPITAL | Age: 60
End: 2020-05-24
Attending: INTERNAL MEDICINE
Payer: MEDICARE

## 2020-05-24 VITALS
RESPIRATION RATE: 16 BRPM | DIASTOLIC BLOOD PRESSURE: 78 MMHG | TEMPERATURE: 99 F | SYSTOLIC BLOOD PRESSURE: 127 MMHG | OXYGEN SATURATION: 100 % | HEART RATE: 65 BPM

## 2020-05-24 DIAGNOSIS — T84.54XD INFECTION ASSOCIATED WITH INTERNAL LEFT KNEE PROSTHESIS, SUBSEQUENT ENCOUNTER: Primary | ICD-10-CM

## 2020-05-24 PROCEDURE — 96374 THER/PROPH/DIAG INJ IV PUSH: CPT

## 2020-05-24 PROCEDURE — A4216 STERILE WATER/SALINE, 10 ML: HCPCS | Performed by: INTERNAL MEDICINE

## 2020-05-24 NOTE — PROGRESS NOTES
Pt to infusion for daily daptomycin to treat L knee infection. Arrives ambulatory with walker     Dapto given over 2 mins and tolerated well. Discharged to parking [facility] with this assistance of medical assistance. Future appointments scheduled.      EOT 5/

## 2020-05-25 ENCOUNTER — OFFICE VISIT (OUTPATIENT)
Dept: HEMATOLOGY/ONCOLOGY | Facility: HOSPITAL | Age: 60
End: 2020-05-25
Attending: INTERNAL MEDICINE
Payer: MEDICARE

## 2020-05-25 VITALS
SYSTOLIC BLOOD PRESSURE: 139 MMHG | TEMPERATURE: 98 F | HEART RATE: 87 BPM | RESPIRATION RATE: 16 BRPM | OXYGEN SATURATION: 99 % | DIASTOLIC BLOOD PRESSURE: 76 MMHG

## 2020-05-25 DIAGNOSIS — T84.54XD INFECTION ASSOCIATED WITH INTERNAL LEFT KNEE PROSTHESIS, SUBSEQUENT ENCOUNTER: Primary | ICD-10-CM

## 2020-05-25 PROCEDURE — 86140 C-REACTIVE PROTEIN: CPT

## 2020-05-25 PROCEDURE — A4216 STERILE WATER/SALINE, 10 ML: HCPCS | Performed by: INTERNAL MEDICINE

## 2020-05-25 PROCEDURE — 85025 COMPLETE CBC W/AUTO DIFF WBC: CPT

## 2020-05-25 PROCEDURE — 80053 COMPREHEN METABOLIC PANEL: CPT

## 2020-05-25 PROCEDURE — 96374 THER/PROPH/DIAG INJ IV PUSH: CPT

## 2020-05-25 PROCEDURE — 82550 ASSAY OF CK (CPK): CPT

## 2020-05-25 NOTE — PROGRESS NOTES
Pt to infusion for daily daptomycin to treat L knee infection. Arrives ambulatory with walker     Labs collected via PICC per protocol and without difficulty. Dapto given over 2 mins and tolerated well. Discharged amb with walker, stable.     Future appoint

## 2020-05-26 ENCOUNTER — OFFICE VISIT (OUTPATIENT)
Dept: HEMATOLOGY/ONCOLOGY | Facility: HOSPITAL | Age: 60
End: 2020-05-26
Attending: INTERNAL MEDICINE
Payer: MEDICARE

## 2020-05-26 VITALS
OXYGEN SATURATION: 98 % | TEMPERATURE: 99 F | DIASTOLIC BLOOD PRESSURE: 74 MMHG | SYSTOLIC BLOOD PRESSURE: 135 MMHG | HEART RATE: 61 BPM | RESPIRATION RATE: 16 BRPM

## 2020-05-26 DIAGNOSIS — T84.54XD INFECTION ASSOCIATED WITH INTERNAL LEFT KNEE PROSTHESIS, SUBSEQUENT ENCOUNTER: Primary | ICD-10-CM

## 2020-05-26 PROCEDURE — 96374 THER/PROPH/DIAG INJ IV PUSH: CPT

## 2020-05-26 PROCEDURE — A4216 STERILE WATER/SALINE, 10 ML: HCPCS | Performed by: INTERNAL MEDICINE

## 2020-05-26 NOTE — PROGRESS NOTES
Pt to infusion for daily daptomycin to treat L knee infection. Arrives ambulatory with walker. Offers no new complaints today. Dapto given over 2 mins and tolerated well. PICC flushed and saline locked. Alcohol cap placed.   Discharged ambulatory with

## 2020-05-27 ENCOUNTER — OFFICE VISIT (OUTPATIENT)
Dept: HEMATOLOGY/ONCOLOGY | Facility: HOSPITAL | Age: 60
End: 2020-05-27
Attending: INTERNAL MEDICINE
Payer: MEDICARE

## 2020-05-27 VITALS
HEART RATE: 72 BPM | RESPIRATION RATE: 16 BRPM | OXYGEN SATURATION: 98 % | SYSTOLIC BLOOD PRESSURE: 118 MMHG | DIASTOLIC BLOOD PRESSURE: 71 MMHG | TEMPERATURE: 99 F

## 2020-05-27 DIAGNOSIS — T84.54XD INFECTION ASSOCIATED WITH INTERNAL LEFT KNEE PROSTHESIS, SUBSEQUENT ENCOUNTER: Primary | ICD-10-CM

## 2020-05-27 PROCEDURE — 96374 THER/PROPH/DIAG INJ IV PUSH: CPT

## 2020-05-27 PROCEDURE — A4216 STERILE WATER/SALINE, 10 ML: HCPCS | Performed by: INTERNAL MEDICINE

## 2020-05-27 NOTE — PROGRESS NOTES
Patient arrives for daily antibiotics for left knee infection. Reports she is well, denies any complaints. . Midline present to left upper arm, Midline flushed with saline, positive blood return noted. Dapto given and tolerated well.  Midline flushed with sal

## 2020-05-28 ENCOUNTER — OFFICE VISIT (OUTPATIENT)
Dept: HEMATOLOGY/ONCOLOGY | Facility: HOSPITAL | Age: 60
End: 2020-05-28
Attending: INTERNAL MEDICINE
Payer: MEDICARE

## 2020-05-28 VITALS
RESPIRATION RATE: 16 BRPM | HEART RATE: 81 BPM | SYSTOLIC BLOOD PRESSURE: 113 MMHG | DIASTOLIC BLOOD PRESSURE: 67 MMHG | TEMPERATURE: 99 F

## 2020-05-28 DIAGNOSIS — T84.54XD INFECTION ASSOCIATED WITH INTERNAL LEFT KNEE PROSTHESIS, SUBSEQUENT ENCOUNTER: Primary | ICD-10-CM

## 2020-05-28 PROCEDURE — 96374 THER/PROPH/DIAG INJ IV PUSH: CPT

## 2020-05-28 PROCEDURE — A4216 STERILE WATER/SALINE, 10 ML: HCPCS | Performed by: INTERNAL MEDICINE

## 2020-05-29 ENCOUNTER — OFFICE VISIT (OUTPATIENT)
Dept: HEMATOLOGY/ONCOLOGY | Facility: HOSPITAL | Age: 60
End: 2020-05-29
Attending: INTERNAL MEDICINE
Payer: MEDICARE

## 2020-05-29 VITALS
OXYGEN SATURATION: 98 % | DIASTOLIC BLOOD PRESSURE: 78 MMHG | TEMPERATURE: 98 F | HEART RATE: 57 BPM | RESPIRATION RATE: 16 BRPM | SYSTOLIC BLOOD PRESSURE: 122 MMHG

## 2020-05-29 DIAGNOSIS — T84.54XD INFECTION ASSOCIATED WITH INTERNAL LEFT KNEE PROSTHESIS, SUBSEQUENT ENCOUNTER: Primary | ICD-10-CM

## 2020-05-29 PROCEDURE — A4216 STERILE WATER/SALINE, 10 ML: HCPCS | Performed by: INTERNAL MEDICINE

## 2020-05-29 PROCEDURE — 96374 THER/PROPH/DIAG INJ IV PUSH: CPT

## 2020-05-29 NOTE — PROGRESS NOTES
Pt to infusion for daily daptomycin to treat L knee infection. Arrives via wheelchair. States she is feeling well. Incision left knee approximated and dry. Dapto given over 2 mins and tolerated well. Picc dressing changed.   Discharged to [facility]

## 2020-05-30 ENCOUNTER — OFFICE VISIT (OUTPATIENT)
Dept: HEMATOLOGY/ONCOLOGY | Facility: HOSPITAL | Age: 60
End: 2020-05-30
Attending: INTERNAL MEDICINE
Payer: MEDICARE

## 2020-05-30 VITALS
RESPIRATION RATE: 16 BRPM | TEMPERATURE: 98 F | DIASTOLIC BLOOD PRESSURE: 72 MMHG | SYSTOLIC BLOOD PRESSURE: 133 MMHG | HEART RATE: 56 BPM

## 2020-05-30 DIAGNOSIS — T84.54XD INFECTION ASSOCIATED WITH INTERNAL LEFT KNEE PROSTHESIS, SUBSEQUENT ENCOUNTER: Primary | ICD-10-CM

## 2020-05-30 PROCEDURE — 96374 THER/PROPH/DIAG INJ IV PUSH: CPT

## 2020-05-30 PROCEDURE — A4216 STERILE WATER/SALINE, 10 ML: HCPCS | Performed by: INTERNAL MEDICINE

## 2020-05-30 NOTE — PROGRESS NOTES
Pt to infusion for daily daptomycin to treat L knee infection. Pt feeling well, offers no complaints. Ambulating with walker. Dapto given over 2 mins and tolerated well. Last infusion day 5/31; ID f/u 6/1.  Pt aware PICC may be removed in ID office, or sh

## 2020-05-31 ENCOUNTER — OFFICE VISIT (OUTPATIENT)
Dept: HEMATOLOGY/ONCOLOGY | Facility: HOSPITAL | Age: 60
End: 2020-05-31
Attending: INTERNAL MEDICINE
Payer: MEDICARE

## 2020-05-31 VITALS
HEART RATE: 67 BPM | RESPIRATION RATE: 16 BRPM | DIASTOLIC BLOOD PRESSURE: 70 MMHG | OXYGEN SATURATION: 98 % | TEMPERATURE: 98 F | SYSTOLIC BLOOD PRESSURE: 122 MMHG

## 2020-05-31 DIAGNOSIS — T84.54XD INFECTION ASSOCIATED WITH INTERNAL LEFT KNEE PROSTHESIS, SUBSEQUENT ENCOUNTER: Primary | ICD-10-CM

## 2020-05-31 PROCEDURE — A4216 STERILE WATER/SALINE, 10 ML: HCPCS | Performed by: INTERNAL MEDICINE

## 2020-05-31 PROCEDURE — 96374 THER/PROPH/DIAG INJ IV PUSH: CPT

## 2020-05-31 NOTE — PROGRESS NOTES
Pt to infusion for daily daptomycin to treat L knee infection. Pt feeling well, offers no complaints. Ambulating with walker. Dapto given over 2 mins and tolerated well. Last infusion day 5/31; ID f/u 6/1.    Reinforced that PICC is not to be removed toda

## 2020-06-01 ENCOUNTER — APPOINTMENT (OUTPATIENT)
Dept: CT IMAGING | Facility: HOSPITAL | Age: 60
DRG: 066 | End: 2020-06-01
Attending: EMERGENCY MEDICINE
Payer: MEDICARE

## 2020-06-01 ENCOUNTER — HOSPITAL ENCOUNTER (INPATIENT)
Facility: HOSPITAL | Age: 60
LOS: 3 days | Discharge: NURSING, REHAB OR LONG-TERM CARE | DRG: 066 | End: 2020-06-04
Attending: EMERGENCY MEDICINE | Admitting: INTERNAL MEDICINE
Payer: MEDICARE

## 2020-06-01 DIAGNOSIS — I63.9 ACUTE CVA (CEREBROVASCULAR ACCIDENT) (HCC): Primary | ICD-10-CM

## 2020-06-01 PROCEDURE — 70450 CT HEAD/BRAIN W/O DYE: CPT | Performed by: EMERGENCY MEDICINE

## 2020-06-01 RX ORDER — ASPIRIN 81 MG/1
81 TABLET, CHEWABLE ORAL ONCE
Status: COMPLETED | OUTPATIENT
Start: 2020-06-01 | End: 2020-06-01

## 2020-06-01 RX ORDER — MECLIZINE HYDROCHLORIDE 25 MG/1
25 TABLET ORAL ONCE
Status: COMPLETED | OUTPATIENT
Start: 2020-06-01 | End: 2020-06-01

## 2020-06-01 NOTE — ED NOTES
Patient brought in to ER 37 from triage with c/o dizziness since last night. States she feels the room is spinning. Also c/o SOB. Denies headache, no CP. Recently discharged from this hospital--completed home abx via PICC for infected knee.

## 2020-06-01 NOTE — ED INITIAL ASSESSMENT (HPI)
Patient states has been dizzy since last night and unable to walk and keep her head up. States she fell with her walker this morning and hit her face on the floor, denies LOC. Denies weakness. States she feels the room is spinning.

## 2020-06-02 ENCOUNTER — APPOINTMENT (OUTPATIENT)
Dept: MRI IMAGING | Facility: HOSPITAL | Age: 60
DRG: 066 | End: 2020-06-02
Attending: Other
Payer: MEDICARE

## 2020-06-02 PROCEDURE — 70544 MR ANGIOGRAPHY HEAD W/O DYE: CPT | Performed by: OTHER

## 2020-06-02 PROCEDURE — 70553 MRI BRAIN STEM W/O & W/DYE: CPT | Performed by: OTHER

## 2020-06-02 PROCEDURE — 70549 MR ANGIOGRAPH NECK W/O&W/DYE: CPT | Performed by: OTHER

## 2020-06-02 PROCEDURE — 99223 1ST HOSP IP/OBS HIGH 75: CPT | Performed by: OTHER

## 2020-06-02 RX ORDER — CLOPIDOGREL BISULFATE 75 MG/1
75 TABLET ORAL DAILY
Status: DISCONTINUED | OUTPATIENT
Start: 2020-06-02 | End: 2020-06-04

## 2020-06-02 RX ORDER — ACETAMINOPHEN 650 MG/1
650 SUPPOSITORY RECTAL EVERY 4 HOURS PRN
Status: DISCONTINUED | OUTPATIENT
Start: 2020-06-02 | End: 2020-06-04

## 2020-06-02 RX ORDER — SENNOSIDES 8.6 MG
17.2 TABLET ORAL NIGHTLY
Status: DISCONTINUED | OUTPATIENT
Start: 2020-06-02 | End: 2020-06-04

## 2020-06-02 RX ORDER — ASPIRIN 81 MG/1
81 TABLET ORAL 2 TIMES DAILY
Status: DISCONTINUED | OUTPATIENT
Start: 2020-06-03 | End: 2020-06-02

## 2020-06-02 RX ORDER — LABETALOL HYDROCHLORIDE 5 MG/ML
10 INJECTION, SOLUTION INTRAVENOUS EVERY 10 MIN PRN
Status: DISCONTINUED | OUTPATIENT
Start: 2020-06-02 | End: 2020-06-04

## 2020-06-02 RX ORDER — ASPIRIN 81 MG/1
81 TABLET, CHEWABLE ORAL DAILY
Status: DISCONTINUED | OUTPATIENT
Start: 2020-06-02 | End: 2020-06-04

## 2020-06-02 RX ORDER — SODIUM CHLORIDE 9 MG/ML
INJECTION, SOLUTION INTRAVENOUS CONTINUOUS
Status: ACTIVE | OUTPATIENT
Start: 2020-06-02 | End: 2020-06-04

## 2020-06-02 RX ORDER — ACETAMINOPHEN 325 MG/1
TABLET ORAL
Status: COMPLETED
Start: 2020-06-02 | End: 2020-06-02

## 2020-06-02 RX ORDER — HEPARIN SODIUM 5000 [USP'U]/ML
5000 INJECTION, SOLUTION INTRAVENOUS; SUBCUTANEOUS 3 TIMES DAILY
Status: DISCONTINUED | OUTPATIENT
Start: 2020-06-02 | End: 2020-06-02 | Stop reason: DRUGHIGH

## 2020-06-02 RX ORDER — ACETAMINOPHEN 325 MG/1
650 TABLET ORAL EVERY 4 HOURS PRN
Status: DISCONTINUED | OUTPATIENT
Start: 2020-06-02 | End: 2020-06-04

## 2020-06-02 RX ORDER — ASPIRIN 81 MG/1
81 TABLET ORAL 2 TIMES DAILY
Status: DISCONTINUED | OUTPATIENT
Start: 2020-06-02 | End: 2020-06-02

## 2020-06-02 RX ORDER — HEPARIN SODIUM 5000 [USP'U]/ML
5000 INJECTION, SOLUTION INTRAVENOUS; SUBCUTANEOUS EVERY 12 HOURS SCHEDULED
Status: DISCONTINUED | OUTPATIENT
Start: 2020-06-02 | End: 2020-06-04

## 2020-06-02 NOTE — CDS QUERY
Brain Imaging Results - Clinical Significance of Edema  Ayesha Faye  Dear Doctor:  Clinical information (provided below) suggests a finding of cerebellar edema on the brain imaging report.  For accurate ICD-10-CM code assignmen

## 2020-06-02 NOTE — OCCUPATIONAL THERAPY NOTE
OCCUPATIONAL THERAPY EVALUATION - INPATIENT     Room Number: 202/202-A  Evaluation Date: 6/2/2020  Type of Evaluation: Initial  Presenting Problem: (acute RT cerebellar infarct)    Physician Order: IP Consult to Occupational Therapy  Reason for Therapy: AD patients with this level of impairment may benefit from [facility] however pt requires physical assist for functional mobility and standing portions of tasks, therefore pt is not safe to return to her home where she lives alone. Recommend GABBY.      DISCHARGE RECOM SURGERY Left 09/26/2019    @ [facility]   • KNEE TOTAL REPLACEMENT Left 9/26/2019    Performed by Vinny Mg MD at [facility] OR   • [address] Left 4/16/2020    Performed by Vinny Mg MD at [facility] OR   • [address] Left 3/26/ Putting on and taking off regular upper body clothing?: None  -   Taking care of personal grooming such as brushing teeth?: A Little  -   Eating meals?: None    AM-PAC Score:  Score: 20  Approx Degree of Impairment: 38.32%  Standardized Score (AM-PAC Scale

## 2020-06-02 NOTE — PAYOR COMM NOTE
--------------  ADMISSION REVIEW     Payor: [id number] #:  [id number]  Authorization Number: [id number]    Admit date: 6/1/20  Admit time: 2240       Admitting Physician: Donna Corona MD  Attending Physician:  Jelani Martinez • OTHER DISEASES     bipolar disorder   • Prolonged Q-T interval on ECG 1/12/2016   • Rheumatoid arthritis ([facility] [address]    • S/P hip replacement, right 2/13/2019   • Schizophrenic disorder ([facility]    • Unspecified essential hypertension    • Visual impairment     G BP (!) 149/92   Pulse 76   Resp 18   Temp 98.5 °F (36.9 °C)   Temp src Oral   SpO2 100 %   O2 Device None (Room air)       Current:BP (!) 151/99   Pulse 68   Temp 98.5 °F (36.9 °C) (Oral)   Resp 17   Ht 149.9 cm (4' 11\")   Wt 52.2 kg   LMP  (LMP Unknown) GCS: GCS eye subscore is 4. GCS verbal subscore is 5. GCS motor subscore is 6. Cranial Nerves: Cranial nerves are intact. No cranial nerve deficit or dysarthria. Sensory: Sensation is intact. No sensory deficit. Motor: No tremor.    Psychi This report includes an Addendum and supersedes previous reports for this exam.    PROCEDURE: CT BRAIN OR HEAD (CPT=70450)  COMPARISON: None.   INDICATIONS: Dizziness interval to walk and old upper head  TECHNIQUE: CT images were obtained without contrast m this interpretation     Dictated by (CST): Orestes Leggett MD on 6/01/2020 at 6:33 PM     Finalized by (CST): Orestes Leggett MD on 6/01/2020 at 6:34 PM             Result Date: 6/1/2020  CONCLUSION:  1.  MODERATELY LARGE ACUTE RIGHT CEREBELLAR INFA H&P signed by Patricio Braun MD at 6/2/2020 10:00 AM     Author:  Patricio Braun MD Service:  Hospitalist Author Type:  Physician    Filed:  6/2/2020 10:00 AM Date of Service:  6/2/2020  8:56 AM Status:  Signed    :  Patricio Braun MD (Ph Further recommendations pending patient's clinical course.   [facility] hospitalist to continue to follow patient while in house    Patient and/or patient's family given opportunity to ask questions and note understanding and agreeing with therapeutic plan as outl Procedure Laterality Date   • ELBOW TOTAL REPLACEMENT Right 3/2/2020    Performed by Vincent Roche MD at [facility] OR   • ELBOW TOTAL REPLACEMENT Right 4/17/2019    Performed by Vincent Roche MD at [facility] OR   • EXTREMITY LOWER HARDWARE REMOVAL Left 4/16/2020 BP (!) 141/98 (BP Location: Right arm)   Pulse 73   Temp 98.5 °F (36.9 °C) (Temporal)   Resp 19   Ht 4' 11\" (1.499 m)   Wt 115 lb (52.2 kg)   LMP  (LMP Unknown)   SpO2 98%   BMI 23.23 kg/m²      GEN: female in NAD  HEENT: EOMI, PERRLA, no nystagmus  Neck: Ashley Alvarez is a a(n) 63year old female being seen at your request regarding recent infected L knee TKR s/p resection arthroplasty. She was last hospitalized in April and plan was for to complete her IV antibiotics on 5/28/2020.   Patient was supposed t   @ [address] [place] [address]   • KNEE TOTAL REPLACEMENT Left 9/26/2019     Performed by Cl Le MD at [address] [place] [address] MAIN OR   • KNEE TOTAL REVISION Left 4/16/2020     Performed by Cl Le MD at [address] [place] [address] OR   • [address] Left 3/26/2020     Performed by DEVON Respiratory: Negative for cough, sputum, hemoptysis, chest pain, wheezing, dyspnea on exertion, or stridor. Cardiovascular: Negative for chest pain, palpitations, irregular heart beats.                Gastrointestinal:  No abdomin General: Awake, alert, non-tox and in NAD. Head: Normocephalic, without obvious abnormality, atraumatic. Eyes: Conjunctivae/corneas clear. No scleral icterus. No conjunctival                    hemorrhage. - IV cubicin ongoing - EOT was to be 5/28 and patient was supposed to f/u with us yesterday for PICC removal     3.  Disposition - inpatient.  Neuro w/u ongoing and supportive care for acute CVA. Patient is at EOT for her L TKR infection.   No further anti She was brought to the hospital with his dizziness and was found to have a large right cerebellar stroke. At that point patient was moved to the ICU and spent the night there.   Still continued to have some dizziness and a headache.     Past Medical Histor • KNEE TOTAL REVISION Left 11/21/2019     Performed by Malissa Azar MD at [facility] OR   • OTHER Right 04/17/2019     total elbow replacement   • OTHER SURGICAL HISTORY   5/12/13     REMOVAL OF HARDWARE L HIP WITH INSERTION OF CEMENT SPACER         F amLODIPine Besylate 5 MG Oral Tab, Take 1 tablet (5 mg total) by mouth once daily.  Resume when systolic blood pressure is greater than 124  FOLIC ACID 1 MG Oral Tab, TAKE 1 TABLET(1 MG) BY MOUTH DAILY  Ferrous Sulfate (IRON) 325 (65 Fe) MG Oral Tab, Take 3 Fund of knowledge appropriate for education and age     Language intact including: comprehension, naming, repetition, vocabulary     Cranial Nerves:  II.- Visual fields full to confrontation        Fundoscopically-unable to visualize.   III, IV, VI- EOM int Ct Brain Or Head (76541)     Addendum Date: 6/1/2020 This report includes an Addendum and supersedes previous reports for this exam.    PROCEDURE:  CT BRAIN OR HEAD (CPT=70450)  COMPARISON:       None.   INDICATIONS:    Dizziness interval to walk and old upper head  TECHNIQUE:           CT images were obtaine 6/01/2020 at 6:31 PM     ADDENDUM:  Correction :  Report called to Dr. Mitul Rios at the time of this interpretation     Dictated by (CST): Juju Kinney MD on 6/01/2020 at 6:33 PM     Finalized by (CST): Juju Kinney MD on 6/01/2020 at 6:34 PM 6/1/2020 1938 Given 81 mg Oral Skye Dave, RN      aspirin chewable tab 81 mg     Date Action Dose Route User    6/2/2020 1105 Given 81 mg Oral Marlen Bejarano APRN      Clopidogrel Bisulfate (PLAVIX) tab 75 mg     Date Action Dose Route Use

## 2020-06-02 NOTE — CONSULTS
[facility] HOSP - [facility]    Report of Consultation    Sharla Londono Patient Status:  Inpatient    1960 MRN [id number]   Location [facility] 2W/SW Attending Daryl Fabian MD   Hosp Day # 1 PCP Blayne Jean MD     Date of Admission:   Surgical History  Past Surgical History:   Procedure Laterality Date   • ELBOW TOTAL REPLACEMENT Right 3/2/2020    Performed by Moe Burton MD at [facility] OR   • ELBOW TOTAL REPLACEMENT Right 4/17/2019    Performed by Moe Burton MD at [address] [place] [address] UNIT/ML injection 5,000 Units, 5,000 Units, Subcutaneous, 2 times per day      sodium chloride 1 g Oral Tab, Take 1 tablet (1 g total) by mouth 2 (two) times daily with meals. aspirin 81 MG Oral Tab, Take 81 mg by mouth daily.   divalproex Sodium ER (Shayan Wayne negative    Physical Exam:      06/02/20  0200 06/02/20  0400 06/02/20  0600 06/02/20  0800   BP: 149/84 (!) 165/91 (!) 145/107 (!) 141/98   Pulse: 63 65 70 73   Resp: 12 15 13 19   Temp:  98.5 °F (36.9 °C)  98.5 °F (36.9 °C)   TempSrc:  Temporal  Temporal Laboratory Data:  Lab Results   Component Value Date    WBC 6.9 06/02/2020    HGB 13.3 06/02/2020    HCT 40.9 06/02/2020    .0 06/02/2020    CREATSERUM 0.43 (L) 06/02/2020    BUN 15 06/02/2020     06/02/2020    K 3.6 06/02/2020     0 views are unremarkable. OTHER: Cavernous carotid and vertebral artery calcifications noted. (CORRECTED:  See below. Report called to Dr. Felicitas Leone at the time of this interpretation)  CONCLUSION:  1.  MODERATELY LARGE ACUTE RIGHT CEREBELLAR INFARCT involving meantime she will be on aspirin and Plavix. MRI and MRA will be done to assess posterior circulation as well. Lipid panel. Echocardiogram also will be done.   In the meantime patient will restart of speech therapy and physical therapy    Thank you for al

## 2020-06-02 NOTE — PLAN OF CARE
Problem: Patient/Family Goals  Goal: Patient/Family Long Term Goal  Description  Patient's Long Term Goal     Interventions:  - See additional Care Plan goals for specific interventions  6/2/2020 1449 by BERNARD Laird  Outcome: Progressing toileting schedule  6/2/2020 1449 by BERNARD Rojas  Outcome: Progressing  6/2/2020 1449 by BERNARD Rojas  Outcome: Progressing     Problem: METABOLIC/FLUID AND ELECTROLYTES - ADULT  Goal: Electrolytes maintained within normal limi APRN  Outcome: Progressing  6/2/2020 1449 by BERNARD Greenberg  Outcome: Progressing  Goal: Achieves maximal functionality and self care  Description  INTERVENTIONS  - Monitor swallowing and airway patency with patient fatigue and changes in neuro

## 2020-06-02 NOTE — SLP NOTE
ADULT SWALLOWING EVALUATION    ASSESSMENT    ASSESSMENT/OVERALL IMPRESSION:  PPE REQUIRED. THIS THERAPIST WORE GLOVES, DROPLET MASK, AND GOWN FOR DURATION OF EVALUATION. HANDS WASHED UPON ENTRANCE/EXIT. SLP BSSE orders received and acknowledged.  ALLAN gonzalez Plan/Recommendations: Aspiration precautions;Cognitive communication therapy  Discharge Recommendations/Plan: Undetermined    HISTORY   MEDICAL HISTORY  Reason for Referral: Stroke protocol    Problem List  Principal Problem:    Acute CVA (cerebrovascular EXAMINATION  Dentition: Natural;Functional  Symmetry: Reduced left facial;Lingual deviation to right  Strength:  Within Functional Limits  Tone: Within Functional Limits  Range of Motion: Reduced left lingual  Rate of Motion: Within Functional Limits    Voi

## 2020-06-02 NOTE — H&P
[facility] Hospitalist H&P     CC: Patient presents with:  Dizziness     PCP: Belinda Bowers MD    Date of Admission: 6/1/2020  3:24 PM    ASSESSMENT / PLAN:     Ms. Julius Mehta is a 62 yo F with PMH of bipolar, schizophrenia, RA, recent septic arthritis who presented w Service number: [phone]    Time spent with patient: 70 mins with > 50% spent counseling patient face to face    HPI     History of Present Illness:      Ms. Kaur Favorite is a 64 yo F with PMH of bipolar, schizophrenia, RA who presented with dizziness and head REPLACEMENT SURGERY  5/12/11    R RICHARD    • HIP REPLACEMENT SURGERY  2/21/13    L RICHARD   • INCISION AND DRAINAGE Left     knee with liner exchange    • KNEE REPLACEMENT SURGERY Left 09/26/2019    @ [facility]   • KNEE TOTAL REPLACEMENT Left 9/26/2019    Performed b non-distended, +BS  MSK: L knee with valgus deformity, knee warm to touch  Neuro: Grossly normal, CN intact, sensory intact  Psych: Affect- normal  SKIN: warm, dry  EXT: no edema    Diagnostic Data:    CBC/Chem    Recent Labs   Lab 06/01/20  1511 06/02/20 the inferior 2/3 of the right cerebellar hemisphere and right cerebellar tonsil with vasogenic edema, sulcal effacement and mass effect upon the inferior- lateral margin of the 4th ventricle  BRAINSTEM: No edema, hemorrhage, mass, acute infarction, or sign

## 2020-06-02 NOTE — PHYSICAL THERAPY NOTE
PHYSICAL THERAPY EVALUATION - INPATIENT     Room Number: 202/202-A  Evaluation Date: 6/2/2020  Type of Evaluation: Initial   Physician Order: PT Eval and Treat    Presenting Problem: acute cerebellar CVA R  Reason for Therapy: Mobility Dysfunction and Dis care/supervision;Sub-acute rehabilitation    PLAN  PT Treatment Plan: Bed mobility; Body mechanics; Patient education;Gait training;Balance training;Transfer training  Rehab Potential : Good  Frequency (Obs): 5x/week       PHYSICAL THERAPY MEDICAL/SOCIAL HIS Performed by Taty Hidalgo MD at [facility] OR   • KNEE TOTAL REVISION Left 11/21/2019    Performed by Taty Hidalgo MD at [address] [place] [address]   • OTHER Right 04/17/2019    total elbow replacement   • OTHER SURGICAL HISTORY  5/12/13    REMOVAL OF HARDWAR room?: A Little   -   Climbing 3-5 steps with a railing?: A Lot     AM-PAC Score:  Raw Score: 17   Approx Degree of Impairment: 50.57%   Standardized Score (AM-PAC Scale): 42.13   CMS Modifier (G-Code): CK    FUNCTIONAL ABILITY STATUS  Gait Assessment   Ga

## 2020-06-02 NOTE — CM/SW NOTE
Received MDO for home health services. PT/OT evaluations pending. Spoke with patient's mom Arch Bowels for assessment. Patient has a recent h/o outpatient IV antibiotics.  Patient was staying with her mom for a period of time for additional assistance ([id number]

## 2020-06-02 NOTE — SLP NOTE
SPEECH/LANGUAGE/COGNITIVE EVALUATION - INPATIENT    Admission Date: 6/1/2020  Evaluation Date: 06/02/20    Reason for Referral: Stroke protocol    ASSESSMENT & PLAN   ASSESSMENT & IMPRESSION  PPE REQUIRED.  THIS THERAPIST WORE GLOVES, DROPLET MASK, AND GOWN Recall: 0/5   Orientation: 6/6       Assessment(s) Administered: MoCA Score;WAB Bedside Score;Perceptual Dysarthria Evaluation Rating  MoCA Score: 19  WAB Bedside Score: 98  Perceptual Dysarthria Evaluation Rating: informal          Deficits Identified:  At

## 2020-06-02 NOTE — CONSULTS
[facility] AND [facility] Infectious Disease  Report of Consultation    Jodie Stewart Patient Status:  Inpatient    1960 MRN [id number]   Location [facility] 2W/SW Attending Andrew Gar MD   Hosp Day # 1 PCP Dianne Edwards MD     Date of by Claudetta Sexton, MD at [address] [place] [address] MAIN OR   • EXTREMITY LOWER HARDWARE REMOVAL Left 4/16/2020    Performed by Ingrid Mcadams MD at [address] [place] [address] MAIN OR   • HIP REPLACEMENT SURGERY  5/12/11    R RICHARD    • HIP REPLACEMENT SURGERY  2/21/13    L RICHARD   • INCISION AND Girish Pump UNIT/ML injection 5,000 Units, 5,000 Units, Subcutaneous, 2 times per day    Review of Systems:    Constitutional:  No fevers, chills, diaphoresis, weight changes. HEENT:  No visual changes, oral ulcers, sore throat, difficulty swallowing.    Respiratory: Intake/Output:  No intake/output data recorded. Physical Exam:   General: Awake, alert, non-tox and in NAD. Head: Normocephalic, without obvious abnormality, atraumatic. Eyes: Conjunctivae/corneas clear. No scleral icterus.   No conjunctival Patient is at EOT for her L TKR infection. No further antibiotics indicated at this time. Will maintain PICC while hospitalized. Will follow with further recommendations. Jolanta Radford [place]  [facility] Infectious Disease  [phone]    6/2/2020

## 2020-06-02 NOTE — ED PROVIDER NOTES
Patient Seen in: [facility] AND [facility] Emergency Department      History   Patient presents with:  Dizziness    Stated Complaint: dizzy     HPI    63year old female with a history of bipolar disorder, depression, GERD, hypertension, high cholesterol, advanc • HIP REPLACEMENT SURGERY  5/12/11    R RICHARD    • HIP REPLACEMENT SURGERY  2/21/13    L RICHARD   • INCISION AND DRAINAGE Left     knee with liner exchange    • KNEE REPLACEMENT SURGERY Left 09/26/2019    @ [facility]   • KNEE TOTAL REPLACEMENT Left 9/26/2019    Perfo Pupils: Pupils are equal, round, and reactive to light. Neck:      Musculoskeletal: Full passive range of motion without pain, normal range of motion and neck supple. Normal range of motion. No neck rigidity.    Cardiovascular:      Rate and Rhythm: No CBC W/ DIFFERENTIAL - Abnormal; Notable for the following components:    RDW-SD 52.8 (*)     RDW 16.9 (*)     Neutrophil Absolute Prelim 8.07 (*)     Neutrophil Absolute 8.07 (*)     All other components within normal limits   RAPID SARS-COV-2 BY PCR - Nor This report includes an Addendum and supersedes previous reports for this exam.    PROCEDURE: CT BRAIN OR HEAD (CPT=70450)  COMPARISON: None.   INDICATIONS: Dizziness interval to walk and old upper head  TECHNIQUE: CT images were obtained without contrast m this interpretation     Dictated by (CST): Macy Block MD on 6/01/2020 at 6:33 PM     Finalized by (CST): Macy Block MD on 6/01/2020 at 6:34 PM             Result Date: 6/1/2020  CONCLUSION:  1.  MODERATELY LARGE ACUTE RIGHT CEREBELLAR INFA

## 2020-06-03 ENCOUNTER — APPOINTMENT (OUTPATIENT)
Dept: CV DIAGNOSTICS | Facility: HOSPITAL | Age: 60
DRG: 066 | End: 2020-06-03
Attending: Other
Payer: MEDICARE

## 2020-06-03 PROCEDURE — 99232 SBSQ HOSP IP/OBS MODERATE 35: CPT | Performed by: OTHER

## 2020-06-03 PROCEDURE — 93306 TTE W/DOPPLER COMPLETE: CPT | Performed by: OTHER

## 2020-06-03 RX ORDER — FLUPHENAZINE HYDROCHLORIDE 2.5 MG/1
10 TABLET ORAL NIGHTLY
Status: DISCONTINUED | OUTPATIENT
Start: 2020-06-03 | End: 2020-06-04

## 2020-06-03 RX ORDER — AMLODIPINE BESYLATE 5 MG/1
5 TABLET ORAL DAILY
Status: DISCONTINUED | OUTPATIENT
Start: 2020-06-03 | End: 2020-06-04

## 2020-06-03 RX ORDER — FLUPHENAZINE HYDROCHLORIDE 2.5 MG/1
5 TABLET ORAL DAILY
Status: DISCONTINUED | OUTPATIENT
Start: 2020-06-03 | End: 2020-06-04

## 2020-06-03 RX ORDER — ATORVASTATIN CALCIUM 10 MG/1
10 TABLET, FILM COATED ORAL NIGHTLY
Status: DISCONTINUED | OUTPATIENT
Start: 2020-06-03 | End: 2020-06-04

## 2020-06-03 RX ORDER — OLANZAPINE 10 MG/1
30 TABLET ORAL NIGHTLY
Status: DISCONTINUED | OUTPATIENT
Start: 2020-06-03 | End: 2020-06-04

## 2020-06-03 RX ORDER — PANTOPRAZOLE SODIUM 40 MG/1
40 TABLET, DELAYED RELEASE ORAL
Status: DISCONTINUED | OUTPATIENT
Start: 2020-06-03 | End: 2020-06-04

## 2020-06-03 RX ORDER — DIVALPROEX SODIUM 500 MG/1
500 TABLET, EXTENDED RELEASE ORAL 2 TIMES DAILY
Status: DISCONTINUED | OUTPATIENT
Start: 2020-06-03 | End: 2020-06-04

## 2020-06-03 RX ORDER — MULTIPLE VITAMINS W/ MINERALS TAB 9MG-400MCG
1 TAB ORAL DAILY
Status: DISCONTINUED | OUTPATIENT
Start: 2020-06-03 | End: 2020-06-04

## 2020-06-03 RX ORDER — FOLIC ACID 1 MG/1
1 TABLET ORAL DAILY
Status: DISCONTINUED | OUTPATIENT
Start: 2020-06-03 | End: 2020-06-04

## 2020-06-03 NOTE — OCCUPATIONAL THERAPY NOTE
OCCUPATIONAL THERAPY TREATMENT NOTE - INPATIENT        Room Number: 336/336-A           Presenting Problem: (acute RT cerebellar infarct)    Problem List  Principal Problem:    Acute CVA (cerebrovascular accident) ([facility] [address]      OCCUPATIONAL THERAPY ASSESSMENT ASSESSMENT  Ratin           ACTIVITY TOLERANCE                         O2 SATURATIONS                ACTIVITIES OF DAILY LIVING ASSESSMENT  AM-PAC ‘6-Clicks’ Inpatient Daily Activity Short Form  How much help from another person does the patient jackie up pants            Goals  on: 20  Frequency: 5x/week

## 2020-06-03 NOTE — PLAN OF CARE
Problem: SAFETY ADULT - FALL  Goal: Free from fall injury  Description  INTERVENTIONS:  - Assess pt frequently for physical needs  - Identify cognitive and physical deficits and behaviors that affect risk of falls.   - [place] fall precautions as indica

## 2020-06-03 NOTE — PROGRESS NOTES
[facility] AND [facility] Infectious Disease  Progress Note    Giovani Garcia Patient Status:  Inpatient    1960 MRN [id number]   Location [facility] 3W/SW Attending Dary Oliveros MD   Hosp Day # 2 PCP Zeke Lee MD     Subjective:  Cletus Merlin CVA in evolution  - Neurology following     2.  s/p resection arthroplasty for infected L TKR hardware during April admission  - Spacer placed with 2nd stage anticipated in the future  - IV cubicin complete - EOT was to be 5/28 and patient was supposed to

## 2020-06-03 NOTE — SLP NOTE
SPEECH DAILY NOTE - INPATIENT    ASSESSMENT & PLAN   ASSESSMENT  PPE REQUIRED. THIS THERAPIST WORE GLOVES, DROPLET MASK, AND GOWN FOR DURATION OF EVALUATION. HANDS WASHED UPON ENTRANCE/EXIT.     SLP f/u for ongoing meal assessment per recommendations of reg GOALS  Goal #1 SLP will complete education on cognitive-communication strategies (WRAP, safety, etc.) for safety during daily tasks. SLP introduced pt to WRAP strategies to increase memory and safety abilities. Handouts provided to pt.  Pt acknowledged un

## 2020-06-03 NOTE — PROGRESS NOTES
[facility] Hospitalist Progress Note     CC: Hospital Follow up    PCP: Carl Mccoy MD       Assessment/Plan:     Principal Problem:    Acute CVA (cerebrovascular accident) [facility])      Ms. Star Corcoran is a 64 yo F with PMH of bipolar, schizophrenia, RA, recent septic ar house     Patient and/or patient's family given opportunity to ask questions and note understanding and agreeing with therapeutic plan as outlined     Guillermina Slaughter MD  [facility] Hospitalist  Answering Service number: [phone]     Subjective:     Still h Lab 06/02/20  0857   ALT 18   AST 18   ALB 3.4         Imaging:  Ct Brain Or Head ([id number])    Addendum Date: 6/1/2020    This report includes an Addendum and supersedes previous reports for this exam.    PROCEDURE: CT BRAIN OR HEAD (CPT=70450)  COMPARISON (CST): Ngozi Ohara MD on 6/01/2020 at 6:31 PM     ADDENDUM:  Correction :  Report called to Dr. Berto Lovett at the time of this interpretation     Dictated by (CST): Ngozi Ohara MD on 6/01/2020 at 6:33 PM     Finalized by (CST): Ngozi Ohaar demonstrating diffusion restriction throughout the majority the right cerebellum compatible with acute/recent infarct.   There is also a small focus of abnormal diffusion restriction in the posterior left temporal lobe, also compatible with acute/recent inf

## 2020-06-03 NOTE — PLAN OF CARE
Problem: Patient/Family Goals  Goal: Patient/Family Long Term Goal  Description  Patient's Long Term Goal: To feel better    Interventions:  - Follow plan of care  - Take medications as prescribed  - See additional Care Plan goals for specific interventi signs and symptoms of electrolyte imbalances  - Administer electrolyte replacement as ordered  - Monitor response to electrolyte replacements, including rhythm and repeat lab results as appropriate  - Fluid restriction as ordered  - Instruct patient on flu Promote increasing activity/tolerance for mobility and gait  - Educate and engage patient/family in tolerated activity level and precautions    Outcome: Progressing     Patient had echo today. Reports headache; medication given and MD aware.  Reports improv

## 2020-06-03 NOTE — CM/SW NOTE
12: 45PM  Per RN rounds and chart review - PT/OT recommend SNF at d/c.    SW contacted pt's mother, Rock Vaughn for f/up conversation about d/c planning. Per Rock Vaughn, pt has hx w/ [employer] and [facility].  Per Rock Vaughn, she and her dtr both really like [facility]

## 2020-06-03 NOTE — PROGRESS NOTES
[facility]  Neurology Progress Note    Peg Blank Patient Status:  Inpatient    1960 MRN [id number]   Location [facility] 3W/SW Attending Kade Rabago MD   Hosp Day # 2 PCP Christine Vasquez MD     Subjective:  Peg Blank is a( Weight:       Height:           General: No apparent distress, well nourished, well groomed.   Head- Normocephalic, atraumatic  Eyes- No redness or swelling  Neck- No masses or adenopathy  CV: pulses were palpable and normal, no cyanosis or edema       Ne ALKPHO 77 06/02/2020    BILT 0.2 06/02/2020    TP 7.6 06/02/2020    AST 18 06/02/2020    ALT 18 06/02/2020    PTT 30.9 06/02/2020    INR 1.15 06/02/2020    T4F 1.3 05/19/2017    TSH 1.070 03/29/2020    ESRML 1 03/27/2020    CRP 0.61 (H) 05/25/2020    MG 2. hemisphere/cerebellar tonsil with vasogenic edema, sulcal effacement and minimal mass effect upon the inferolateral margin of the right 4th ventricle. 2. No acute intracranial hemorrhage. 3. Mild chronic white matter microvascular ischemia.     Dictated by artery and faint opacification of the right vertebral artery within the neck, also secondary to retrograde flow.    Dictated by (CST): Jesus Ty MD on 6/02/2020 at 6:21 PM     Finalized by (CST): Jesus Ty MD on 6/02/2020 at 6:26 PM          Mri Brain replacement, left (HCC)     Rheumatoid arthritis of multiple sites with negative rheumatoid factor (HCC)     Infection of prosthetic left knee joint ([facility] [address]     Infected prosthetic knee joint (HCC)     Schizoaffective disorder, chronic condition ([facility] [address]     Ac

## 2020-06-04 ENCOUNTER — TELEPHONE (OUTPATIENT)
Dept: NEUROLOGY | Facility: CLINIC | Age: 60
End: 2020-06-04

## 2020-06-04 VITALS
BODY MASS INDEX: 25.44 KG/M2 | TEMPERATURE: 98 F | SYSTOLIC BLOOD PRESSURE: 125 MMHG | HEIGHT: 59 IN | OXYGEN SATURATION: 97 % | RESPIRATION RATE: 17 BRPM | HEART RATE: 77 BPM | DIASTOLIC BLOOD PRESSURE: 91 MMHG | WEIGHT: 126.19 LBS

## 2020-06-04 DIAGNOSIS — I63.9 CEREBROVASCULAR ACCIDENT (CVA), UNSPECIFIED MECHANISM (HCC): Primary | ICD-10-CM

## 2020-06-04 PROCEDURE — 99231 SBSQ HOSP IP/OBS SF/LOW 25: CPT | Performed by: OTHER

## 2020-06-04 RX ORDER — ATORVASTATIN CALCIUM 10 MG/1
10 TABLET, FILM COATED ORAL NIGHTLY
Qty: 30 TABLET | Refills: 0 | Status: SHIPPED | OUTPATIENT
Start: 2020-06-04 | End: 2020-07-17

## 2020-06-04 RX ORDER — CLOPIDOGREL BISULFATE 75 MG/1
75 TABLET ORAL DAILY
Qty: 30 TABLET | Refills: 0 | Status: SHIPPED | OUTPATIENT
Start: 2020-06-05 | End: 2020-07-17

## 2020-06-04 RX ORDER — ACETAMINOPHEN 325 MG/1
650 TABLET ORAL EVERY 4 HOURS PRN
Qty: 30 TABLET | Refills: 0 | Status: SHIPPED | OUTPATIENT
Start: 2020-06-04

## 2020-06-04 NOTE — PHYSICAL THERAPY NOTE
PHYSICAL THERAPY TREATMENT NOTE - INPATIENT     Room Number: 336/336-A       Presenting Problem: acute cerebellar CVA R    Problem List  Principal Problem:    Acute CVA (cerebrovascular accident) ([facility] [address]      PHYSICAL THERAPY ASSESSMENT     Pt seen daily.  Pt person does the patient currently need. ..   -   Moving to and from a bed to a chair (including a wheelchair)?: A Little   -   Need to walk in hospital room?: A Little   -   Climbing 3-5 steps with a railing?: A Lot     AM-PAC Score:  Raw Score: 17   Approx

## 2020-06-04 NOTE — PROGRESS NOTES
[facility]  Neurology Progress Note    Ashley Alvarez Patient Status:  Inpatient    1960 MRN [id number]   Location [facility] 3W/SW Attending Ashlie Jalloh MD   Hosp Day # 3 PCP Shameka Schroeder MD     Subjective:  Ashley Alvarez is a( 3.2 oz (57.2 kg)   Height:           General: No apparent distress, well nourished, well groomed.   Head- Normocephalic, atraumatic  Eyes- No redness or swelling  Neck- No masses or adenopathy  CV: pulses were palpable and normal, no cyanosis or edema ALKPHO 77 06/02/2020    BILT 0.2 06/02/2020    TP 7.6 06/02/2020    AST 18 06/02/2020    ALT 18 06/02/2020    PTT 30.9 06/02/2020    INR 1.15 06/02/2020    T4F 1.3 05/19/2017    TSH 1.070 03/29/2020    ESRML 1 03/27/2020    CRP 0.61 (H) 05/25/2020    MG 2. at 6:08 PM     Finalized by (CST): Jesus Ty MD on 6/02/2020 at 6:12 PM              MRI of the brain was independently reviewed, acute large right cerebellar stroke noted.   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

## 2020-06-04 NOTE — DISCHARGE SUMMARY
[facility] Internal Medicine Discharge Summary   Patient ID:  Elvira Carter  [id number]  63year old  9/28/1960    Admit date: 6/1/2020    Discharge date and time: 6/4/20    Attending Physician: Iris Alvarez MD     Primary Care Physician: Sigrid Haynes MD daily. Take in between meals, not with meals     Multivital Tabs     OLANZapine 20 MG Tabs  Commonly known as:  ZYPREXA  Take 1.5 tablets (30 mg total) by mouth nightly.      Omeprazole 40 MG Cpdr  TAKE 1 CAPSULE BY MOUTH EVERY DAY     sodium chloride 1 g T Discharge Diagnoses:   Acute cerebellar CVA  - CT with moderately large acute R cerebellar infarct involving inferior 2/3 of R cerebellar hemisphere   - neurology consulted  - continue ASA and plavix  - PT/OT/SLP- recommending SNF at this time  - MRI/M exposure control for dose reduction was used. Dose information is transmitted to the [facility] of Radiology) NRDR ([address]) which includes the Dose Index Registry.   FINDINGS:  CSF SPACES: No hydrocephalus or intracrani interval to walk and old upper head  TECHNIQUE: CT images were obtained without contrast material.  Automated exposure control for dose reduction was used.   Dose information is transmitted to the ACR ([address] [place] [address] of Radiology) Janice Bernardo [facility] Radiology PM.  INDICATIONS: Large acute right cerebellar infarct. Smaller acute infarct left posterior temporal lobe.   TECHNIQUE: MR angiography was without contrast material.  Multiplanar reconstructed 2D and 3D images of the cerebral arteries were created and int TECHNIQUE: MR angiography was performed without and with intravenous gadolinium, with creation and interpretation of 2D and 3D/multi-planar images of the carotid and vertebral arteries. Evaluation of internal carotid stenosis is based on NASCET Criteria. temporal lobe, along the lateral margin of the left temporal horn compatible with acute/recent infarct.   Scattered T2/FLAIR signal hyperintense foci are present in the superficial deep cerebral white matter mainly along the margins of the lateral ventricle the knee replacement has been removed and is replaced with a cement spacer. Components are in the expected position. There are no signs of loosening or lucencies surrounding the cement spacer.  There is radiolucency of the proximal tibia seen on the AP vie

## 2020-06-04 NOTE — OCCUPATIONAL THERAPY NOTE
OCCUPATIONAL THERAPY TREATMENT NOTE - INPATIENT        Room Number: 336/336-A           Presenting Problem: (acute RT cerebellar infarct)    Problem List  Principal Problem:    Acute CVA (cerebrovascular accident) ([facility] [address]      OCCUPATIONAL THERAPY ASSESSMENT O2 SATURATIONS                ACTIVITIES OF DAILY LIVING ASSESSMENT  AM-PAC ‘6-Clicks’ Inpatient Daily Activity Short Form  How much help from another person does the patient currently need…  -   Putting on and taking off regular lower b

## 2020-06-04 NOTE — PROGRESS NOTES
RN attempted to call report [phone]. Message left, RN to call back soon. [phone]- This RN attempted to call the RN again, no response. Direct number provided. Awaiting call. [phone]- Report given to [facility]. All questions were answered.

## 2020-06-04 NOTE — PLAN OF CARE
Problem: Patient/Family Goals  Goal: Patient/Family Long Term Goal  Description  Patient's Long Term Goal: To feel better    Interventions:  - Follow plan of care  - Take medications as prescribed  - See additional Care Plan goals for specific interventi symptoms of electrolyte imbalances  - Administer electrolyte replacement as ordered  - Monitor response to electrolyte replacements, including rhythm and repeat lab results as appropriate  - Fluid restriction as ordered  - Instruct patient on fluid and nut activity/tolerance for mobility and gait  - Educate and engage patient/family in tolerated activity level and precautions  - Recommend use of chair position in bed 3 times per day  Outcome: Completed

## 2020-06-04 NOTE — PROGRESS NOTES
[facility] AND [facility] Infectious Disease Progress Note    Ross Larios Patient Status:  Inpatient    1960 MRN [id number]   Location [facility] 3W/SW Attending Kerry Cabello MD   Hosp Day # 3 PCP Lucy Alexandra MD     Subjective:  Pt stat temperature source Oral, resp. rate 17, height 4' 11\" (1.499 m), weight 126 lb 3.2 oz (57.2 kg), SpO2 99 %, not currently breastfeeding. Temp (24hrs), Av.3 °F (36.8 °C), Min:97.5 °F (36.4 °C), Max:99 °F (37.2 °C)      HEENT: Exam is unremarkable.   Burnis Bilberry

## 2020-06-04 NOTE — CM/SW NOTE
SW obtained list of accepting SNF via [employer]. SW met w/ pt in her room wearing appropriate PPE. SW discussed SNF and options w/ pt. SW provided pt w/ generated list from [address] [place], [place] [address]denae Pichardo. Pt is requesting [facility] as SNF of choice.     SW contacted pt's mother, Linda Henderson

## 2020-06-04 NOTE — PLAN OF CARE
Problem: Patient/Family Goals  Goal: Patient/Family Long Term Goal  Description  Patient's Long Term Goal: To feel better    Interventions:  - Follow plan of care  - Take medications as prescribed  - See additional Care Plan goals for specific interventi signs and symptoms of electrolyte imbalances  - Administer electrolyte replacement as ordered  - Monitor response to electrolyte replacements, including rhythm and repeat lab results as appropriate  - Fluid restriction as ordered  - Instruct patient on flu

## 2020-06-05 ENCOUNTER — EXTERNAL FACILITY (OUTPATIENT)
Dept: INTERNAL MEDICINE CLINIC | Facility: CLINIC | Age: 60
End: 2020-06-05

## 2020-06-05 DIAGNOSIS — M25.522 BILATERAL ELBOW JOINT PAIN: ICD-10-CM

## 2020-06-05 DIAGNOSIS — T84.50XD INFECTION OF PROSTHETIC JOINT, SUBSEQUENT ENCOUNTER: ICD-10-CM

## 2020-06-05 DIAGNOSIS — I63.9 ACUTE CVA (CEREBROVASCULAR ACCIDENT) (HCC): ICD-10-CM

## 2020-06-05 DIAGNOSIS — M25.521 BILATERAL ELBOW JOINT PAIN: ICD-10-CM

## 2020-06-05 DIAGNOSIS — F25.9 SCHIZOAFFECTIVE DISORDER, CHRONIC CONDITION (HCC): ICD-10-CM

## 2020-06-05 PROCEDURE — 99306 1ST NF CARE HIGH MDM 50: CPT | Performed by: INTERNAL MEDICINE

## 2020-06-05 NOTE — DISCHARGE PLANNING
Patient's mother [facility] #[phone] called inquiring about patient's follow up appointments and questions of care/medications.  I reviewed newly prescribed medication in regards to reducing patient's risk for future stroke and I encouraged patient's moth

## 2020-06-05 NOTE — PROGRESS NOTES
Telemedicine , video audioMs. Major Lucio is a 62 yo F with PMH of bipolar, schizophrenia, RA who presented with dizziness and headache. Symptoms started yesterday, felt very unsteady. Had 2 falls. Feels like the room is moving.  States she was not told she had TOTAL REPLACEMENT Left 9/26/2019   Performed by Cristal Granda MD at [facility] Left 4/16/2020   Performed by Cristal Granda MD at [address] [place] [address] Left 3/26/2020   Performed by Cristal Granda MD at     Bipolar/Schizophrenia/Anxiety/Depression  -continue home depakote 500 mg BID, fluphenazine 5 mg daily/10 mg nightly, olanzapine 30 mg nightly  - follows with Dr. Amna Scott at [facility] department     Cognitive impairment/?early dementia  - follow up with olman

## 2020-06-06 NOTE — PAYOR COMM NOTE
--------------  DISCHARGE REVIEW    Payor: [id number] #:  [id number]  Authorization Number: [id number]    Admit date: 6/1/20  Admit time:  2240  Discharge Date: 6/4/2020  3:42 PM     Admitting Physician: Alena Prince MD  Atte Besylate 5 MG Tabs  Commonly known as:  NORVASC  Take 1 tablet (5 mg total) by mouth once daily.  Resume when systolic blood pressure is greater than 140     aspirin 81 MG Chew     Benztropine Mesylate 1 MG Tabs  Commonly known as:  COGENTIN     CITRACAL OR herself but had been staying with her mother for 3 months since she was driving patient to the infusion clinic every day. Patient had just been able to go to her apartment for the first time on 5/31. Denies fevers or chills.  No increased pain.      [facility]ospita mother Neftaly Night- updated on 6/2/20, left VM on 6/3    Consults: IP CONSULT TO NEUROLOGY  IP CONSULT TO HOSPITALIST  IP CONSULT TO PHYSICAL THERAPY  IP CONSULT TO OCCUPATIONAL THERAPY  IP CONSULT TO SOCIAL WORK  IP CONSULT TO PHYSICAL THERAPY  IP CONSUL effacement and minimal mass effect upon the inferolateral margin of the right 4th ventricle. 2. No acute intracranial hemorrhage. 3. Mild chronic white matter microvascular ischemia.     Dictated by (CST): Selma Martinez MD on 6/01/2020 at 6:25 PM inferior 2/3 of the right cerebellar hemisphere/cerebellar tonsil with vasogenic edema, sulcal effacement and minimal mass effect upon the inferolateral margin of the right 4th ventricle. 2. No acute intracranial hemorrhage.  3. Mild chronic white matter mi 1. Thrombosed Right vertebral artery with trace retrograde flow distally. Nonvisualized right PICA and Right anterior inferior cerebellar artery (AICA), presumably occluded. 2. Dominant left vertebral artery continues as the basilar artery.  3. Minimal ath artery within the neck, also secondary to retrograde flow.    Dictated by (CST): Eusebia Briones MD on 6/02/2020 at 6:21 PM     Finalized by (CST): Eusebia Briones MD on 6/02/2020 at 6:26 PM          Mri Brain (w+wo) ([id number])    Result Date: 6/2/2020  [facility] Abnormal exam demonstrating diffusion restriction throughout the majority the right cerebellum compatible with acute/recent infarct.   There is also a small focus of abnormal diffusion restriction in the posterior left temporal lobe, also compatible with ac and agree with therapeutic plan as outlined      Electronically signed by Radha Jensen MD on 6/4/2020  3:11 PM         REVIEWER COMMENTS

## 2020-06-08 ENCOUNTER — TELEPHONE (OUTPATIENT)
Dept: NEUROLOGY | Facility: CLINIC | Age: 60
End: 2020-06-08

## 2020-06-08 ENCOUNTER — EXTERNAL FACILITY (OUTPATIENT)
Dept: INTERNAL MEDICINE CLINIC | Facility: CLINIC | Age: 60
End: 2020-06-08

## 2020-06-08 DIAGNOSIS — K21.9 GASTROESOPHAGEAL REFLUX DISEASE WITHOUT ESOPHAGITIS: ICD-10-CM

## 2020-06-08 DIAGNOSIS — T84.59XD INFECTION OF PROSTHETIC KNEE JOINT, SUBSEQUENT ENCOUNTER: ICD-10-CM

## 2020-06-08 DIAGNOSIS — I63.9 ACUTE CVA (CEREBROVASCULAR ACCIDENT) (HCC): ICD-10-CM

## 2020-06-08 DIAGNOSIS — F25.9 SCHIZOAFFECTIVE DISORDER, CHRONIC CONDITION (HCC): ICD-10-CM

## 2020-06-08 DIAGNOSIS — Z96.659 INFECTION OF PROSTHETIC KNEE JOINT, SUBSEQUENT ENCOUNTER: ICD-10-CM

## 2020-06-08 PROCEDURE — 99308 SBSQ NF CARE LOW MDM 20: CPT | Performed by: INTERNAL MEDICINE

## 2020-06-08 NOTE — TELEPHONE ENCOUNTER
I tried to call the phone number in the chart. There was no answer. Is there a different phone number?

## 2020-06-08 NOTE — TELEPHONE ENCOUNTER
Spoke to patient's mother and notified her of heart monitor. She states that she has some questions regarding the stroke patient had. Patient is currently in [facility] and unable to do a video visit.

## 2020-06-08 NOTE — TELEPHONE ENCOUNTER
Order place 6/4/20 for 30 day heart monitor. Left message for Jose Noe at [facility] to call [facility] at . Advised may use someone else if they use another office for 30 day monitor.

## 2020-06-08 NOTE — PROGRESS NOTES
telemedicine , video audio visit   follow up ,   [facility]  Past Medical History:  Diagnosis Date  Bipolar affective ([facility] [address]  C. difficile colitis 05/20/2013  per pt's mother pt. never had c.diff  DEPRESSION  manic depressive & bypolar disease,Beth Ruiz At  RASH  Comment:C/o breaking out in white heads    Home Medications:  reviwed .         Soc Hx  Social History  Tobacco Use  Smoking status: Former Smoker  Packs/day: 1.00  Years: 15.00  Pack years: 13  Quit date: 2000  Years since quittin.1  Smokele

## 2020-06-10 ENCOUNTER — EXTERNAL FACILITY (OUTPATIENT)
Dept: INTERNAL MEDICINE CLINIC | Facility: CLINIC | Age: 60
End: 2020-06-10

## 2020-06-10 DIAGNOSIS — M06.9 RHEUMATOID ARTHRITIS INVOLVING BOTH ELBOWS, UNSPECIFIED RHEUMATOID FACTOR PRESENCE: ICD-10-CM

## 2020-06-10 DIAGNOSIS — M06.4 INFLAMMATORY POLYARTHRITIS (HCC): ICD-10-CM

## 2020-06-10 DIAGNOSIS — I63.9 ACUTE CVA (CEREBROVASCULAR ACCIDENT) (HCC): ICD-10-CM

## 2020-06-10 DIAGNOSIS — F25.9 SCHIZOAFFECTIVE DISORDER, CHRONIC CONDITION (HCC): ICD-10-CM

## 2020-06-10 PROCEDURE — 99308 SBSQ NF CARE LOW MDM 20: CPT | Performed by: INTERNAL MEDICINE

## 2020-06-10 NOTE — PROGRESS NOTES
telemedicine , video audio visit  follow up ,    [facility]  Past Medical History:  Diagnosis Date  Bipolar affective ([facility] [address]  C. difficile colitis 05/20/2013  per pt's mother pt. never had c.diff  DEPRESSION  manic depressive & bypolar disease,, Yueus Jessica Strange  RASH  Comment:C/o breaking out in white heads    Home Medications:  reviewed .         Soc Hx  Social History  Tobacco Use  Smoking status: Former Smoker  Packs/day: 1.00  Years: 15.00  Pack years: 13  Quit date: 2000  Years since quittin.1  Smokel

## 2020-06-12 ENCOUNTER — EXTERNAL FACILITY (OUTPATIENT)
Dept: INTERNAL MEDICINE CLINIC | Facility: CLINIC | Age: 60
End: 2020-06-12

## 2020-06-12 DIAGNOSIS — F25.9 SCHIZOAFFECTIVE DISORDER, CHRONIC CONDITION (HCC): ICD-10-CM

## 2020-06-12 DIAGNOSIS — D84.9 IMMUNOSUPPRESSED STATUS (HCC): ICD-10-CM

## 2020-06-12 DIAGNOSIS — I63.9 ACUTE CVA (CEREBROVASCULAR ACCIDENT) (HCC): ICD-10-CM

## 2020-06-12 PROCEDURE — 99308 SBSQ NF CARE LOW MDM 20: CPT | Performed by: INTERNAL MEDICINE

## 2020-06-12 NOTE — PROGRESS NOTES
telemedicine , video audio visit  follow up ,    [facility]  Past Medical History:  Diagnosis Date  Bipolar affective ([facility] [address]  C. difficile colitis 05/20/2013  per pt's mother pt. never had c.diff  DEPRESSION  manic depressive & bypolar disease,Burgess Preethi Ruiz At  RASH  Comment:C/o breaking out in white heads    Home Medications:  reviewed .         Soc Hx  Social History  Tobacco Use  Smoking status: Former Smoker  Packs/day: 1.00  Years: 15.00  Pack years: 13  Quit date: 2000  Years since quittin.1  Smokel

## 2020-06-16 ENCOUNTER — EXTERNAL FACILITY (OUTPATIENT)
Dept: INTERNAL MEDICINE CLINIC | Facility: CLINIC | Age: 60
End: 2020-06-16

## 2020-06-16 DIAGNOSIS — M79.89 LEFT LEG SWELLING: ICD-10-CM

## 2020-06-16 DIAGNOSIS — F25.9 SCHIZOAFFECTIVE DISORDER, CHRONIC CONDITION (HCC): ICD-10-CM

## 2020-06-16 DIAGNOSIS — T84.54XD INFECTION ASSOCIATED WITH INTERNAL LEFT KNEE PROSTHESIS, SUBSEQUENT ENCOUNTER: ICD-10-CM

## 2020-06-16 DIAGNOSIS — Z96.652 S/P TOTAL KNEE REPLACEMENT, LEFT: ICD-10-CM

## 2020-06-16 DIAGNOSIS — I63.9 ACUTE CVA (CEREBROVASCULAR ACCIDENT) (HCC): ICD-10-CM

## 2020-06-16 PROCEDURE — 99309 SBSQ NF CARE MODERATE MDM 30: CPT | Performed by: INTERNAL MEDICINE

## 2020-06-16 RX ORDER — SODIUM CHLORIDE 1 G/1
TABLET ORAL
Qty: 60 TABLET | Refills: 0 | Status: SHIPPED | OUTPATIENT
Start: 2020-06-16 | End: 2020-07-30

## 2020-06-16 NOTE — PROGRESS NOTES
follow up ,  left leg swelling   PMH  Past Medical History:  Diagnosis Date  Bipolar affective ([facility] [address]  C. difficile colitis 05/20/2013  per pt's mother pt. never had c.diff  DEPRESSION  manic depressive & bypolar disease,, [address] [employer] out in white heads    Home Medications:  reviewed .         Soc Hx  Social History  Tobacco Use  Smoking status: Former Smoker  Packs/day: 1.00  Years: 15.00  Pack years: 13  Quit date: 2000  Years since quittin.1  Smokeless tobacco: Never Used  Al

## 2020-06-18 ENCOUNTER — EXTERNAL FACILITY (OUTPATIENT)
Dept: INTERNAL MEDICINE CLINIC | Facility: CLINIC | Age: 60
End: 2020-06-18

## 2020-06-18 DIAGNOSIS — F25.9 SCHIZOAFFECTIVE DISORDER, CHRONIC CONDITION (HCC): ICD-10-CM

## 2020-06-18 DIAGNOSIS — M06.09 RHEUMATOID ARTHRITIS OF MULTIPLE SITES WITH NEGATIVE RHEUMATOID FACTOR (HCC): ICD-10-CM

## 2020-06-18 DIAGNOSIS — K21.9 GASTROESOPHAGEAL REFLUX DISEASE WITHOUT ESOPHAGITIS: ICD-10-CM

## 2020-06-18 DIAGNOSIS — I63.9 ACUTE CVA (CEREBROVASCULAR ACCIDENT) (HCC): ICD-10-CM

## 2020-06-18 PROCEDURE — 99308 SBSQ NF CARE LOW MDM 20: CPT | Performed by: INTERNAL MEDICINE

## 2020-06-18 NOTE — PROGRESS NOTES
ollow up ,  left leg swelling, us results  PMH  Past Medical History:  Diagnosis Date  Bipolar affective ([facility] [address]  C. difficile colitis 05/20/2013  per pt's mother pt. never had c.diff  DEPRESSION  manic depressive & bypolar disease,Dr, [address] breaking out in white heads    Home Medications:  reviewed .         Soc Hx  Social History  Tobacco Use  Smoking status: Former Smoker  Packs/day: 1.00  Years: 15.00  Pack years: 13  Quit date: 2000  Years since quittin.1  Smokeless tobacco: Never

## 2020-06-22 ENCOUNTER — TELEPHONE (OUTPATIENT)
Dept: CARDIOLOGY | Age: 60
End: 2020-06-22

## 2020-06-23 ENCOUNTER — EXTERNAL FACILITY (OUTPATIENT)
Dept: INTERNAL MEDICINE CLINIC | Facility: CLINIC | Age: 60
End: 2020-06-23

## 2020-06-23 DIAGNOSIS — M06.9 RHEUMATOID ARTHRITIS INVOLVING BOTH ELBOWS, UNSPECIFIED RHEUMATOID FACTOR PRESENCE: ICD-10-CM

## 2020-06-23 DIAGNOSIS — D84.9 IMMUNOSUPPRESSED STATUS (HCC): ICD-10-CM

## 2020-06-23 DIAGNOSIS — F25.9 SCHIZOAFFECTIVE DISORDER, CHRONIC CONDITION (HCC): ICD-10-CM

## 2020-06-23 DIAGNOSIS — I63.9 ACUTE CVA (CEREBROVASCULAR ACCIDENT) (HCC): ICD-10-CM

## 2020-06-23 PROCEDURE — 99308 SBSQ NF CARE LOW MDM 20: CPT | Performed by: INTERNAL MEDICINE

## 2020-06-23 NOTE — PROGRESS NOTES
follow up ,    [facility]  Past Medical History:  Diagnosis Date  Bipolar affective ([facility] [address]  C. difficile colitis 05/20/2013  per pt's mother pt. never had c.diff  DEPRESSION  manic depressive & bypolar disease,Anna Ruiz At [facility]  Depression  Esophageal heads    Home Medications:  reviewed .         Soc Hx  Social History  Tobacco Use  Smoking status: Former Smoker  Packs/day: 1.00  Years: 15.00  Pack years: 13  Quit date: 2000  Years since quittin.1  Smokeless tobacco: Never Used  Alcohol use: No

## 2020-06-25 ENCOUNTER — EXTERNAL FACILITY (OUTPATIENT)
Dept: INTERNAL MEDICINE CLINIC | Facility: CLINIC | Age: 60
End: 2020-06-25

## 2020-06-25 DIAGNOSIS — D84.9 IMMUNOSUPPRESSED STATUS (HCC): ICD-10-CM

## 2020-06-25 DIAGNOSIS — F25.9 SCHIZOAFFECTIVE DISORDER, CHRONIC CONDITION (HCC): ICD-10-CM

## 2020-06-25 DIAGNOSIS — Z96.652 S/P TOTAL KNEE REPLACEMENT, LEFT: ICD-10-CM

## 2020-06-25 DIAGNOSIS — I63.9 ACUTE CVA (CEREBROVASCULAR ACCIDENT) (HCC): ICD-10-CM

## 2020-06-25 DIAGNOSIS — M06.09 RHEUMATOID ARTHRITIS OF MULTIPLE SITES WITH NEGATIVE RHEUMATOID FACTOR (HCC): ICD-10-CM

## 2020-06-25 PROCEDURE — 99308 SBSQ NF CARE LOW MDM 20: CPT | Performed by: INTERNAL MEDICINE

## 2020-06-25 NOTE — PROGRESS NOTES
follow up ,    [facility]  Past Medical History:  Diagnosis Date  Bipolar affective ([facility] [address]  C. difficile colitis 05/20/2013  per pt's mother pt. never had c.diff  DEPRESSION  manic depressive & bypolar disease,Mathew Ruiz At [facility]  Depression  Esophageal heads    Home Medications:  reviewed .         Soc Hx  Social History  Tobacco Use  Smoking status: Former Smoker  Packs/day: 1.00  Years: 15.00  Pack years: 13  Quit date: 2000  Years since quittin.1  Smokeless tobacco: Never Used  Alcohol use: No

## 2020-07-07 ENCOUNTER — HOSPITAL ENCOUNTER (OUTPATIENT)
Dept: CV DIAGNOSTICS | Facility: HOSPITAL | Age: 60
Discharge: HOME | End: 2020-07-07
Attending: Other
Payer: MEDICARE

## 2020-07-07 DIAGNOSIS — I63.9 IMPENDING CEREBROVASCULAR ACCIDENT (HCC): ICD-10-CM

## 2020-07-07 DIAGNOSIS — I63.9 ACUTE CVA (CEREBROVASCULAR ACCIDENT) (HCC): ICD-10-CM

## 2020-07-07 PROCEDURE — 93272 ECG/REVIEW INTERPRET ONLY: CPT | Performed by: INTERNAL MEDICINE

## 2020-07-07 PROCEDURE — 93271 ECG/MONITORING AND ANALYSIS: CPT | Performed by: INTERNAL MEDICINE

## 2020-07-07 PROCEDURE — 93270 REMOTE 30 DAY ECG REV/REPORT: CPT | Performed by: INTERNAL MEDICINE

## 2020-07-08 ENCOUNTER — TELEPHONE (OUTPATIENT)
Dept: INTERNAL MEDICINE CLINIC | Facility: CLINIC | Age: 60
End: 2020-07-08

## 2020-07-08 NOTE — TELEPHONE ENCOUNTER
Pharmacy calling in, patient had scipt faxxed for pharmacy stating increase of dose for  OLANZapine 20 MG Oral Tab   New dose states 30 mg, but to take 1.5 20's.  Insurance won't pay for this, needs 10 mg and 20 mg to be covered, pharmacy just needs new scr

## 2020-07-09 RX ORDER — LIDOCAINE HYDROCHLORIDE 20 MG/ML
SOLUTION ORAL; TOPICAL
Qty: 60 TABLET | Refills: 3 | OUTPATIENT
Start: 2020-07-09

## 2020-07-10 NOTE — TELEPHONE ENCOUNTER
I called [facility] and spoke to pharmacist, Maxx Roberts. She stated that the last script sent in to them for OLANZapine was sent in as a discharge prescription signed by Dr. Josh Fitzgerald with a sig of take 1 and a half tabs to equal 30 mg nightly.     Per pharmacist,

## 2020-07-11 NOTE — TELEPHONE ENCOUNTER
Advised Alla of the following routing comment from Dr Roverto Coronado:      She needs to confirm with them . We continued what ever medication she came with . If they ch aged the dose they need to prescribe . She is not my patient .  I only saw her at rehab

## 2020-07-20 ENCOUNTER — TELEPHONE (OUTPATIENT)
Dept: INTERNAL MEDICINE CLINIC | Facility: CLINIC | Age: 60
End: 2020-07-20

## 2020-07-20 RX ORDER — SENNOSIDES 8.6 MG
TABLET ORAL
Qty: 30 TABLET | Refills: 0 | OUTPATIENT
Start: 2020-07-20

## 2020-07-22 NOTE — TELEPHONE ENCOUNTER
Spoke to patient and advised per MD note. Patient verbalized understanding. Nothing further needed from MD or nurse. Closing encounter.

## 2020-07-22 NOTE — TELEPHONE ENCOUNTER
Pt states that she a BM usually  every three days .   If she does not take the stool softener every day  it is hard for her to have a bowel movement

## 2020-08-10 ENCOUNTER — HOSPITAL ENCOUNTER (INPATIENT)
Facility: HOSPITAL | Age: 60
LOS: 1 days | Discharge: HOME HEALTH | DRG: 645 | End: 2020-08-11
Attending: EMERGENCY MEDICINE | Admitting: INTERNAL MEDICINE
Payer: MEDICARE

## 2020-08-10 DIAGNOSIS — E87.1 HYPONATREMIA: Primary | ICD-10-CM

## 2020-08-10 LAB
ANION GAP SERPL CALC-SCNC: 7 MMOL/L (ref 0–18)
BUN BLD-MCNC: 13 MG/DL (ref 7–18)
BUN/CREAT SERPL: 25 (ref 10–20)
CALCIUM BLD-MCNC: 8.8 MG/DL (ref 8.5–10.1)
CHLORIDE SERPL-SCNC: 100 MMOL/L (ref 98–112)
CO2 SERPL-SCNC: 26 MMOL/L (ref 21–32)
CREAT BLD-MCNC: 0.52 MG/DL (ref 0.55–1.02)
GLUCOSE BLD-MCNC: 81 MG/DL (ref 70–99)
OSMOLALITY SERPL CALC.SUM OF ELEC: 275 MOSM/KG (ref 275–295)
POTASSIUM SERPL-SCNC: 3.2 MMOL/L (ref 3.5–5.1)
SARS-COV-2 RNA RESP QL NAA+PROBE: NOT DETECTED
SODIUM SERPL-SCNC: 133 MMOL/L (ref 136–145)
TSI SER-ACNC: 1.8 MIU/ML (ref 0.36–3.74)
URATE SERPL-MCNC: 3.2 MG/DL (ref 2.6–6)
VALPROATE SERPL-MCNC: 65.5 UG/ML (ref 50–100)

## 2020-08-10 PROCEDURE — 80048 BASIC METABOLIC PNL TOTAL CA: CPT | Performed by: INTERNAL MEDICINE

## 2020-08-10 PROCEDURE — 80164 ASSAY DIPROPYLACETIC ACD TOT: CPT | Performed by: EMERGENCY MEDICINE

## 2020-08-10 PROCEDURE — 96360 HYDRATION IV INFUSION INIT: CPT

## 2020-08-10 PROCEDURE — 84443 ASSAY THYROID STIM HORMONE: CPT | Performed by: INTERNAL MEDICINE

## 2020-08-10 PROCEDURE — 84550 ASSAY OF BLOOD/URIC ACID: CPT | Performed by: INTERNAL MEDICINE

## 2020-08-10 PROCEDURE — 99285 EMERGENCY DEPT VISIT HI MDM: CPT

## 2020-08-10 RX ORDER — ONDANSETRON 2 MG/ML
4 INJECTION INTRAMUSCULAR; INTRAVENOUS EVERY 6 HOURS PRN
Status: DISCONTINUED | OUTPATIENT
Start: 2020-08-10 | End: 2020-08-11

## 2020-08-10 RX ORDER — SODIUM CHLORIDE 9 MG/ML
INJECTION, SOLUTION INTRAVENOUS CONTINUOUS
Status: DISCONTINUED | OUTPATIENT
Start: 2020-08-10 | End: 2020-08-11

## 2020-08-10 RX ORDER — METOCLOPRAMIDE HYDROCHLORIDE 5 MG/ML
10 INJECTION INTRAMUSCULAR; INTRAVENOUS EVERY 8 HOURS PRN
Status: DISCONTINUED | OUTPATIENT
Start: 2020-08-10 | End: 2020-08-11

## 2020-08-10 RX ORDER — ACETAMINOPHEN 325 MG/1
650 TABLET ORAL EVERY 6 HOURS PRN
Status: DISCONTINUED | OUTPATIENT
Start: 2020-08-10 | End: 2020-08-11

## 2020-08-10 RX ORDER — SODIUM CHLORIDE 9 MG/ML
INJECTION, SOLUTION INTRAVENOUS CONTINUOUS
Status: DISCONTINUED | OUTPATIENT
Start: 2020-08-10 | End: 2020-08-10

## 2020-08-10 RX ORDER — SODIUM CHLORIDE 0.9 % (FLUSH) 0.9 %
3 SYRINGE (ML) INJECTION AS NEEDED
Status: DISCONTINUED | OUTPATIENT
Start: 2020-08-10 | End: 2020-08-11

## 2020-08-10 RX ORDER — HEPARIN SODIUM 5000 [USP'U]/ML
5000 INJECTION, SOLUTION INTRAVENOUS; SUBCUTANEOUS EVERY 8 HOURS SCHEDULED
Status: DISCONTINUED | OUTPATIENT
Start: 2020-08-10 | End: 2020-08-11

## 2020-08-11 VITALS
RESPIRATION RATE: 16 BRPM | WEIGHT: 104.88 LBS | HEIGHT: 59 IN | OXYGEN SATURATION: 96 % | HEART RATE: 58 BPM | SYSTOLIC BLOOD PRESSURE: 119 MMHG | BODY MASS INDEX: 21.14 KG/M2 | DIASTOLIC BLOOD PRESSURE: 63 MMHG | TEMPERATURE: 98 F

## 2020-08-11 LAB
ANION GAP SERPL CALC-SCNC: 5 MMOL/L (ref 0–18)
ANION GAP SERPL CALC-SCNC: 7 MMOL/L (ref 0–18)
BASOPHILS # BLD AUTO: 0.07 X10(3) UL (ref 0–0.2)
BASOPHILS NFR BLD AUTO: 1.7 %
BUN BLD-MCNC: 11 MG/DL (ref 7–18)
BUN BLD-MCNC: 9 MG/DL (ref 7–18)
BUN/CREAT SERPL: 25 (ref 10–20)
BUN/CREAT SERPL: 26.8 (ref 10–20)
CALCIUM BLD-MCNC: 8.4 MG/DL (ref 8.5–10.1)
CALCIUM BLD-MCNC: 8.5 MG/DL (ref 8.5–10.1)
CHLORIDE SERPL-SCNC: 104 MMOL/L (ref 98–112)
CHLORIDE SERPL-SCNC: 107 MMOL/L (ref 98–112)
CO2 SERPL-SCNC: 26 MMOL/L (ref 21–32)
CO2 SERPL-SCNC: 27 MMOL/L (ref 21–32)
CREAT BLD-MCNC: 0.36 MG/DL (ref 0.55–1.02)
CREAT BLD-MCNC: 0.41 MG/DL (ref 0.55–1.02)
DEPRECATED RDW RBC AUTO: 49.6 FL (ref 35.1–46.3)
EOSINOPHIL # BLD AUTO: 0.13 X10(3) UL (ref 0–0.7)
EOSINOPHIL NFR BLD AUTO: 3.1 %
ERYTHROCYTE [DISTWIDTH] IN BLOOD BY AUTOMATED COUNT: 15.5 % (ref 11–15)
GLUCOSE BLD-MCNC: 85 MG/DL (ref 70–99)
GLUCOSE BLD-MCNC: 90 MG/DL (ref 70–99)
HAV IGM SER QL: 2.2 MG/DL (ref 1.6–2.6)
HCT VFR BLD AUTO: 33.2 % (ref 35–48)
HGB BLD-MCNC: 11.2 G/DL (ref 12–16)
IMM GRANULOCYTES # BLD AUTO: 0.02 X10(3) UL (ref 0–1)
IMM GRANULOCYTES NFR BLD: 0.5 %
LYMPHOCYTES # BLD AUTO: 1.45 X10(3) UL (ref 1–4)
LYMPHOCYTES NFR BLD AUTO: 34.3 %
MCH RBC QN AUTO: 29.6 PG (ref 26–34)
MCHC RBC AUTO-ENTMCNC: 33.7 G/DL (ref 31–37)
MCV RBC AUTO: 87.8 FL (ref 80–100)
MONOCYTES # BLD AUTO: 0.64 X10(3) UL (ref 0.1–1)
MONOCYTES NFR BLD AUTO: 15.1 %
NEUTROPHILS # BLD AUTO: 1.92 X10 (3) UL (ref 1.5–7.7)
NEUTROPHILS # BLD AUTO: 1.92 X10(3) UL (ref 1.5–7.7)
NEUTROPHILS NFR BLD AUTO: 45.3 %
OSMOLALITY SERPL CALC.SUM OF ELEC: 284 MOSM/KG (ref 275–295)
OSMOLALITY SERPL CALC.SUM OF ELEC: 285 MOSM/KG (ref 275–295)
PLATELET # BLD AUTO: 353 10(3)UL (ref 150–450)
POTASSIUM SERPL-SCNC: 3.1 MMOL/L (ref 3.5–5.1)
POTASSIUM SERPL-SCNC: 3.6 MMOL/L (ref 3.5–5.1)
POTASSIUM SERPL-SCNC: 4.6 MMOL/L (ref 3.5–5.1)
RBC # BLD AUTO: 3.78 X10(6)UL (ref 3.8–5.3)
SODIUM SERPL-SCNC: 138 MMOL/L (ref 136–145)
SODIUM SERPL-SCNC: 138 MMOL/L (ref 136–145)
SODIUM SERPL-SCNC: 98 MMOL/L
TSI SER-ACNC: 0.94 MIU/ML (ref 0.36–3.74)
WBC # BLD AUTO: 4.2 X10(3) UL (ref 4–11)

## 2020-08-11 PROCEDURE — 84443 ASSAY THYROID STIM HORMONE: CPT | Performed by: INTERNAL MEDICINE

## 2020-08-11 PROCEDURE — 84132 ASSAY OF SERUM POTASSIUM: CPT | Performed by: INTERNAL MEDICINE

## 2020-08-11 PROCEDURE — 84300 ASSAY OF URINE SODIUM: CPT | Performed by: INTERNAL MEDICINE

## 2020-08-11 PROCEDURE — 83735 ASSAY OF MAGNESIUM: CPT | Performed by: INTERNAL MEDICINE

## 2020-08-11 PROCEDURE — 85025 COMPLETE CBC W/AUTO DIFF WBC: CPT | Performed by: INTERNAL MEDICINE

## 2020-08-11 PROCEDURE — 80048 BASIC METABOLIC PNL TOTAL CA: CPT | Performed by: INTERNAL MEDICINE

## 2020-08-11 PROCEDURE — 83935 ASSAY OF URINE OSMOLALITY: CPT | Performed by: INTERNAL MEDICINE

## 2020-08-11 RX ORDER — FLUPHENAZINE HYDROCHLORIDE 2.5 MG/1
5 TABLET ORAL DAILY
Status: DISCONTINUED | OUTPATIENT
Start: 2020-08-11 | End: 2020-08-11

## 2020-08-11 RX ORDER — OLANZAPINE 10 MG/1
30 TABLET ORAL NIGHTLY
Status: DISCONTINUED | OUTPATIENT
Start: 2020-08-11 | End: 2020-08-11

## 2020-08-11 RX ORDER — DIVALPROEX SODIUM 500 MG/1
500 TABLET, EXTENDED RELEASE ORAL 2 TIMES DAILY
Status: DISCONTINUED | OUTPATIENT
Start: 2020-08-11 | End: 2020-08-11

## 2020-08-11 RX ORDER — DIVALPROEX SODIUM 500 MG/1
TABLET, EXTENDED RELEASE ORAL
Qty: 60 TABLET | Refills: 0 | OUTPATIENT
Start: 2020-08-11

## 2020-08-11 RX ORDER — CLOPIDOGREL BISULFATE 75 MG/1
75 TABLET ORAL DAILY
Status: DISCONTINUED | OUTPATIENT
Start: 2020-08-11 | End: 2020-08-11

## 2020-08-11 RX ORDER — ASPIRIN 81 MG/1
81 TABLET ORAL DAILY
Status: DISCONTINUED | OUTPATIENT
Start: 2020-08-11 | End: 2020-08-11

## 2020-08-11 RX ORDER — FOLIC ACID 1 MG/1
1 TABLET ORAL DAILY
Status: DISCONTINUED | OUTPATIENT
Start: 2020-08-11 | End: 2020-08-11

## 2020-08-11 RX ORDER — SODIUM CHLORIDE 1000 MG
1 TABLET, SOLUBLE MISCELLANEOUS 2 TIMES DAILY WITH MEALS
Status: DISCONTINUED | OUTPATIENT
Start: 2020-08-11 | End: 2020-08-11

## 2020-08-11 RX ORDER — SENNOSIDES 8.6 MG
17.2 TABLET ORAL NIGHTLY
Status: DISCONTINUED | OUTPATIENT
Start: 2020-08-11 | End: 2020-08-11

## 2020-08-11 RX ORDER — PANTOPRAZOLE SODIUM 40 MG/1
40 TABLET, DELAYED RELEASE ORAL
Status: DISCONTINUED | OUTPATIENT
Start: 2020-08-11 | End: 2020-08-11

## 2020-08-11 RX ORDER — ATORVASTATIN CALCIUM 10 MG/1
10 TABLET, FILM COATED ORAL NIGHTLY
COMMUNITY
End: 2020-08-13

## 2020-08-11 RX ORDER — MELATONIN
325 2 TIMES DAILY
Status: DISCONTINUED | OUTPATIENT
Start: 2020-08-11 | End: 2020-08-11

## 2020-08-11 RX ORDER — FLUPHENAZINE HYDROCHLORIDE 2.5 MG/1
10 TABLET ORAL NIGHTLY
Status: DISCONTINUED | OUTPATIENT
Start: 2020-08-11 | End: 2020-08-11

## 2020-08-11 RX ORDER — MULTIPLE VITAMINS W/ MINERALS TAB 9MG-400MCG
1 TAB ORAL DAILY
Status: DISCONTINUED | OUTPATIENT
Start: 2020-08-11 | End: 2020-08-11

## 2020-08-11 RX ORDER — ATORVASTATIN CALCIUM 10 MG/1
10 TABLET, FILM COATED ORAL NIGHTLY
Status: DISCONTINUED | OUTPATIENT
Start: 2020-08-11 | End: 2020-08-11

## 2020-08-11 RX ORDER — POTASSIUM CHLORIDE 20 MEQ/1
40 TABLET, EXTENDED RELEASE ORAL EVERY 4 HOURS
Status: COMPLETED | OUTPATIENT
Start: 2020-08-11 | End: 2020-08-11

## 2020-08-11 NOTE — CONSULTS
[facility] - Nephrology  Inpatient Consultation    Jayleen Muir Patient Status:  Inpatient    1960 MRN [id number]   Location [facility] 5SW/SE Attending Colleen Quiros MD   Hosp Day # 1 PCP Karla Cabrera tab 500 mg, 500 mg, Oral, BID  •  fluPHENAZine HCl (PROLIXIN) tab 5 mg, 5 mg, Oral, Daily  •  fluPHENAZine HCl (PROLIXIN) tab 10 mg, 10 mg, Oral, Nightly  •  OLANZapine (ZYPREXA) tab 30 mg, 30 mg, Oral, Nightly  •  Normal Saline Flush 0.9 % injection 3 mL, Disp: 90 tablet, Rfl: 3, 8/10/2020 at Unknown time  Ferrous Sulfate (IRON) 325 (65 Fe) MG Oral Tab, Take 325 mg by mouth 2 (two) times daily.  Take in between meals, not with meals, Disp: 60 tablet, Rfl: 3, 8/10/2020 at Unknown time  OMEPRAZOLE 40 MG Oral C Past Surgical History:   Procedure Laterality Date   • ELBOW TOTAL REPLACEMENT Right 3/2/2020    Performed by Anny Ball MD at [address] [place] [address] OR   • ELBOW TOTAL REPLACEMENT Right 4/17/2019    Performed by Anny Ball MD at [address] [place] [address] OR   • EXTREMITY LOWER HA arm)   Pulse 58   Temp 97.8 °F (36.6 °C) (Oral)   Resp 16   Ht 4' 11\" (1.499 m)   Wt 104 lb 14.4 oz (47.6 kg)   LMP  (LMP Unknown)   SpO2 96%   Breastfeeding No   BMI 21.19 kg/m²   Temp (24hrs), Av.8 °F (36.6 °C), Min:97.6 °F (36.4 °C), Max:98.1 °F (3 03/04/2014     08/11/2020    K 3.6 08/11/2020     08/11/2020    CO2 26.0 08/11/2020     Lab Results   Component Value Date    WBC 4.2 08/11/2020    RBC 3.78 (L) 08/11/2020    HGB 11.2 (L) 08/11/2020    HCT 33.2 (L) 08/11/2020    .0 08/11 techniques for patient specific dose reduction were  followed while maintaining the necessary diagnostic image quality. ADVERSE REACTION: None.   FINDINGS:  CT OF THE HEAD WITH AND WITHOUT CONTRAST:  There is no intracranial hemorrhage, mass effect or midl There is some reconstitution  of flow within the V3 segment of the right vertebral artery. There is severe narrowing of the V4  segment of the right vertebral artery. Aortic arch and great vessels: There are scattered, calcified atherosclerotic changes.  Yoni Coleman

## 2020-08-11 NOTE — ED NOTES
Per family patient has been altered and not herself. Has history of hyponatremia. Had labs drawn earlier which showed her NA+ at 125.   Patient is AOx4 at this time, able to answer all RNs questions, however this RN did notice that patient is forgetful, w

## 2020-08-11 NOTE — PAYOR COMM NOTE
--------------  ADMISSION REVIEW     Payor: [id number] #:  [id number]  Authorization Number: [id number]    Admit date: 8/10/20  Admit time: 65       Admitting Physician: Negrito Aguilar MD  Attending Physician:  Dary Oliveros, • Visual impairment     GLASSES       Positive for stated complaint: abnormal labs  Other systems are as noted in HPI. Constitutional and vital signs reviewed. All other systems reviewed and negative except as noted above.     Physical Exam     ED T GREEN   RAINBOW DRAW GOLD   RAPID SARS-COV-2 BY PCR   RAPID SARS-COV-2 BY PCR         Present on Admission  Date Reviewed: 7/29/2020          ICD-10-CM Noted POA    Hyponatremia E87.1 8/10/2020 Unknown                   Signed by Rika Prater MD on 8/10/2020 Negative for myalgias. Skin: Negative. Negative for rash. Neurological: Negative.     All other systems reviewed and are negative.        PHYSICAL EXAM:      Vital Signs: /63 (BP Location: Right arm)   Pulse 58   Temp 97.8 °F (36.6 °C) (Oral)   R   CA 8.4 (L) 08/11/2020     OSMOCALC 284 08/11/2020     ALKPHO 77 06/02/2020     AST 18 06/02/2020     ALT 18 06/02/2020     BILT 0.2 06/02/2020     TP 7.6 06/02/2020     ALB 3.4 06/02/2020     GLOBULIN 4.2 06/02/2020     AGRATIO 1.7 03/04/2014     NA 13 and postcontrast CT of the head was performed. CTA of the head and neck was  performed after the intravenous infusion of 100 mL of Isovue-370. 3-D reconstructions along with  maximum intensity projection images were obtained on an independent workstation. internal carotid artery demonstrates normal caliber and contour.     Left vertebral artery: The left vertebral artery is dominant. The left vertebral artery is tortuous  but otherwise demonstrates normal caliber and contour.   Right vertebral artery: The ri Home regimen: Amlodipine 5 mg daily     RECOMMENDATIONS:   - 1.5L Fluid restriction  - Resuming bid salt tabs  - No barriers to discharge from a Nephrology standpoint  - Discussed with RN.      Galo Wilkins MD  Nephrology  [facility] 2  [phone]

## 2020-08-11 NOTE — PROGRESS NOTES
Spoke with mother Meggan- per RN arina pt is forgetful. Mother is aware no new meds at dc. No physical script given to pt. Pt mother aware to get labs done prior to follow up with pcp. All needs met. IV to be removed by HOMERO Taylor.

## 2020-08-11 NOTE — ED NOTES
Orders for admission, patient is aware of plan and ready to go upstairs. Any questions, please call ED RN Magno Camacho  at extension [phone]. AOx4, but forgetful.

## 2020-08-11 NOTE — CM/SW NOTE
MDO order for [facility] PT/OT/RN and aide. Pt is current with [facility] . SW placed resume orders via [facility]. Referral placed to [facility] for CG services if applicable. SW/CM to remain available for support and/or discharge planning.      [facility]

## 2020-08-11 NOTE — PAYOR COMM NOTE
--------------  CONTINUED STAY REVIEW    Payor: [id number] [place] [address] #:  [id number]  Authorization Number: [id number]    Admit date: 8/10/20  Admit time: 65    Admitting Physician: Cem Reyes MD  Attending Physician:  Yosef Nayak confusion and low sodium. Patient was hospitalized in June for cerebellar CVA, went to SNF and then has been home alone. Per mother, patient has been more forgetful lately but can still cook and clean, is forgetful about medications.  They have home health Martha Ken MD at [address] [facility] MAIN OR   • KNEE TOTAL REVISION Left 11/21/2019     Performed by Martha Ken MD at [facility]   • OTHER Right 04/17/2019     total elbow replacement   • OTHER SURGICAL HISTORY   5/12/13     REMOVAL OF HARDWARE L HIP W CAPSULE BY MOUTH EVERY DAY, Disp: 90 capsule, Rfl: 1  Calcium Citrate (CITRACAL OR), Take 2 capsules by mouth daily. , Disp: , Rfl:   Cholecalciferol (D3 VITAMIN OR), Take 1 tablet by mouth daily. , Disp: , Rfl:   Multiple Vitamins-Minerals (MULTIVITAL) Oral 168 hours.     Additional Diagnostics: ECG       Radiology: Cta Brain+cta Carotids (contrast Only)(cpt=70496/86059)     Result Date: 8/10/2020  IMPRESSION: 1. Chronic ischemic white matter changes. 2. Occlusion of the right vertebral artery.  There is some NaCl infusion     Date Action Dose Route User    8/10/2020 2339 New Bag (none) Intravenous Zoya Leon RN      sodium chloride 0.9% IV bolus 500 mL     Date Action Dose Route User    8/10/2020 2032 New Bag 500 mL Intravenous Taurus Garcia RN

## 2020-08-11 NOTE — DISCHARGE SUMMARY
General Medicine Discharge Summary     Patient ID:  Elvira Carter  63year old  9/28/1960    Admit date: 8/10/2020    Discharge date and time: 08/11/20    Attending Physician: Elise Sin MD     Primary Care Physician: Sigrid Haynes MD     Reason for - cerebellar, continue using walker, home PT/OT  - f/u neurology     RA    GERD     Exam  GEN: female in NAD, A&Ox2 person/place  HEENT: EOMI  Neck: Supple  Pulm: CTAB, no crackles or wheezes  CV: RRR, no murmurs  ABD: Soft, non-tender, non-distended, +BS fluPHENAZine HCl 5 MG Tabs  Commonly known as:  PROLIXIN  1 tablet in the morning and 2 tablets at night     folic acid 1 MG Tabs  Commonly known as:  FOLVITE  TAKE 1 TABLET(1 MG) BY MOUTH DAILY     Iron 325 (65 Fe) MG Tabs  Take 325 mg by mouth 2 (two) ti

## 2020-08-11 NOTE — H&P
[facility] Hospitalist H&P       CC: Patient presents with:  Abnormal Labs       PCP: Ashlie Arana MD    Date of Admission: 8/10/2020  7:58 PM    ASSESSMENT / PLAN:       Ms. Fam Rodriguez is a 62 yo F with PMH of bipolar, schizophrenia, HTN, RA, GERD, CVA who presented PMH  Past Medical History:   Diagnosis Date   • Bipolar affective ([facility] [address]    • C. difficile colitis 05/20/2013    per pt's mother pt. never had c.diff   • DEPRESSION     manic depressive & bypolar disease,Suzan [facility]iz At [facility]   • Depression CAUSES ITCHY RASH---can't specify             what metal?  Nuts                    OTHER (SEE COMMENTS)    Comment:PIMPLES ON FACE AND NECK  Peanuts                 RASH    Comment:C/o breaking out in white heads     Home Medications:  atorvastatin 10 MG O Soc Hx  Social History    Tobacco Use      Smoking status: Former Smoker        Packs/day: 1.00        Years: 15.00        Pack years: 13        Quit date: 2000        Years since quittin.3      Smokeless tobacco: Never Used    Alcohol u Diagnostics: ECG      Radiology: Cta Brain+cta Carotids (contrast Only)(cpt=70496/46706)    Result Date: 8/10/2020  IMPRESSION: 1. Chronic ischemic white matter changes. 2. Occlusion of the right vertebral artery.  There is some reconstitution of flow in th

## 2020-08-11 NOTE — ED PROVIDER NOTES
Patient Seen in: [facility] AND [facility] Emergency Department      History   Patient presents with:  Abnormal Labs    Stated Complaint: abnormal labs    HPI    Patient is a 71-year-old female with history listed below.   She was seen today by nephrology and fou REPLACEMENT Left 9/26/2019    Performed by Andrea Dennis MD at [address] [place] [address] MAIN OR   • KNEE TOTAL REVISION Left 4/16/2020    Performed by Andrea Dennis MD at [address] [place] [address] MAIN OR   • KNEE TOTAL REVISION Left 3/26/2020    Performed by Andrea Denins MD at  sounds normal. No respiratory distress. Abdominal: Soft. Bowel sounds are normal. Exhibits no distension and no mass. There is no tenderness. There is no rebound and no guarding. Musculoskeletal: Normal range of motion. Exhibits no edema or tenderness.

## 2020-08-11 NOTE — ED INITIAL ASSESSMENT (HPI)
Patient is here for low sodium, 125. Per family she has been having increased confusion, hx low sodium. Has been taking sodium chloride BID. All other review of systems negative, except as noted in HPI

## 2020-08-11 NOTE — PLAN OF CARE
Problem: Patient Centered Care  Goal: Patient preferences are identified and integrated in the patient's plan of care  Description  Interventions:  - What would you like us to know as we care for you?   - Provide timely, complete, and accurate informatio falls.  - [place] fall precautions as indicated by assessment.  - Educate pt/family on patient safety including physical limitations  - Instruct pt to call for assistance with activity based on assessment  - Modify environment to reduce risk of injury  -

## 2020-08-12 LAB — OSMOLALITY UR: 376 MOSM/KG (ref 300–1100)

## 2020-08-12 NOTE — PAYOR COMM NOTE
--------------  DISCHARGE REVIEW    Payor: Shannon Arcos [address] [place] [address] #:  [id number]  Authorization Number: [id number]    Admit date: 8/10/20  Admit time:  2250  Discharge Date: 8/11/2020  4:04 PM    Requesting one day authorization. Thank you. Ms. Fam Rodriguez is a 64 yo F with PMH of bipolar, schizophrenia, HTN, RA, GERD, CVA who presented with increased confusion and low sodium.  Na 125 as outpatient, 133 in ER, 138 this AM, improved with only small IVF bolus, renal consulted, likely hyponatremic 2/2 i IMPRESSION: 1. Chronic ischemic white matter changes. 2. Occlusion of the right vertebral artery. There is some reconstitution of flow in the V3 segment of the right vertebral artery.  There is severe narrowing in the proximal V4 segment of the right verteb Take 1 tablet (17.2 mg total) by mouth nightly.     sodium chloride 1 g Tabs  Take 1 tablet (1 g total) by mouth 2 (two) times daily with meals.      vitamin C 1000 MG Tabs         * This list has 2 medication(s) that are the same as other medications presc

## 2020-08-13 NOTE — TELEPHONE ENCOUNTER
She is not my patient , I have seen her only at rehab,she  Is not following with me , that is prescribed by neurologist - forward to her pcp

## 2020-08-13 NOTE — TELEPHONE ENCOUNTER
I called the [facility]'s  pharmacy and informed them that she has not established care with our physicians. The pharmacy stated they will call the patient to see who she sees.     They will take Dr. Buzz Rodriguez off her list.

## 2020-08-18 NOTE — PROGRESS NOTES
Pt arrived for IV Dapto for treatment of L knee infection. VRE in culture. EOT 5/7 Denies fevers/ chills. PICC line present to left Arm, positive blood return noted, picc dsg change done per protocol. Exit site without redness, tenderness or drainage.
7c/EMS

## 2020-08-20 RX ORDER — LIDOCAINE HYDROCHLORIDE 20 MG/ML
SOLUTION ORAL; TOPICAL
Qty: 60 TABLET | Refills: 3 | OUTPATIENT
Start: 2020-08-20

## 2020-09-01 RX ORDER — OMEPRAZOLE 40 MG/1
CAPSULE, DELAYED RELEASE ORAL
Qty: 30 CAPSULE | Refills: 0 | OUTPATIENT
Start: 2020-09-01

## 2020-09-08 RX ORDER — FLUPHENAZINE HYDROCHLORIDE 5 MG/1
TABLET ORAL
Qty: 90 TABLET | Refills: 1 | OUTPATIENT
Start: 2020-09-08

## 2020-09-08 NOTE — TELEPHONE ENCOUNTER
Patient, would like a refill on fluphenazine medication. Per pt she is out of medication.  Pharmacy: [facility]/JENNIFER Arita (Listed)     Current Outpatient Medications   Medication Sig Dispense Refill   • fluPHENAZine HCl 5 MG Oral Tab 1 tablet in the mo

## 2020-09-09 ENCOUNTER — TELEPHONE (OUTPATIENT)
Dept: INTERNAL MEDICINE CLINIC | Facility: CLINIC | Age: 60
End: 2020-09-09

## 2020-09-09 NOTE — TELEPHONE ENCOUNTER
Pharmacy is requesting clarification on the dosage for fluphenazine.      fluPHENAZine HCl 5 MG Oral Tab 90 tablet 1 2020    Si tablet in the morning and 2 tablets at night     Route:   (none)     Order #:   [id number]

## 2020-09-09 NOTE — TELEPHONE ENCOUNTER
Refill denied by provider.     Future Appointments   Date Time Provider Juliana Heike   9/11/2020  1:15 PM [address] [facility]   9/22/2020  1:30 PM MD CASSIE Patel   10/1/2020  2:40 PM Remi Bailey MD [facility]

## 2020-09-09 NOTE — TELEPHONE ENCOUNTER
Nurses,   Please call pharmacy and notify them that this need sto come from her psychiatrist.   Agnes Elizalde see earlier TE about this also

## 2020-09-09 NOTE — TELEPHONE ENCOUNTER
This is the second time the pharmacy sent us the medication to be refilled. I called the pharmacy who stated that the patient had script   From Dr. Sidra Trammell on June 4,2020 when at a facility.        Fluphenazine 10 mg 1/2 tablet during the day and once a ni

## 2020-09-09 NOTE — TELEPHONE ENCOUNTER
I saw this patient when she was at rehab she has her own PCP and she is following with her please forward her own PCP

## 2020-09-10 ENCOUNTER — TELEPHONE (OUTPATIENT)
Dept: INTERNAL MEDICINE CLINIC | Facility: CLINIC | Age: 60
End: 2020-09-10

## 2020-09-15 NOTE — TELEPHONE ENCOUNTER
Calling pharmacy   Advised of provider comments  Caller verbalizes understanding  Nothing further needed from MD or nurse. Closing encounter.

## 2020-09-18 ENCOUNTER — TELEPHONE (OUTPATIENT)
Dept: INTERNAL MEDICINE CLINIC | Facility: CLINIC | Age: 60
End: 2020-09-18

## 2020-09-18 NOTE — TELEPHONE ENCOUNTER
She is not my patient.   I used to see her when she was in rehab but she has been discharged from rehab for more than 2 months she has different PCP, please check with her PCP is sent to her

## 2020-09-18 NOTE — TELEPHONE ENCOUNTER
Current Outpatient Medications:     •  OMEPRAZOLE 40 MG Oral Capsule Delayed Release, TAKE 1 CAPSULE BY MOUTH EVERY DAY, Disp: 90 capsule, Rfl: 1 REFILL      •  FOLIC ACID 1 MG Oral Tab, TAKE 1 TABLET(1 MG) BY MOUTH DAILY, Disp: 90 tablet, Rfl: 3 REFILL

## 2020-09-20 ENCOUNTER — APPOINTMENT (OUTPATIENT)
Dept: GENERAL RADIOLOGY | Facility: HOSPITAL | Age: 60
End: 2020-09-20
Attending: EMERGENCY MEDICINE
Payer: MEDICARE

## 2020-09-20 ENCOUNTER — HOSPITAL ENCOUNTER (EMERGENCY)
Facility: HOSPITAL | Age: 60
Discharge: HOME | End: 2020-09-20
Attending: EMERGENCY MEDICINE
Payer: MEDICARE

## 2020-09-20 VITALS
OXYGEN SATURATION: 97 % | RESPIRATION RATE: 18 BRPM | SYSTOLIC BLOOD PRESSURE: 134 MMHG | DIASTOLIC BLOOD PRESSURE: 93 MMHG | WEIGHT: 110 LBS | HEART RATE: 80 BPM | TEMPERATURE: 98 F | BODY MASS INDEX: 22 KG/M2

## 2020-09-20 DIAGNOSIS — S73.014A CLOSED POSTERIOR DISLOCATION OF RIGHT HIP, INITIAL ENCOUNTER (HCC): Primary | ICD-10-CM

## 2020-09-20 LAB
ANION GAP SERPL CALC-SCNC: 6 MMOL/L (ref 0–18)
BASOPHILS # BLD AUTO: 0.07 X10(3) UL (ref 0–0.2)
BASOPHILS NFR BLD AUTO: 0.9 %
BUN BLD-MCNC: 8 MG/DL (ref 7–18)
BUN/CREAT SERPL: 17 (ref 10–20)
CALCIUM BLD-MCNC: 8.8 MG/DL (ref 8.5–10.1)
CHLORIDE SERPL-SCNC: 99 MMOL/L (ref 98–112)
CO2 SERPL-SCNC: 27 MMOL/L (ref 21–32)
CREAT BLD-MCNC: 0.47 MG/DL
DEPRECATED RDW RBC AUTO: 45.9 FL (ref 35.1–46.3)
EOSINOPHIL # BLD AUTO: 0.02 X10(3) UL (ref 0–0.7)
EOSINOPHIL NFR BLD AUTO: 0.3 %
ERYTHROCYTE [DISTWIDTH] IN BLOOD BY AUTOMATED COUNT: 13.6 % (ref 11–15)
GLUCOSE BLD-MCNC: 99 MG/DL (ref 70–99)
HCT VFR BLD AUTO: 34.6 %
HGB BLD-MCNC: 11.7 G/DL
IMM GRANULOCYTES # BLD AUTO: 0.02 X10(3) UL (ref 0–1)
IMM GRANULOCYTES NFR BLD: 0.3 %
INR BLD: 1.87 (ref 0.9–1.2)
LYMPHOCYTES # BLD AUTO: 1.33 X10(3) UL (ref 1–4)
LYMPHOCYTES NFR BLD AUTO: 18 %
MCH RBC QN AUTO: 31.1 PG (ref 26–34)
MCHC RBC AUTO-ENTMCNC: 33.8 G/DL (ref 31–37)
MCV RBC AUTO: 92 FL
MONOCYTES # BLD AUTO: 0.48 X10(3) UL (ref 0.1–1)
MONOCYTES NFR BLD AUTO: 6.5 %
NEUTROPHILS # BLD AUTO: 5.45 X10 (3) UL (ref 1.5–7.7)
NEUTROPHILS # BLD AUTO: 5.45 X10(3) UL (ref 1.5–7.7)
NEUTROPHILS NFR BLD AUTO: 74 %
OSMOLALITY SERPL CALC.SUM OF ELEC: 272 MOSM/KG (ref 275–295)
PLATELET # BLD AUTO: 287 10(3)UL (ref 150–450)
POTASSIUM SERPL-SCNC: 3.3 MMOL/L (ref 3.5–5.1)
PROTHROMBIN TIME: 21.2 SECONDS (ref 11.8–14.5)
RBC # BLD AUTO: 3.76 X10(6)UL
SODIUM SERPL-SCNC: 132 MMOL/L (ref 136–145)
WBC # BLD AUTO: 7.4 X10(3) UL (ref 4–11)

## 2020-09-20 PROCEDURE — 27265 TREAT HIP DISLOCATION: CPT

## 2020-09-20 PROCEDURE — 85610 PROTHROMBIN TIME: CPT | Performed by: EMERGENCY MEDICINE

## 2020-09-20 PROCEDURE — 99152 MOD SED SAME PHYS/QHP 5/>YRS: CPT

## 2020-09-20 PROCEDURE — 0SW9XJZ REVISION OF SYNTHETIC SUBSTITUTE IN RIGHT HIP JOINT, EXTERNAL APPROACH: ICD-10-PCS | Performed by: EMERGENCY MEDICINE

## 2020-09-20 PROCEDURE — 73501 X-RAY EXAM HIP UNI 1 VIEW: CPT | Performed by: EMERGENCY MEDICINE

## 2020-09-20 PROCEDURE — 96375 TX/PRO/DX INJ NEW DRUG ADDON: CPT

## 2020-09-20 PROCEDURE — 96374 THER/PROPH/DIAG INJ IV PUSH: CPT

## 2020-09-20 PROCEDURE — 99285 EMERGENCY DEPT VISIT HI MDM: CPT

## 2020-09-20 PROCEDURE — 80048 BASIC METABOLIC PNL TOTAL CA: CPT | Performed by: EMERGENCY MEDICINE

## 2020-09-20 PROCEDURE — 85025 COMPLETE CBC W/AUTO DIFF WBC: CPT | Performed by: EMERGENCY MEDICINE

## 2020-09-20 PROCEDURE — 73502 X-RAY EXAM HIP UNI 2-3 VIEWS: CPT | Performed by: EMERGENCY MEDICINE

## 2020-09-20 RX ORDER — PANTOPRAZOLE SODIUM 20 MG/1
20 TABLET, DELAYED RELEASE ORAL
Refills: 1 | Status: CANCELLED | OUTPATIENT
Start: 2020-09-21

## 2020-09-20 RX ORDER — FLUPHENAZINE HYDROCHLORIDE 2.5 MG/1
10 TABLET ORAL NIGHTLY
Status: CANCELLED | OUTPATIENT
Start: 2020-09-20

## 2020-09-20 RX ORDER — AMLODIPINE BESYLATE 5 MG/1
5 TABLET ORAL
Status: CANCELLED | OUTPATIENT
Start: 2020-09-21

## 2020-09-20 RX ORDER — HYDROCODONE BITARTRATE AND ACETAMINOPHEN 5; 325 MG/1; MG/1
1-2 TABLET ORAL EVERY 4 HOURS PRN
Status: CANCELLED | OUTPATIENT
Start: 2020-09-20

## 2020-09-20 RX ORDER — MORPHINE SULFATE 4 MG/ML
2 INJECTION, SOLUTION INTRAMUSCULAR; INTRAVENOUS EVERY 2 HOUR PRN
Status: CANCELLED | OUTPATIENT
Start: 2020-09-20

## 2020-09-20 RX ORDER — SODIUM CHLORIDE 1000 MG
1 TABLET, SOLUBLE MISCELLANEOUS 2 TIMES DAILY WITH MEALS
Status: CANCELLED | OUTPATIENT
Start: 2020-09-20

## 2020-09-20 RX ORDER — MORPHINE SULFATE 4 MG/ML
4 INJECTION, SOLUTION INTRAMUSCULAR; INTRAVENOUS EVERY 2 HOUR PRN
Status: CANCELLED | OUTPATIENT
Start: 2020-09-20

## 2020-09-20 RX ORDER — DIVALPROEX SODIUM 500 MG/1
500 TABLET, EXTENDED RELEASE ORAL 2 TIMES DAILY
Status: CANCELLED | OUTPATIENT
Start: 2020-09-20

## 2020-09-20 RX ORDER — MORPHINE SULFATE 4 MG/ML
INJECTION, SOLUTION INTRAMUSCULAR; INTRAVENOUS
Status: COMPLETED
Start: 2020-09-20 | End: 2020-09-20

## 2020-09-20 RX ORDER — SODIUM CHLORIDE 0.9 % (FLUSH) 0.9 %
3 SYRINGE (ML) INJECTION AS NEEDED
Status: CANCELLED | OUTPATIENT
Start: 2020-09-20

## 2020-09-20 RX ORDER — MORPHINE SULFATE 4 MG/ML
4 INJECTION, SOLUTION INTRAMUSCULAR; INTRAVENOUS ONCE
Status: COMPLETED | OUTPATIENT
Start: 2020-09-20 | End: 2020-09-20

## 2020-09-20 RX ORDER — ATORVASTATIN CALCIUM 10 MG/1
10 TABLET, FILM COATED ORAL NIGHTLY
Status: CANCELLED | OUTPATIENT
Start: 2020-09-20

## 2020-09-20 RX ORDER — BENZTROPINE MESYLATE 0.5 MG/1
0.5 TABLET ORAL 2 TIMES DAILY
Status: CANCELLED | OUTPATIENT
Start: 2020-09-20

## 2020-09-20 RX ORDER — ACETAMINOPHEN 325 MG/1
650 TABLET ORAL EVERY 6 HOURS PRN
Status: CANCELLED | OUTPATIENT
Start: 2020-09-20

## 2020-09-20 RX ORDER — FLUPHENAZINE HYDROCHLORIDE 2.5 MG/1
5 TABLET ORAL DAILY
Status: CANCELLED | OUTPATIENT
Start: 2020-09-21

## 2020-09-20 NOTE — ED PROVIDER NOTES
Patient Seen in: [facility] AND [facility] Emergency Department      History   Patient presents with:  Hip Pain    Stated Complaint: hip pain    HPI  Patient is a 60-year-old female history of rheumatoid arthritis, s/p b/l total hip replacements, hypertension, h DRAINAGE Left     knee with liner exchange    • KNEE REPLACEMENT SURGERY Left 09/26/2019    @ [facility]   • KNEE TOTAL REPLACEMENT Left 9/26/2019    Performed by Vaibhav Yu MD at [address] [place] [address] OR   • [id number] Left 4/16/2020    Performed by Trace Vuong Neck:      Musculoskeletal: Normal range of motion and neck supple. Cardiovascular:      Rate and Rhythm: Normal rate and regular rhythm. Pulmonary:      Effort: Pulmonary effort is normal. No respiratory distress.       Breath sounds: Normal breath s Please view results for these tests on the individual orders.           Xr Hip W Or Wo Pelvis 1 View, Right (cpt=[id number])    Result Date: 9/20/2020  CONCLUSION:  Persistent posterior superior dislocation of the right total hip arthroplasty status post att superior acetabular fracture, discussed with Ortho over the phone who reviewed previous images and confirms injury appears chronic. Recommends discharge home if patient is able to bear weight and ambulate.   Patient ambulating freely throughout the departm dislocation of right hip, initial encounter [facility])  (primary encounter diagnosis)    Disposition:  Discharge  9/20/2020  8:06 pm    Follow-up:  Delgado Cote MD  [facility]  [phone]    Schedule an appointmen

## 2020-09-20 NOTE — H&P
[facility] Hospitalist H&P       CC: Hip Pain     PCP: Shannan Chakraborty MD    History of Present Illness:   Mrs. William Reed is a pleasant 63year old female with history of schizophrenia, rheumatoid arthritis, total right hip replacement, hypertension, who presents to th REPLACEMENT Left 9/26/2019    Performed by Loly Bradshaw MD at [address] [place] [address] MAIN OR   • KNEE TOTAL REVISION Left 4/16/2020    Performed by Loly Bradshaw MD at [address] [place] [address] MAIN OR   • KNEE TOTAL REVISION Left 3/26/2020    Performed by Loly Bradshaw MD at  dislocation of the right total hip arthroplasty status post attempted reduction. No periprosthetic fracture.      Dictated by (CST): Tico Hill MD on 9/20/2020 at 6:30 PM     Finalized by (CST): Tico Hill MD on 9/20/2020 at 6:32 PM

## 2020-09-20 NOTE — ED INITIAL ASSESSMENT (HPI)
Patient has history of bilateral hip prosthesis. Patient was working on her closet and felt a pop on her right hip. Patient unable to ambulate and move. Per patient, she has a history of hip dislocation.

## 2020-09-21 RX ORDER — FOLIC ACID 1 MG/1
1 TABLET ORAL DAILY
Qty: 90 TABLET | Refills: 3 | OUTPATIENT
Start: 2020-09-21

## 2020-09-21 NOTE — ED NOTES
Per MD Fernando Juarez pt was ambulated successfully per baseline with walker and therefore dressed to go home with new VS's obtained and PIV removed.  Per Pt request this writer spoke with pts nadia who is approximately 15 min away and gave an update as to pts dispo

## 2020-09-21 NOTE — ED NOTES
Pt provided and explained d/c instructions, at-home care, and follow-up. Pt in nad at this time. Iv access d/c. Vss. Mora. Ambulatory with walker. A&ox3. Belongings with pt. All questions and concerns addressed.

## 2020-09-22 ENCOUNTER — TELEPHONE (OUTPATIENT)
Dept: INTERNAL MEDICINE CLINIC | Facility: CLINIC | Age: 60
End: 2020-09-22

## 2020-09-22 RX ORDER — OMEPRAZOLE 40 MG/1
40 CAPSULE, DELAYED RELEASE ORAL DAILY
Qty: 90 CAPSULE | Refills: 1 | Status: SHIPPED | OUTPATIENT
Start: 2020-09-22 | End: 2020-09-24

## 2020-09-22 RX ORDER — FOLIC ACID 1 MG/1
1 TABLET ORAL DAILY
Qty: 90 TABLET | Refills: 3 | OUTPATIENT
Start: 2020-09-22

## 2020-10-20 PROBLEM — D68.59 HYPERCOAGULABLE STATE (HCC): Status: ACTIVE | Noted: 2020-10-20

## 2020-10-26 ENCOUNTER — NURSE ONLY (OUTPATIENT)
Dept: HEMATOLOGY/ONCOLOGY | Facility: HOSPITAL | Age: 60
End: 2020-10-26
Attending: INTERNAL MEDICINE
Payer: MEDICARE

## 2020-10-26 ENCOUNTER — LAB ENCOUNTER (OUTPATIENT)
Dept: LAB | Age: 60
DRG: 467 | End: 2020-10-26
Attending: ORTHOPAEDIC SURGERY
Payer: MEDICARE

## 2020-10-26 ENCOUNTER — TELEPHONE (OUTPATIENT)
Dept: HEMATOLOGY/ONCOLOGY | Facility: HOSPITAL | Age: 60
End: 2020-10-26

## 2020-10-26 ENCOUNTER — APPOINTMENT (OUTPATIENT)
Dept: LAB | Age: 60
DRG: 467 | End: 2020-10-26
Attending: ORTHOPAEDIC SURGERY
Payer: MEDICARE

## 2020-10-26 DIAGNOSIS — Z01.818 PRE-OP TESTING: ICD-10-CM

## 2020-10-26 DIAGNOSIS — Z86.718 HISTORY OF DVT (DEEP VEIN THROMBOSIS): Primary | ICD-10-CM

## 2020-10-26 PROCEDURE — 96372 THER/PROPH/DIAG INJ SC/IM: CPT

## 2020-10-26 PROCEDURE — 36415 COLL VENOUS BLD VENIPUNCTURE: CPT

## 2020-10-26 PROCEDURE — 86850 RBC ANTIBODY SCREEN: CPT

## 2020-10-26 PROCEDURE — 86901 BLOOD TYPING SEROLOGIC RH(D): CPT

## 2020-10-26 PROCEDURE — 86900 BLOOD TYPING SEROLOGIC ABO: CPT

## 2020-10-26 RX ORDER — ENOXAPARIN SODIUM 150 MG/ML
INJECTION SUBCUTANEOUS DAILY
Status: ON HOLD | COMMUNITY
End: 2020-11-02

## 2020-10-26 RX ORDER — CEFAZOLIN SODIUM/WATER 2 G/20 ML
2 SYRINGE (ML) INTRAVENOUS ONCE
Status: CANCELLED | OUTPATIENT
Start: 2020-10-26 | End: 2020-10-26

## 2020-10-26 RX ORDER — ENOXAPARIN SODIUM 100 MG/ML
80 INJECTION SUBCUTANEOUS ONCE
Status: CANCELLED
Start: 2020-10-27 | End: 2020-10-27

## 2020-10-26 RX ORDER — ENOXAPARIN SODIUM 100 MG/ML
80 INJECTION SUBCUTANEOUS ONCE
Status: COMPLETED | OUTPATIENT
Start: 2020-10-26 | End: 2020-10-26

## 2020-10-26 RX ORDER — ENOXAPARIN SODIUM 100 MG/ML
80 INJECTION SUBCUTANEOUS ONCE
Status: CANCELLED
Start: 2020-10-26 | End: 2020-10-26

## 2020-10-26 RX ADMIN — ENOXAPARIN SODIUM 80 MG: 100 INJECTION SUBCUTANEOUS at 13:27:00

## 2020-10-26 NOTE — PROGRESS NOTES
Patient here for 1 of 3 Lovenox injections prior to knee surgery. Patient arrived per wheelchair. States she is having knee surgery on Thursday. Very anxious about getting injection, repeated several times if the shot will hurt.   Emotional support given

## 2020-10-27 ENCOUNTER — NURSE ONLY (OUTPATIENT)
Dept: HEMATOLOGY/ONCOLOGY | Facility: HOSPITAL | Age: 60
End: 2020-10-27
Attending: INTERNAL MEDICINE
Payer: MEDICARE

## 2020-10-27 VITALS
SYSTOLIC BLOOD PRESSURE: 114 MMHG | TEMPERATURE: 98 F | DIASTOLIC BLOOD PRESSURE: 59 MMHG | HEART RATE: 75 BPM | RESPIRATION RATE: 16 BRPM

## 2020-10-27 DIAGNOSIS — Z86.718 HISTORY OF DVT (DEEP VEIN THROMBOSIS): Primary | ICD-10-CM

## 2020-10-27 PROCEDURE — 96372 THER/PROPH/DIAG INJ SC/IM: CPT

## 2020-10-27 RX ORDER — ENOXAPARIN SODIUM 100 MG/ML
80 INJECTION SUBCUTANEOUS ONCE
Status: CANCELLED
Start: 2020-10-28 | End: 2020-10-28

## 2020-10-27 RX ORDER — ENOXAPARIN SODIUM 100 MG/ML
80 INJECTION SUBCUTANEOUS ONCE
Status: COMPLETED | OUTPATIENT
Start: 2020-10-27 | End: 2020-10-27

## 2020-10-27 RX ADMIN — ENOXAPARIN SODIUM 80 MG: 100 INJECTION SUBCUTANEOUS at 14:52:00

## 2020-10-27 NOTE — PROGRESS NOTES
Patient here for 2 of 3 Lovenox injections prior to knee replacement surgery. Patient arrived per wheelchair. States she is having knee surgery on Thursday. Less anxious today. Lovenox given sq in left lower abdomen, tolerated injection well.    Disch

## 2020-10-28 ENCOUNTER — NURSE ONLY (OUTPATIENT)
Dept: HEMATOLOGY/ONCOLOGY | Facility: HOSPITAL | Age: 60
End: 2020-10-28
Attending: INTERNAL MEDICINE
Payer: MEDICARE

## 2020-10-28 DIAGNOSIS — Z86.718 HISTORY OF DVT (DEEP VEIN THROMBOSIS): Primary | ICD-10-CM

## 2020-10-28 PROCEDURE — 96372 THER/PROPH/DIAG INJ SC/IM: CPT

## 2020-10-28 RX ORDER — ENOXAPARIN SODIUM 100 MG/ML
80 INJECTION SUBCUTANEOUS ONCE
Status: COMPLETED | OUTPATIENT
Start: 2020-10-28 | End: 2020-10-28

## 2020-10-28 RX ORDER — ENOXAPARIN SODIUM 100 MG/ML
80 INJECTION SUBCUTANEOUS ONCE
Status: CANCELLED
Start: 2020-10-28 | End: 2020-10-28

## 2020-10-28 RX ADMIN — ENOXAPARIN SODIUM 80 MG: 100 INJECTION SUBCUTANEOUS at 13:39:00

## 2020-10-28 NOTE — H&P
[facility]    PATIENT'S NAME: Laura Maria   ATTENDING PHYSICIAN: Pilo Metz MD   PATIENT ACCOUNT#:   [id number]    LOCATION:    MEDICAL RECORD #:   [id number]       YOB: 1960  ADMISSION DATE:       10/29/2020    HISTORY A hyponatremia, rheumatoid arthritis. 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 She also had a right elbow replacement in April 5022 complicated by fracture.      MEDICATIONS:  At

## 2020-10-29 ENCOUNTER — APPOINTMENT (OUTPATIENT)
Dept: GENERAL RADIOLOGY | Facility: HOSPITAL | Age: 60
DRG: 467 | End: 2020-10-29
Attending: PHYSICIAN ASSISTANT
Payer: MEDICARE

## 2020-10-29 ENCOUNTER — ANESTHESIA EVENT (OUTPATIENT)
Dept: SURGERY | Facility: HOSPITAL | Age: 60
DRG: 467 | End: 2020-10-29
Payer: MEDICARE

## 2020-10-29 ENCOUNTER — HOSPITAL ENCOUNTER (INPATIENT)
Facility: HOSPITAL | Age: 60
LOS: 4 days | Discharge: NURSING, REHAB OR LONG-TERM CARE | DRG: 467 | End: 2020-11-02
Attending: ORTHOPAEDIC SURGERY | Admitting: ORTHOPAEDIC SURGERY
Payer: MEDICARE

## 2020-10-29 ENCOUNTER — ANESTHESIA (OUTPATIENT)
Dept: SURGERY | Facility: HOSPITAL | Age: 60
DRG: 467 | End: 2020-10-29
Payer: MEDICARE

## 2020-10-29 DIAGNOSIS — Z96.652 HISTORY OF TOTAL KNEE ARTHROPLASTY, LEFT: ICD-10-CM

## 2020-10-29 DIAGNOSIS — Z01.818 PRE-OP TESTING: Primary | ICD-10-CM

## 2020-10-29 PROCEDURE — 0SRD0J9 REPLACEMENT OF LEFT KNEE JOINT WITH SYNTHETIC SUBSTITUTE, CEMENTED, OPEN APPROACH: ICD-10-PCS | Performed by: ORTHOPAEDIC SURGERY

## 2020-10-29 PROCEDURE — 0SPD08Z REMOVAL OF SPACER FROM LEFT KNEE JOINT, OPEN APPROACH: ICD-10-PCS | Performed by: ORTHOPAEDIC SURGERY

## 2020-10-29 PROCEDURE — 73560 X-RAY EXAM OF KNEE 1 OR 2: CPT | Performed by: PHYSICIAN ASSISTANT

## 2020-10-29 DEVICE — NEX STR STEM EXT 12.7 DIX75 30: Type: IMPLANTABLE DEVICE | Site: KNEE | Status: FUNCTIONAL

## 2020-10-29 DEVICE — IMPLANTABLE DEVICE: Type: IMPLANTABLE DEVICE | Site: KNEE | Status: FUNCTIONAL

## 2020-10-29 DEVICE — REFOBACIN BC R 1X40 US: Type: IMPLANTABLE DEVICE | Site: KNEE | Status: FUNCTIONAL

## 2020-10-29 DEVICE — NEX STR STEM EXT 15 DIX145 100: Type: IMPLANTABLE DEVICE | Site: KNEE | Status: FUNCTIONAL

## 2020-10-29 RX ORDER — NALOXONE HYDROCHLORIDE 0.4 MG/ML
80 INJECTION, SOLUTION INTRAMUSCULAR; INTRAVENOUS; SUBCUTANEOUS AS NEEDED
Status: DISCONTINUED | OUTPATIENT
Start: 2020-10-29 | End: 2020-10-29 | Stop reason: HOSPADM

## 2020-10-29 RX ORDER — SODIUM CHLORIDE, SODIUM LACTATE, POTASSIUM CHLORIDE, CALCIUM CHLORIDE 600; 310; 30; 20 MG/100ML; MG/100ML; MG/100ML; MG/100ML
INJECTION, SOLUTION INTRAVENOUS CONTINUOUS
Status: DISCONTINUED | OUTPATIENT
Start: 2020-10-29 | End: 2020-10-29 | Stop reason: HOSPADM

## 2020-10-29 RX ORDER — FAMOTIDINE 20 MG/1
20 TABLET ORAL ONCE
Status: DISCONTINUED | OUTPATIENT
Start: 2020-10-29 | End: 2020-10-29 | Stop reason: HOSPADM

## 2020-10-29 RX ORDER — PROCHLORPERAZINE EDISYLATE 5 MG/ML
5 INJECTION INTRAMUSCULAR; INTRAVENOUS ONCE AS NEEDED
Status: DISCONTINUED | OUTPATIENT
Start: 2020-10-29 | End: 2020-10-29 | Stop reason: HOSPADM

## 2020-10-29 RX ORDER — HYDROCODONE BITARTRATE AND ACETAMINOPHEN 5; 325 MG/1; MG/1
1 TABLET ORAL AS NEEDED
Status: DISCONTINUED | OUTPATIENT
Start: 2020-10-29 | End: 2020-10-29 | Stop reason: HOSPADM

## 2020-10-29 RX ORDER — ATORVASTATIN CALCIUM 10 MG/1
10 TABLET, FILM COATED ORAL NIGHTLY
Status: DISCONTINUED | OUTPATIENT
Start: 2020-10-29 | End: 2020-11-02

## 2020-10-29 RX ORDER — BENZTROPINE MESYLATE 0.5 MG/1
0.5 TABLET ORAL DAILY
Status: DISCONTINUED | OUTPATIENT
Start: 2020-10-30 | End: 2020-11-02

## 2020-10-29 RX ORDER — SENNOSIDES 8.6 MG
17.2 TABLET ORAL NIGHTLY
Status: DISCONTINUED | OUTPATIENT
Start: 2020-10-29 | End: 2020-10-29

## 2020-10-29 RX ORDER — SODIUM PHOSPHATE, DIBASIC AND SODIUM PHOSPHATE, MONOBASIC 7; 19 G/133ML; G/133ML
1 ENEMA RECTAL ONCE AS NEEDED
Status: DISCONTINUED | OUTPATIENT
Start: 2020-10-29 | End: 2020-11-02

## 2020-10-29 RX ORDER — POLYETHYLENE GLYCOL 3350 17 G/17G
17 POWDER, FOR SOLUTION ORAL DAILY PRN
Status: DISCONTINUED | OUTPATIENT
Start: 2020-10-29 | End: 2020-11-02

## 2020-10-29 RX ORDER — DEXAMETHASONE SODIUM PHOSPHATE 4 MG/ML
VIAL (ML) INJECTION AS NEEDED
Status: DISCONTINUED | OUTPATIENT
Start: 2020-10-29 | End: 2020-10-29 | Stop reason: SURG

## 2020-10-29 RX ORDER — NEOSTIGMINE METHYLSULFATE 1 MG/ML
INJECTION INTRAVENOUS AS NEEDED
Status: DISCONTINUED | OUTPATIENT
Start: 2020-10-29 | End: 2020-10-29 | Stop reason: SURG

## 2020-10-29 RX ORDER — HYDROMORPHONE HYDROCHLORIDE 1 MG/ML
0.4 INJECTION, SOLUTION INTRAMUSCULAR; INTRAVENOUS; SUBCUTANEOUS EVERY 2 HOUR PRN
Status: DISCONTINUED | OUTPATIENT
Start: 2020-10-29 | End: 2020-10-30

## 2020-10-29 RX ORDER — PROCHLORPERAZINE EDISYLATE 5 MG/ML
10 INJECTION INTRAMUSCULAR; INTRAVENOUS EVERY 6 HOURS PRN
Status: DISCONTINUED | OUTPATIENT
Start: 2020-10-29 | End: 2020-10-31

## 2020-10-29 RX ORDER — CEFAZOLIN SODIUM/WATER 2 G/20 ML
2 SYRINGE (ML) INTRAVENOUS EVERY 8 HOURS
Status: COMPLETED | OUTPATIENT
Start: 2020-10-29 | End: 2020-10-30

## 2020-10-29 RX ORDER — AMLODIPINE BESYLATE 5 MG/1
5 TABLET ORAL
Status: DISCONTINUED | OUTPATIENT
Start: 2020-10-29 | End: 2020-11-02

## 2020-10-29 RX ORDER — HYDROMORPHONE HYDROCHLORIDE 1 MG/ML
0.8 INJECTION, SOLUTION INTRAMUSCULAR; INTRAVENOUS; SUBCUTANEOUS EVERY 2 HOUR PRN
Status: DISCONTINUED | OUTPATIENT
Start: 2020-10-29 | End: 2020-10-30

## 2020-10-29 RX ORDER — HYDROCODONE BITARTRATE AND ACETAMINOPHEN 10; 325 MG/1; MG/1
2 TABLET ORAL EVERY 4 HOURS PRN
Status: DISCONTINUED | OUTPATIENT
Start: 2020-10-29 | End: 2020-10-30

## 2020-10-29 RX ORDER — DIPHENHYDRAMINE HYDROCHLORIDE 50 MG/ML
12.5 INJECTION INTRAMUSCULAR; INTRAVENOUS EVERY 4 HOURS PRN
Status: DISCONTINUED | OUTPATIENT
Start: 2020-10-29 | End: 2020-10-30

## 2020-10-29 RX ORDER — LIDOCAINE HYDROCHLORIDE 10 MG/ML
INJECTION, SOLUTION EPIDURAL; INFILTRATION; INTRACAUDAL; PERINEURAL AS NEEDED
Status: DISCONTINUED | OUTPATIENT
Start: 2020-10-29 | End: 2020-10-29 | Stop reason: SURG

## 2020-10-29 RX ORDER — ROCURONIUM BROMIDE 10 MG/ML
INJECTION, SOLUTION INTRAVENOUS AS NEEDED
Status: DISCONTINUED | OUTPATIENT
Start: 2020-10-29 | End: 2020-10-29 | Stop reason: SURG

## 2020-10-29 RX ORDER — ONDANSETRON 2 MG/ML
4 INJECTION INTRAMUSCULAR; INTRAVENOUS EVERY 4 HOURS PRN
Status: DISCONTINUED | OUTPATIENT
Start: 2020-10-29 | End: 2020-11-02

## 2020-10-29 RX ORDER — ACETAMINOPHEN 500 MG
1000 TABLET ORAL ONCE
Status: COMPLETED | OUTPATIENT
Start: 2020-10-29 | End: 2020-10-29

## 2020-10-29 RX ORDER — ENOXAPARIN SODIUM 100 MG/ML
40 INJECTION SUBCUTANEOUS DAILY
Status: DISCONTINUED | OUTPATIENT
Start: 2020-10-30 | End: 2020-10-29

## 2020-10-29 RX ORDER — HYDROCODONE BITARTRATE AND ACETAMINOPHEN 5; 325 MG/1; MG/1
2 TABLET ORAL AS NEEDED
Status: DISCONTINUED | OUTPATIENT
Start: 2020-10-29 | End: 2020-10-29 | Stop reason: HOSPADM

## 2020-10-29 RX ORDER — BISACODYL 10 MG
10 SUPPOSITORY, RECTAL RECTAL
Status: DISCONTINUED | OUTPATIENT
Start: 2020-10-29 | End: 2020-11-02

## 2020-10-29 RX ORDER — WARFARIN SODIUM 5 MG/1
5 TABLET ORAL
Status: CANCELLED | OUTPATIENT
Start: 2020-10-29 | End: 2020-10-30

## 2020-10-29 RX ORDER — HALOPERIDOL 5 MG/ML
0.25 INJECTION INTRAMUSCULAR ONCE AS NEEDED
Status: DISCONTINUED | OUTPATIENT
Start: 2020-10-29 | End: 2020-10-29 | Stop reason: HOSPADM

## 2020-10-29 RX ORDER — PANTOPRAZOLE SODIUM 40 MG/1
40 TABLET, DELAYED RELEASE ORAL
Status: DISCONTINUED | OUTPATIENT
Start: 2020-10-30 | End: 2020-11-02

## 2020-10-29 RX ORDER — SODIUM CHLORIDE, SODIUM LACTATE, POTASSIUM CHLORIDE, CALCIUM CHLORIDE 600; 310; 30; 20 MG/100ML; MG/100ML; MG/100ML; MG/100ML
INJECTION, SOLUTION INTRAVENOUS CONTINUOUS
Status: DISCONTINUED | OUTPATIENT
Start: 2020-10-29 | End: 2020-10-30

## 2020-10-29 RX ORDER — FLUPHENAZINE HYDROCHLORIDE 2.5 MG/1
10 TABLET ORAL 2 TIMES DAILY
Status: DISCONTINUED | OUTPATIENT
Start: 2020-10-29 | End: 2020-11-02

## 2020-10-29 RX ORDER — CEFAZOLIN SODIUM/WATER 2 G/20 ML
2 SYRINGE (ML) INTRAVENOUS ONCE
Status: COMPLETED | OUTPATIENT
Start: 2020-10-29 | End: 2020-10-29

## 2020-10-29 RX ORDER — DOCUSATE SODIUM 100 MG/1
100 CAPSULE, LIQUID FILLED ORAL 2 TIMES DAILY
Status: DISCONTINUED | OUTPATIENT
Start: 2020-10-29 | End: 2020-11-02

## 2020-10-29 RX ORDER — SENNOSIDES 8.6 MG
17.2 TABLET ORAL NIGHTLY
Status: DISCONTINUED | OUTPATIENT
Start: 2020-10-29 | End: 2020-11-02

## 2020-10-29 RX ORDER — ONDANSETRON 2 MG/ML
4 INJECTION INTRAMUSCULAR; INTRAVENOUS ONCE AS NEEDED
Status: DISCONTINUED | OUTPATIENT
Start: 2020-10-29 | End: 2020-10-29 | Stop reason: HOSPADM

## 2020-10-29 RX ORDER — ONDANSETRON 2 MG/ML
INJECTION INTRAMUSCULAR; INTRAVENOUS AS NEEDED
Status: DISCONTINUED | OUTPATIENT
Start: 2020-10-29 | End: 2020-10-29 | Stop reason: SURG

## 2020-10-29 RX ORDER — HYDROMORPHONE HYDROCHLORIDE 1 MG/ML
0.4 INJECTION, SOLUTION INTRAMUSCULAR; INTRAVENOUS; SUBCUTANEOUS EVERY 5 MIN PRN
Status: DISCONTINUED | OUTPATIENT
Start: 2020-10-29 | End: 2020-10-29 | Stop reason: HOSPADM

## 2020-10-29 RX ORDER — MORPHINE SULFATE 10 MG/ML
6 INJECTION, SOLUTION INTRAMUSCULAR; INTRAVENOUS EVERY 10 MIN PRN
Status: DISCONTINUED | OUTPATIENT
Start: 2020-10-29 | End: 2020-10-29 | Stop reason: HOSPADM

## 2020-10-29 RX ORDER — DIVALPROEX SODIUM 250 MG/1
500 TABLET, EXTENDED RELEASE ORAL 2 TIMES DAILY
Status: DISCONTINUED | OUTPATIENT
Start: 2020-10-29 | End: 2020-11-02

## 2020-10-29 RX ORDER — DIPHENHYDRAMINE HCL 25 MG
25 CAPSULE ORAL EVERY 4 HOURS PRN
Status: DISCONTINUED | OUTPATIENT
Start: 2020-10-29 | End: 2020-11-02

## 2020-10-29 RX ORDER — HYDROMORPHONE HYDROCHLORIDE 1 MG/ML
INJECTION, SOLUTION INTRAMUSCULAR; INTRAVENOUS; SUBCUTANEOUS AS NEEDED
Status: DISCONTINUED | OUTPATIENT
Start: 2020-10-29 | End: 2020-10-29 | Stop reason: SURG

## 2020-10-29 RX ORDER — SODIUM CHLORIDE 9 MG/ML
INJECTION, SOLUTION INTRAVENOUS CONTINUOUS
Status: DISCONTINUED | OUTPATIENT
Start: 2020-10-29 | End: 2020-10-30

## 2020-10-29 RX ORDER — HYDROCODONE BITARTRATE AND ACETAMINOPHEN 10; 325 MG/1; MG/1
1 TABLET ORAL EVERY 4 HOURS PRN
Status: DISCONTINUED | OUTPATIENT
Start: 2020-10-29 | End: 2020-10-31

## 2020-10-29 RX ORDER — MIDAZOLAM HYDROCHLORIDE 1 MG/ML
INJECTION INTRAMUSCULAR; INTRAVENOUS AS NEEDED
Status: DISCONTINUED | OUTPATIENT
Start: 2020-10-29 | End: 2020-10-29 | Stop reason: SURG

## 2020-10-29 RX ORDER — MORPHINE SULFATE 4 MG/ML
4 INJECTION, SOLUTION INTRAMUSCULAR; INTRAVENOUS EVERY 10 MIN PRN
Status: DISCONTINUED | OUTPATIENT
Start: 2020-10-29 | End: 2020-10-29 | Stop reason: HOSPADM

## 2020-10-29 RX ORDER — GABAPENTIN 300 MG/1
300 CAPSULE ORAL NIGHTLY
Status: DISCONTINUED | OUTPATIENT
Start: 2020-10-29 | End: 2020-11-02

## 2020-10-29 RX ORDER — HYDROMORPHONE HYDROCHLORIDE 1 MG/ML
0.2 INJECTION, SOLUTION INTRAMUSCULAR; INTRAVENOUS; SUBCUTANEOUS EVERY 5 MIN PRN
Status: DISCONTINUED | OUTPATIENT
Start: 2020-10-29 | End: 2020-10-29 | Stop reason: HOSPADM

## 2020-10-29 RX ORDER — METOCLOPRAMIDE 10 MG/1
10 TABLET ORAL ONCE
Status: DISCONTINUED | OUTPATIENT
Start: 2020-10-29 | End: 2020-10-29 | Stop reason: HOSPADM

## 2020-10-29 RX ORDER — DIPHENHYDRAMINE HYDROCHLORIDE 50 MG/ML
25 INJECTION INTRAMUSCULAR; INTRAVENOUS ONCE AS NEEDED
Status: ACTIVE | OUTPATIENT
Start: 2020-10-29 | End: 2020-10-29

## 2020-10-29 RX ORDER — HYDROMORPHONE HYDROCHLORIDE 1 MG/ML
0.6 INJECTION, SOLUTION INTRAMUSCULAR; INTRAVENOUS; SUBCUTANEOUS EVERY 5 MIN PRN
Status: DISCONTINUED | OUTPATIENT
Start: 2020-10-29 | End: 2020-10-29 | Stop reason: HOSPADM

## 2020-10-29 RX ORDER — GLYCOPYRROLATE 0.2 MG/ML
INJECTION, SOLUTION INTRAMUSCULAR; INTRAVENOUS AS NEEDED
Status: DISCONTINUED | OUTPATIENT
Start: 2020-10-29 | End: 2020-10-29 | Stop reason: SURG

## 2020-10-29 RX ORDER — MORPHINE SULFATE 4 MG/ML
2 INJECTION, SOLUTION INTRAMUSCULAR; INTRAVENOUS EVERY 10 MIN PRN
Status: DISCONTINUED | OUTPATIENT
Start: 2020-10-29 | End: 2020-10-29 | Stop reason: HOSPADM

## 2020-10-29 RX ORDER — HYDROMORPHONE HYDROCHLORIDE 1 MG/ML
1.2 INJECTION, SOLUTION INTRAMUSCULAR; INTRAVENOUS; SUBCUTANEOUS EVERY 2 HOUR PRN
Status: DISCONTINUED | OUTPATIENT
Start: 2020-10-29 | End: 2020-10-30

## 2020-10-29 RX ORDER — WARFARIN SODIUM 2.5 MG/1
2.5 TABLET ORAL NIGHTLY
Status: DISCONTINUED | OUTPATIENT
Start: 2020-10-29 | End: 2020-10-30

## 2020-10-29 RX ADMIN — CEFAZOLIN SODIUM/WATER 2 G: 2 G/20 ML SYRINGE (ML) INTRAVENOUS at 12:16:00

## 2020-10-29 RX ADMIN — ROCURONIUM BROMIDE 40 MG: 10 INJECTION, SOLUTION INTRAVENOUS at 12:03:00

## 2020-10-29 RX ADMIN — SODIUM CHLORIDE, SODIUM LACTATE, POTASSIUM CHLORIDE, CALCIUM CHLORIDE: 600; 310; 30; 20 INJECTION, SOLUTION INTRAVENOUS at 15:08:00

## 2020-10-29 RX ADMIN — ONDANSETRON 4 MG: 2 INJECTION INTRAMUSCULAR; INTRAVENOUS at 12:21:00

## 2020-10-29 RX ADMIN — DEXAMETHASONE SODIUM PHOSPHATE 8 MG: 4 MG/ML VIAL (ML) INJECTION at 12:21:00

## 2020-10-29 RX ADMIN — ROCURONIUM BROMIDE 10 MG: 10 INJECTION, SOLUTION INTRAVENOUS at 14:24:00

## 2020-10-29 RX ADMIN — NEOSTIGMINE METHYLSULFATE 4 MG: 1 INJECTION INTRAVENOUS at 15:07:00

## 2020-10-29 RX ADMIN — LIDOCAINE HYDROCHLORIDE 50 MG: 10 INJECTION, SOLUTION EPIDURAL; INFILTRATION; INTRACAUDAL; PERINEURAL at 12:03:00

## 2020-10-29 RX ADMIN — ROCURONIUM BROMIDE 10 MG: 10 INJECTION, SOLUTION INTRAVENOUS at 13:05:00

## 2020-10-29 RX ADMIN — MIDAZOLAM HYDROCHLORIDE 2 MG: 1 INJECTION INTRAMUSCULAR; INTRAVENOUS at 11:59:00

## 2020-10-29 RX ADMIN — HYDROMORPHONE HYDROCHLORIDE 0.5 MG: 1 INJECTION, SOLUTION INTRAMUSCULAR; INTRAVENOUS; SUBCUTANEOUS at 12:58:00

## 2020-10-29 RX ADMIN — ROCURONIUM BROMIDE 10 MG: 10 INJECTION, SOLUTION INTRAVENOUS at 13:48:00

## 2020-10-29 RX ADMIN — GLYCOPYRROLATE 0.6 MG: 0.2 INJECTION, SOLUTION INTRAMUSCULAR; INTRAVENOUS at 15:07:00

## 2020-10-29 RX ADMIN — HYDROMORPHONE HYDROCHLORIDE 0.5 MG: 1 INJECTION, SOLUTION INTRAMUSCULAR; INTRAVENOUS; SUBCUTANEOUS at 14:33:00

## 2020-10-29 NOTE — H&P
[facility] Hospitalist H&P       CC: Knee replacement      PCP: Miriam Chambers MD    History of Present Illness: Ms. Francheska Lopez is a 61 yo F with PMH of bipolar, schizophrenia, HTN, RA, hs of septic arthritis, antiphospholipid ab syn, on coumadin hs of cerebellar CVA, OR   • KNEE TOTAL REVISION Left 4/16/2020    Performed by Teresita Dimas MD at [address] [place] [address] Dr   • [address] Left 3/26/2020    Performed by Teresita Dimas MD at [facility] OR   • [address] Left 11/21/2019    Performed by Matt Richardson Omeprazole 40 MG Oral Capsule Delayed Release, Take 1 capsule (40 mg total) by mouth daily. , Disp: 90 capsule, Rfl: 1    •  sodium chloride 1 g Oral Tab, Take 1 tablet (1 g total) by mouth 2 (two) times daily with meals. , Disp: 60 tablet, Rfl: 0    •  Ferr LMP  (LMP Unknown)   SpO2 98%   BMI 23.43 kg/m²   General:  Alert, no distress   Head:  Normocephalic,    Eyes:  Sclera anicteric, No conjunctival pallor   Nose: Nares normal. 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

## 2020-10-29 NOTE — ANESTHESIA PREPROCEDURE EVALUATION
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 , Disp: 90 capsule, Rfl: 1, 10/29/2020 at 700    •  sodium chloride 1 g Oral Tab, Take 1 tablet (1 g total) by mouth 2 (two) times daily with Jaci Wallace MD, Last Rate: 20 mL/hr at 10/29/20 1043    •  famoTIDine (PEPCID) tab 20 mg, 20 mg, Oral, Once, Jaci Chan MD    •  Metoclopramide HCl (REGLAN) tab 10 mg, 10 mg, Oral, Once, Jaci Chan MD    •  ceFAZolin sodium (ANCEF/KEFZOL) file    Lifestyle      Physical activity        Days per week: Not on file        Minutes per session: Not on file      Stress: Not on file    Relationships      Social connections        Talks on phone: Not on file        Gets together: Not on file Dental - normal exam     Pulmonary - negative ROS and normal exam   Cardiovascular - normal exam  Exercise tolerance: good    NYHA Classification: I    Neuro/Psych - negative ROS     GI/Hepatic/Renal - negative ROS     Endo/Other - negative ROS   Abdominal

## 2020-10-29 NOTE — BRIEF OP NOTE
Pre-Operative Diagnosis: History of total knee arthroplasty, left [Z96.652]     Post-Operative Diagnosis: History of total knee arthroplasty, left [Z96.652]      Procedure Performed:   Procedure(s):  REVISION OF LEFT TOTAL KNEE ARTHOPLASTY     Surgeon(s) a

## 2020-10-29 NOTE — INTERVAL H&P NOTE
Pre-op Diagnosis: History of total knee arthroplasty, left [Z96.652]    The above referenced H&P was reviewed by Thomas Bone MD on 10/29/2020, the patient was examined and no significant changes have occurred in the patient's condition since the H&P

## 2020-10-29 NOTE — PACU NOTE
Pt following simple commands throughout time in PACU but very sleepy. When pt fully woke up she was very agitated and confused. Pt yelling at staff \"to get her check book\" and trying to get out of bed. Called MD to update on pt's status.  Per MD do not gi

## 2020-10-29 NOTE — ANESTHESIA POSTPROCEDURE EVALUATION
Patient: Bhanu Acosta    Procedure Summary     Date: 10/29/20 Room / Location: [facility] OR 04 / [facility] OR    Anesthesia Start: 9969 Anesthesia Stop: 6166    Procedure: KNEE TOTAL REVISION (Left Knee) Diagnosis:       History of total knee arthroplasty,

## 2020-10-29 NOTE — ANESTHESIA PROCEDURE NOTES
Airway  Date/Time: 10/29/2020 12:05 PM  Urgency: elective    Airway not difficult    General Information and Staff    Patient location during procedure: OR  Anesthesiologist: Flor Rasheed MD  Resident/CRNA: Toshia Bueno CRNA  Performed: CRNA

## 2020-10-30 RX ORDER — TRAMADOL HYDROCHLORIDE 50 MG/1
50 TABLET ORAL EVERY 6 HOURS PRN
Status: DISCONTINUED | OUTPATIENT
Start: 2020-10-30 | End: 2020-11-02

## 2020-10-30 RX ORDER — ACETAMINOPHEN 325 MG/1
650 TABLET ORAL EVERY 6 HOURS PRN
Status: DISCONTINUED | OUTPATIENT
Start: 2020-10-30 | End: 2020-11-02

## 2020-10-30 RX ORDER — ENOXAPARIN SODIUM 100 MG/ML
40 INJECTION SUBCUTANEOUS DAILY
Status: DISCONTINUED | OUTPATIENT
Start: 2020-10-30 | End: 2020-11-01

## 2020-10-30 RX ORDER — WARFARIN SODIUM 2.5 MG/1
5 TABLET ORAL NIGHTLY
Status: DISCONTINUED | OUTPATIENT
Start: 2020-10-30 | End: 2020-11-01

## 2020-10-30 RX ORDER — HYDRALAZINE HYDROCHLORIDE 25 MG/1
25 TABLET, FILM COATED ORAL EVERY 6 HOURS PRN
Status: DISCONTINUED | OUTPATIENT
Start: 2020-10-30 | End: 2020-11-02

## 2020-10-30 NOTE — PLAN OF CARE
VSS. Straight cath today; bladder scan show >900cc. Output 1400cc from cath. Will continue to monitor. Weight bearing as tolerated - gets up x1 assist with walker & rolling commode. Utilized gait belt for safety. SCDs on bilaterally. Up to chair today.  Pt the level they choose  - Honor patient and family perspectives and choices  Outcome: Progressing     Problem: MUSCULOSKELETAL - ADULT  Goal: Return mobility to safest level of function  Description: INTERVENTIONS:  - Assess patient stability and activity t to assist with strengthening/mobility  - Encourage toileting schedule  Outcome: Progressing     Problem: DISCHARGE PLANNING  Goal: Discharge to home or other facility with appropriate resources  Description: INTERVENTIONS:  - Identify barriers to discharge

## 2020-10-30 NOTE — PLAN OF CARE
Spoke with Gemini Schroeder this AM regarding patient isolation status. Per Magalis Gaona, patient does not need to be isolated at this time as she does not have any active cultures pending. Patient to be on standard isolation precautions.

## 2020-10-30 NOTE — CM/SW NOTE
MONTSERRAT received MDO for discharge planning. SW to discuss discharge planning with pt. MONTSERRAT met with pt at bedside to complete initial assessment. SW confirmed pt's address. Pt lives in a 1 level condo w/ elevators alone.  There is/are 1 steps into the home an Refill ADDERALL    [facility]

## 2020-10-30 NOTE — PAYOR COMM NOTE
--------------  ADMISSION REVIEW     Payor: [id number] [place] [address] #:  [id number]  Authorization Number: [id number]    Admit date: 10/29/20  Admit time: [address]       Admitting Physician: Azar Medina MD  Attending Physician:  Mayank Garcia distress   Head:  Normocephalic,    Eyes:  Sclera anicteric, No conjunctival pallor   Nose: Nares normal. Septum midline    Throat: Lips, mucosa, and tongue normal    Neck: Supple    Lungs:   Clear to auscultation bilaterally.  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

## 2020-10-30 NOTE — PROGRESS NOTES
[facility] Hospitalist Progress Note     CC: Hospital Follow up    PCP: Lindsay Delacruz MD       Assessment/Plan:     Active Problems:    Infected prosthetic knee joint ([address]    Dislocation of hip, posterior, right, closed [facility])    Ms. Jayme Samson is a 63 yo F with PMH of 127/75      Intake/Output:    Intake/Output Summary (Last 24 hours) at 10/30/2020 1413  Last data filed at 10/30/2020 1138  Gross per 24 hour   Intake 2676.82 ml   Output 3200 ml   Net -523.18 ml       Last 3 Weights  10/29/20 1050 : 116 lb (52.6 kg)  10/2 mg Oral Nightly   • Pantoprazole Sodium  40 mg Oral QAM AC   • Warfarin Sodium  2.5 mg Oral Nightly     • lactated ringers 20 mL/hr at 10/29/20 1043   • sodium chloride 83 mL/hr at 10/30/20 1138     hydrALAzine HCl, acetaminophen, traMADol HCl, sodium chlo

## 2020-10-30 NOTE — PHYSICAL THERAPY NOTE
Pt PM session: Pt education with transfers with min A with a RW. Pt education to amb with ' with a step to gait pattern and decreased heel strike and toe off.  Pt presents in PM with significantly improved alertness with decreased drowsiness followin

## 2020-10-30 NOTE — OPERATIVE REPORT
[facility]    PATIENT'S NAME: Laureanolyle St   ATTENDING PHYSICIAN: Pilo Hollis MD   OPERATING PHYSICIAN: Pilo Hollis MD   PATIENT ACCOUNT#:   [id number]    LOCATION:  [address] [place] [address] #:   [id number]       DATE OF BI TECHNIQUE:  The patient was prepared in the preoperative staging area where the surgical site was confirmed and marked. All questions answered. She was taken to the operating room with preoperative antibiotics running.   She was positioned on the operatin trabecular metal proximal tibial augment size small. The punch was then used for the small trabecular metal augment with careful attention to not fracture the proximal tibia.   The trial fit well and the proximal tibia was measured to a size D.  a 10 mm au position and excess cement was removed. The cement was allowed to dry prior to implanting the femoral component. Antibiotic cement was used.   The femoral component was assembled on the back table using the trial as a guide and was impacted on a clean bed

## 2020-10-30 NOTE — PHYSICAL THERAPY NOTE
PHYSICAL THERAPY KNEE EVALUATION - INPATIENT       Room Number: 420/420-A  Evaluation Date: 10/30/2020  Type of Evaluation: Initial  Physician Order: PT Eval and Treat    Presenting Problem: TKR LLE revision   Reason for Therapy: Mobility Dysfunction and D motion;Transfer training;Balance training;Strengthening  Rehab Potential : Fair  Frequency (Obs): Daily       PHYSICAL THERAPY MEDICAL/SOCIAL HISTORY     History related to current admission: LLE TKR with complications requiring a revision     Problem List REVISION Left 10/29/2020    Performed by Vinny Mg MD at [facility] MAIN OR   • KNEE TOTAL REVISION Left 4/16/2020    Performed by Vinny Mg MD at [facility] MAIN OR   • KNEE TOTAL REVISION Left 3/26/2020    Performed by Vinny Mg MD at [facility] O2 WALK                  AM-PAC '6-Clicks' INPATIENT SHORT FORM - BASIC MOBILITY  How much difficulty does the patient currently have. ..  -   Turning over in bed (including adjusting bedclothes, sheets and blankets)?: A Lot   -   Sitting down on and st Status    Goal #3 Patient is able to ambulate 300 feet with assistive device at assistance level: modified independent    Goal #3   Current Status    Goal #4 Patient will negotiate 4 stairs/one curb w/ assistive device and supervision   Goal #4   Current S

## 2020-10-30 NOTE — PLAN OF CARE
Laine Cannon arrived to the unit last night drowsy mostly. At times when she became alert she was irritable and verbally abusive, this in regards to repositioning and bed side rails x 2 being up. Tolerating clear liquid intake with medications whole.  She is on injury  Description: INTERVENTIONS:  - Assess pt frequently for physical needs  - Identify cognitive and physical deficits and behaviors that affect risk of falls.   - [place] fall precautions as indicated by assessment.  - Educate pt/family on patient sa

## 2020-10-30 NOTE — PROGRESS NOTES
POD#1 s/p Revision of the left knee   PaIN WELL CONTROLLED ON ORALS  No nausea  Slept well    Xray reviewed    Dressing with some drainage  Calf's non tender, DNVI    Imp: POD#1 s/p revision left TKA    Plan: Dressing change  Rehab as tolerated left knee

## 2020-10-31 RX ORDER — HYDROCODONE BITARTRATE AND ACETAMINOPHEN 5; 325 MG/1; MG/1
2 TABLET ORAL EVERY 4 HOURS PRN
Status: DISCONTINUED | OUTPATIENT
Start: 2020-10-31 | End: 2020-11-02

## 2020-10-31 RX ORDER — HYDROCODONE BITARTRATE AND ACETAMINOPHEN 5; 325 MG/1; MG/1
1 TABLET ORAL EVERY 4 HOURS PRN
Status: DISCONTINUED | OUTPATIENT
Start: 2020-10-31 | End: 2020-11-02

## 2020-10-31 NOTE — PROGRESS NOTES
[facility] Hospitalist Progress Note     CC: Hospital Follow up    PCP: Jaime Brown MD       Assessment/Plan:     Active Problems:    Infected prosthetic knee joint ([address]    Dislocation of hip, posterior, right, closed [facility])    Ms. Ralph Varner is a 63 yo F with PMH of [70-83] 77  Resp:  [14-18] 18  BP: (111-149)/() 111/84      Intake/Output:    Intake/Output Summary (Last 24 hours) at 10/31/2020 1356  Last data filed at 10/31/2020 1120  Gross per 24 hour   Intake 1500 ml   Output 3800 ml   Net -2300 ml       Last amLODIPine Besylate  5 mg Oral Daily   • atorvastatin  10 mg Oral Nightly   • Benztropine Mesylate  0.5 mg Oral Daily   • divalproex Sodium ER  500 mg Oral BID   • fluPHENAZine HCl  10 mg Oral BID   • OLANZapine  25 mg Oral Nightly   • Pantoprazole Sodium

## 2020-10-31 NOTE — PHYSICAL THERAPY NOTE
PHYSICAL THERAPY KNEE TREATMENT NOTE - INPATIENT     Room Number: 420/420-A       Presenting Problem: TKR LLE revision     Problem List  Active Problems:    Infected prosthetic knee joint ([address]    Dislocation of hip, posterior, right, closed ([address]      PHY INPATIENT SHORT FORM - BASIC MOBILITY  How much difficulty does the patient currently have. ..  -   Turning over in bed (including adjusting bedclothes, sheets and blankets)?: A Little   -   Sitting down on and standing up from a chair with arms (e.g., whee Current Status NT   Goal #5  AROM 0 degrees extension to 95 degrees flexion     Goal #5   Current Status In progress   Goal #6 Patient independently performs home exercise program for ROM/strengthening per the instructions provided in preparation for dis

## 2020-10-31 NOTE — PROGRESS NOTES
Per POA/Mother, Ana Hahn, she took home patient's cell phone today. Patient states that she brought 3 phones with her and that they are missing. Per Aan Hahn, patient is confused and does not have 3 phones.  Patient also explained that she was missing 3 purse

## 2020-10-31 NOTE — PLAN OF CARE
Tolerating meals, up to chair and ambulating with walker and 1 assist, dressing changed by md in am, prn pain medication given, arellano inserted due to retaining urine. Plan for discharge to rehab.        Problem: Patient Centered Care  Goal: Patient preferen interventions unsuccessful or patient reports new pain  - Anticipate increased pain with activity and pre-medicate as appropriate  Outcome: Progressing     Problem: SAFETY ADULT - FALL  Goal: Free from fall injury  Description: INTERVENTIONS:  - Assess pt

## 2020-10-31 NOTE — PROGRESS NOTES
POD#2 s/p Left TKA revision  Dressing with some drainage  Some breakthrough pain    H/H stable    Calfs non tender, DNVI  Knee with moderate hematoma/effusion    Imp; post op day 2 s/p revision Left TKA with some bloody effusion  And slight drainage.     Pl

## 2020-10-31 NOTE — OCCUPATIONAL THERAPY NOTE
OCCUPATIONAL THERAPY EVALUATION - INPATIENT      Room Number: 420/420-A  Evaluation Date: 10/31/2020  Type of Evaluation: Initial  Presenting Problem: (infected prosthetic knee joint)    Physician Order: IP Consult to Occupational Therapy  Reason for Thera conservation/work simplification techniques;ADL training;Functional transfer training; Endurance training;Patient/Family education;Patient/Family training;Equipment eval/education; Compensatory technique education       OCCUPATIONAL THERAPY MEDICAL/SOCIAL HI MAIN OR   • KNEE TOTAL REVISION Left 10/29/2020    Performed by Cl Le MD at [address] [place] [address] OR   • [address] Left 4/16/2020    Performed by Cl Le MD at [address] [place] [address] OR   • [address] Left 3/26/2020    Performed by Michelle Jackson Conchis    AM-PAC Score:  Score: 16  Approx Degree of Impairment: 53.32%  Standardized Score (AM-PAC Scale): 35.96  CMS Modifier (G-Code): CK    FUNCTIONAL TRANSFER ASSESSMENT  Supine to Sit : Minimum assistance  Sit to Stand: Minimum assistance  Toilet Tra

## 2020-10-31 NOTE — PLAN OF CARE
Talon Zapata had an uneventful night. She was more cooperative and pleasant. She is voiding well. Ambulating with the walker and 1 assist. Pain managed wit oral medications. Bed alarm is on, all needs within reach.  The plan is for discharge to either SNF or [facility]

## 2020-11-01 RX ORDER — WARFARIN SODIUM 2.5 MG/1
2.5 TABLET ORAL NIGHTLY
Status: DISCONTINUED | OUTPATIENT
Start: 2020-11-01 | End: 2020-11-02

## 2020-11-01 RX ORDER — SODIUM CHLORIDE 1000 MG
1 TABLET, SOLUBLE MISCELLANEOUS 2 TIMES DAILY WITH MEALS
Status: DISCONTINUED | OUTPATIENT
Start: 2020-11-01 | End: 2020-11-02

## 2020-11-01 NOTE — PLAN OF CARE
Problem: Patient Centered Care  Goal: Patient preferences are identified and integrated in the patient's plan of care  Description: Interventions:  - What would you like us to know as we care for you?  Pt from home alone  - Provide timely, complete, and a MD/LIP if interventions unsuccessful or patient reports new pain  - Anticipate increased pain with activity and pre-medicate as appropriate  11/1/2020 1754 by Maria C Garcia RN  Outcome: Progressing  11/1/2020 1753 by Maria C Garcia, RN  Outcome: Progre Steve Cruz RN  Outcome: Progressing      Pt alert and orientated but can be forgetful at times. Pt is easily reorientated by staff members. Moderate drainage on left knee dressing this AM and this afternoon.  Dressing was changed twice, and is clean/d

## 2020-11-01 NOTE — PLAN OF CARE
Problem: MUSCULOSKELETAL - ADULT  Goal: Return mobility to safest level of function  Description: INTERVENTIONS:  - Assess patient stability and activity tolerance for standing, transferring and ambulating w/ or w/o assistive devices  - Assist with trans Problem: DISCHARGE PLANNING  Goal: Discharge to home or other facility with appropriate resources  Description: INTERVENTIONS:  - Identify barriers to discharge w/pt and caregiver  - Include patient/family/discharge partner in discharge Jose Roa

## 2020-11-01 NOTE — PROGRESS NOTES
[facility] Hospitalist Progress Note     CC: Hospital Follow up    PCP: Kasey Donato MD       Assessment/Plan:     Active Problems:    Infected prosthetic knee joint ([address]    Dislocation of hip, posterior, right, closed [facility])    Ms. Noam Dobbins is a 63 yo F with PMH of 141/84, pulse 115, temperature 97.5 °F (36.4 °C), temperature source Oral, resp. rate 16, height 4' 11\" (1.499 m), weight 116 lb (52.6 kg), SpO2 97 %, not currently breastfeeding.     Temp:  [97.5 °F (36.4 °C)-98.9 °F (37.2 °C)] 97.5 °F (36.4 °C)  Pulse: constrained arthroplasty.     Dictated by (CST): Harjinder Garcia MD on 10/29/2020 at 4:35 PM     Finalized by (CST): Harjinder Garcia MD on 10/29/2020 at 4:38 PM              Meds:     • Warfarin Sodium  2.5 mg Oral Nightly   • Senna  17.2 mg Oral Nigh

## 2020-11-01 NOTE — CONSULTS
NEPHROLOGY CONSULT NOTE       DATE: 11/1/2020    Requesting Physician: Dr. Rubio Dumont     Reason for Consult: Hyponatremia      HISTORY OF PRESENT ILLNESS: Falguni Dorsey is 62year old female with history of HTN, RA, Bipolar schizophrenia and chronic hyponatrem liner exchange    • KNEE REPLACEMENT SURGERY Left 09/26/2019    @ [facility]- has had 5 replacements   • KNEE TOTAL REPLACEMENT Left 9/26/2019    Performed by Ca Ramírez MD at [address]   • [address] Left 10/29/2020    Performed by Master Motta Not on file        Attends [employer] service: Not on file        Active member of club or organization: Not on file        Attends meetings of clubs or organizations: Not on file        Relationship status: Not on file      Intimate partner violence 10 mg, Oral, BID    •  OLANZapine (ZYPREXA) tab 25 mg, 25 mg, Oral, Nightly    •  Pantoprazole Sodium (PROTONIX) EC tab 40 mg, 40 mg, Oral, QAM AC        •  Enoxaparin Sodium 150 MG/ML Subcutaneous Solution, Inject into the skin daily.       •  Warfarin Sod (VITAMIN C) 1000 MG Oral Tab, Take 1,000 mg by mouth daily. •  Multiple Vitamins-Minerals (MULTIVITAL) Oral Tab, Take 1 tablet by mouth daily.     •  HYDROcodone-acetaminophen (NORCO)  MG Oral Tab, 1-2 TABS EVERY 4-6 HOURS AS NEEDED FOR PAIN    • history of HTN, RA, Bipolar schizophrenia and chronic hyponatremia thought to be due to SIADH. She is currently admitted for Left TKA and POD #3.   Nephrology is consulted for hyponatremia    Hyponatremia  - has chronic hyponatremia thought to be due to [facility]

## 2020-11-01 NOTE — PHYSICAL THERAPY NOTE
PHYSICAL THERAPY KNEE TREATMENT NOTE - INPATIENT     Room Number: 420/420-A             Presenting Problem: TKR LLE revision     Problem List  Active Problems:    Infected prosthetic knee joint ([address]    Dislocation of hip, posterior, right, closed ([address] L Lower Extremity: Weight Bearing as Tolerated    PAIN ASSESSMENT   Rating: (did not rate)  Location: L knee  Management Techniques: Activity promotion; Body mechanics; Relaxation;Repositioning    BALANCE  Static Sitting: Good  Dynamic Sitting: Fair + #2  Current Status Min A with the rW   Goal #3 Patient is able to ambulate 300 feet with assistive device at assistance level: modified independent    Goal #3   Current Status 20 ft with RW with Min A   Goal #4 Patient will negotiate 4 stairs/one curb w/ a

## 2020-11-01 NOTE — PROGRESS NOTES
POD#3 s/p revision of the left knee  Pain controlled on orals  No nausea  Grissom replaced secondary to retention    Lab with decreasing Sodium (130)  H/H stable  PT/INR therapeutic     Dressing with slight drainage  Reduced effusion  Calfs non tender, DNVI

## 2020-11-02 VITALS
HEIGHT: 59 IN | BODY MASS INDEX: 23.39 KG/M2 | TEMPERATURE: 98 F | WEIGHT: 116 LBS | HEART RATE: 79 BPM | SYSTOLIC BLOOD PRESSURE: 113 MMHG | DIASTOLIC BLOOD PRESSURE: 77 MMHG | RESPIRATION RATE: 16 BRPM | OXYGEN SATURATION: 98 %

## 2020-11-02 PROCEDURE — 99232 SBSQ HOSP IP/OBS MODERATE 35: CPT | Performed by: OTHER

## 2020-11-02 RX ORDER — TRAMADOL HYDROCHLORIDE 50 MG/1
50 TABLET ORAL EVERY 6 HOURS PRN
Qty: 30 TABLET | Refills: 0 | Status: SHIPPED | OUTPATIENT
Start: 2020-11-02 | End: 2021-05-18 | Stop reason: ALTCHOICE

## 2020-11-02 RX ORDER — HYDROCODONE BITARTRATE AND ACETAMINOPHEN 5; 325 MG/1; MG/1
2 TABLET ORAL EVERY 4 HOURS PRN
Qty: 30 TABLET | Refills: 0 | Status: SHIPPED | OUTPATIENT
Start: 2020-11-02 | End: 2021-05-18 | Stop reason: ALTCHOICE

## 2020-11-02 NOTE — PLAN OF CARE
Juan Carlos Garcia is up with 1 assist and the walker, moves with a slow but steady gait. Wales prn for pain control, takes sparingly. Patient is AOx3, can get confused at times and yell out. Easily reoriented by staff.  Fluid restrictions in place and sodium tablets g effects  - Notify MD/LIP if interventions unsuccessful or patient reports new pain  - Anticipate increased pain with activity and pre-medicate as appropriate  Outcome: Progressing     Problem: SAFETY ADULT - FALL  Goal: Free from fall injury  Description:

## 2020-11-02 NOTE — PAYOR COMM NOTE
--------------  CONTINUED STAY REVIEW    Payor: Nito  Subscriber #:  [id number]  Authorization Number: [id number] / Aye KHOURY#[id number]    Admit date: 10/29/20  Admit time: 1905    Admitting Physician: Cl Le MD  Attending Phys 29.0 11/01/2020     BUN 14 11/01/2020     CREATSERUM 0.41 (L) 11/01/2020     ASSESSMENT AND PLAN:   This is 62year old female with history of HTN, RA, Bipolar schizophrenia and chronic hyponatremia thought to be due to SIADH.  She is currently admitted for 11/1/2020 2059 Given 1 g Oral [employer]on [employer]    11/1/2020 1357 Given 1 g Oral Paulo Myers RN      Warfarin Sodium (COUMADIN) tab 2.5 mg     Date Action Dose Route User    11/1/2020 2100 Given 2.5 mg Oral Johns, Linnette Duane, RN

## 2020-11-02 NOTE — CM/SW NOTE
MONTSERRAT received confirmation of insurance authorization. MONTSERRAT confirmed with RN that pt is medically ready for discharge today. MONTSERRAT called and spoke with Patricia Rodrigues from [facility]  to arrange a time for discharge. RN is aware of discharge time and location.  MONTSERRAT called and n

## 2020-11-02 NOTE — PROGRESS NOTES
POD#4 s/p left revision TKA  Nephrology consult noted  Small amount of drainage on dressing  Pain well controled on orals    PT/INR stable  H/H stable    Incision stable with minimal drainage  Several small fracture blisters anterior knee  Calfs non tender

## 2020-11-02 NOTE — PAYOR COMM NOTE
--------------  CONTINUED STAY REVIEW    Payor: Nito  Subscriber #:  [id number]  Authorization Number: [id number] / Mago LEA#[id number]    Admit date: 10/29/20  Admit time: 1905    Admitting Physician: Martha Ken MD  Attending Phys 12.6    PLT   150.0 - 450.0 10(3)uL 243.0             Left TKA  - oral and IV meds pain control wean to oral meds as able  - Adv diet, zofran for nausea, OBR  - PT/OT/SW  - monitor for acute blood loss anemia, cbc in am  - lovenox ppx dose per ortho with w

## 2020-11-02 NOTE — CONSULTS
[facility]    PATIENT'S NAME: Ragini Emersonford   ATTENDING PHYSICIAN: Pilo Villegas MD   CONSULTING PHYSICIAN: Rita Mcgee MD   PATIENT ACCOUNT#:   [id number]    LOCATION:  [id number] [facility] #:   [id number]       DATE OF weight has been stable. The patient, however, still insisted that she brought 3 cell phones and 3 purses to the hospital with her. She said that she had to get them out of the apartment, because people break in and might steal them.     MEDICATIONS:  Am or so.  Insight and judgment are currently poor, thought content and process are notable for significant memory loss. Assets include a supportive mother and liabilities include her illnesses. She is not oriented.        INITIAL ASSESSMENT:  This is a 62-y

## 2020-11-02 NOTE — PLAN OF CARE
Problem: MUSCULOSKELETAL - ADULT  Goal: Return mobility to safest level of function  Description: INTERVENTIONS:  - Assess patient stability and activity tolerance for standing, transferring and ambulating w/ or w/o assistive devices  - Assist with trans schedule  Outcome: Adequate for Discharge     Problem: DISCHARGE PLANNING  Goal: Discharge to home or other facility with appropriate resources  Description: INTERVENTIONS:  - Identify barriers to discharge w/pt and caregiver  - Include patient/family/disc

## 2020-11-02 NOTE — PHYSICAL THERAPY NOTE
PHYSICAL THERAPY KNEE TREATMENT NOTE - INPATIENT     Room Number: 420/420-A             Presenting Problem: TKR LLE revision     Problem List  Active Problems:    Infected prosthetic knee joint ([address]    Dislocation of hip, posterior, right, closed ([address] Techniques: Activity promotion; Body mechanics; Relaxation;Repositioning    BALANCE  Static Sitting: Good  Dynamic Sitting: Fair +  Static Standing: Poor +  Dynamic Standing: Poor +    ACTIVITY TOLERANCE                         O2 WALK                  AM-PA Goal #3   Current Status 60 ft with RW with Min A   Goal #4 Patient will negotiate 4 stairs/one curb w/ assistive device and supervision   Goal #4   Current Status NT   Goal #5  AROM 0 degrees extension to 95 degrees flexion     Goal #5   Current Status

## 2020-11-02 NOTE — PAYOR COMM NOTE
--------------  CONTINUED STAY REVIEW    Payor: Nito  Subscriber #:  [id number]  Authorization Number: [id number] / Prashant MEEKS#[id number]    Admit date: 10/29/20  Admit time: 1905    Admitting Physician: Yashira Crocker MD  Attending Phys POD #3.  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  - TSH in august was within range   - Negative orthostatics.   - pain and nausea stimulate

## 2020-11-02 NOTE — PAYOR COMM NOTE
--------------  CONTINUED STAY REVIEW    Payor: Nito  Subscriber #:  [id number]  Authorization Number: [id number] / Prashant GRADY#[id number]    Admit date: 10/29/20  Admit time: 1905    Admitting Physician: Yashira Crocker MD  Attending Phys 10/30/20  0643   RBC 3.80   HGB 11.9*   HCT 35.1   MCV 92.4   MCH 31.3   MCHC 33.9   RDW 12.7   NEPRELIM 8.56*   WBC 11.7*   .0                Recent Labs   Lab 10/30/20  0643   GLU 97   BUN 12   CREATSERUM 0.50*   GFRAA 123   GFRNAA 106   CA 8.4*

## 2020-11-02 NOTE — PROGRESS NOTES
[facility] Hospitalist Progress Note     CC: Hospital Follow up    PCP: Ashlie Arana MD       Assessment/Plan:   Ms. Fam Rodriguez is a 64 yo F with PMH of bipolar, schizophrenia, HTN, Rheumatoid Arthritis, recent septic arthritis, history of cerebellar CVA, antiphospholi kg), SpO2 98 %, not currently breastfeeding.     Temp:  [97.5 °F (36.4 °C)-99.2 °F (37.3 °C)] 98.6 °F (37 °C)  Pulse:  [] 81  Resp:  [16-22] 16  BP: (100-140)/(69-90) 115/90      Intake/Output:    Intake/Output Summary (Last 24 hours) at 11/2/2020 123 divalproex Sodium ER  500 mg Oral BID   • fluPHENAZine HCl  10 mg Oral BID   • OLANZapine  25 mg Oral Nightly   • Pantoprazole Sodium  40 mg Oral QAM AC       HYDROcodone-acetaminophen **OR** HYDROcodone-acetaminophen, hydrALAzine HCl, acetaminophen, traMA

## 2020-11-02 NOTE — PROGRESS NOTES
[facility]    Progress Note    Sri Torres Patient Status:  Inpatient    1960 MRN [id number]   Location [facility] 4W/SW/SE Attending Raleigh Zuleta MD   Hosp Day # 4 PCP Gilma Jenkins MD       Subjective:     Complaini POD #3. 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  - TSH in august was within range   - Negative orthostatics.   - pain and nausea stimulate ADH

## 2020-11-03 ENCOUNTER — EXTERNAL FACILITY (OUTPATIENT)
Dept: INTERNAL MEDICINE CLINIC | Facility: CLINIC | Age: 60
End: 2020-11-03

## 2020-11-03 DIAGNOSIS — E87.1 HYPONATREMIA: ICD-10-CM

## 2020-11-03 DIAGNOSIS — D68.59 HYPERCOAGULABLE STATE (HCC): ICD-10-CM

## 2020-11-03 DIAGNOSIS — Z96.652 PAIN DUE TO TOTAL LEFT KNEE REPLACEMENT, SUBSEQUENT ENCOUNTER: ICD-10-CM

## 2020-11-03 DIAGNOSIS — S73.014D CLOSED POSTERIOR DISLOCATION OF RIGHT HIP, SUBSEQUENT ENCOUNTER: ICD-10-CM

## 2020-11-03 DIAGNOSIS — T84.84XD PAIN DUE TO TOTAL LEFT KNEE REPLACEMENT, SUBSEQUENT ENCOUNTER: ICD-10-CM

## 2020-11-03 DIAGNOSIS — Z86.718 HISTORY OF DVT (DEEP VEIN THROMBOSIS): ICD-10-CM

## 2020-11-03 PROCEDURE — 1111F DSCHRG MED/CURRENT MED MERGE: CPT | Performed by: INTERNAL MEDICINE

## 2020-11-03 PROCEDURE — 99306 1ST NF CARE HIGH MDM 50: CPT | Performed by: INTERNAL MEDICINE

## 2020-11-03 NOTE — PROGRESS NOTES
The patient is a 41-year-old  single female with history of schizoaffective disorder who presents to the hospital for knee replacement. Patient demonstrated some confusion and agitation post surgery indicate psych consult.   Patient seen by Dr. Karen Mojica intact  Thought to process somewhat distracted. Intellect is limited for the chronic schizoaffective cognitive deficit syndrome. Judgment insight are appropriate. The patient has been compliant on her medication and aware of her psychiatric condition.

## 2020-11-03 NOTE — DISCHARGE SUMMARY
General Medicine Discharge Summary     Patient ID:  Natasha Rush  62year old  9/28/1960    Admit date: 10/29/2020    Discharge date and time: 11/2/2020  6:09 PM     Attending Physician: Ashli Figueroa    Hypertension   -continue home amlodipine     Rheumatoid arthritis  -stable    Operative Procedures: Procedure(s) (LRB):  KNEE TOTAL REVISION (Left)       Patient instructions:      Discharge Medication List as of 11/2/2020  5:20 PM    START taking the MG Oral Tab  Take 325 mg by mouth 2 (two) times daily.  Take in between meals, not with meals, Normal, Disp-60 tablet, R-2    SENNA 8.6 MG Oral Tab  TAKE 2 TABLETS BY MOUTH NIGHTLY, Normal, Disp-90 tablet, R-0    acetaminophen 325 MG Oral Tab  Take 2 tablet 30 minutes    Vivek Fregoso, DO

## 2020-11-03 NOTE — PAYOR COMM NOTE
--------------  DISCHARGE REVIEW    Payor: [address] #:  [id number]  Authorization Number: [id number] / January Dobbs #[id number]    Admit date: 10/29/20  Admit time:  1905  Discharge Date: 11/2/2020  6:09 PM     Admitting Physician: Jocelyn Escobar

## 2020-11-05 ENCOUNTER — EXTERNAL FACILITY (OUTPATIENT)
Dept: INTERNAL MEDICINE CLINIC | Facility: CLINIC | Age: 60
End: 2020-11-05

## 2020-11-05 DIAGNOSIS — Z96.652 S/P TOTAL KNEE ARTHROPLASTY, LEFT: ICD-10-CM

## 2020-11-05 DIAGNOSIS — D68.59 HYPERCOAGULABLE STATE (HCC): ICD-10-CM

## 2020-11-05 DIAGNOSIS — E87.1 HYPONATREMIA: ICD-10-CM

## 2020-11-05 PROCEDURE — 99309 SBSQ NF CARE MODERATE MDM 30: CPT | Performed by: INTERNAL MEDICINE

## 2020-11-05 NOTE — PROGRESS NOTES
follow up    [address]  Past Medical History:  Diagnosis Date   Bipolar affective ([facility] [address]    Blood disorder    C. difficile colitis 05/20/2013   per pt's mother pt. never had c.diff   DEPRESSION    manic depressive & bypolar disease,, [address] ITCHY RASH---can't specify   what metal?  Nuts OTHER (SEE COMMENTS)   Comment:PIMPLES ON FACE AND NECK  Peanuts RASH   Comment:C/o breaking out in white heads     Home Medications:  reviewed       Soc Hx  Social History   Tobacco Use   Smoking status:  Form

## 2020-11-07 PROCEDURE — 99309 SBSQ NF CARE MODERATE MDM 30: CPT | Performed by: INTERNAL MEDICINE

## 2020-11-08 ENCOUNTER — EXTERNAL FACILITY (OUTPATIENT)
Dept: INTERNAL MEDICINE CLINIC | Facility: CLINIC | Age: 60
End: 2020-11-08

## 2020-11-08 DIAGNOSIS — D68.59 HYPERCOAGULABLE STATE (HCC): ICD-10-CM

## 2020-11-08 DIAGNOSIS — E87.1 HYPONATREMIA: ICD-10-CM

## 2020-11-08 DIAGNOSIS — Z96.652 S/P TOTAL KNEE REPLACEMENT, LEFT: ICD-10-CM

## 2020-11-08 DIAGNOSIS — M06.4 INFLAMMATORY POLYARTHRITIS (HCC): ICD-10-CM

## 2020-11-09 NOTE — PROGRESS NOTES
follow up 11/7/2020  [facility]  Past Medical History:  Diagnosis Date  Bipolar affective ([facility] [address]  Blood disorder  C. difficile colitis 05/20/2013  per pt's mother pt. never had c.diff  DEPRESSION  manic depressive & bypolar disease,, [address] COMMENTS)  Comment:PIMPLES ON FACE AND NECK  Peanuts RASH  Comment:C/o breaking out in white heads    Home Medications:  reviewed      Soc Hx  Social History  Tobacco Use  Smoking status: Former Smoker  Packs/day: 1.00  Years: 15.00  Pack years: 15  Quit d

## 2020-11-10 ENCOUNTER — EXTERNAL FACILITY (OUTPATIENT)
Dept: INTERNAL MEDICINE CLINIC | Facility: CLINIC | Age: 60
End: 2020-11-10

## 2020-11-10 DIAGNOSIS — D68.59 HYPERCOAGULABLE STATE (HCC): ICD-10-CM

## 2020-11-10 DIAGNOSIS — I63.9 ACUTE CVA (CEREBROVASCULAR ACCIDENT) (HCC): ICD-10-CM

## 2020-11-10 DIAGNOSIS — E87.1 HYPONATREMIA: ICD-10-CM

## 2020-11-10 DIAGNOSIS — Z86.718 HISTORY OF DVT (DEEP VEIN THROMBOSIS): ICD-10-CM

## 2020-11-10 PROCEDURE — 99309 SBSQ NF CARE MODERATE MDM 30: CPT | Performed by: INTERNAL MEDICINE

## 2020-11-11 NOTE — PROGRESS NOTES
follow up  [facility]  Past Medical History:  Diagnosis Date  Bipolar affective ([address]  Blood disorder  C. difficile colitis 05/20/2013  per pt's mother pt. never had c.diff  DEPRESSION  manic depressive & bypolar disease,Yaneth Ruiz Si At [facility]  Depression COMMENTS)  Comment:PIMPLES ON FACE AND NECK  Peanuts RASH  Comment:C/o breaking out in white heads    Home Medications:  reviewed      Soc Hx  Social History  Tobacco Use  Smoking status: Former Smoker  Packs/day: 1.00  Years: 15.00  Pack years: 15  Quit d

## 2020-11-12 ENCOUNTER — EXTERNAL FACILITY (OUTPATIENT)
Dept: INTERNAL MEDICINE CLINIC | Facility: CLINIC | Age: 60
End: 2020-11-12

## 2020-11-12 DIAGNOSIS — Z96.652 PAIN DUE TO TOTAL LEFT KNEE REPLACEMENT, SUBSEQUENT ENCOUNTER: ICD-10-CM

## 2020-11-12 DIAGNOSIS — T84.84XD PAIN DUE TO TOTAL LEFT KNEE REPLACEMENT, SUBSEQUENT ENCOUNTER: ICD-10-CM

## 2020-11-12 DIAGNOSIS — E87.1 HYPONATREMIA: ICD-10-CM

## 2020-11-12 DIAGNOSIS — D68.59 HYPERCOAGULABLE STATE (HCC): ICD-10-CM

## 2020-11-12 PROCEDURE — 99308 SBSQ NF CARE LOW MDM 20: CPT | Performed by: INTERNAL MEDICINE

## 2020-11-13 NOTE — PROGRESS NOTES
follow up  [address]  Past Medical History:  Diagnosis Date  Bipolar affective ([facility] [address]  Blood disorder  C. difficile colitis 05/20/2013  per pt's mother pt. never had c.diff  DEPRESSION  manic depressive & bypolar disease,Oscar Ruiz At [facility]  Depression COMMENTS)  Comment:PIMPLES ON FACE AND NECK  Peanuts RASH  Comment:C/o breaking out in white heads    Home Medications:  reviewed      Soc Hx  Social History  Tobacco Use  Smoking status: Former Smoker  Packs/day: 1.00  Years: 15.00  Pack years: 15  Quit d

## 2020-11-17 ENCOUNTER — EXTERNAL FACILITY (OUTPATIENT)
Dept: INTERNAL MEDICINE CLINIC | Facility: CLINIC | Age: 60
End: 2020-11-17

## 2020-11-17 DIAGNOSIS — D68.59 HYPERCOAGULABLE STATE (HCC): ICD-10-CM

## 2020-11-17 DIAGNOSIS — Z96.652 PAIN DUE TO TOTAL LEFT KNEE REPLACEMENT, SUBSEQUENT ENCOUNTER: ICD-10-CM

## 2020-11-17 DIAGNOSIS — E87.1 HYPONATREMIA: ICD-10-CM

## 2020-11-17 DIAGNOSIS — F25.9 SCHIZOAFFECTIVE DISORDER, CHRONIC CONDITION (HCC): ICD-10-CM

## 2020-11-17 DIAGNOSIS — Z96.652 S/P TOTAL KNEE REPLACEMENT, LEFT: ICD-10-CM

## 2020-11-17 DIAGNOSIS — Z86.718 HISTORY OF DVT (DEEP VEIN THROMBOSIS): ICD-10-CM

## 2020-11-17 DIAGNOSIS — T84.84XD PAIN DUE TO TOTAL LEFT KNEE REPLACEMENT, SUBSEQUENT ENCOUNTER: ICD-10-CM

## 2020-11-17 PROCEDURE — 99309 SBSQ NF CARE MODERATE MDM 30: CPT | Performed by: INTERNAL MEDICINE

## 2020-11-17 NOTE — PROGRESS NOTES
follow up  [address]  Past Medical History:  Diagnosis Date  Bipolar affective ([facility] [address]  Blood disorder  C. difficile colitis 05/20/2013  per pt's mother pt. never had c.diff  DEPRESSION  manic depressive & bypolar disease,Mishel Ruiz At [facility]  Depression COMMENTS)  Comment:PIMPLES ON FACE AND NECK  Peanuts RASH  Comment:C/o breaking out in white heads    Home Medications:  reviewed      Soc Hx  Social History  Tobacco Use  Smoking status: Former Smoker  Packs/day: 1.00  Years: 15.00  Pack years: 15  Quit d

## 2020-11-19 ENCOUNTER — EXTERNAL FACILITY (OUTPATIENT)
Dept: INTERNAL MEDICINE CLINIC | Facility: CLINIC | Age: 60
End: 2020-11-19

## 2020-11-19 DIAGNOSIS — M06.09 RHEUMATOID ARTHRITIS OF MULTIPLE SITES WITH NEGATIVE RHEUMATOID FACTOR (HCC): ICD-10-CM

## 2020-11-19 DIAGNOSIS — F25.9 SCHIZOAFFECTIVE DISORDER, CHRONIC CONDITION (HCC): ICD-10-CM

## 2020-11-19 DIAGNOSIS — T84.84XD PAIN DUE TO TOTAL LEFT KNEE REPLACEMENT, SUBSEQUENT ENCOUNTER: ICD-10-CM

## 2020-11-19 DIAGNOSIS — D68.59 HYPERCOAGULABLE STATE (HCC): ICD-10-CM

## 2020-11-19 DIAGNOSIS — Z96.652 S/P TOTAL KNEE REPLACEMENT, LEFT: ICD-10-CM

## 2020-11-19 DIAGNOSIS — Z96.652 PAIN DUE TO TOTAL LEFT KNEE REPLACEMENT, SUBSEQUENT ENCOUNTER: ICD-10-CM

## 2020-11-19 PROCEDURE — 99308 SBSQ NF CARE LOW MDM 20: CPT | Performed by: INTERNAL MEDICINE

## 2020-11-19 NOTE — PROGRESS NOTES
follow up  [address]  Past Medical History:  Diagnosis Date  Bipolar affective ([facility] [address]  Blood disorder  C. difficile colitis 05/20/2013  per pt's mother pt. never had c.diff  DEPRESSION  manic depressive & bypolar disease,DrAnna At [facility]  Depression COMMENTS)  Comment:PIMPLES ON FACE AND NECK  Peanuts RASH  Comment:C/o breaking out in white heads    Home Medications:  reviewed      Soc Hx  Social History  Tobacco Use  Smoking status: Former Smoker  Packs/day: 1.00  Years: 15.00  Pack years: 15  Quit d

## 2020-11-24 ENCOUNTER — EXTERNAL FACILITY (OUTPATIENT)
Dept: INTERNAL MEDICINE CLINIC | Facility: CLINIC | Age: 60
End: 2020-11-24

## 2020-11-24 DIAGNOSIS — D68.59 HYPERCOAGULABLE STATE (HCC): ICD-10-CM

## 2020-11-24 DIAGNOSIS — T84.54XD INFECTION ASSOCIATED WITH INTERNAL LEFT KNEE PROSTHESIS, SUBSEQUENT ENCOUNTER: ICD-10-CM

## 2020-11-24 DIAGNOSIS — Z86.718 HISTORY OF DVT (DEEP VEIN THROMBOSIS): ICD-10-CM

## 2020-11-24 DIAGNOSIS — E87.1 HYPONATREMIA: ICD-10-CM

## 2020-11-24 PROCEDURE — 99308 SBSQ NF CARE LOW MDM 20: CPT | Performed by: INTERNAL MEDICINE

## 2020-11-24 NOTE — PROGRESS NOTES
follow up  [address]  Past Medical History:  Diagnosis Date  Bipolar affective ([address]  Blood disorder  C. difficile colitis 05/20/2013  per pt's mother pt. never had c.diff  DEPRESSION  manic depressive & bypolar disease,Burgess Preethi Ruiz At [facility]  Depression COMMENTS)  Comment:PIMPLES ON FACE AND NECK  Peanuts RASH  Comment:C/o breaking out in white heads    Home Medications:  reviewed      Soc Hx  Social History  Tobacco Use  Smoking status: Former Smoker  Packs/day: 1.00  Years: 15.00  Pack years: 15  Quit d

## 2020-12-01 ENCOUNTER — EXTERNAL FACILITY (OUTPATIENT)
Dept: INTERNAL MEDICINE CLINIC | Facility: CLINIC | Age: 60
End: 2020-12-01

## 2020-12-01 DIAGNOSIS — D68.59 HYPERCOAGULABLE STATE (HCC): ICD-10-CM

## 2020-12-01 DIAGNOSIS — M06.4 INFLAMMATORY POLYARTHRITIS (HCC): ICD-10-CM

## 2020-12-01 DIAGNOSIS — T84.84XD PAIN DUE TO TOTAL LEFT KNEE REPLACEMENT, SUBSEQUENT ENCOUNTER: ICD-10-CM

## 2020-12-01 DIAGNOSIS — Z86.718 HISTORY OF DVT (DEEP VEIN THROMBOSIS): ICD-10-CM

## 2020-12-01 DIAGNOSIS — Z96.652 PAIN DUE TO TOTAL LEFT KNEE REPLACEMENT, SUBSEQUENT ENCOUNTER: ICD-10-CM

## 2020-12-01 DIAGNOSIS — K21.00 GASTROESOPHAGEAL REFLUX DISEASE WITH ESOPHAGITIS WITHOUT HEMORRHAGE: ICD-10-CM

## 2020-12-01 PROCEDURE — 99308 SBSQ NF CARE LOW MDM 20: CPT | Performed by: INTERNAL MEDICINE

## 2020-12-01 NOTE — PROGRESS NOTES
dc summary  PMH  Past Medical History:  Diagnosis Date  Bipolar affective ([facility] [address]  Blood disorder  C. difficile colitis 05/20/2013  per pt's mother pt. never had c.diff  DEPRESSION  manic depressive & bypolar disease,, [address] [place] [address] COMMENTS)  Comment:PIMPLES ON FACE AND NECK  Peanuts RASH  Comment:C/o breaking out in white heads    Home Medications:  reviewed      Soc Hx  Social History  Tobacco Use  Smoking status: Former Smoker  Packs/day: 1.00  Years: 15.00  Pack years: 15  Quit d

## 2021-03-21 ENCOUNTER — APPOINTMENT (OUTPATIENT)
Dept: GENERAL RADIOLOGY | Facility: HOSPITAL | Age: 61
End: 2021-03-21
Attending: EMERGENCY MEDICINE
Payer: MEDICARE

## 2021-03-21 ENCOUNTER — HOSPITAL ENCOUNTER (EMERGENCY)
Facility: HOSPITAL | Age: 61
Discharge: HOME | End: 2021-03-21
Attending: EMERGENCY MEDICINE
Payer: MEDICARE

## 2021-03-21 VITALS
OXYGEN SATURATION: 96 % | RESPIRATION RATE: 18 BRPM | TEMPERATURE: 97 F | SYSTOLIC BLOOD PRESSURE: 130 MMHG | HEIGHT: 58 IN | WEIGHT: 151 LBS | BODY MASS INDEX: 31.7 KG/M2 | DIASTOLIC BLOOD PRESSURE: 74 MMHG | HEART RATE: 58 BPM

## 2021-03-21 DIAGNOSIS — M25.559 HIP PAIN: ICD-10-CM

## 2021-03-21 DIAGNOSIS — R26.2 UNABLE TO AMBULATE: Primary | ICD-10-CM

## 2021-03-21 DIAGNOSIS — M19.90 ARTHRITIS: ICD-10-CM

## 2021-03-21 LAB — SARS-COV-2 RNA RESP QL NAA+PROBE: NOT DETECTED

## 2021-03-21 PROCEDURE — 97161 PT EVAL LOW COMPLEX 20 MIN: CPT

## 2021-03-21 PROCEDURE — 97166 OT EVAL MOD COMPLEX 45 MIN: CPT

## 2021-03-21 PROCEDURE — 73502 X-RAY EXAM HIP UNI 2-3 VIEWS: CPT | Performed by: EMERGENCY MEDICINE

## 2021-03-21 PROCEDURE — 97530 THERAPEUTIC ACTIVITIES: CPT

## 2021-03-21 PROCEDURE — 99285 EMERGENCY DEPT VISIT HI MDM: CPT

## 2021-03-21 RX ORDER — IBUPROFEN 600 MG/1
600 TABLET ORAL ONCE
Status: COMPLETED | OUTPATIENT
Start: 2021-03-21 | End: 2021-03-21

## 2021-03-21 RX ORDER — TRAMADOL HYDROCHLORIDE 50 MG/1
50 TABLET ORAL ONCE
Status: COMPLETED | OUTPATIENT
Start: 2021-03-21 | End: 2021-03-21

## 2021-03-21 RX ORDER — ACETAMINOPHEN 325 MG/1
650 TABLET ORAL ONCE
Status: COMPLETED | OUTPATIENT
Start: 2021-03-21 | End: 2021-03-21

## 2021-03-21 NOTE — ED INITIAL ASSESSMENT (HPI)
Pt reports R hip pain after fall yesterday, went to [facility] yesterday for same

## 2021-03-21 NOTE — ED NOTES
Late entry d/t care provided:    Received pt a/ox4, clear speech, nad, no resp distress, ambulatory with standby assist  Here with c/o R hip pain after dislocation and replacement yesterday at OSH.  States pain is worse, worried hip is not back in place as

## 2021-03-21 NOTE — OCCUPATIONAL THERAPY NOTE
OCCUPATIONAL THERAPY EVALUATION - INPATIENT     Room Number: 51/26  Evaluation Date: 3/21/2021  Type of Evaluation: Initial  Presenting Problem:  (right hip pain)    Physician Order: IP Consult to Occupational Therapy  Reason for Therapy: ADL/IADL Dysfunct will benefit from continued education. She is noted to have a left elbow hinged brace on which she states she wears at all times. Per chart review she has been going to outpatient PT and has a weight restriction of 3lbs for her left elbow.      Currently, s Medical History  Past Medical History:   Diagnosis Date   • Bipolar affective ([facility] [address]    • Blood disorder    • C. difficile colitis 05/20/2013    per pt's mother pt. never had c.diff   • DEPRESSION     manic depressive & bypolar disease,, Denise Cagle 04/17/2019    total elbow replacement   • OTHER SURGICAL HISTORY  5/12/13    REMOVAL OF HARDWARE L HIP WITH INSERTION OF CEMENT SPACER       HOME SITUATION  Type of Home: House  Home Layout: One level  Lives With:  (mother renetta)        Shower/Tub and Eq COORDINATION  Gross Motor: WFL   Fine Motor: WFL          ACTIVITIES OF DAILY LIVING ASSESSMENT  AM-PAC ‘6-Clicks’ Inpatient Daily Activity Short Form  How much help from another person does the patient currently need…  -   Putting on and taking off

## 2021-03-21 NOTE — ED PROVIDER NOTES
Patient Seen in: [facility] AND [facility] Emergency Department      History   Patient presents with:  Hip Pain    Stated Complaint: Pain     HPI/Subjective:   HPI    The patient is a 43-year-old female with a history of rheumatoid arthritis status post bilatera REPLACEMENT SURGERY  2/21/13    L RICHARD   • INCISION AND DRAINAGE Left     knee with liner exchange    • KNEE REPLACEMENT SURGERY Left 09/26/2019    @ [facility]- has had 5 replacements   • KNEE TOTAL REPLACEMENT Left 9/26/2019    Performed by Chas Patel Nose: Nose normal.      Mouth/Throat:      Mouth: Mucous membranes are moist.   Eyes:      Conjunctiva/sclera: Conjunctivae normal.   Cardiovascular:      Rate and Rhythm: Normal rate and regular rhythm. Pulses: Normal pulses.    Pulmonary:      Effort femoral  stem, which may relate to early loosening.    Dictated by (CST): Jean Carlos Delvalle MD on 3/21/2021 at 11:12 AM     Finalized by (CST): Jean Carlos Delvalle MD on 3/21/2021 at 11:16 AM            Radiology exams  Viewed and reviewed by myself and findings

## 2021-03-21 NOTE — CM/SW NOTE
Spoke to the pt concerning rehab placement from the ED. The pt is requesting rehab. The pt will need insurance auth through [facility]. [facility] and they are needing PT eval with notes.  The order was placed in Epic for PT eval. Contacted

## 2021-03-21 NOTE — ED NOTES
Care endorsed to [facility] at [address] over meds and medical history    Pt continues resting in wheelchair, nad, no resp distress, no new complaints  Awaiting transport

## 2021-03-21 NOTE — CM/SW NOTE
Pt notes sent to [facility]. PT notes faxed to [phone] [facility] for authorization.  Fax # [phone]

## 2021-03-21 NOTE — ED NOTES
[facility] delayed    Pt and mother updated on plan of care  Dinner offered, pt declined    Ambulatory to and from bathroom with standby assist and walker  Stable  Will monitor

## 2021-03-21 NOTE — PHYSICAL THERAPY NOTE
PHYSICAL THERAPY EVALUATION - INPATIENT     Room Number: 59/45  Evaluation Date: 3/21/2021  Type of Evaluation: Initial   Physician Order: PT Eval and Treat    Presenting Problem: recent right hip dislocation with right hip pain  Reason for Therapy: [employer] MEDICAL/SOCIAL HISTORY     History related to current admission: right hip dislocation with pain, reduced at [facility] hospital     Problem List  Active Problems:    * No active hospital problems.  *      Past Medical History  Past Medical History:   [facility] OR   • KNEE TOTAL REVISION Left 3/26/2020    Performed by Cristal Granda MD at [facility] OR   • [address] Left 11/21/2019    Performed by Cristal Granda MD at [address] [place] [address]   • OTHER Right 04/17/2019    total elbow replacement   • OTHER OCHOA currently have. ..  -   Turning over in bed (including adjusting bedclothes, sheets and blankets)?: A Little   -   Sitting down on and standing up from a chair with arms (e.g., wheelchair, bedside commode, etc.): A Little   -   Moving from lying on back to Patient will negotiate 7 stairs/one curb w/ assistive device and cga   Goal #4   Current Status    Goal #5 Patient to demonstrate independence with home activity/exercise instructions provided to patient in preparation for discharge.    Goal #5   Current St

## 2021-03-21 NOTE — CM/SW NOTE
PT/OT notes re-sent with a pt sticker on them to identify the pt with a . Confirmation of the fax returned. Will wait to hear from [phone] to confirm authorization.

## 2021-03-21 NOTE — CM/SW NOTE
The pt has been accepted to [facility] with pending auth from [place]. The pt is thankful to go to rehab and is in agreement to [facility]. Waiting for bed placement and nurse number to call report.

## 2021-03-21 NOTE — CM/SW NOTE
Called [facility] again to inquire on authorization. They replied that the information they received had 2 names on it and they could not accept it.  I verified the forms that were faxed and the other name on the form was the PT/OT name as the therapist doing

## 2021-03-22 NOTE — CM/SW NOTE
Rec'd 5 page fax from [facility] approving SNF for patient from 3/22/2021 until 3/31/2021. LM for Luis E Almonte to call ERCM back to see if he would like ERCM to fax approval to [place].      ERCM also called registration and notified them of need to scan above f

## 2021-03-22 NOTE — CM/SW NOTE
Spoke with Luis E Almonte - he would like [employer] approval faxed to him @ [phone] [facility] faxed [employer] approval to the above fax number - confirmation rec'd. JOSELYN also had registration scan [employer] approval into epic.

## 2021-03-22 NOTE — CM/SW NOTE
Call rec'd from [facility] stating he is unsure if call was placed for need of DON screen/PASS screen yesterday - Robi Marcelino requesting ERCM to call number and inform them of need for DON screen/PASS screen - LM  @ X: [phone] informing them of the abo

## 2021-03-22 NOTE — ED NOTES
Pt to [facility] per [facility]  Stable, ambulatory with walker upon leaving ed    All paperwork and belongings provided to ems

## 2021-04-16 NOTE — PROGRESS NOTES
Pt to infusion for daily daptomycin to treat L knee infection. Arrives ambulatory with walker     Dapto given over 2 mins and tolerated well. Discharged to parking [facility] with this assistance of medical assistance. Future appointments scheduled.      EOT 5/ Normal vision: sees adequately in most situations; can see medication labels, newsprint

## 2021-05-03 ENCOUNTER — TELEPHONE (OUTPATIENT)
Dept: INFUSION THERAPY | Age: 61
End: 2021-05-03

## 2021-05-06 ENCOUNTER — CLINICAL ABSTRACT (OUTPATIENT)
Dept: INFUSION THERAPY | Age: 61
End: 2021-05-06

## 2021-05-06 PROBLEM — M06.9 RHEUMATOID ARTHRITIS (CMD): Status: ACTIVE | Noted: 2021-05-06

## 2021-05-06 RX ORDER — DIPHENHYDRAMINE HYDROCHLORIDE 50 MG/ML
50 INJECTION INTRAMUSCULAR; INTRAVENOUS
Status: CANCELLED | OUTPATIENT
Start: 2021-05-14

## 2021-05-06 RX ORDER — SODIUM CHLORIDE 9 MG/ML
INJECTION, SOLUTION INTRAVENOUS CONTINUOUS PRN
Status: CANCELLED | OUTPATIENT
Start: 2021-05-14

## 2021-05-06 RX ORDER — METHYLPREDNISOLONE SODIUM SUCCINATE 125 MG/2ML
125 INJECTION, POWDER, LYOPHILIZED, FOR SOLUTION INTRAMUSCULAR; INTRAVENOUS
Status: CANCELLED | OUTPATIENT
Start: 2021-05-14

## 2021-05-06 RX ORDER — FAMOTIDINE 10 MG/ML
20 INJECTION, SOLUTION INTRAVENOUS
Status: CANCELLED | OUTPATIENT
Start: 2021-05-14

## 2021-05-07 ENCOUNTER — TELEPHONE (OUTPATIENT)
Dept: INFUSION THERAPY | Age: 61
End: 2021-05-07

## 2021-05-14 ENCOUNTER — HOSPITAL ENCOUNTER (OUTPATIENT)
Dept: INFUSION THERAPY | Age: 61
Discharge: STILL ADMITTED OR ADMINISTRATIVE | End: 2021-05-14
Attending: INTERNAL MEDICINE

## 2021-05-14 VITALS
TEMPERATURE: 97.6 F | HEART RATE: 62 BPM | WEIGHT: 113.87 LBS | DIASTOLIC BLOOD PRESSURE: 80 MMHG | RESPIRATION RATE: 16 BRPM | SYSTOLIC BLOOD PRESSURE: 128 MMHG | OXYGEN SATURATION: 99 %

## 2021-05-14 DIAGNOSIS — M06.9 RHEUMATOID ARTHRITIS INVOLVING MULTIPLE SITES, UNSPECIFIED WHETHER RHEUMATOID FACTOR PRESENT (CMD): Primary | ICD-10-CM

## 2021-05-14 PROCEDURE — 10002800 HB RX 250 W HCPCS: Performed by: INTERNAL MEDICINE

## 2021-05-14 PROCEDURE — 96417 CHEMO IV INFUS EACH ADDL SEQ: CPT

## 2021-05-14 PROCEDURE — 96415 CHEMO IV INFUSION ADDL HR: CPT

## 2021-05-14 PROCEDURE — 10002807 HB RX 258: Performed by: INTERNAL MEDICINE

## 2021-05-14 PROCEDURE — 96413 CHEMO IV INFUSION 1 HR: CPT

## 2021-05-14 RX ORDER — FLUPHENAZINE HYDROCHLORIDE 5 MG/1
5 TABLET ORAL DAILY
Status: ON HOLD | COMMUNITY
Start: 2020-10-19 | End: 2023-11-11 | Stop reason: HOSPADM

## 2021-05-14 RX ORDER — FOLIC ACID 1 MG/1
1 TABLET ORAL DAILY
Status: ON HOLD | COMMUNITY
Start: 2020-03-27 | End: 2023-11-11

## 2021-05-14 RX ORDER — TRAMADOL HYDROCHLORIDE 50 MG/1
50 TABLET ORAL
Status: ON HOLD | COMMUNITY
Start: 2020-11-02 | End: 2023-11-02

## 2021-05-14 RX ORDER — ATORVASTATIN CALCIUM 20 MG/1
20 TABLET, FILM COATED ORAL EVERY EVENING
Status: ON HOLD | COMMUNITY
Start: 2020-12-29 | End: 2023-11-11 | Stop reason: HOSPADM

## 2021-05-14 RX ORDER — AMLODIPINE BESYLATE 5 MG/1
5 TABLET ORAL EVERY MORNING
Status: ON HOLD | COMMUNITY
Start: 2020-10-13 | End: 2023-11-11 | Stop reason: HOSPADM

## 2021-05-14 RX ORDER — WARFARIN SODIUM 2 MG/1
TABLET ORAL
Status: ON HOLD | COMMUNITY
Start: 2020-12-02 | End: 2023-11-02

## 2021-05-14 RX ORDER — WARFARIN SODIUM 5 MG/1
TABLET ORAL
Status: ON HOLD | COMMUNITY
Start: 2020-10-21 | End: 2023-11-02

## 2021-05-14 RX ORDER — OMEPRAZOLE 40 MG/1
40 CAPSULE, DELAYED RELEASE ORAL DAILY
Status: ON HOLD | COMMUNITY
Start: 2021-03-29 | End: 2023-11-11 | Stop reason: HOSPADM

## 2021-05-14 RX ORDER — METHOTREXATE 2.5 MG/1
10 TABLET ORAL
Status: ON HOLD | COMMUNITY
Start: 2021-04-29 | End: 2023-11-11 | Stop reason: HOSPADM

## 2021-05-14 RX ORDER — METHYLPREDNISOLONE SODIUM SUCCINATE 125 MG/2ML
125 INJECTION, POWDER, LYOPHILIZED, FOR SOLUTION INTRAMUSCULAR; INTRAVENOUS
Status: CANCELLED | OUTPATIENT
Start: 2021-05-14

## 2021-05-14 RX ORDER — ACETAMINOPHEN 325 MG/1
650 TABLET ORAL
Status: ON HOLD | COMMUNITY
Start: 2020-06-04 | End: 2023-11-02

## 2021-05-14 RX ORDER — HYDROCODONE BITARTRATE AND ACETAMINOPHEN 5; 325 MG/1; MG/1
2 TABLET ORAL
Status: ON HOLD | COMMUNITY
Start: 2020-11-02 | End: 2023-11-02

## 2021-05-14 RX ORDER — SENNOSIDES A AND B 8.6 MG/1
2 TABLET, FILM COATED ORAL DAILY
Status: ON HOLD | COMMUNITY
Start: 2020-09-22 | End: 2023-11-11 | Stop reason: HOSPADM

## 2021-05-14 RX ORDER — DIVALPROEX SODIUM 500 MG/1
500 TABLET, EXTENDED RELEASE ORAL
Status: ON HOLD | COMMUNITY
Start: 2020-04-23 | End: 2023-11-02

## 2021-05-14 RX ORDER — SODIUM CHLORIDE 1 G/1
1 TABLET ORAL 2 TIMES DAILY
Status: ON HOLD | COMMUNITY
Start: 2020-10-30 | End: 2023-11-11 | Stop reason: HOSPADM

## 2021-05-14 RX ORDER — OLANZAPINE 20 MG/1
20 TABLET ORAL NIGHTLY
Status: ON HOLD | COMMUNITY
Start: 2020-10-19 | End: 2023-11-11 | Stop reason: HOSPADM

## 2021-05-14 RX ORDER — FERROUS SULFATE 325(65) MG
325 TABLET ORAL 2 TIMES DAILY
Status: ON HOLD | COMMUNITY
Start: 2020-12-29 | End: 2023-11-11 | Stop reason: HOSPADM

## 2021-05-14 RX ORDER — FAMOTIDINE 10 MG/ML
20 INJECTION, SOLUTION INTRAVENOUS
Status: CANCELLED | OUTPATIENT
Start: 2021-05-14

## 2021-05-14 RX ORDER — OLANZAPINE 5 MG/1
5 TABLET ORAL NIGHTLY
Status: ON HOLD | COMMUNITY
Start: 2020-10-19 | End: 2023-11-11 | Stop reason: HOSPADM

## 2021-05-14 RX ORDER — DIPHENHYDRAMINE HYDROCHLORIDE 50 MG/ML
50 INJECTION INTRAMUSCULAR; INTRAVENOUS
Status: CANCELLED | OUTPATIENT
Start: 2021-05-14

## 2021-05-14 RX ORDER — SODIUM CHLORIDE 9 MG/ML
INJECTION, SOLUTION INTRAVENOUS CONTINUOUS PRN
Status: CANCELLED | OUTPATIENT
Start: 2021-05-14

## 2021-05-14 RX ORDER — BENZTROPINE MESYLATE 0.5 MG/1
TABLET ORAL
Status: ON HOLD | COMMUNITY
Start: 2020-10-06 | End: 2023-11-02

## 2021-05-14 RX ADMIN — SODIUM CHLORIDE 160 MG: 0.9 INJECTION, SOLUTION INTRAVENOUS at 11:35

## 2021-05-14 RX ADMIN — SODIUM CHLORIDE 250 ML: 0.9 INJECTION, SOLUTION INTRAVENOUS at 11:34

## 2021-05-14 ASSESSMENT — PAIN SCALES - GENERAL: PAINLEVEL_OUTOF10: 0

## 2021-05-28 ENCOUNTER — HOSPITAL ENCOUNTER (OUTPATIENT)
Dept: INFUSION THERAPY | Age: 61
Discharge: STILL ADMITTED OR ADMINISTRATIVE | End: 2021-05-28
Attending: INTERNAL MEDICINE

## 2021-05-28 VITALS
TEMPERATURE: 97.9 F | SYSTOLIC BLOOD PRESSURE: 113 MMHG | RESPIRATION RATE: 15 BRPM | OXYGEN SATURATION: 97 % | HEART RATE: 56 BPM | DIASTOLIC BLOOD PRESSURE: 69 MMHG | WEIGHT: 113.76 LBS

## 2021-05-28 DIAGNOSIS — M06.9 RHEUMATOID ARTHRITIS INVOLVING MULTIPLE SITES, UNSPECIFIED WHETHER RHEUMATOID FACTOR PRESENT (CMD): Primary | ICD-10-CM

## 2021-05-28 PROCEDURE — 96415 CHEMO IV INFUSION ADDL HR: CPT

## 2021-05-28 PROCEDURE — 96413 CHEMO IV INFUSION 1 HR: CPT

## 2021-05-28 PROCEDURE — 10002800 HB RX 250 W HCPCS: Performed by: INTERNAL MEDICINE

## 2021-05-28 PROCEDURE — 10002807 HB RX 258: Performed by: INTERNAL MEDICINE

## 2021-05-28 RX ORDER — SODIUM CHLORIDE 9 MG/ML
INJECTION, SOLUTION INTRAVENOUS CONTINUOUS PRN
Status: CANCELLED | OUTPATIENT
Start: 2021-05-28

## 2021-05-28 RX ORDER — FAMOTIDINE 10 MG/ML
20 INJECTION, SOLUTION INTRAVENOUS
Status: CANCELLED | OUTPATIENT
Start: 2021-05-28

## 2021-05-28 RX ORDER — METHYLPREDNISOLONE SODIUM SUCCINATE 125 MG/2ML
125 INJECTION, POWDER, LYOPHILIZED, FOR SOLUTION INTRAMUSCULAR; INTRAVENOUS
Status: CANCELLED | OUTPATIENT
Start: 2021-05-28

## 2021-05-28 RX ORDER — DIPHENHYDRAMINE HYDROCHLORIDE 50 MG/ML
50 INJECTION INTRAMUSCULAR; INTRAVENOUS
Status: CANCELLED | OUTPATIENT
Start: 2021-05-28

## 2021-05-28 RX ADMIN — SODIUM CHLORIDE 250 ML: 0.9 INJECTION, SOLUTION INTRAVENOUS at 11:40

## 2021-05-28 RX ADMIN — SODIUM CHLORIDE 160 MG: 0.9 INJECTION, SOLUTION INTRAVENOUS at 11:41

## 2021-05-28 ASSESSMENT — PAIN SCALES - GENERAL
PAINLEVEL_OUTOF10: 0
PAINLEVEL: 0

## 2021-06-25 ENCOUNTER — HOSPITAL ENCOUNTER (OUTPATIENT)
Dept: INFUSION THERAPY | Age: 61
Discharge: STILL ADMITTED OR ADMINISTRATIVE | End: 2021-06-25
Attending: INTERNAL MEDICINE

## 2021-06-25 VITALS
DIASTOLIC BLOOD PRESSURE: 85 MMHG | RESPIRATION RATE: 16 BRPM | HEART RATE: 65 BPM | WEIGHT: 117.06 LBS | SYSTOLIC BLOOD PRESSURE: 123 MMHG | OXYGEN SATURATION: 100 % | TEMPERATURE: 97.7 F

## 2021-06-25 DIAGNOSIS — M06.9 RHEUMATOID ARTHRITIS INVOLVING MULTIPLE SITES, UNSPECIFIED WHETHER RHEUMATOID FACTOR PRESENT (CMD): Primary | ICD-10-CM

## 2021-06-25 PROCEDURE — 96415 CHEMO IV INFUSION ADDL HR: CPT

## 2021-06-25 PROCEDURE — 10002800 HB RX 250 W HCPCS: Performed by: INTERNAL MEDICINE

## 2021-06-25 PROCEDURE — 96413 CHEMO IV INFUSION 1 HR: CPT

## 2021-06-25 PROCEDURE — 10002807 HB RX 258: Performed by: INTERNAL MEDICINE

## 2021-06-25 RX ORDER — FAMOTIDINE 10 MG/ML
20 INJECTION, SOLUTION INTRAVENOUS
Status: CANCELLED | OUTPATIENT
Start: 2021-06-25

## 2021-06-25 RX ORDER — METHYLPREDNISOLONE SODIUM SUCCINATE 125 MG/2ML
125 INJECTION, POWDER, LYOPHILIZED, FOR SOLUTION INTRAMUSCULAR; INTRAVENOUS
Status: CANCELLED | OUTPATIENT
Start: 2021-06-25

## 2021-06-25 RX ORDER — SODIUM CHLORIDE 9 MG/ML
INJECTION, SOLUTION INTRAVENOUS CONTINUOUS PRN
Status: CANCELLED | OUTPATIENT
Start: 2021-06-25

## 2021-06-25 RX ORDER — DIPHENHYDRAMINE HYDROCHLORIDE 50 MG/ML
50 INJECTION INTRAMUSCULAR; INTRAVENOUS
Status: CANCELLED | OUTPATIENT
Start: 2021-06-25

## 2021-06-25 RX ADMIN — SODIUM CHLORIDE 159 MG: 0.9 INJECTION, SOLUTION INTRAVENOUS at 12:35

## 2021-06-25 RX ADMIN — SODIUM CHLORIDE 250 ML: 0.9 INJECTION, SOLUTION INTRAVENOUS at 12:34

## 2021-06-25 ASSESSMENT — PAIN SCALES - GENERAL: PAINLEVEL_OUTOF10: 0

## 2021-08-02 PROBLEM — Z51.81 MONITORING FOR LONG-TERM ANTICOAGULANT USE: Status: ACTIVE | Noted: 2021-08-02

## 2021-08-02 PROBLEM — Z79.01 MONITORING FOR LONG-TERM ANTICOAGULANT USE: Status: ACTIVE | Noted: 2021-08-02

## 2021-08-20 ENCOUNTER — HOSPITAL ENCOUNTER (OUTPATIENT)
Dept: INFUSION THERAPY | Age: 61
Discharge: STILL ADMITTED OR ADMINISTRATIVE | End: 2021-08-20
Attending: INTERNAL MEDICINE

## 2021-08-20 VITALS
SYSTOLIC BLOOD PRESSURE: 145 MMHG | HEART RATE: 61 BPM | WEIGHT: 120.48 LBS | OXYGEN SATURATION: 97 % | RESPIRATION RATE: 16 BRPM | TEMPERATURE: 98.2 F | DIASTOLIC BLOOD PRESSURE: 80 MMHG

## 2021-08-20 DIAGNOSIS — M06.9 RHEUMATOID ARTHRITIS INVOLVING MULTIPLE SITES, UNSPECIFIED WHETHER RHEUMATOID FACTOR PRESENT (CMD): Primary | ICD-10-CM

## 2021-08-20 PROCEDURE — 10002800 HB RX 250 W HCPCS: Performed by: INTERNAL MEDICINE

## 2021-08-20 PROCEDURE — 10002807 HB RX 258: Performed by: INTERNAL MEDICINE

## 2021-08-20 PROCEDURE — 96413 CHEMO IV INFUSION 1 HR: CPT

## 2021-08-20 PROCEDURE — 96415 CHEMO IV INFUSION ADDL HR: CPT

## 2021-08-20 RX ORDER — METHYLPREDNISOLONE SODIUM SUCCINATE 125 MG/2ML
125 INJECTION, POWDER, LYOPHILIZED, FOR SOLUTION INTRAMUSCULAR; INTRAVENOUS
Status: CANCELLED | OUTPATIENT
Start: 2021-10-15

## 2021-08-20 RX ORDER — DIPHENHYDRAMINE HYDROCHLORIDE 50 MG/ML
50 INJECTION INTRAMUSCULAR; INTRAVENOUS
Status: CANCELLED | OUTPATIENT
Start: 2021-10-15

## 2021-08-20 RX ORDER — FAMOTIDINE 10 MG/ML
20 INJECTION, SOLUTION INTRAVENOUS
Status: CANCELLED | OUTPATIENT
Start: 2021-10-15

## 2021-08-20 RX ORDER — SODIUM CHLORIDE 9 MG/ML
INJECTION, SOLUTION INTRAVENOUS CONTINUOUS PRN
Status: CANCELLED | OUTPATIENT
Start: 2021-10-15

## 2021-08-20 RX ADMIN — SODIUM CHLORIDE 160 MG: 0.9 INJECTION, SOLUTION INTRAVENOUS at 11:57

## 2021-08-20 RX ADMIN — SODIUM CHLORIDE 250 ML: 0.9 INJECTION, SOLUTION INTRAVENOUS at 11:56

## 2021-08-20 ASSESSMENT — PAIN SCALES - GENERAL: PAINLEVEL_OUTOF10: 0

## 2021-09-28 PROBLEM — D63.8 ANEMIA OF CHRONIC DISEASE: Status: ACTIVE | Noted: 2021-09-28

## 2021-10-15 ENCOUNTER — HOSPITAL ENCOUNTER (OUTPATIENT)
Dept: INFUSION THERAPY | Age: 61
Discharge: STILL ADMITTED OR ADMINISTRATIVE | End: 2021-10-15
Attending: INTERNAL MEDICINE

## 2021-10-15 VITALS
SYSTOLIC BLOOD PRESSURE: 133 MMHG | DIASTOLIC BLOOD PRESSURE: 90 MMHG | OXYGEN SATURATION: 95 % | RESPIRATION RATE: 17 BRPM | TEMPERATURE: 97.3 F | WEIGHT: 122.69 LBS | HEART RATE: 80 BPM

## 2021-10-15 DIAGNOSIS — M81.0 AGE-RELATED OSTEOPOROSIS WITHOUT CURRENT PATHOLOGICAL FRACTURE: ICD-10-CM

## 2021-10-15 DIAGNOSIS — M06.9 RHEUMATOID ARTHRITIS INVOLVING MULTIPLE SITES, UNSPECIFIED WHETHER RHEUMATOID FACTOR PRESENT (CMD): Primary | ICD-10-CM

## 2021-10-15 PROCEDURE — 10002807 HB RX 258: Performed by: INTERNAL MEDICINE

## 2021-10-15 PROCEDURE — 96415 CHEMO IV INFUSION ADDL HR: CPT

## 2021-10-15 PROCEDURE — 96375 TX/PRO/DX INJ NEW DRUG ADDON: CPT

## 2021-10-15 PROCEDURE — 10002800 HB RX 250 W HCPCS: Performed by: INTERNAL MEDICINE

## 2021-10-15 PROCEDURE — 96413 CHEMO IV INFUSION 1 HR: CPT

## 2021-10-15 RX ORDER — SODIUM CHLORIDE 9 MG/ML
INJECTION, SOLUTION INTRAVENOUS CONTINUOUS PRN
Status: DISCONTINUED | OUTPATIENT
Start: 2021-10-15 | End: 2021-10-17 | Stop reason: HOSPADM

## 2021-10-15 RX ORDER — ZOLEDRONIC ACID 5 MG/100ML
5 INJECTION, SOLUTION INTRAVENOUS ONCE
Status: COMPLETED | OUTPATIENT
Start: 2021-10-15 | End: 2021-10-15

## 2021-10-15 RX ORDER — FAMOTIDINE 10 MG/ML
20 INJECTION, SOLUTION INTRAVENOUS
Status: DISCONTINUED | OUTPATIENT
Start: 2021-10-15 | End: 2021-10-17 | Stop reason: HOSPADM

## 2021-10-15 RX ORDER — DIPHENHYDRAMINE HYDROCHLORIDE 50 MG/ML
50 INJECTION INTRAMUSCULAR; INTRAVENOUS
Status: CANCELLED | OUTPATIENT
Start: 2021-12-10

## 2021-10-15 RX ORDER — DIPHENHYDRAMINE HYDROCHLORIDE 50 MG/ML
50 INJECTION INTRAMUSCULAR; INTRAVENOUS
Status: DISCONTINUED | OUTPATIENT
Start: 2021-10-15 | End: 2021-10-17 | Stop reason: HOSPADM

## 2021-10-15 RX ORDER — METHYLPREDNISOLONE SODIUM SUCCINATE 125 MG/2ML
125 INJECTION, POWDER, LYOPHILIZED, FOR SOLUTION INTRAMUSCULAR; INTRAVENOUS
Status: CANCELLED | OUTPATIENT
Start: 2021-12-10

## 2021-10-15 RX ORDER — METHYLPREDNISOLONE SODIUM SUCCINATE 125 MG/2ML
125 INJECTION, POWDER, LYOPHILIZED, FOR SOLUTION INTRAMUSCULAR; INTRAVENOUS
Status: DISCONTINUED | OUTPATIENT
Start: 2021-10-15 | End: 2021-10-17 | Stop reason: HOSPADM

## 2021-10-15 RX ORDER — ZOLEDRONIC ACID 5 MG/100ML
5 INJECTION, SOLUTION INTRAVENOUS ONCE
Status: CANCELLED | OUTPATIENT
Start: 2021-10-15 | End: 2021-10-15

## 2021-10-15 RX ORDER — FAMOTIDINE 10 MG/ML
20 INJECTION, SOLUTION INTRAVENOUS
Status: CANCELLED | OUTPATIENT
Start: 2021-12-10

## 2021-10-15 RX ORDER — SODIUM CHLORIDE 9 MG/ML
INJECTION, SOLUTION INTRAVENOUS CONTINUOUS PRN
Status: CANCELLED | OUTPATIENT
Start: 2021-12-10

## 2021-10-15 RX ADMIN — ZOLEDRONIC ACID 5 MG: 5 INJECTION, SOLUTION INTRAVENOUS at 15:11

## 2021-10-15 RX ADMIN — SODIUM CHLORIDE 250 ML: 0.9 INJECTION, SOLUTION INTRAVENOUS at 12:37

## 2021-10-15 RX ADMIN — SODIUM CHLORIDE 170 MG: 0.9 INJECTION, SOLUTION INTRAVENOUS at 12:40

## 2021-10-19 PROBLEM — G31.84 MILD COGNITIVE IMPAIRMENT: Status: ACTIVE | Noted: 2021-10-19

## 2021-10-19 PROBLEM — I63.412 CEREBRAL INFARCTION DUE TO EMBOLISM OF LEFT MIDDLE CEREBRAL ARTERY (HCC): Status: ACTIVE | Noted: 2021-10-19

## 2021-10-19 PROBLEM — M06.9 RHEUMATOID ARTHRITIS INVOLVING MULTIPLE SITES, UNSPECIFIED WHETHER RHEUMATOID FACTOR PRESENT (HCC): Status: ACTIVE | Noted: 2020-02-03

## 2021-10-19 PROBLEM — I69.353: Status: ACTIVE | Noted: 2021-10-19

## 2021-11-30 PROBLEM — I63.212 CEREBRAL INFARCTION DUE TO OCCLUSION OF LEFT VERTEBRAL ARTERY (HCC): Status: ACTIVE | Noted: 2021-11-30

## 2021-11-30 PROBLEM — R26.1 HEMIPLEGIC GAIT: Status: ACTIVE | Noted: 2021-11-30

## 2021-11-30 PROBLEM — Q07.9 CONGENITAL ENCEPHALOPATHY (HCC): Status: ACTIVE | Noted: 2021-11-30

## 2021-11-30 PROBLEM — I69.352 FLACCID HEMIPLEGIA OF LEFT DOMINANT SIDE AS LATE EFFECT OF CEREBRAL INFARCTION (HCC): Status: ACTIVE | Noted: 2021-11-30

## 2021-12-10 ENCOUNTER — HOSPITAL ENCOUNTER (OUTPATIENT)
Dept: INFUSION THERAPY | Age: 61
Discharge: STILL ADMITTED OR ADMINISTRATIVE | End: 2021-12-10
Attending: INTERNAL MEDICINE

## 2021-12-10 VITALS
OXYGEN SATURATION: 98 % | HEART RATE: 72 BPM | TEMPERATURE: 97.2 F | WEIGHT: 131.72 LBS | RESPIRATION RATE: 18 BRPM | DIASTOLIC BLOOD PRESSURE: 98 MMHG | SYSTOLIC BLOOD PRESSURE: 145 MMHG

## 2021-12-10 DIAGNOSIS — M06.9 RHEUMATOID ARTHRITIS INVOLVING MULTIPLE SITES, UNSPECIFIED WHETHER RHEUMATOID FACTOR PRESENT (CMD): Primary | ICD-10-CM

## 2021-12-10 PROCEDURE — 10002800 HB RX 250 W HCPCS: Performed by: INTERNAL MEDICINE

## 2021-12-10 PROCEDURE — 10002807 HB RX 258: Performed by: INTERNAL MEDICINE

## 2021-12-10 PROCEDURE — 96415 CHEMO IV INFUSION ADDL HR: CPT

## 2021-12-10 PROCEDURE — 96413 CHEMO IV INFUSION 1 HR: CPT

## 2021-12-10 RX ORDER — METHYLPREDNISOLONE SODIUM SUCCINATE 125 MG/2ML
125 INJECTION, POWDER, LYOPHILIZED, FOR SOLUTION INTRAMUSCULAR; INTRAVENOUS
Status: DISCONTINUED | OUTPATIENT
Start: 2021-12-10 | End: 2021-12-12 | Stop reason: HOSPADM

## 2021-12-10 RX ORDER — METHYLPREDNISOLONE SODIUM SUCCINATE 125 MG/2ML
125 INJECTION, POWDER, LYOPHILIZED, FOR SOLUTION INTRAMUSCULAR; INTRAVENOUS
Status: CANCELLED | OUTPATIENT
Start: 2022-02-04

## 2021-12-10 RX ORDER — FAMOTIDINE 10 MG/ML
20 INJECTION, SOLUTION INTRAVENOUS
Status: CANCELLED | OUTPATIENT
Start: 2022-02-04

## 2021-12-10 RX ORDER — FAMOTIDINE 10 MG/ML
20 INJECTION, SOLUTION INTRAVENOUS
Status: DISCONTINUED | OUTPATIENT
Start: 2021-12-10 | End: 2021-12-12 | Stop reason: HOSPADM

## 2021-12-10 RX ORDER — SODIUM CHLORIDE 9 MG/ML
INJECTION, SOLUTION INTRAVENOUS CONTINUOUS PRN
Status: DISCONTINUED | OUTPATIENT
Start: 2021-12-10 | End: 2021-12-12 | Stop reason: HOSPADM

## 2021-12-10 RX ORDER — DIPHENHYDRAMINE HYDROCHLORIDE 50 MG/ML
50 INJECTION INTRAMUSCULAR; INTRAVENOUS
Status: DISCONTINUED | OUTPATIENT
Start: 2021-12-10 | End: 2021-12-12 | Stop reason: HOSPADM

## 2021-12-10 RX ORDER — DIPHENHYDRAMINE HYDROCHLORIDE 50 MG/ML
50 INJECTION INTRAMUSCULAR; INTRAVENOUS
Status: CANCELLED | OUTPATIENT
Start: 2022-02-04

## 2021-12-10 RX ORDER — SODIUM CHLORIDE 9 MG/ML
INJECTION, SOLUTION INTRAVENOUS CONTINUOUS PRN
Status: CANCELLED | OUTPATIENT
Start: 2022-02-04

## 2021-12-10 RX ADMIN — SODIUM CHLORIDE 200 MG: 9 INJECTION, SOLUTION INTRAVENOUS at 12:00

## 2021-12-10 RX ADMIN — SODIUM CHLORIDE 20 ML: 0.9 INJECTION, SOLUTION INTRAVENOUS at 11:59

## 2021-12-10 ASSESSMENT — PAIN SCALES - GENERAL
PAINLEVEL_OUTOF10: 0
PAINLEVEL: 0
PAINLEVEL: 0

## 2022-02-04 ENCOUNTER — HOSPITAL ENCOUNTER (OUTPATIENT)
Dept: INFUSION THERAPY | Age: 62
Discharge: STILL ADMITTED OR ADMINISTRATIVE | End: 2022-02-04
Attending: INTERNAL MEDICINE

## 2022-02-04 VITALS
WEIGHT: 132.28 LBS | OXYGEN SATURATION: 95 % | SYSTOLIC BLOOD PRESSURE: 154 MMHG | TEMPERATURE: 98.6 F | HEART RATE: 64 BPM | DIASTOLIC BLOOD PRESSURE: 96 MMHG | RESPIRATION RATE: 16 BRPM

## 2022-02-04 DIAGNOSIS — M06.9 RHEUMATOID ARTHRITIS INVOLVING MULTIPLE SITES, UNSPECIFIED WHETHER RHEUMATOID FACTOR PRESENT (CMD): Primary | ICD-10-CM

## 2022-02-04 PROCEDURE — 10002807 HB RX 258: Performed by: INTERNAL MEDICINE

## 2022-02-04 PROCEDURE — 96413 CHEMO IV INFUSION 1 HR: CPT

## 2022-02-04 PROCEDURE — 96417 CHEMO IV INFUS EACH ADDL SEQ: CPT

## 2022-02-04 PROCEDURE — 10002800 HB RX 250 W HCPCS: Performed by: INTERNAL MEDICINE

## 2022-02-04 PROCEDURE — 96415 CHEMO IV INFUSION ADDL HR: CPT

## 2022-02-04 RX ORDER — DIPHENHYDRAMINE HYDROCHLORIDE 50 MG/ML
50 INJECTION INTRAMUSCULAR; INTRAVENOUS
Status: CANCELLED | OUTPATIENT
Start: 2022-04-01

## 2022-02-04 RX ORDER — SODIUM CHLORIDE 9 MG/ML
INJECTION, SOLUTION INTRAVENOUS
Status: DISPENSED
Start: 2022-02-04 | End: 2022-02-04

## 2022-02-04 RX ORDER — SODIUM CHLORIDE 9 MG/ML
INJECTION, SOLUTION INTRAVENOUS CONTINUOUS PRN
Status: CANCELLED | OUTPATIENT
Start: 2022-04-01

## 2022-02-04 RX ORDER — METHYLPREDNISOLONE SODIUM SUCCINATE 125 MG/2ML
125 INJECTION, POWDER, LYOPHILIZED, FOR SOLUTION INTRAMUSCULAR; INTRAVENOUS
Status: CANCELLED | OUTPATIENT
Start: 2022-04-01

## 2022-02-04 RX ORDER — FAMOTIDINE 10 MG/ML
20 INJECTION, SOLUTION INTRAVENOUS
Status: CANCELLED | OUTPATIENT
Start: 2022-04-01

## 2022-02-04 RX ADMIN — SODIUM CHLORIDE 200 MG: 9 INJECTION, SOLUTION INTRAVENOUS at 11:54

## 2022-02-04 ASSESSMENT — PAIN SCALES - GENERAL
PAINLEVEL_OUTOF10: 0
PAINLEVEL: 0

## 2022-04-01 ENCOUNTER — HOSPITAL ENCOUNTER (OUTPATIENT)
Dept: INFUSION THERAPY | Age: 62
Discharge: STILL ADMITTED OR ADMINISTRATIVE | End: 2022-04-01
Attending: INTERNAL MEDICINE

## 2022-04-01 VITALS
RESPIRATION RATE: 17 BRPM | WEIGHT: 132.94 LBS | OXYGEN SATURATION: 95 % | DIASTOLIC BLOOD PRESSURE: 83 MMHG | TEMPERATURE: 98 F | SYSTOLIC BLOOD PRESSURE: 130 MMHG | HEART RATE: 61 BPM

## 2022-04-01 DIAGNOSIS — M06.9 RHEUMATOID ARTHRITIS INVOLVING MULTIPLE SITES, UNSPECIFIED WHETHER RHEUMATOID FACTOR PRESENT (CMD): Primary | ICD-10-CM

## 2022-04-01 PROCEDURE — 96413 CHEMO IV INFUSION 1 HR: CPT

## 2022-04-01 PROCEDURE — 96415 CHEMO IV INFUSION ADDL HR: CPT

## 2022-04-01 PROCEDURE — 10002807 HB RX 258: Performed by: INTERNAL MEDICINE

## 2022-04-01 PROCEDURE — 10002800 HB RX 250 W HCPCS: Performed by: INTERNAL MEDICINE

## 2022-04-01 RX ORDER — DIPHENHYDRAMINE HYDROCHLORIDE 50 MG/ML
50 INJECTION INTRAMUSCULAR; INTRAVENOUS
Status: CANCELLED | OUTPATIENT
Start: 2022-05-27

## 2022-04-01 RX ORDER — METHYLPREDNISOLONE SODIUM SUCCINATE 125 MG/2ML
125 INJECTION, POWDER, LYOPHILIZED, FOR SOLUTION INTRAMUSCULAR; INTRAVENOUS
Status: CANCELLED | OUTPATIENT
Start: 2022-05-27

## 2022-04-01 RX ORDER — SODIUM CHLORIDE 9 MG/ML
INJECTION, SOLUTION INTRAVENOUS CONTINUOUS PRN
Status: CANCELLED | OUTPATIENT
Start: 2022-05-27

## 2022-04-01 RX ORDER — FAMOTIDINE 10 MG/ML
20 INJECTION, SOLUTION INTRAVENOUS
Status: CANCELLED | OUTPATIENT
Start: 2022-05-27

## 2022-04-01 RX ADMIN — SODIUM CHLORIDE 180 MG: 9 INJECTION, SOLUTION INTRAVENOUS at 12:03

## 2022-04-01 RX ADMIN — SODIUM CHLORIDE 250 ML: 9 INJECTION, SOLUTION INTRAVENOUS at 12:02

## 2022-04-01 ASSESSMENT — PAIN SCALES - GENERAL: PAINLEVEL_OUTOF10: 0

## 2022-05-26 ENCOUNTER — TELEPHONE (OUTPATIENT)
Dept: INFUSION THERAPY | Age: 62
End: 2022-05-26

## 2022-05-27 ENCOUNTER — APPOINTMENT (OUTPATIENT)
Dept: INFUSION THERAPY | Age: 62
End: 2022-05-27
Attending: INTERNAL MEDICINE

## 2022-06-01 ENCOUNTER — CLINICAL ABSTRACT (OUTPATIENT)
Dept: INFUSION THERAPY | Age: 62
End: 2022-06-01

## 2022-06-03 ENCOUNTER — HOSPITAL ENCOUNTER (OUTPATIENT)
Dept: INFUSION THERAPY | Age: 62
Discharge: STILL ADMITTED OR ADMINISTRATIVE | End: 2022-06-03
Attending: INTERNAL MEDICINE

## 2022-06-03 ENCOUNTER — CLINICAL ABSTRACT (OUTPATIENT)
Dept: INFUSION THERAPY | Age: 62
End: 2022-06-03

## 2022-06-03 VITALS
RESPIRATION RATE: 16 BRPM | WEIGHT: 134.26 LBS | TEMPERATURE: 98 F | HEART RATE: 60 BPM | SYSTOLIC BLOOD PRESSURE: 142 MMHG | DIASTOLIC BLOOD PRESSURE: 86 MMHG | OXYGEN SATURATION: 96 %

## 2022-06-03 DIAGNOSIS — M06.9 RHEUMATOID ARTHRITIS INVOLVING MULTIPLE SITES, UNSPECIFIED WHETHER RHEUMATOID FACTOR PRESENT (CMD): Primary | ICD-10-CM

## 2022-06-03 PROCEDURE — 96415 CHEMO IV INFUSION ADDL HR: CPT

## 2022-06-03 PROCEDURE — 10002800 HB RX 250 W HCPCS: Performed by: INTERNAL MEDICINE

## 2022-06-03 PROCEDURE — 96413 CHEMO IV INFUSION 1 HR: CPT

## 2022-06-03 PROCEDURE — 10002807 HB RX 258: Performed by: INTERNAL MEDICINE

## 2022-06-03 RX ADMIN — SODIUM CHLORIDE 200 MG: 9 INJECTION, SOLUTION INTRAVENOUS at 11:26

## 2022-06-03 RX ADMIN — SODIUM CHLORIDE 250 ML: 9 INJECTION, SOLUTION INTRAVENOUS at 11:25

## 2022-06-03 ASSESSMENT — PAIN SCALES - GENERAL: PAINLEVEL_OUTOF10: 0

## 2022-06-28 ENCOUNTER — CLINICAL ABSTRACT (OUTPATIENT)
Dept: INFUSION THERAPY | Age: 62
End: 2022-06-28

## 2022-06-28 RX ORDER — ZOLEDRONIC ACID 5 MG/100ML
5 INJECTION, SOLUTION INTRAVENOUS ONCE
Status: CANCELLED | OUTPATIENT
Start: 2022-06-28 | End: 2022-06-28

## 2022-07-29 ENCOUNTER — HOSPITAL ENCOUNTER (OUTPATIENT)
Dept: INFUSION THERAPY | Age: 62
Discharge: STILL ADMITTED OR ADMINISTRATIVE | End: 2022-07-29
Attending: INTERNAL MEDICINE

## 2022-07-29 VITALS
WEIGHT: 134.26 LBS | TEMPERATURE: 98.1 F | SYSTOLIC BLOOD PRESSURE: 127 MMHG | RESPIRATION RATE: 16 BRPM | HEART RATE: 66 BPM | DIASTOLIC BLOOD PRESSURE: 85 MMHG

## 2022-07-29 DIAGNOSIS — M06.9 RHEUMATOID ARTHRITIS INVOLVING MULTIPLE SITES, UNSPECIFIED WHETHER RHEUMATOID FACTOR PRESENT (CMD): Primary | ICD-10-CM

## 2022-07-29 PROCEDURE — 96415 CHEMO IV INFUSION ADDL HR: CPT

## 2022-07-29 PROCEDURE — 10002800 HB RX 250 W HCPCS: Performed by: INTERNAL MEDICINE

## 2022-07-29 PROCEDURE — 96413 CHEMO IV INFUSION 1 HR: CPT

## 2022-07-29 PROCEDURE — 10002807 HB RX 258: Performed by: INTERNAL MEDICINE

## 2022-07-29 RX ADMIN — SODIUM CHLORIDE 250 ML: 9 INJECTION, SOLUTION INTRAVENOUS at 11:43

## 2022-07-29 RX ADMIN — SODIUM CHLORIDE 200 MG: 9 INJECTION, SOLUTION INTRAVENOUS at 11:44

## 2022-07-29 ASSESSMENT — PAIN SCALES - GENERAL: PAINLEVEL_OUTOF10: 0

## 2022-09-23 ENCOUNTER — HOSPITAL ENCOUNTER (OUTPATIENT)
Dept: INFUSION THERAPY | Age: 62
Discharge: STILL ADMITTED OR ADMINISTRATIVE | End: 2022-09-23
Attending: INTERNAL MEDICINE

## 2022-09-23 VITALS
SYSTOLIC BLOOD PRESSURE: 153 MMHG | OXYGEN SATURATION: 96 % | WEIGHT: 134.81 LBS | RESPIRATION RATE: 16 BRPM | DIASTOLIC BLOOD PRESSURE: 90 MMHG | TEMPERATURE: 99.7 F | HEART RATE: 55 BPM

## 2022-09-23 DIAGNOSIS — M06.9 RHEUMATOID ARTHRITIS INVOLVING MULTIPLE SITES, UNSPECIFIED WHETHER RHEUMATOID FACTOR PRESENT (CMD): Primary | ICD-10-CM

## 2022-09-23 PROCEDURE — 10002800 HB RX 250 W HCPCS: Performed by: INTERNAL MEDICINE

## 2022-09-23 PROCEDURE — 96413 CHEMO IV INFUSION 1 HR: CPT

## 2022-09-23 PROCEDURE — 10002807 HB RX 258: Performed by: INTERNAL MEDICINE

## 2022-09-23 PROCEDURE — 96415 CHEMO IV INFUSION ADDL HR: CPT

## 2022-09-23 RX ORDER — SODIUM CHLORIDE 9 MG/ML
INJECTION, SOLUTION INTRAVENOUS
Status: DISPENSED
Start: 2022-09-23 | End: 2022-09-23

## 2022-09-23 RX ORDER — ZOLEDRONIC ACID 5 MG/100ML
5 INJECTION, SOLUTION INTRAVENOUS ONCE
Status: CANCELLED | OUTPATIENT
Start: 2022-09-23 | End: 2022-09-23

## 2022-09-23 RX ADMIN — SODIUM CHLORIDE 250 ML: 9 INJECTION, SOLUTION INTRAVENOUS at 11:21

## 2022-09-23 RX ADMIN — SODIUM CHLORIDE 200 MG: 9 INJECTION, SOLUTION INTRAVENOUS at 11:23

## 2022-11-18 ENCOUNTER — HOSPITAL ENCOUNTER (OUTPATIENT)
Dept: INFUSION THERAPY | Age: 62
Discharge: STILL ADMITTED OR ADMINISTRATIVE | End: 2022-11-18
Attending: INTERNAL MEDICINE

## 2022-11-18 VITALS
DIASTOLIC BLOOD PRESSURE: 83 MMHG | RESPIRATION RATE: 16 BRPM | TEMPERATURE: 98.7 F | HEART RATE: 65 BPM | OXYGEN SATURATION: 95 % | SYSTOLIC BLOOD PRESSURE: 152 MMHG | WEIGHT: 135.58 LBS

## 2022-11-18 DIAGNOSIS — M81.0 AGE-RELATED OSTEOPOROSIS WITHOUT CURRENT PATHOLOGICAL FRACTURE: ICD-10-CM

## 2022-11-18 DIAGNOSIS — M06.9 RHEUMATOID ARTHRITIS INVOLVING MULTIPLE SITES, UNSPECIFIED WHETHER RHEUMATOID FACTOR PRESENT (CMD): Primary | ICD-10-CM

## 2022-11-18 LAB
ALBUMIN SERPL-MCNC: 3.5 G/DL (ref 3.6–5.1)
CALCIUM SERPL-MCNC: 9.3 MG/DL (ref 8.4–10.2)
CREAT SERPL-MCNC: 0.41 MG/DL (ref 0.51–0.95)
GFR SERPLBLD BASED ON 1.73 SQ M-ARVRAT: >90 ML/MIN
RAINBOW EXTRA TUBES HOLD SPECIMEN: NORMAL
RAINBOW EXTRA TUBES HOLD SPECIMEN: NORMAL

## 2022-11-18 PROCEDURE — 36415 COLL VENOUS BLD VENIPUNCTURE: CPT

## 2022-11-18 PROCEDURE — 82310 ASSAY OF CALCIUM: CPT | Performed by: INTERNAL MEDICINE

## 2022-11-18 PROCEDURE — 96415 CHEMO IV INFUSION ADDL HR: CPT

## 2022-11-18 PROCEDURE — 96375 TX/PRO/DX INJ NEW DRUG ADDON: CPT

## 2022-11-18 PROCEDURE — 96365 THER/PROPH/DIAG IV INF INIT: CPT

## 2022-11-18 PROCEDURE — 82565 ASSAY OF CREATININE: CPT | Performed by: INTERNAL MEDICINE

## 2022-11-18 PROCEDURE — 10002807 HB RX 258: Performed by: INTERNAL MEDICINE

## 2022-11-18 PROCEDURE — 10002800 HB RX 250 W HCPCS: Performed by: INTERNAL MEDICINE

## 2022-11-18 PROCEDURE — 82040 ASSAY OF SERUM ALBUMIN: CPT | Performed by: INTERNAL MEDICINE

## 2022-11-18 PROCEDURE — 96413 CHEMO IV INFUSION 1 HR: CPT

## 2022-11-18 RX ORDER — ZOLEDRONIC ACID 5 MG/100ML
5 INJECTION, SOLUTION INTRAVENOUS ONCE
Status: CANCELLED | OUTPATIENT
Start: 2022-11-18 | End: 2022-11-18

## 2022-11-18 RX ORDER — ZOLEDRONIC ACID 5 MG/100ML
5 INJECTION, SOLUTION INTRAVENOUS ONCE
Status: COMPLETED | OUTPATIENT
Start: 2022-11-18 | End: 2022-11-18

## 2022-11-18 RX ADMIN — SODIUM CHLORIDE 250 ML: 9 INJECTION, SOLUTION INTRAVENOUS at 12:28

## 2022-11-18 RX ADMIN — SODIUM CHLORIDE 200 MG: 9 INJECTION, SOLUTION INTRAVENOUS at 12:55

## 2022-11-18 RX ADMIN — ZOLEDRONIC ACID 5 MG: 5 INJECTION, SOLUTION INTRAVENOUS at 12:31

## 2022-11-18 ASSESSMENT — PAIN SCALES - GENERAL: PAINLEVEL_OUTOF10: 0

## 2023-01-01 ENCOUNTER — HOSPITAL ENCOUNTER (INPATIENT)
Age: 63
LOS: 13 days | DRG: 951 | End: 2023-12-20
Attending: INTERNAL MEDICINE | Admitting: INTERNAL MEDICINE

## 2023-01-01 ENCOUNTER — CLINICAL ABSTRACT (OUTPATIENT)
Dept: INFUSION THERAPY | Age: 63
End: 2023-01-01

## 2023-01-01 ENCOUNTER — EXTERNAL RECORD (OUTPATIENT)
Dept: OTHER | Age: 63
End: 2023-01-01

## 2023-01-01 VITALS
DIASTOLIC BLOOD PRESSURE: 51 MMHG | OXYGEN SATURATION: 69 % | HEIGHT: 59 IN | SYSTOLIC BLOOD PRESSURE: 61 MMHG | WEIGHT: 108.69 LBS | RESPIRATION RATE: 28 BRPM | TEMPERATURE: 101.9 F | BODY MASS INDEX: 21.91 KG/M2 | HEART RATE: 66 BPM

## 2023-01-01 PROCEDURE — 10002803 HB RX 637: Performed by: INTERNAL MEDICINE

## 2023-01-01 PROCEDURE — 10004651 HB RX, NO CHARGE ITEM: Performed by: INTERNAL MEDICINE

## 2023-01-01 PROCEDURE — 10002800 HB RX 250 W HCPCS: Performed by: INTERNAL MEDICINE

## 2023-01-01 PROCEDURE — 10002800 HB RX 250 W HCPCS: Performed by: HOSPITALIST

## 2023-01-01 PROCEDURE — 10000002 HB ROOM CHARGE MED SURG

## 2023-01-01 PROCEDURE — 10002803 HB RX 637: Performed by: HOSPITALIST

## 2023-01-01 PROCEDURE — 10002801 HB RX 250 W/O HCPCS: Performed by: HOSPITALIST

## 2023-01-01 PROCEDURE — 13003289 HB OXYGEN THERAPY DAILY

## 2023-01-01 PROCEDURE — 10002801 HB RX 250 W/O HCPCS: Performed by: INTERNAL MEDICINE

## 2023-01-01 RX ORDER — ONDANSETRON 2 MG/ML
4 INJECTION INTRAMUSCULAR; INTRAVENOUS EVERY 12 HOURS PRN
Status: DISCONTINUED | OUTPATIENT
Start: 2023-01-01 | End: 2023-12-20 | Stop reason: HOSPADM

## 2023-01-01 RX ORDER — LORAZEPAM 2 MG/ML
1 INJECTION INTRAMUSCULAR
Status: DISCONTINUED | OUTPATIENT
Start: 2023-01-01 | End: 2023-12-20 | Stop reason: HOSPADM

## 2023-01-01 RX ORDER — LORAZEPAM 2 MG/ML
2 CONCENTRATE ORAL EVERY 6 HOURS SCHEDULED
Status: DISCONTINUED | OUTPATIENT
Start: 2023-01-01 | End: 2023-01-01

## 2023-01-01 RX ORDER — ACETAMINOPHEN 325 MG/1
650 TABLET ORAL EVERY 4 HOURS PRN
Status: DISCONTINUED | OUTPATIENT
Start: 2023-01-01 | End: 2023-12-20 | Stop reason: HOSPADM

## 2023-01-01 RX ORDER — ACETAMINOPHEN 650 MG/1
650 SUPPOSITORY RECTAL EVERY 4 HOURS PRN
Status: DISCONTINUED | OUTPATIENT
Start: 2023-01-01 | End: 2023-12-20 | Stop reason: HOSPADM

## 2023-01-01 RX ORDER — BISACODYL 10 MG
10 SUPPOSITORY, RECTAL RECTAL DAILY PRN
Status: DISCONTINUED | OUTPATIENT
Start: 2023-01-01 | End: 2023-12-20 | Stop reason: HOSPADM

## 2023-01-01 RX ORDER — LORAZEPAM 2 MG/ML
0.5 INJECTION INTRAMUSCULAR EVERY 8 HOURS SCHEDULED
Status: DISCONTINUED | OUTPATIENT
Start: 2023-01-01 | End: 2023-01-01 | Stop reason: DRUGHIGH

## 2023-01-01 RX ORDER — MORPHINE SULFATE 20 MG/ML
5 SOLUTION ORAL EVERY 6 HOURS SCHEDULED
Status: DISCONTINUED | OUTPATIENT
Start: 2023-01-01 | End: 2023-01-01

## 2023-01-01 RX ORDER — MORPHINE SULFATE 20 MG/ML
5 SOLUTION ORAL
Status: DISCONTINUED | OUTPATIENT
Start: 2023-01-01 | End: 2023-01-01 | Stop reason: CLARIF

## 2023-01-01 RX ORDER — 0.9 % SODIUM CHLORIDE 0.9 %
2 VIAL (ML) INJECTION EVERY 12 HOURS SCHEDULED
Status: DISCONTINUED | OUTPATIENT
Start: 2023-01-01 | End: 2023-12-20 | Stop reason: HOSPADM

## 2023-01-01 RX ORDER — LORAZEPAM 2 MG/ML
1 CONCENTRATE ORAL
Status: DISCONTINUED | OUTPATIENT
Start: 2023-01-01 | End: 2023-12-20 | Stop reason: HOSPADM

## 2023-01-01 RX ORDER — LORAZEPAM 2 MG/ML
1 CONCENTRATE ORAL EVERY 6 HOURS SCHEDULED
Status: DISCONTINUED | OUTPATIENT
Start: 2023-01-01 | End: 2023-01-01

## 2023-01-01 RX ORDER — LORAZEPAM 2 MG/ML
1 INJECTION INTRAMUSCULAR EVERY 6 HOURS SCHEDULED
Status: DISCONTINUED | OUTPATIENT
Start: 2023-01-01 | End: 2023-01-01 | Stop reason: ALTCHOICE

## 2023-01-01 RX ORDER — LORAZEPAM 2 MG/ML
0.5 INJECTION INTRAMUSCULAR
Status: DISCONTINUED | OUTPATIENT
Start: 2023-01-01 | End: 2023-01-01 | Stop reason: DRUGHIGH

## 2023-01-01 RX ORDER — CARBOXYMETHYLCELLULOSE SODIUM 5 MG/ML
1 SOLUTION/ DROPS OPHTHALMIC PRN
Status: DISCONTINUED | OUTPATIENT
Start: 2023-01-01 | End: 2023-12-20 | Stop reason: HOSPADM

## 2023-01-01 RX ORDER — LIDOCAINE HYDROCHLORIDE 20 MG/ML
10 JELLY TOPICAL PRN
Status: DISCONTINUED | OUTPATIENT
Start: 2023-01-01 | End: 2023-12-20 | Stop reason: HOSPADM

## 2023-01-01 RX ORDER — GLYCOPYRROLATE 0.2 MG/ML
0.2 INJECTION, SOLUTION INTRAMUSCULAR; INTRAVENOUS EVERY 4 HOURS PRN
Status: DISCONTINUED | OUTPATIENT
Start: 2023-01-01 | End: 2023-12-20 | Stop reason: HOSPADM

## 2023-01-01 RX ORDER — GLYCOPYRROLATE 0.2 MG/ML
0.4 INJECTION, SOLUTION INTRAMUSCULAR; INTRAVENOUS EVERY 6 HOURS
Status: COMPLETED | OUTPATIENT
Start: 2023-01-01 | End: 2023-01-01

## 2023-01-01 RX ORDER — GLYCOPYRROLATE 0.2 MG/ML
0.4 INJECTION, SOLUTION INTRAMUSCULAR; INTRAVENOUS EVERY 6 HOURS
Status: DISCONTINUED | OUTPATIENT
Start: 2023-01-01 | End: 2023-12-20 | Stop reason: HOSPADM

## 2023-01-01 RX ORDER — SCOLOPAMINE TRANSDERMAL SYSTEM 1 MG/1
1 PATCH, EXTENDED RELEASE TRANSDERMAL
Status: DISCONTINUED | OUTPATIENT
Start: 2023-01-01 | End: 2023-12-20 | Stop reason: HOSPADM

## 2023-01-01 RX ADMIN — MORPHINE SULFATE 2 MG: 2 INJECTION, SOLUTION INTRAMUSCULAR; INTRAVENOUS at 11:01

## 2023-01-01 RX ADMIN — GLYCOPYRROLATE 0.4 MG: 0.2 INJECTION INTRAMUSCULAR; INTRAVENOUS at 04:00

## 2023-01-01 RX ADMIN — MORPHINE SULFATE 2 MG: 2 INJECTION, SOLUTION INTRAMUSCULAR; INTRAVENOUS at 12:04

## 2023-01-01 RX ADMIN — SODIUM CHLORIDE, PRESERVATIVE FREE 2 ML: 5 INJECTION INTRAVENOUS at 19:36

## 2023-01-01 RX ADMIN — SODIUM CHLORIDE, PRESERVATIVE FREE 2 ML: 5 INJECTION INTRAVENOUS at 08:01

## 2023-01-01 RX ADMIN — LORAZEPAM 0.5 MG: 2 INJECTION INTRAMUSCULAR; INTRAVENOUS at 10:52

## 2023-01-01 RX ADMIN — MORPHINE SULFATE 2 MG: 2 INJECTION, SOLUTION INTRAMUSCULAR; INTRAVENOUS at 16:09

## 2023-01-01 RX ADMIN — LORAZEPAM 1 MG: 2 SOLUTION, CONCENTRATE ORAL at 23:06

## 2023-01-01 RX ADMIN — LORAZEPAM 0.5 MG: 2 INJECTION INTRAMUSCULAR; INTRAVENOUS at 18:18

## 2023-01-01 RX ADMIN — LORAZEPAM 0.5 MG: 2 INJECTION INTRAMUSCULAR; INTRAVENOUS at 18:16

## 2023-01-01 RX ADMIN — SODIUM CHLORIDE, PRESERVATIVE FREE 2 ML: 5 INJECTION INTRAVENOUS at 21:37

## 2023-01-01 RX ADMIN — MORPHINE SULFATE 2 MG: 2 INJECTION, SOLUTION INTRAMUSCULAR; INTRAVENOUS at 18:38

## 2023-01-01 RX ADMIN — LORAZEPAM 0.5 MG: 2 INJECTION INTRAMUSCULAR; INTRAVENOUS at 03:20

## 2023-01-01 RX ADMIN — SODIUM CHLORIDE, PRESERVATIVE FREE 2 ML: 5 INJECTION INTRAVENOUS at 20:18

## 2023-01-01 RX ADMIN — MORPHINE SULFATE 2 MG: 2 INJECTION, SOLUTION INTRAMUSCULAR; INTRAVENOUS at 05:50

## 2023-01-01 RX ADMIN — MORPHINE SULFATE 2 MG: 2 INJECTION, SOLUTION INTRAMUSCULAR; INTRAVENOUS at 06:28

## 2023-01-01 RX ADMIN — GLYCOPYRROLATE 0.2 MG: 0.2 INJECTION INTRAMUSCULAR; INTRAVENOUS at 03:43

## 2023-01-01 RX ADMIN — LORAZEPAM 1 MG: 2 INJECTION INTRAMUSCULAR; INTRAVENOUS at 14:55

## 2023-01-01 RX ADMIN — MORPHINE SULFATE 2 MG: 2 INJECTION, SOLUTION INTRAMUSCULAR; INTRAVENOUS at 23:32

## 2023-01-01 RX ADMIN — MORPHINE SULFATE 2 MG: 2 INJECTION, SOLUTION INTRAMUSCULAR; INTRAVENOUS at 08:45

## 2023-01-01 RX ADMIN — SODIUM CHLORIDE, PRESERVATIVE FREE 2 ML: 5 INJECTION INTRAVENOUS at 20:57

## 2023-01-01 RX ADMIN — MORPHINE SULFATE 5 MG: 100 SOLUTION ORAL at 05:18

## 2023-01-01 RX ADMIN — GLYCOPYRROLATE 0.4 MG: 0.2 INJECTION INTRAMUSCULAR; INTRAVENOUS at 17:12

## 2023-01-01 RX ADMIN — LORAZEPAM 1 MG: 2 INJECTION INTRAMUSCULAR; INTRAVENOUS at 12:08

## 2023-01-01 RX ADMIN — LORAZEPAM 0.5 MG: 2 INJECTION INTRAMUSCULAR; INTRAVENOUS at 21:26

## 2023-01-01 RX ADMIN — MORPHINE SULFATE 2 MG: 2 INJECTION, SOLUTION INTRAMUSCULAR; INTRAVENOUS at 23:43

## 2023-01-01 RX ADMIN — LORAZEPAM 1 MG: 2 SOLUTION, CONCENTRATE ORAL at 11:04

## 2023-01-01 RX ADMIN — LORAZEPAM 0.5 MG: 2 INJECTION INTRAMUSCULAR; INTRAVENOUS at 06:29

## 2023-01-01 RX ADMIN — MORPHINE SULFATE 5 MG: 100 SOLUTION ORAL at 17:43

## 2023-01-01 RX ADMIN — MORPHINE SULFATE 5 MG: 100 SOLUTION ORAL at 23:06

## 2023-01-01 RX ADMIN — LORAZEPAM 1 MG: 2 INJECTION INTRAMUSCULAR; INTRAVENOUS at 11:00

## 2023-01-01 RX ADMIN — SCOPALAMINE 1 PATCH: 1 PATCH, EXTENDED RELEASE TRANSDERMAL at 15:16

## 2023-01-01 RX ADMIN — MORPHINE SULFATE 2 MG: 2 INJECTION, SOLUTION INTRAMUSCULAR; INTRAVENOUS at 03:43

## 2023-01-01 RX ADMIN — MORPHINE SULFATE 2 MG: 2 INJECTION, SOLUTION INTRAMUSCULAR; INTRAVENOUS at 20:18

## 2023-01-01 RX ADMIN — MORPHINE SULFATE 2 MG: 2 INJECTION, SOLUTION INTRAMUSCULAR; INTRAVENOUS at 06:01

## 2023-01-01 RX ADMIN — LORAZEPAM 1 MG: 2 SOLUTION, CONCENTRATE ORAL at 17:33

## 2023-01-01 RX ADMIN — MORPHINE SULFATE 2 MG: 2 INJECTION, SOLUTION INTRAMUSCULAR; INTRAVENOUS at 06:02

## 2023-01-01 RX ADMIN — MORPHINE SULFATE 5 MG: 100 SOLUTION ORAL at 07:45

## 2023-01-01 RX ADMIN — MORPHINE SULFATE 5 MG: 100 SOLUTION ORAL at 17:31

## 2023-01-01 RX ADMIN — SCOPALAMINE 1 PATCH: 1 PATCH, EXTENDED RELEASE TRANSDERMAL at 06:20

## 2023-01-01 RX ADMIN — MORPHINE SULFATE 2 MG: 2 INJECTION, SOLUTION INTRAMUSCULAR; INTRAVENOUS at 08:00

## 2023-01-01 RX ADMIN — LORAZEPAM 0.5 MG: 2 INJECTION INTRAMUSCULAR; INTRAVENOUS at 10:16

## 2023-01-01 RX ADMIN — LORAZEPAM 1 MG: 2 SOLUTION, CONCENTRATE ORAL at 05:12

## 2023-01-01 RX ADMIN — MORPHINE SULFATE 2 MG: 2 INJECTION, SOLUTION INTRAMUSCULAR; INTRAVENOUS at 17:12

## 2023-01-01 RX ADMIN — MORPHINE SULFATE 5 MG: 100 SOLUTION ORAL at 17:07

## 2023-01-01 RX ADMIN — SODIUM CHLORIDE, PRESERVATIVE FREE 2 ML: 5 INJECTION INTRAVENOUS at 19:58

## 2023-01-01 RX ADMIN — SODIUM CHLORIDE, PRESERVATIVE FREE 2 ML: 5 INJECTION INTRAVENOUS at 08:00

## 2023-01-01 RX ADMIN — SODIUM CHLORIDE, PRESERVATIVE FREE 2 ML: 5 INJECTION INTRAVENOUS at 21:17

## 2023-01-01 RX ADMIN — MORPHINE SULFATE 2 MG: 2 INJECTION, SOLUTION INTRAMUSCULAR; INTRAVENOUS at 00:30

## 2023-01-01 RX ADMIN — GLYCOPYRROLATE 0.2 MG: 0.2 INJECTION INTRAMUSCULAR; INTRAVENOUS at 23:48

## 2023-01-01 RX ADMIN — MORPHINE SULFATE 2 MG: 2 INJECTION, SOLUTION INTRAMUSCULAR; INTRAVENOUS at 18:18

## 2023-01-01 RX ADMIN — LORAZEPAM 2 MG: 2 SOLUTION, CONCENTRATE ORAL at 12:25

## 2023-01-01 RX ADMIN — SODIUM CHLORIDE, PRESERVATIVE FREE 2 ML: 5 INJECTION INTRAVENOUS at 01:08

## 2023-01-01 RX ADMIN — MORPHINE SULFATE 2 MG: 2 INJECTION, SOLUTION INTRAMUSCULAR; INTRAVENOUS at 04:00

## 2023-01-01 RX ADMIN — MORPHINE SULFATE 2 MG: 2 INJECTION, SOLUTION INTRAMUSCULAR; INTRAVENOUS at 23:09

## 2023-01-01 RX ADMIN — GLYCOPYRROLATE 0.4 MG: 0.2 INJECTION INTRAMUSCULAR; INTRAVENOUS at 09:31

## 2023-01-01 RX ADMIN — LORAZEPAM 1 MG: 2 SOLUTION, CONCENTRATE ORAL at 07:40

## 2023-01-01 RX ADMIN — SODIUM CHLORIDE, PRESERVATIVE FREE 2 ML: 5 INJECTION INTRAVENOUS at 21:26

## 2023-01-01 RX ADMIN — LORAZEPAM 0.5 MG: 2 INJECTION INTRAMUSCULAR; INTRAVENOUS at 21:59

## 2023-01-01 RX ADMIN — LORAZEPAM 0.5 MG: 2 INJECTION INTRAMUSCULAR; INTRAVENOUS at 14:41

## 2023-01-01 RX ADMIN — MORPHINE SULFATE 2 MG: 2 INJECTION, SOLUTION INTRAMUSCULAR; INTRAVENOUS at 18:10

## 2023-01-01 RX ADMIN — MORPHINE SULFATE 2 MG: 2 INJECTION, SOLUTION INTRAMUSCULAR; INTRAVENOUS at 00:38

## 2023-01-01 RX ADMIN — MORPHINE SULFATE 2 MG: 2 INJECTION, SOLUTION INTRAMUSCULAR; INTRAVENOUS at 09:31

## 2023-01-01 RX ADMIN — LORAZEPAM 1 MG: 2 SOLUTION, CONCENTRATE ORAL at 00:37

## 2023-01-01 RX ADMIN — LORAZEPAM 0.5 MG: 2 INJECTION INTRAMUSCULAR; INTRAVENOUS at 14:30

## 2023-01-01 RX ADMIN — MORPHINE SULFATE 2 MG: 2 INJECTION, SOLUTION INTRAMUSCULAR; INTRAVENOUS at 18:52

## 2023-01-01 RX ADMIN — MORPHINE SULFATE 2 MG: 2 INJECTION, SOLUTION INTRAMUSCULAR; INTRAVENOUS at 10:52

## 2023-01-01 RX ADMIN — SODIUM CHLORIDE, PRESERVATIVE FREE 2 ML: 5 INJECTION INTRAVENOUS at 21:59

## 2023-01-01 RX ADMIN — SODIUM CHLORIDE, PRESERVATIVE FREE 2 ML: 5 INJECTION INTRAVENOUS at 21:00

## 2023-01-01 RX ADMIN — LORAZEPAM 0.5 MG: 2 INJECTION INTRAMUSCULAR; INTRAVENOUS at 05:36

## 2023-01-01 RX ADMIN — SODIUM CHLORIDE, PRESERVATIVE FREE 2 ML: 5 INJECTION INTRAVENOUS at 11:01

## 2023-01-01 RX ADMIN — GLYCOPYRROLATE 0.4 MG: 0.2 INJECTION INTRAMUSCULAR; INTRAVENOUS at 22:35

## 2023-01-01 RX ADMIN — SODIUM CHLORIDE, PRESERVATIVE FREE 2 ML: 5 INJECTION INTRAVENOUS at 09:31

## 2023-01-01 RX ADMIN — MORPHINE SULFATE 2 MG: 2 INJECTION, SOLUTION INTRAMUSCULAR; INTRAVENOUS at 12:18

## 2023-01-01 RX ADMIN — MORPHINE SULFATE 2 MG: 2 INJECTION, SOLUTION INTRAMUSCULAR; INTRAVENOUS at 05:06

## 2023-01-01 RX ADMIN — GLYCOPYRROLATE 0.4 MG: 0.2 INJECTION INTRAMUSCULAR; INTRAVENOUS at 16:47

## 2023-01-01 RX ADMIN — LORAZEPAM 0.5 MG: 2 INJECTION INTRAMUSCULAR; INTRAVENOUS at 06:02

## 2023-01-01 RX ADMIN — MORPHINE SULFATE 5 MG: 100 SOLUTION ORAL at 11:04

## 2023-01-01 RX ADMIN — LORAZEPAM 2 MG: 2 SOLUTION, CONCENTRATE ORAL at 23:06

## 2023-01-01 RX ADMIN — MORPHINE SULFATE 2 MG: 2 INJECTION, SOLUTION INTRAMUSCULAR; INTRAVENOUS at 19:36

## 2023-01-01 RX ADMIN — LORAZEPAM 1 MG: 2 INJECTION INTRAMUSCULAR; INTRAVENOUS at 18:38

## 2023-01-01 RX ADMIN — LORAZEPAM 1 MG: 2 INJECTION INTRAMUSCULAR; INTRAVENOUS at 05:38

## 2023-01-01 RX ADMIN — MORPHINE SULFATE 2 MG: 2 INJECTION, SOLUTION INTRAMUSCULAR; INTRAVENOUS at 09:24

## 2023-01-01 RX ADMIN — MORPHINE SULFATE 2 MG: 2 INJECTION, SOLUTION INTRAMUSCULAR; INTRAVENOUS at 19:58

## 2023-01-01 RX ADMIN — LORAZEPAM 0.5 MG: 2 INJECTION INTRAMUSCULAR; INTRAVENOUS at 16:36

## 2023-01-01 RX ADMIN — MORPHINE SULFATE 3 MG: 2 INJECTION, SOLUTION INTRAMUSCULAR; INTRAVENOUS at 16:47

## 2023-01-01 RX ADMIN — SODIUM CHLORIDE, PRESERVATIVE FREE 2 ML: 5 INJECTION INTRAVENOUS at 20:22

## 2023-01-01 RX ADMIN — MORPHINE SULFATE 2 MG: 2 INJECTION, SOLUTION INTRAMUSCULAR; INTRAVENOUS at 12:28

## 2023-01-01 RX ADMIN — MORPHINE SULFATE 2 MG: 2 INJECTION, SOLUTION INTRAMUSCULAR; INTRAVENOUS at 14:48

## 2023-01-01 RX ADMIN — MORPHINE SULFATE 2 MG: 2 INJECTION, SOLUTION INTRAMUSCULAR; INTRAVENOUS at 05:36

## 2023-01-01 RX ADMIN — SCOPALAMINE 1 PATCH: 1 PATCH, EXTENDED RELEASE TRANSDERMAL at 13:25

## 2023-01-01 RX ADMIN — LORAZEPAM 2 MG: 2 SOLUTION, CONCENTRATE ORAL at 17:43

## 2023-01-01 RX ADMIN — MORPHINE SULFATE 2 MG: 2 INJECTION, SOLUTION INTRAMUSCULAR; INTRAVENOUS at 11:00

## 2023-01-01 RX ADMIN — MORPHINE SULFATE 2 MG: 2 INJECTION, SOLUTION INTRAMUSCULAR; INTRAVENOUS at 23:48

## 2023-01-01 RX ADMIN — LORAZEPAM 1 MG: 2 INJECTION INTRAMUSCULAR; INTRAVENOUS at 18:51

## 2023-01-01 RX ADMIN — LORAZEPAM 0.5 MG: 2 INJECTION INTRAMUSCULAR; INTRAVENOUS at 13:28

## 2023-01-01 RX ADMIN — MORPHINE SULFATE 2 MG: 2 INJECTION, SOLUTION INTRAMUSCULAR; INTRAVENOUS at 03:50

## 2023-01-01 RX ADMIN — LORAZEPAM 0.5 MG: 2 INJECTION INTRAMUSCULAR; INTRAVENOUS at 11:01

## 2023-01-01 RX ADMIN — LORAZEPAM 1 MG: 2 SOLUTION, CONCENTRATE ORAL at 05:18

## 2023-01-01 RX ADMIN — MORPHINE SULFATE 2 MG: 2 INJECTION, SOLUTION INTRAMUSCULAR; INTRAVENOUS at 20:12

## 2023-01-01 RX ADMIN — MORPHINE SULFATE 5 MG: 100 SOLUTION ORAL at 23:56

## 2023-01-01 RX ADMIN — GLYCOPYRROLATE 0.2 MG: 0.2 INJECTION INTRAMUSCULAR; INTRAVENOUS at 08:00

## 2023-01-01 RX ADMIN — MORPHINE SULFATE 5 MG: 100 SOLUTION ORAL at 12:23

## 2023-01-01 RX ADMIN — MORPHINE SULFATE 2 MG: 2 INJECTION, SOLUTION INTRAMUSCULAR; INTRAVENOUS at 12:23

## 2023-01-01 RX ADMIN — MORPHINE SULFATE 2 MG: 2 INJECTION, SOLUTION INTRAMUSCULAR; INTRAVENOUS at 17:10

## 2023-01-01 RX ADMIN — SODIUM CHLORIDE, PRESERVATIVE FREE 2 ML: 5 INJECTION INTRAVENOUS at 08:28

## 2023-01-01 RX ADMIN — SODIUM CHLORIDE, PRESERVATIVE FREE 2 ML: 5 INJECTION INTRAVENOUS at 08:27

## 2023-01-01 RX ADMIN — SODIUM CHLORIDE, PRESERVATIVE FREE 2 ML: 5 INJECTION INTRAVENOUS at 07:40

## 2023-01-01 RX ADMIN — MORPHINE SULFATE 2 MG: 2 INJECTION, SOLUTION INTRAMUSCULAR; INTRAVENOUS at 15:56

## 2023-01-01 RX ADMIN — MORPHINE SULFATE 2 MG: 2 INJECTION, SOLUTION INTRAMUSCULAR; INTRAVENOUS at 16:36

## 2023-01-01 RX ADMIN — GLYCOPYRROLATE 0.2 MG: 0.2 INJECTION INTRAMUSCULAR; INTRAVENOUS at 15:16

## 2023-01-01 RX ADMIN — SODIUM CHLORIDE, PRESERVATIVE FREE 2 ML: 5 INJECTION INTRAVENOUS at 08:19

## 2023-01-01 RX ADMIN — MORPHINE SULFATE 2 MG: 2 INJECTION, SOLUTION INTRAMUSCULAR; INTRAVENOUS at 05:37

## 2023-01-01 RX ADMIN — LORAZEPAM 1 MG: 2 INJECTION INTRAMUSCULAR; INTRAVENOUS at 23:30

## 2023-01-01 RX ADMIN — MORPHINE SULFATE 3 MG: 2 INJECTION, SOLUTION INTRAMUSCULAR; INTRAVENOUS at 19:51

## 2023-01-01 RX ADMIN — MORPHINE SULFATE 2 MG: 2 INJECTION, SOLUTION INTRAMUSCULAR; INTRAVENOUS at 13:00

## 2023-01-01 RX ADMIN — SODIUM CHLORIDE, PRESERVATIVE FREE 2 ML: 5 INJECTION INTRAVENOUS at 09:28

## 2023-01-01 RX ADMIN — MORPHINE SULFATE 5 MG: 100 SOLUTION ORAL at 05:12

## 2023-01-01 RX ADMIN — MORPHINE SULFATE 2 MG: 2 INJECTION, SOLUTION INTRAMUSCULAR; INTRAVENOUS at 12:26

## 2023-01-01 RX ADMIN — MORPHINE SULFATE 2 MG: 2 INJECTION, SOLUTION INTRAMUSCULAR; INTRAVENOUS at 23:29

## 2023-01-01 RX ADMIN — SODIUM CHLORIDE, PRESERVATIVE FREE 2 ML: 5 INJECTION INTRAVENOUS at 08:45

## 2023-01-01 RX ADMIN — MORPHINE SULFATE 2 MG: 2 INJECTION, SOLUTION INTRAMUSCULAR; INTRAVENOUS at 23:58

## 2023-01-01 RX ADMIN — GLYCOPYRROLATE 0.2 MG: 0.2 INJECTION INTRAMUSCULAR; INTRAVENOUS at 19:51

## 2023-01-01 RX ADMIN — LORAZEPAM 1 MG: 2 SOLUTION, CONCENTRATE ORAL at 17:07

## 2023-01-01 SDOH — HEALTH STABILITY: PHYSICAL HEALTH: DO YOU HAVE SERIOUS DIFFICULTY WALKING OR CLIMBING STAIRS?: YES

## 2023-01-01 SDOH — ECONOMIC STABILITY: FOOD INSECURITY: WITHIN THE PAST 12 MONTHS, THE FOOD YOU BOUGHT JUST DIDN'T LAST AND YOU DIDN'T HAVE MONEY TO GET MORE.: PATIENT DECLINED

## 2023-01-01 SDOH — ECONOMIC STABILITY: HOUSING INSECURITY

## 2023-01-01 SDOH — ECONOMIC STABILITY: TRANSPORTATION INSECURITY
IN THE PAST 12 MONTHS, HAS LACK OF TRANSPORTATION KEPT YOU FROM MEETINGS, WORK, OR FROM GETTING THINGS NEEDED FOR DAILY LIVING?: NO

## 2023-01-01 SDOH — ECONOMIC STABILITY: TRANSPORTATION INSECURITY
IN THE PAST 12 MONTHS, HAS LACK OF RELIABLE TRANSPORTATION KEPT YOU FROM MEDICAL APPOINTMENTS, MEETINGS, WORK OR FROM GETTING THINGS NEEDED FOR DAILY LIVING?: PATIENT UNABLE TO ANSWER

## 2023-01-01 SDOH — HEALTH STABILITY: GENERAL
BECAUSE OF A PHYSICAL, MENTAL, OR EMOTIONAL CONDITION, DO YOU HAVE SERIOUS DIFFICULTY CONCENTRATING, REMEMBERING OR MAKING DECISIONS?: YES

## 2023-01-01 SDOH — ECONOMIC STABILITY: GENERAL

## 2023-01-01 SDOH — HEALTH STABILITY: GENERAL: BECAUSE OF A PHYSICAL, MENTAL, OR EMOTIONAL CONDITION, DO YOU HAVE DIFFICULTY DOING ERRANDS ALONE?: YES

## 2023-01-01 SDOH — ECONOMIC STABILITY: HOUSING INSECURITY: ARE YOU WORRIED ABOUT LOSING YOUR HOUSING?: NO

## 2023-01-01 SDOH — ECONOMIC STABILITY: HOUSING INSECURITY: DO YOU HAVE PROBLEMS WITH ANY OF THE FOLLOWING?: PATIENT UNABLE TO ANSWER

## 2023-01-01 SDOH — ECONOMIC STABILITY: GENERAL: WOULD YOU LIKE HELP WITH ANY OF THE FOLLOWING NEEDS?: I DON'T WANT HELP WITH ANY OF THESE

## 2023-01-01 SDOH — HEALTH STABILITY: PHYSICAL HEALTH: DO YOU HAVE DIFFICULTY DRESSING OR BATHING?: YES

## 2023-01-01 SDOH — ECONOMIC STABILITY: TRANSPORTATION INSECURITY
IN THE PAST 12 MONTHS, HAS LACK OF TRANSPORTATION KEPT YOU FROM MEETINGS, WORK, OR FROM GETTING THINGS NEEDED FOR DAILY LIVING?: PATIENT UNABLE TO ANSWER

## 2023-01-01 SDOH — ECONOMIC STABILITY: HOUSING INSECURITY: WHAT IS YOUR LIVING SITUATION TODAY?: OTHER FACILITY RESIDENTS

## 2023-01-01 SDOH — ECONOMIC STABILITY: INCOME INSECURITY
IN THE PAST 12 MONTHS, HAS THE ELECTRIC, GAS, OIL, OR WATER COMPANY THREATENED TO SHUT OFF SERVICE IN YOUR HOME?: PATIENT UNABLE TO ANSWER

## 2023-01-01 SDOH — SOCIAL STABILITY: SOCIAL NETWORK: SUPPORT SYSTEMS: PARENT;FAMILY MEMBERS

## 2023-01-01 SDOH — ECONOMIC STABILITY: HOUSING INSECURITY: WHAT IS YOUR LIVING SITUATION TODAY?: LONG TERM CARE FACILITY

## 2023-01-01 SDOH — ECONOMIC STABILITY: TRANSPORTATION INSECURITY
IN THE PAST 12 MONTHS, HAS THE LACK OF TRANSPORTATION KEPT YOU FROM MEDICAL APPOINTMENTS OR FROM GETTING MEDICATIONS?: PATIENT UNABLE TO ANSWER

## 2023-01-01 SDOH — ECONOMIC STABILITY: TRANSPORTATION INSECURITY
IN THE PAST 12 MONTHS, HAS THE LACK OF TRANSPORTATION KEPT YOU FROM MEDICAL APPOINTMENTS OR FROM GETTING MEDICATIONS?: NO

## 2023-01-01 SDOH — SOCIAL STABILITY: SOCIAL NETWORK
HOW OFTEN DO YOU SEE OR TALK TO PEOPLE THAT YOU CARE ABOUT AND FEEL CLOSE TO? (FOR EXAMPLE: TALKING TO FRIENDS ON THE PHONE, VISITING FRIENDS OR FAMILY, GOING TO CHURCH OR CLUB MEETINGS): 3 TO 5 TIMES A WEEK

## 2023-01-01 SDOH — ECONOMIC STABILITY: FOOD INSECURITY: HOW OFTEN IN THE PAST 12 MONTHS WERE YOU WORRIED OR STRESSED ABOUT HAVING ENOUGH MONEY TO BUY NUTRITIOUS MEALS?: NEVER

## 2023-01-01 SDOH — ECONOMIC STABILITY: GENERAL
IN THE PAST YEAR, HAVE YOU OR ANY FAMILY MEMBERS YOU LIVE WITH BEEN UNABLE TO GET ANY OF THE FOLLOWING WHEN IT WAS REALLY NEEDED? CHECK ALL THAT APPLY.: PATIENT UNABLE TO ANSWER

## 2023-01-01 ASSESSMENT — PAIN SCALES - PAIN ASSESSMENT IN ADVANCED DEMENTIA (PAINAD)
TOTALSCORE: 0
BREATHING: NOISY LABORED BREATHING, LONG PERIODS HYPERVENTILATION, CHEYNE-STOKES RESPIRATIONS
TOTALSCORE: 0
BREATHING: NORMAL
CONSOLABILITY: NO NEED TO CONSOLE
BODYLANGUAGE: RELAXED
BODYLANGUAGE: RELAXED
BREATHING: NORMAL
BREATHING: OCCASIONAL LABORED BREATHING, SHORT PERIOD OF HYPERVENTILATION
BODYLANGUAGE: RELAXED
BODYLANGUAGE: TENSE, DISTRESSED, FIDGETING
FACIALEXPRESSION: SMILING OR INEXPRESSIVE
TOTALSCORE: 1
FACIALEXPRESSION: SMILING OR INEXPRESSIVE
BREATHING: NORMAL
FACIALEXPRESSION: SMILING OR INEXPRESSIVE
CONSOLABILITY: NO NEED TO CONSOLE
CONSOLABILITY: DISTRACTED OR REASSURED BY VOICE OR TOUCH
BODYLANGUAGE: RELAXED
BODYLANGUAGE: RELAXED
CONSOLABILITY: NO NEED TO CONSOLE
BREATHING: OCCASIONAL LABORED BREATHING, SHORT PERIOD OF HYPERVENTILATION
TOTALSCORE: 1
TOTALSCORE: 4
FACIALEXPRESSION: SMILING OR INEXPRESSIVE
BODYLANGUAGE: RIGID, FISTS CLINCHED, KNEES PULLED UP. PUSHING OR PULLING AWAY, STRIKES OUT
CONSOLABILITY: NO NEED TO CONSOLE
FACIALEXPRESSION: SMILING OR INEXPRESSIVE
TOTALSCORE: 1
CONSOLABILITY: NO NEED TO CONSOLE
TOTALSCORE: 0
BREATHING: OCCASIONAL LABORED BREATHING, SHORT PERIOD OF HYPERVENTILATION
CONSOLABILITY: NO NEED TO CONSOLE
NEGVOCALIZATION: OCCASIONAL MOAN OR GROAN, LOW LEVELS OF SPEECH WITH A NEGATIVE OR DISAPPROVING QUALITY
TOTALSCORE: 2
CONSOLABILITY: NO NEED TO CONSOLE
FACIALEXPRESSION: SAD. FRIGHTENED. FROWNING
BREATHING: NORMAL
FACIALEXPRESSION: SMILING OR INEXPRESSIVE
BODYLANGUAGE: TENSE, DISTRESSED, FIDGETING
CONSOLABILITY: NO NEED TO CONSOLE
CONSOLABILITY: NO NEED TO CONSOLE
BREATHING: OCCASIONAL LABORED BREATHING, SHORT PERIOD OF HYPERVENTILATION
BODYLANGUAGE: TENSE, DISTRESSED, FIDGETING
BODYLANGUAGE: TENSE, DISTRESSED, FIDGETING
FACIALEXPRESSION: SAD. FRIGHTENED. FROWNING
NEGVOCALIZATION: OCCASIONAL MOAN OR GROAN, LOW LEVELS OF SPEECH WITH A NEGATIVE OR DISAPPROVING QUALITY
FACIALEXPRESSION: SMILING OR INEXPRESSIVE
FACIALEXPRESSION: SMILING OR INEXPRESSIVE
FACIALEXPRESSION: SAD. FRIGHTENED. FROWNING
CONSOLABILITY: NO NEED TO CONSOLE
FACIALEXPRESSION: SMILING OR INEXPRESSIVE
BREATHING: OCCASIONAL LABORED BREATHING, SHORT PERIOD OF HYPERVENTILATION
FACIALEXPRESSION: SMILING OR INEXPRESSIVE
FACIALEXPRESSION: SMILING OR INEXPRESSIVE
BODYLANGUAGE: RIGID, FISTS CLINCHED, KNEES PULLED UP. PUSHING OR PULLING AWAY, STRIKES OUT
BREATHING: OCCASIONAL LABORED BREATHING, SHORT PERIOD OF HYPERVENTILATION
BODYLANGUAGE: TENSE, DISTRESSED, FIDGETING
BREATHING: NORMAL
CONSOLABILITY: NO NEED TO CONSOLE
CONSOLABILITY: NO NEED TO CONSOLE
BODYLANGUAGE: RELAXED
TOTALSCORE: 5
BODYLANGUAGE: RELAXED
CONSOLABILITY: NO NEED TO CONSOLE
CONSOLABILITY: NO NEED TO CONSOLE
TOTALSCORE: 0
BREATHING: NORMAL
CONSOLABILITY: DISTRACTED OR REASSURED BY VOICE OR TOUCH
BODYLANGUAGE: RELAXED
BODYLANGUAGE: RELAXED
BREATHING: OCCASIONAL LABORED BREATHING, SHORT PERIOD OF HYPERVENTILATION
TOTALSCORE: 3
BREATHING: OCCASIONAL LABORED BREATHING, SHORT PERIOD OF HYPERVENTILATION
BREATHING: NORMAL
BODYLANGUAGE: RELAXED
TOTALSCORE: 1
FACIALEXPRESSION: SMILING OR INEXPRESSIVE
BREATHING: OCCASIONAL LABORED BREATHING, SHORT PERIOD OF HYPERVENTILATION
TOTALSCORE: 0
BREATHING: NORMAL
BODYLANGUAGE: RELAXED
BREATHING: OCCASIONAL LABORED BREATHING, SHORT PERIOD OF HYPERVENTILATION
BREATHING: OCCASIONAL LABORED BREATHING, SHORT PERIOD OF HYPERVENTILATION
CONSOLABILITY: NO NEED TO CONSOLE
CONSOLABILITY: NO NEED TO CONSOLE
TOTALSCORE: 0
FACIALEXPRESSION: SMILING OR INEXPRESSIVE
BODYLANGUAGE: TENSE, DISTRESSED, FIDGETING
NEGVOCALIZATION: OCCASIONAL MOAN OR GROAN, LOW LEVELS OF SPEECH WITH A NEGATIVE OR DISAPPROVING QUALITY
CONSOLABILITY: NO NEED TO CONSOLE
TOTALSCORE: 0
CONSOLABILITY: NO NEED TO CONSOLE
FACIALEXPRESSION: SMILING OR INEXPRESSIVE
FACIALEXPRESSION: SMILING OR INEXPRESSIVE
BODYLANGUAGE: RELAXED
BODYLANGUAGE: RELAXED
BREATHING: OCCASIONAL LABORED BREATHING, SHORT PERIOD OF HYPERVENTILATION
BREATHING: OCCASIONAL LABORED BREATHING, SHORT PERIOD OF HYPERVENTILATION
NEGVOCALIZATION: OCCASIONAL MOAN OR GROAN, LOW LEVELS OF SPEECH WITH A NEGATIVE OR DISAPPROVING QUALITY
FACIALEXPRESSION: SMILING OR INEXPRESSIVE
CONSOLABILITY: NO NEED TO CONSOLE
BREATHING: NORMAL
TOTALSCORE: 1
BODYLANGUAGE: RELAXED
FACIALEXPRESSION: SMILING OR INEXPRESSIVE
BREATHING: NORMAL
BODYLANGUAGE: RELAXED
TOTALSCORE: 2
CONSOLABILITY: NO NEED TO CONSOLE
FACIALEXPRESSION: SMILING OR INEXPRESSIVE
TOTALSCORE: 0
BODYLANGUAGE: RELAXED
BODYLANGUAGE: RELAXED
TOTALSCORE: 0
BODYLANGUAGE: RELAXED
TOTALSCORE: 1
BREATHING: NORMAL
TOTALSCORE: 1
BREATHING: NORMAL
FACIALEXPRESSION: SMILING OR INEXPRESSIVE
CONSOLABILITY: DISTRACTED OR REASSURED BY VOICE OR TOUCH
FACIALEXPRESSION: SMILING OR INEXPRESSIVE
FACIALEXPRESSION: SMILING OR INEXPRESSIVE
BREATHING: NORMAL
TOTALSCORE: 2
FACIALEXPRESSION: SMILING OR INEXPRESSIVE
BODYLANGUAGE: TENSE, DISTRESSED, FIDGETING
TOTALSCORE: 1
CONSOLABILITY: NO NEED TO CONSOLE
FACIALEXPRESSION: SMILING OR INEXPRESSIVE
FACIALEXPRESSION: SAD. FRIGHTENED. FROWNING
BODYLANGUAGE: RELAXED
CONSOLABILITY: NO NEED TO CONSOLE
TOTALSCORE: 2
CONSOLABILITY: NO NEED TO CONSOLE
CONSOLABILITY: NO NEED TO CONSOLE
FACIALEXPRESSION: FACIAL GRIMACING
BODYLANGUAGE: RELAXED
TOTALSCORE: 4
TOTALSCORE: 0
FACIALEXPRESSION: SAD. FRIGHTENED. FROWNING
BREATHING: NORMAL
BREATHING: OCCASIONAL LABORED BREATHING, SHORT PERIOD OF HYPERVENTILATION
TOTALSCORE: 1

## 2023-01-01 ASSESSMENT — COLUMBIA-SUICIDE SEVERITY RATING SCALE - C-SSRS: IS THE PATIENT ABLE TO COMPLETE C-SSRS: NO, DEFER FOR CLINICAL INSTABILITY

## 2023-01-01 ASSESSMENT — ACTIVITIES OF DAILY LIVING (ADL)
ADL_BEFORE_ADMISSION: NEEDS/REQUIRES ASSISTANCE
ADL_SCORE: 0
FEEDING: DEPENDENT
ADL_SHORT_OF_BREATH: NO
BATHING: DEPENDENT
DRESSING: DEPENDENT
RECENT_DECLINE_ADL: NO
TOILETING: DEPENDENT

## 2023-01-01 ASSESSMENT — LIFESTYLE VARIABLES
HOW OFTEN DO YOU HAVE 6 OR MORE DRINKS ON ONE OCCASION: NEVER
HOW OFTEN DO YOU HAVE A DRINK CONTAINING ALCOHOL: NEVER
AUDIT-C TOTAL SCORE: 0
HOW MANY STANDARD DRINKS CONTAINING ALCOHOL DO YOU HAVE ON A TYPICAL DAY: 0,1 OR 2
ALCOHOL_USE_STATUS: NO OR LOW RISK WITH VALIDATED TOOL

## 2023-01-01 ASSESSMENT — PATIENT HEALTH QUESTIONNAIRE - PHQ9: IS PATIENT ABLE TO COMPLETE PHQ2 OR PHQ9: NO, PATIENT WILL NEVER BE ABLE TO COMPLETE

## 2023-01-13 ENCOUNTER — HOSPITAL ENCOUNTER (OUTPATIENT)
Dept: INFUSION THERAPY | Age: 63
Discharge: STILL ADMITTED OR ADMINISTRATIVE | End: 2023-01-13
Attending: INTERNAL MEDICINE

## 2023-01-13 VITALS
RESPIRATION RATE: 17 BRPM | OXYGEN SATURATION: 94 % | SYSTOLIC BLOOD PRESSURE: 133 MMHG | TEMPERATURE: 98.2 F | WEIGHT: 135.69 LBS | HEART RATE: 71 BPM | DIASTOLIC BLOOD PRESSURE: 92 MMHG

## 2023-01-13 DIAGNOSIS — M06.9 RHEUMATOID ARTHRITIS INVOLVING MULTIPLE SITES, UNSPECIFIED WHETHER RHEUMATOID FACTOR PRESENT (CMD): Primary | ICD-10-CM

## 2023-01-13 PROCEDURE — 96415 CHEMO IV INFUSION ADDL HR: CPT

## 2023-01-13 PROCEDURE — 10002800 HB RX 250 W HCPCS: Performed by: INTERNAL MEDICINE

## 2023-01-13 PROCEDURE — 96417 CHEMO IV INFUS EACH ADDL SEQ: CPT

## 2023-01-13 PROCEDURE — 96413 CHEMO IV INFUSION 1 HR: CPT

## 2023-01-13 PROCEDURE — 10002807 HB RX 258: Performed by: INTERNAL MEDICINE

## 2023-01-13 RX ADMIN — SODIUM CHLORIDE 250 ML: 9 INJECTION, SOLUTION INTRAVENOUS at 12:06

## 2023-01-13 RX ADMIN — SODIUM CHLORIDE 200 MG: 9 INJECTION, SOLUTION INTRAVENOUS at 12:08

## 2023-01-13 ASSESSMENT — PAIN SCALES - GENERAL: PAINLEVEL_OUTOF10: 0

## 2023-03-10 ENCOUNTER — APPOINTMENT (OUTPATIENT)
Dept: INFUSION THERAPY | Age: 63
End: 2023-03-10
Attending: INTERNAL MEDICINE

## 2023-03-22 ENCOUNTER — HOSPITAL ENCOUNTER (OUTPATIENT)
Dept: INFUSION THERAPY | Age: 63
Discharge: STILL ADMITTED OR ADMINISTRATIVE | End: 2023-03-22
Attending: INTERNAL MEDICINE

## 2023-03-22 VITALS
RESPIRATION RATE: 16 BRPM | SYSTOLIC BLOOD PRESSURE: 148 MMHG | OXYGEN SATURATION: 93 % | DIASTOLIC BLOOD PRESSURE: 78 MMHG | HEART RATE: 65 BPM | WEIGHT: 135.8 LBS | TEMPERATURE: 98.2 F

## 2023-03-22 DIAGNOSIS — M06.9 RHEUMATOID ARTHRITIS INVOLVING MULTIPLE SITES, UNSPECIFIED WHETHER RHEUMATOID FACTOR PRESENT (CMD): Primary | ICD-10-CM

## 2023-03-22 PROCEDURE — 10002800 HB RX 250 W HCPCS: Performed by: INTERNAL MEDICINE

## 2023-03-22 PROCEDURE — 10002807 HB RX 258: Performed by: INTERNAL MEDICINE

## 2023-03-22 PROCEDURE — 96365 THER/PROPH/DIAG IV INF INIT: CPT

## 2023-03-22 PROCEDURE — 96366 THER/PROPH/DIAG IV INF ADDON: CPT

## 2023-03-22 RX ADMIN — SODIUM CHLORIDE 250 ML: 9 INJECTION, SOLUTION INTRAVENOUS at 14:13

## 2023-03-22 RX ADMIN — SODIUM CHLORIDE 200 MG: 9 INJECTION, SOLUTION INTRAVENOUS at 14:15

## 2023-03-22 ASSESSMENT — PAIN SCALES - GENERAL
PAINLEVEL: 0
PAINLEVEL_OUTOF10: 0
PAINLEVEL: 0

## 2023-05-25 DIAGNOSIS — M06.9 RHEUMATOID ARTHRITIS INVOLVING MULTIPLE SITES, UNSPECIFIED WHETHER RHEUMATOID FACTOR PRESENT (CMD): Primary | ICD-10-CM

## 2023-05-26 ENCOUNTER — HOSPITAL ENCOUNTER (OUTPATIENT)
Dept: INFUSION THERAPY | Age: 63
Discharge: STILL ADMITTED OR ADMINISTRATIVE | End: 2023-05-26
Attending: INTERNAL MEDICINE

## 2023-05-26 VITALS
OXYGEN SATURATION: 95 % | TEMPERATURE: 98.1 F | WEIGHT: 137.35 LBS | RESPIRATION RATE: 16 BRPM | DIASTOLIC BLOOD PRESSURE: 89 MMHG | HEART RATE: 55 BPM | SYSTOLIC BLOOD PRESSURE: 157 MMHG

## 2023-05-26 DIAGNOSIS — M06.9 RHEUMATOID ARTHRITIS INVOLVING MULTIPLE SITES, UNSPECIFIED WHETHER RHEUMATOID FACTOR PRESENT (CMD): Primary | ICD-10-CM

## 2023-05-26 PROCEDURE — 96366 THER/PROPH/DIAG IV INF ADDON: CPT

## 2023-05-26 PROCEDURE — 10002800 HB RX 250 W HCPCS: Performed by: INTERNAL MEDICINE

## 2023-05-26 PROCEDURE — 10002807 HB RX 258: Performed by: INTERNAL MEDICINE

## 2023-05-26 PROCEDURE — 96365 THER/PROPH/DIAG IV INF INIT: CPT

## 2023-05-26 RX ADMIN — SODIUM CHLORIDE 200 MG: 9 INJECTION, SOLUTION INTRAVENOUS at 12:03

## 2023-05-26 RX ADMIN — SODIUM CHLORIDE 250 ML: 9 INJECTION, SOLUTION INTRAVENOUS at 12:03

## 2023-05-26 ASSESSMENT — PAIN SCALES - GENERAL: PAINLEVEL_OUTOF10: 0

## 2023-07-19 ENCOUNTER — HOSPITAL ENCOUNTER (OUTPATIENT)
Dept: INFUSION THERAPY | Age: 63
Discharge: STILL ADMITTED OR ADMINISTRATIVE | End: 2023-07-19
Attending: INTERNAL MEDICINE

## 2023-07-19 VITALS
RESPIRATION RATE: 16 BRPM | SYSTOLIC BLOOD PRESSURE: 117 MMHG | OXYGEN SATURATION: 100 % | DIASTOLIC BLOOD PRESSURE: 77 MMHG | WEIGHT: 135.03 LBS | HEART RATE: 71 BPM | TEMPERATURE: 98.3 F

## 2023-07-19 DIAGNOSIS — M06.9 RHEUMATOID ARTHRITIS INVOLVING MULTIPLE SITES, UNSPECIFIED WHETHER RHEUMATOID FACTOR PRESENT (CMD): Primary | ICD-10-CM

## 2023-07-19 PROCEDURE — 10002800 HB RX 250 W HCPCS: Performed by: INTERNAL MEDICINE

## 2023-07-19 PROCEDURE — 10002807 HB RX 258: Performed by: INTERNAL MEDICINE

## 2023-07-19 PROCEDURE — 96365 THER/PROPH/DIAG IV INF INIT: CPT

## 2023-07-19 RX ADMIN — SODIUM CHLORIDE 250 ML: 9 INJECTION, SOLUTION INTRAVENOUS at 12:00

## 2023-07-19 RX ADMIN — SODIUM CHLORIDE 200 MG: 9 INJECTION, SOLUTION INTRAVENOUS at 12:01

## 2023-07-19 ASSESSMENT — PAIN SCALES - GENERAL: PAINLEVEL_OUTOF10: 0

## 2023-07-21 ENCOUNTER — APPOINTMENT (OUTPATIENT)
Dept: INFUSION THERAPY | Age: 63
End: 2023-07-21
Attending: INTERNAL MEDICINE

## 2023-08-23 NOTE — MR AVS SNAPSHOT
After Visit Summary   5/14/2020    Olivier Bolden    MRN: [id number]           Visit Information     Date & Time  5/14/2020  3:00 PM Provider  EM CC INFRN [address] [facility] Dept.  Phone  [phone]      Your Vi RHEUMATOLOGY INITIAL CONSULT  VISIT    Name: Kelsy Adan  : 1939     REFERRING PHYSICIAN : Roney Mae MD  REASON FOR REFERRAL : Rheumatoid arthritis.    CHIEF COMPLAINT   Kelsy Adan is a 84 year old lady with history of osteoarthritis and hypertension referred to the rheumatology clinic for evaluation of rheumatoid arthritis.    The patient reports that she is not aware if she has rheumatoid arthritis, she has never been treated for it, she does not have any significant pain in her joints, overall she thinks she is doing very well.    See below for details.      HISTORY OF PRESENT ILLNESS    Bilateral hand pain: Not present today, she is not taking any medications on a regular basis, on observation she has osteoarthritis of the PIPs and the DIPs with classic OA deformities Heberden's nodes and Claudia's nodes present, she has no synovitis of the MCPs, interestingly she has mild synovial swelling of the right wrist, but she denies any pain, no tenderness no, no morning stiffness, she is not wearing any wrist braces or gloves, she reports that she can do her daily activities, cooking, without any difficulties in her hands.    X-rays of her hands were reviewed, there is damage to the carpal bones, I am not clear if this is erosive damage or whether it is subchondral cyst with damage secondary to surgery done to her wrist, she has previous carpal tunnel surgery.  There seems to be mild scapholunate dysfunction.  Damage to the capitate bone.  On the left hand, she has a congenital short fourth metacarpal bone, there is no erosive changes in the MCPs, significant OA changes are noted, with mild joint subluxations.  Secondary to osteophyte production.    No significant dry eyes or dry mouth, no features of interstitial lung disease, no pyoderma, no history of iritis uveitis or scleritis, no history of vasculitis.    She has a history of osteoporosis, and has been on alendronate for close to 5 years  Ferrous Sulfate (IRON) 325 (65 Fe) MG Oral Tab Take 325 mg by mouth 2 (two) times daily.  Take in between meals, not with meals    OMEPRAZOLE 40 MG Oral Capsule Delayed Release TAKE 1 CAPSULE BY MOUTH EVERY DAY    Calcium Citrate (CITRACAL OR) Take 2 capsu now, previous DEXA scan showed mild osteopenia with low FRAX score, no fragility fracture, she is unclear if she lost any height.  No family  history of rheumatoid arthritis or lupus, she reports that cancer runs in her family, mother had cervical cancer.  And father had prostate cancer.  She believes that her mother's cancer came from \"baby pwder\"       No inflammatory skin rashes, no photosensitivity, no purpura, no oral ulcers, no nasal ulcers or epistaxis, no erythema nodosum, no hair loss,  No history of uveitis or scleritis, no visual disturbances,  no vasculitic rashes, no headaches.     No chest pain, no orthopnea, no PND, no features of angina, no pain suggestive of pericarditis or pleuritis.  No shortness of breath, no chronic cough, no history of hemoptysis, no history of lung masses, or mediastinal lymphadenopathy.  No abdominal pain, no GERD, no nausea and vomiting, no history of gastroparesis, no blood in the stool, no history suggestive of inflammatory bowel disease.  No dysuria, no blood in the urine, no increased frequency of urination, no history of microscopic hematuria.  No history of blood clots, no history of strokes, no history of PE, no focal motor or neurological deficits no history of psychosis.  No anemia, no history of leukopenia or recurrent infections, no history of unusual bruising or superficial bleeding.    Family history as documented above.        she has following chronic conditions.  1. Primary osteoarthritis of both hands    2. Primary osteoarthritis of both wrists        Past Medical History:   Diagnosis Date   • High cholesterol    • HTN (hypertension)       History reviewed. No pertinent surgical history.  Family History   Problem Relation Age of Onset   • Hypertension Mother    • Cancer Father      Social History     Tobacco Use   • Smoking status: Every Day     Current packs/day: 0.00     Types: Cigarettes   • Smokeless tobacco: Never   Vaping Use   • Vaping Use: never used    Substance Use Topics   • Alcohol use: Not Currently     Alcohol/week: 2.0 standard drinks of alcohol     Types: 2 Cans of beer per week   • Drug use: Not Currently       Current Outpatient Medications   Medication Sig Dispense Refill   • clotrimazole (LOTRIMIN) 1 % cream Apply topically 2 times daily. 30 g 0   • meloxicam (Mobic) 7.5 MG tablet Take 1 tablet by mouth in the morning and 1 tablet in the evening. 10 tablet 0   • atorvastatin (LIPITOR) 10 MG tablet TAKE ONE TABLET BY MOUTH ONCE DAILY 90 tablet 0   • alendronate (FOSAMAX) 35 MG tablet Take 1 tablet by mouth every 7 days. 14 tablet 3   • amLODIPine (NORVASC) 10 MG tablet Take 1 tablet by mouth daily. 90 tablet 3   • atorvastatin (LIPITOR) 10 MG tablet Take 1 tablet by mouth daily. 90 tablet 3     No current facility-administered medications for this visit.       REVIEW OF SYSTEMS    PHYSICAL EXAMINATION  Visit Vitals  BP (!) 140/76 (BP Location: LUE - Left upper extremity, Patient Position: Sitting, Cuff Size: Regular)   Pulse 78   Temp 98.1 °F (36.7 °C) (Oral)   Resp 20   Ht 4' 9.75\" (1.467 m)   Wt 48.5 kg (106 lb 13 oz)   SpO2 98%   BMI 22.52 kg/m²     Well-appearing elderly  Black/ , alert oriented x3, no acute discomfort, breathing normally, vitals reviewed.  Skin: No photosensitive rashes, no purpura, no sclerodactyly, no lupus rashes, no nail fold capillary dilatation, no stasis ulcers.  HEENT: PERRLA, EOMI, no pallor, no icterus, oral mucosa is moist, no tonsillar enlargement, no oral ulcers, no cervical lymphadenopathy, no thyromegaly, neck range of motion is supple, no elevated JVP.  Chest: Air entry is bilaterally present, no wheezes or crackles,   CVS: S1-S2 is heard normally, regular rate and rhythm, no loud P2, no murmurs or gallop.  GI: No organomegaly, soft nontender, bowel sounds normally heard.  MSK:  Upper extremities:  Hands: Significant OA changes in the PIP and DIPs, fist formation is reduced because of Heberden's  DO YOU KNOW WHERE TO GO? Injury & Illness are never convenient. If you are dealing with a   non-emergency, consider your options before heading to an ER.   VIDEO VISITS  Visit face-to-face with a [facility] physician or   YURI using your mobile device or computer nodes and Claudia's nodes.  The fingers are slightly flexed.  Hand  is reduced, but strength is close to normal.    Wrists:  Right wrist: There is mild swelling over the right wrist, flexion extension is reduced, patient denies any pain, no tenderness, no warmth, no morning stiffness.  Left wrist: Minimal synovial hypertrophy is noted, especially extending to the base of the first CMC joint, denies any pain, no tenderness, flexion extension is reduced, no morning stiffness.  Elbows: No swelling or tenderness bilaterally, supination pronation is normal flexion-extension is normal.  Shoulders:.  PROM is   painless.  Lower extremities:  Hips:  No groin pain, no trochanteric tenderness bilaterally.  Knees: Mild OA changes bilaterally, flexion extension is normal bilaterally, no joint line tenderness, no joint swelling, no pain at the anserine bursa.  Ankle and feet: No pedal edema, mild hallux valgus, onychomycosis.  No synovitis of the MTPs no plantar fasciitis.  Hammertoes present.  Neuro: Cranial nerves II to XII is grossly intact, there is no focal motor deficits, there is no focal sensory deficit by examination.  Psych: Normal mood, normal affect and normal judgment.    LAB  Lab Results   Component Value Date    WBC 10.3 09/19/2022    RBC 4.52 09/19/2022    HCT 39.3 09/19/2022    HGB 12.3 09/19/2022     09/19/2022    AST 14 05/02/2023    ALBUMIN 4.1 05/02/2023    CREATININE 0.63 05/02/2023     Lab Results   Component Value Date    AST 14 05/02/2023    AST 18 09/19/2022    AST 18 06/30/2022     No results found for: \"ALT\"  No results found for: \"RF\"  Lab Results   Component Value Date    CRP <0.3 05/02/2023     No results found for: \"ANABL\"    IMAGING  XR FOOT 3 OR MORE VIEWS LEFT  Narrative: EXAM:  XR FOOT 3 OR MORE VIEWS LEFT   TECHNIQUE:  XR FOOT 3 VIEWS LEFT     EXAM DATE:  8/16/2023 10:11 AM    HISTORY: Age: 84 years , Gender: Female, Complaint: Pain.    COMPARISON: None available at time of  dictation.    FINDINGS:     There is a mildly displaced angulated fracture of the 3rd metatarsal neck  with evidence of callus formation, suggestive of a subacute fracture. There  is also a chronic appearing fracture deformity of the 2nd metatarsal neck.  There is moderate-severe joint space narrowing of the 1st MTP with  associated subchondral sclerosis. There is mild soft tissue edema. No  radiopaque foreign bodies are visible.  Impression: 1.    Subacute-appearing fracture of the 3rd metatarsal neck.  2.   Chronic appearing fracture of the 2nd metatarsal neck.   3.    Moderate degenerative changes of the 1st MTP.    END IMPRESSION:    Electronically Signed by: OLMAN WADE M.D.   Signed on: 8/16/2023 2:18 PM   Workstation ID: [id number]    I reviewed the hand x-rays, see HPI for details, of my impression of the hand x-rays.    ASSESSMENT/PLAN  Kelsy was seen today for office visit.    Diagnoses and all orders for this visit:    Primary osteoarthritis of both hands    Primary osteoarthritis of both wrists      Hand osteoarthritis: She does not have any features of synovitis on the MCPs, PIPs or the DIPs, this is classical OA, early bony changes, she does not have any pain in the region.  Wrists :  She has mild synovial hypertrophy in both wrist, x-ray showed damage of the carpal bones, which has been read as erosive changes, interestingly she has no pain in the region.  No stiffness, range of motion is reduced, denies any morning stiffness or discomfort.  No pain with passive and active motion of the wrist, she previous had surgeries in the wrist for carpal tunnel.  Serologies are negative, RF, CCP, MARSHALL negative.  ESR CRP normal.    Since she has no pain in the wrist, at the moment it is hard to diagnose her as rheumatoid arthritis, I will get a second opinion regarding the hand x-rays, on my read I feel there is damage, but this could all be subchondral cysts, with osteoarthritis rather than an erosive  damage of the carpal bones.  She reports that her arthritis was diagnosed more than 20 years back, she has never been on any DMARDs, never treated for RA.    Plan:  -Since she does not have any clinical symptoms of rheumatoid arthritis, and she has clear obvious osteoarthritic changes in the PIPs and the DIPs, hands, we will treat her conservatively for OA.   -Tylenol 500 3 times daily as needed for pain,  -I have asked her to wear a compression gloves of hands when she is doing repetitive motions, cooking, other physical activities.    No need for any DMARDs at the moment.  As she has no symptoms.  We will monitor her, if she develops morning stiffness or pain.  We will decide on further treatment.    Osteoporosis: On alendronate since 2018 at least, last DEXA scan shows osteopenia, FRAX score is not increased, she would require another DEXA, and depending which she may require a drug holiday.      Total time with the patient is 50 minutes, 35 minutes face-to-face with the patient, detailed history, examination, reviewing the x-ray images with the patient and explanation of the situation in detail.  Rest 15 minutes reviewing her records from outside institutions including PCP notes from 2014.        Risks of medical conditions and side effects of medication were discussed.     Return in about 6 months (around 2/23/2024), or if symptoms worsen or fail to improve.    Rogelio Siddiqui MD  8/23/2023

## 2023-09-15 ENCOUNTER — CLINICAL ABSTRACT (OUTPATIENT)
Dept: INFUSION THERAPY | Age: 63
End: 2023-09-15

## 2023-09-15 RX ORDER — ZOLEDRONIC ACID 5 MG/100ML
5 INJECTION, SOLUTION INTRAVENOUS ONCE
OUTPATIENT
Start: 2023-09-15 | End: 2023-09-15

## 2023-09-19 ENCOUNTER — TELEPHONE (OUTPATIENT)
Dept: INFUSION THERAPY | Age: 63
End: 2023-09-19

## 2023-09-22 NOTE — PROGRESS NOTES
Patient arrives for daily antibiotics for left knee infection, taking pain pills as needed. Reports she is well, complains of some pain in the left knee. Midline present to left upper arm, Midline flushed with saline, positive blood return noted.  Dapto giv 22-Sep-2023 14:56

## 2023-10-18 ENCOUNTER — CLINICAL ABSTRACT (OUTPATIENT)
Dept: INFUSION THERAPY | Age: 63
End: 2023-10-18

## 2023-10-25 ENCOUNTER — APPOINTMENT (OUTPATIENT)
Dept: INFUSION THERAPY | Age: 63
End: 2023-10-25

## 2023-11-02 ENCOUNTER — APPOINTMENT (OUTPATIENT)
Dept: GENERAL RADIOLOGY | Age: 63
DRG: 064 | End: 2023-11-02
Attending: STUDENT IN AN ORGANIZED HEALTH CARE EDUCATION/TRAINING PROGRAM

## 2023-11-02 ENCOUNTER — APPOINTMENT (OUTPATIENT)
Dept: CT IMAGING | Age: 63
DRG: 064 | End: 2023-11-02
Attending: NURSE PRACTITIONER

## 2023-11-02 ENCOUNTER — HOSPITAL ENCOUNTER (INPATIENT)
Age: 63
LOS: 9 days | Discharge: NURSING, REHAB OR LONG-TERM CARE | DRG: 064 | End: 2023-11-11
Attending: STUDENT IN AN ORGANIZED HEALTH CARE EDUCATION/TRAINING PROGRAM | Admitting: HOSPITALIST

## 2023-11-02 ENCOUNTER — APPOINTMENT (OUTPATIENT)
Dept: CT IMAGING | Age: 63
DRG: 064 | End: 2023-11-02
Attending: STUDENT IN AN ORGANIZED HEALTH CARE EDUCATION/TRAINING PROGRAM

## 2023-11-02 DIAGNOSIS — I61.0 NONTRAUMATIC SUBCORTICAL HEMORRHAGE OF LEFT CEREBRAL HEMISPHERE (CMD): Primary | ICD-10-CM

## 2023-11-02 DIAGNOSIS — R13.19 INTERMITTENT DYSPHAGIA: ICD-10-CM

## 2023-11-02 LAB
ABO + RH BLD: NORMAL
ALBUMIN SERPL-MCNC: 3.4 G/DL (ref 3.6–5.1)
ALBUMIN/GLOB SERPL: 0.6 {RATIO} (ref 1–2.4)
ALP SERPL-CCNC: 103 UNITS/L (ref 45–117)
ALT SERPL-CCNC: 16 UNITS/L
ANION GAP SERPL CALC-SCNC: 8 MMOL/L (ref 7–19)
APTT PPP: 31 SEC (ref 22–32)
APTT PPP: 48 SEC (ref 22–32)
APTT PPP: 51 SEC (ref 22–32)
AST SERPL-CCNC: 16 UNITS/L
ATRIAL RATE (BPM): 77
BASOPHILS # BLD: 0.1 K/MCL (ref 0–0.3)
BASOPHILS # BLD: 0.1 K/MCL (ref 0–0.3)
BASOPHILS NFR BLD: 1 %
BASOPHILS NFR BLD: 1 %
BILIRUB SERPL-MCNC: 0.4 MG/DL (ref 0.2–1)
BLD GP AB SCN SERPL QL GEL: NEGATIVE
BLOOD EXPIRATION DATE: NORMAL
BUN SERPL-MCNC: 12 MG/DL (ref 6–20)
BUN/CREAT SERPL: 24 (ref 7–25)
CALCIUM SERPL-MCNC: 9.5 MG/DL (ref 8.4–10.2)
CHLORIDE SERPL-SCNC: 102 MMOL/L (ref 97–110)
CLOSURE TME BLD-IMP: NORMAL
CLOSURE TME COLL+EPINEP BLD: 132 SEC (ref 70–160)
CO2 SERPL-SCNC: 28 MMOL/L (ref 21–32)
CREAT SERPL-MCNC: 0.5 MG/DL (ref 0.51–0.95)
DEPRECATED RDW RBC: 47.3 FL (ref 39–50)
DEPRECATED RDW RBC: 47.8 FL (ref 39–50)
DISPENSE STATUS: NORMAL
EGFRCR SERPLBLD CKD-EPI 2021: >90 ML/MIN/{1.73_M2}
EOSINOPHIL # BLD: 0.1 K/MCL (ref 0–0.5)
EOSINOPHIL # BLD: 0.1 K/MCL (ref 0–0.5)
EOSINOPHIL NFR BLD: 1 %
EOSINOPHIL NFR BLD: 3 %
ERYTHROCYTE [DISTWIDTH] IN BLOOD: 12.9 % (ref 11–15)
ERYTHROCYTE [DISTWIDTH] IN BLOOD: 13 % (ref 11–15)
FASTING DURATION TIME PATIENT: ABNORMAL H
FIBRINOGEN PPP-MCNC: 370 MG/DL (ref 190–425)
GLOBULIN SER-MCNC: 5.3 G/DL (ref 2–4)
GLUCOSE BLDC GLUCOMTR-MCNC: 115 MG/DL (ref 70–99)
GLUCOSE BLDC GLUCOMTR-MCNC: 99 MG/DL (ref 70–99)
GLUCOSE SERPL-MCNC: 97 MG/DL (ref 70–99)
HCT VFR BLD CALC: 40.1 % (ref 36–46.5)
HCT VFR BLD CALC: 40.8 % (ref 36–46.5)
HGB BLD-MCNC: 13 G/DL (ref 12–15.5)
HGB BLD-MCNC: 13.2 G/DL (ref 12–15.5)
IMM GRANULOCYTES # BLD AUTO: 0 K/MCL (ref 0–0.2)
IMM GRANULOCYTES # BLD AUTO: 0 K/MCL (ref 0–0.2)
IMM GRANULOCYTES # BLD: 0 %
IMM GRANULOCYTES # BLD: 1 %
INR PPP: 1.1
INR PPP: 1.3
INR PPP: 2.8
INR PPP: 2.9
ISBT BLOOD TYPE: 6200
ISSUE DATE/TIME: NORMAL
LYMPHOCYTES # BLD: 1.4 K/MCL (ref 1–4)
LYMPHOCYTES # BLD: 2.2 K/MCL (ref 1–4)
LYMPHOCYTES NFR BLD: 17 %
LYMPHOCYTES NFR BLD: 40 %
MCH RBC QN AUTO: 32 PG (ref 26–34)
MCH RBC QN AUTO: 32.3 PG (ref 26–34)
MCHC RBC AUTO-ENTMCNC: 32.4 G/DL (ref 32–36.5)
MCHC RBC AUTO-ENTMCNC: 32.4 G/DL (ref 32–36.5)
MCV RBC AUTO: 99 FL (ref 78–100)
MCV RBC AUTO: 99.5 FL (ref 78–100)
MONOCYTES # BLD: 0.6 K/MCL (ref 0.3–0.9)
MONOCYTES # BLD: 0.9 K/MCL (ref 0.3–0.9)
MONOCYTES NFR BLD: 11 %
MONOCYTES NFR BLD: 11 %
NEUTROPHILS # BLD: 2.5 K/MCL (ref 1.8–7.7)
NEUTROPHILS # BLD: 5.7 K/MCL (ref 1.8–7.7)
NEUTROPHILS NFR BLD: 45 %
NEUTROPHILS NFR BLD: 69 %
NRBC BLD MANUAL-RTO: 0 /100 WBC
NRBC BLD MANUAL-RTO: 0 /100 WBC
P AXIS (DEGREES): 24
PLATELET # BLD AUTO: 270 K/MCL (ref 140–450)
PLATELET # BLD AUTO: 278 K/MCL (ref 140–450)
POTASSIUM SERPL-SCNC: 3.6 MMOL/L (ref 3.4–5.1)
PR-INTERVAL (MSEC): 168
PRODUCT CODE: NORMAL
PRODUCT DESCRIPTION: NORMAL
PRODUCT ID: NORMAL
PROT SERPL-MCNC: 8.7 G/DL (ref 6.4–8.2)
PROTHROMBIN TIME: 11.9 SEC (ref 9.7–11.8)
PROTHROMBIN TIME: 13.2 SEC (ref 9.7–11.8)
PROTHROMBIN TIME: 28.2 SEC (ref 9.7–11.8)
PROTHROMBIN TIME: 28.6 SEC (ref 9.7–11.8)
QRS-INTERVAL (MSEC): 74
QT-INTERVAL (MSEC): 408
QTC: 462
R AXIS (DEGREES): -23
RAINBOW EXTRA TUBES HOLD SPECIMEN: NORMAL
RBC # BLD: 4.03 MIL/MCL (ref 4–5.2)
RBC # BLD: 4.12 MIL/MCL (ref 4–5.2)
REPORT TEXT: NORMAL
SODIUM SERPL-SCNC: 134 MMOL/L (ref 135–145)
T AXIS (DEGREES): 49
TROPONIN I SERPL DL<=0.01 NG/ML-MCNC: 7 NG/L
TYPE AND SCREEN EXPIRATION DATE: NORMAL
UNIT BLOOD TYPE: NORMAL
UNIT NUMBER: NORMAL
VENTRICULAR RATE EKG/MIN (BPM): 77
WBC # BLD: 5.5 K/MCL (ref 4.2–11)
WBC # BLD: 8.1 K/MCL (ref 4.2–11)

## 2023-11-02 PROCEDURE — 70496 CT ANGIOGRAPHY HEAD: CPT

## 2023-11-02 PROCEDURE — 36415 COLL VENOUS BLD VENIPUNCTURE: CPT | Performed by: STUDENT IN AN ORGANIZED HEALTH CARE EDUCATION/TRAINING PROGRAM

## 2023-11-02 PROCEDURE — 99221 1ST HOSP IP/OBS SF/LOW 40: CPT | Performed by: NURSE PRACTITIONER

## 2023-11-02 PROCEDURE — 85384 FIBRINOGEN ACTIVITY: CPT | Performed by: STUDENT IN AN ORGANIZED HEALTH CARE EDUCATION/TRAINING PROGRAM

## 2023-11-02 PROCEDURE — 84484 ASSAY OF TROPONIN QUANT: CPT | Performed by: STUDENT IN AN ORGANIZED HEALTH CARE EDUCATION/TRAINING PROGRAM

## 2023-11-02 PROCEDURE — 10002800 HB RX 250 W HCPCS: Performed by: HOSPITALIST

## 2023-11-02 PROCEDURE — 99291 CRITICAL CARE FIRST HOUR: CPT

## 2023-11-02 PROCEDURE — 82962 GLUCOSE BLOOD TEST: CPT

## 2023-11-02 PROCEDURE — 96375 TX/PRO/DX INJ NEW DRUG ADDON: CPT

## 2023-11-02 PROCEDURE — 13003289 HB OXYGEN THERAPY DAILY

## 2023-11-02 PROCEDURE — P9073 PLATELETS PHERESIS PATH REDU: HCPCS

## 2023-11-02 PROCEDURE — 10002800 HB RX 250 W HCPCS: Performed by: STUDENT IN AN ORGANIZED HEALTH CARE EDUCATION/TRAINING PROGRAM

## 2023-11-02 PROCEDURE — 70450 CT HEAD/BRAIN W/O DYE: CPT

## 2023-11-02 PROCEDURE — 10002800 HB RX 250 W HCPCS: Performed by: NURSE PRACTITIONER

## 2023-11-02 PROCEDURE — 71045 X-RAY EXAM CHEST 1 VIEW: CPT

## 2023-11-02 PROCEDURE — 10002807 HB RX 258: Performed by: STUDENT IN AN ORGANIZED HEALTH CARE EDUCATION/TRAINING PROGRAM

## 2023-11-02 PROCEDURE — 10002801 HB RX 250 W/O HCPCS: Performed by: STUDENT IN AN ORGANIZED HEALTH CARE EDUCATION/TRAINING PROGRAM

## 2023-11-02 PROCEDURE — 86900 BLOOD TYPING SEROLOGIC ABO: CPT | Performed by: NEUROLOGICAL SURGERY

## 2023-11-02 PROCEDURE — 10002805 HB CONTRAST AGENT: Performed by: NURSE PRACTITIONER

## 2023-11-02 PROCEDURE — 80053 COMPREHEN METABOLIC PANEL: CPT | Performed by: STUDENT IN AN ORGANIZED HEALTH CARE EDUCATION/TRAINING PROGRAM

## 2023-11-02 PROCEDURE — 10002800 HB RX 250 W HCPCS

## 2023-11-02 PROCEDURE — 85576 BLOOD PLATELET AGGREGATION: CPT | Performed by: NURSE PRACTITIONER

## 2023-11-02 PROCEDURE — 99221 1ST HOSP IP/OBS SF/LOW 40: CPT | Performed by: SURGERY

## 2023-11-02 PROCEDURE — 96374 THER/PROPH/DIAG INJ IV PUSH: CPT

## 2023-11-02 PROCEDURE — 93005 ELECTROCARDIOGRAM TRACING: CPT | Performed by: STUDENT IN AN ORGANIZED HEALTH CARE EDUCATION/TRAINING PROGRAM

## 2023-11-02 PROCEDURE — 85025 COMPLETE CBC W/AUTO DIFF WBC: CPT | Performed by: STUDENT IN AN ORGANIZED HEALTH CARE EDUCATION/TRAINING PROGRAM

## 2023-11-02 PROCEDURE — 85730 THROMBOPLASTIN TIME PARTIAL: CPT | Performed by: STUDENT IN AN ORGANIZED HEALTH CARE EDUCATION/TRAINING PROGRAM

## 2023-11-02 PROCEDURE — 85610 PROTHROMBIN TIME: CPT | Performed by: STUDENT IN AN ORGANIZED HEALTH CARE EDUCATION/TRAINING PROGRAM

## 2023-11-02 PROCEDURE — 10000008 HB ROOM CHARGE ICU OR CCU

## 2023-11-02 RX ORDER — LEVETIRACETAM 500 MG/5ML
500 INJECTION, SOLUTION, CONCENTRATE INTRAVENOUS EVERY 12 HOURS SCHEDULED
Status: DISCONTINUED | OUTPATIENT
Start: 2023-11-02 | End: 2023-11-06

## 2023-11-02 RX ORDER — ASPIRIN 81 MG/1
81 TABLET ORAL DAILY
Status: ON HOLD | COMMUNITY
End: 2023-11-11 | Stop reason: HOSPADM

## 2023-11-02 RX ORDER — DIVALPROEX SODIUM 500 MG/1
500 TABLET, DELAYED RELEASE ORAL 2 TIMES DAILY
Status: ON HOLD | COMMUNITY
End: 2023-11-11 | Stop reason: HOSPADM

## 2023-11-02 RX ORDER — ACETAMINOPHEN 500 MG
500 TABLET ORAL EVERY 4 HOURS PRN
Status: ON HOLD | COMMUNITY
End: 2023-11-11 | Stop reason: HOSPADM

## 2023-11-02 RX ORDER — FLUPHENAZINE HYDROCHLORIDE 10 MG/1
10 TABLET ORAL 2 TIMES DAILY
Status: ON HOLD | COMMUNITY
End: 2023-11-11 | Stop reason: HOSPADM

## 2023-11-02 RX ORDER — WARFARIN SODIUM 2 MG/1
2 TABLET ORAL
Status: ON HOLD | COMMUNITY
End: 2023-11-11 | Stop reason: HOSPADM

## 2023-11-02 RX ORDER — HYDRALAZINE HYDROCHLORIDE 20 MG/ML
10 INJECTION INTRAMUSCULAR; INTRAVENOUS EVERY 6 HOURS PRN
Status: DISCONTINUED | OUTPATIENT
Start: 2023-11-02 | End: 2023-11-11 | Stop reason: HOSPADM

## 2023-11-02 RX ORDER — WARFARIN SODIUM 3 MG/1
3 TABLET ORAL
Status: ON HOLD | COMMUNITY
End: 2023-11-11 | Stop reason: HOSPADM

## 2023-11-02 RX ORDER — ASCORBIC ACID 500 MG
500 TABLET ORAL DAILY
Status: ON HOLD | COMMUNITY
End: 2023-11-11

## 2023-11-02 RX ORDER — POTASSIUM CHLORIDE 14.9 MG/ML
20 INJECTION INTRAVENOUS ONCE
Status: COMPLETED | OUTPATIENT
Start: 2023-11-02 | End: 2023-11-02

## 2023-11-02 RX ORDER — SODIUM CHLORIDE 9 MG/ML
INJECTION, SOLUTION INTRAVENOUS CONTINUOUS PRN
Status: DISCONTINUED | OUTPATIENT
Start: 2023-11-02 | End: 2023-11-11 | Stop reason: HOSPADM

## 2023-11-02 RX ORDER — ONDANSETRON 2 MG/ML
4 INJECTION INTRAMUSCULAR; INTRAVENOUS EVERY 6 HOURS PRN
Status: DISCONTINUED | OUTPATIENT
Start: 2023-11-02 | End: 2023-11-11 | Stop reason: HOSPADM

## 2023-11-02 RX ORDER — ACETAMINOPHEN 325 MG/1
650 TABLET ORAL EVERY 4 HOURS PRN
Status: ON HOLD | COMMUNITY
End: 2023-11-11 | Stop reason: HOSPADM

## 2023-11-02 RX ADMIN — IOHEXOL 75 ML: 350 INJECTION, SOLUTION INTRAVENOUS at 14:35

## 2023-11-02 RX ADMIN — LEVETIRACETAM 500 MG: 100 INJECTION, SOLUTION INTRAVENOUS at 21:32

## 2023-11-02 RX ADMIN — NICARDIPINE HYDROCHLORIDE 2.5 MG/HR: 0.2 INJECTION, SOLUTION INTRAVENOUS at 21:06

## 2023-11-02 RX ADMIN — POTASSIUM CHLORIDE 20 MEQ: 14.9 INJECTION, SOLUTION INTRAVENOUS at 15:21

## 2023-11-02 RX ADMIN — LABETALOL HYDROCHLORIDE 10 MG: 5 INJECTION, SOLUTION INTRAVENOUS at 19:40

## 2023-11-02 RX ADMIN — PHYTONADIONE 10 MG: 10 INJECTION, EMULSION INTRAMUSCULAR; INTRAVENOUS; SUBCUTANEOUS at 11:47

## 2023-11-02 RX ADMIN — LEVETIRACETAM 500 MG: 100 INJECTION, SOLUTION INTRAVENOUS at 10:54

## 2023-11-02 RX ADMIN — NICARDIPINE HYDROCHLORIDE 2.5 MG/HR: 0.2 INJECTION, SOLUTION INTRAVENOUS at 10:31

## 2023-11-02 RX ADMIN — POTASSIUM CHLORIDE 20 MEQ: 14.9 INJECTION, SOLUTION INTRAVENOUS at 17:00

## 2023-11-02 SDOH — ECONOMIC STABILITY: GENERAL

## 2023-11-02 SDOH — ECONOMIC STABILITY: HOUSING INSECURITY: WHAT IS YOUR LIVING SITUATION TODAY?: ALONE

## 2023-11-02 SDOH — HEALTH STABILITY: GENERAL: BECAUSE OF A PHYSICAL, MENTAL, OR EMOTIONAL CONDITION, DO YOU HAVE DIFFICULTY DOING ERRANDS ALONE?: YES

## 2023-11-02 SDOH — HEALTH STABILITY: PHYSICAL HEALTH: DO YOU HAVE DIFFICULTY DRESSING OR BATHING?: NO

## 2023-11-02 SDOH — HEALTH STABILITY: PHYSICAL HEALTH: DO YOU HAVE SERIOUS DIFFICULTY WALKING OR CLIMBING STAIRS?: NO

## 2023-11-02 SDOH — SOCIAL STABILITY: SOCIAL NETWORK
HOW OFTEN DO YOU SEE OR TALK TO PEOPLE THAT YOU CARE ABOUT AND FEEL CLOSE TO? (FOR EXAMPLE: TALKING TO FRIENDS ON THE PHONE, VISITING FRIENDS OR FAMILY, GOING TO CHURCH OR CLUB MEETINGS): 5 OR MORE TIMES A WEEK

## 2023-11-02 SDOH — ECONOMIC STABILITY: HOUSING INSECURITY: WHAT IS YOUR LIVING SITUATION TODAY?: APARTMENT

## 2023-11-02 SDOH — ECONOMIC STABILITY: HOUSING INSECURITY: ARE YOU WORRIED ABOUT LOSING YOUR HOUSING?: NO

## 2023-11-02 SDOH — SOCIAL STABILITY: SOCIAL NETWORK

## 2023-11-02 SDOH — ECONOMIC STABILITY: FOOD INSECURITY: HOW OFTEN IN THE PAST 12 MONTHS WERE YOU WORRIED OR STRESSED ABOUT HAVING ENOUGH MONEY TO BUY NUTRITIOUS MEALS?: NEVER

## 2023-11-02 ASSESSMENT — LIFESTYLE VARIABLES
HOW OFTEN DO YOU HAVE A DRINK CONTAINING ALCOHOL: NEVER
HOW OFTEN DO YOU HAVE 6 OR MORE DRINKS ON ONE OCCASION: NEVER
ALCOHOL_USE_STATUS: NO OR LOW RISK WITH VALIDATED TOOL
AUDIT-C TOTAL SCORE: 0
HOW MANY STANDARD DRINKS CONTAINING ALCOHOL DO YOU HAVE ON A TYPICAL DAY: 0,1 OR 2

## 2023-11-02 ASSESSMENT — ACTIVITIES OF DAILY LIVING (ADL)
ADL_SHORT_OF_BREATH: NO
TOILETING: DEPENDENT
ADL_SCORE: 12
RECENT_DECLINE_ADL: NO
FEEDING: DEPENDENT
BATHING: DEPENDENT
ADL_BEFORE_ADMISSION: INDEPENDENT
DRESSING: DEPENDENT

## 2023-11-02 ASSESSMENT — PATIENT HEALTH QUESTIONNAIRE - PHQ9: IS PATIENT ABLE TO COMPLETE PHQ2 OR PHQ9: NO, DEFER TO LATER TIME

## 2023-11-02 ASSESSMENT — COLUMBIA-SUICIDE SEVERITY RATING SCALE - C-SSRS: IS THE PATIENT ABLE TO COMPLETE C-SSRS: NO, DEFER TO LATER TIME

## 2023-11-03 ENCOUNTER — APPOINTMENT (OUTPATIENT)
Dept: CT IMAGING | Age: 63
DRG: 064 | End: 2023-11-03
Attending: NURSE PRACTITIONER

## 2023-11-03 LAB
ANION GAP SERPL CALC-SCNC: 10 MMOL/L (ref 7–19)
APTT PPP: 30 SEC (ref 22–32)
BASOPHILS # BLD: 0 K/MCL (ref 0–0.3)
BASOPHILS # BLD: 0 K/MCL (ref 0–0.3)
BASOPHILS NFR BLD: 0 %
BASOPHILS NFR BLD: 0 %
BUN SERPL-MCNC: 14 MG/DL (ref 6–20)
BUN/CREAT SERPL: 24 (ref 7–25)
CALCIUM SERPL-MCNC: 9.2 MG/DL (ref 8.4–10.2)
CHLORIDE SERPL-SCNC: 102 MMOL/L (ref 97–110)
CO2 SERPL-SCNC: 26 MMOL/L (ref 21–32)
CREAT SERPL-MCNC: 0.58 MG/DL (ref 0.51–0.95)
DEPRECATED RDW RBC: 49 FL (ref 39–50)
DEPRECATED RDW RBC: 49 FL (ref 39–50)
EGFRCR SERPLBLD CKD-EPI 2021: >90 ML/MIN/{1.73_M2}
EOSINOPHIL # BLD: 0 K/MCL (ref 0–0.5)
EOSINOPHIL # BLD: 0 K/MCL (ref 0–0.5)
EOSINOPHIL NFR BLD: 0 %
EOSINOPHIL NFR BLD: 0 %
ERYTHROCYTE [DISTWIDTH] IN BLOOD: 13.2 % (ref 11–15)
ERYTHROCYTE [DISTWIDTH] IN BLOOD: 13.4 % (ref 11–15)
FASTING DURATION TIME PATIENT: ABNORMAL H
GLUCOSE SERPL-MCNC: 97 MG/DL (ref 70–99)
HCT VFR BLD CALC: 38.5 % (ref 36–46.5)
HCT VFR BLD CALC: 39.1 % (ref 36–46.5)
HGB BLD-MCNC: 12.4 G/DL (ref 12–15.5)
HGB BLD-MCNC: 12.6 G/DL (ref 12–15.5)
IMM GRANULOCYTES # BLD AUTO: 0 K/MCL (ref 0–0.2)
IMM GRANULOCYTES # BLD AUTO: 0 K/MCL (ref 0–0.2)
IMM GRANULOCYTES # BLD: 0 %
IMM GRANULOCYTES # BLD: 1 %
INR PPP: 1.1
LYMPHOCYTES # BLD: 1.2 K/MCL (ref 1–4)
LYMPHOCYTES # BLD: 1.3 K/MCL (ref 1–4)
LYMPHOCYTES NFR BLD: 15 %
LYMPHOCYTES NFR BLD: 17 %
MCH RBC QN AUTO: 32 PG (ref 26–34)
MCH RBC QN AUTO: 32.5 PG (ref 26–34)
MCHC RBC AUTO-ENTMCNC: 32.2 G/DL (ref 32–36.5)
MCHC RBC AUTO-ENTMCNC: 32.2 G/DL (ref 32–36.5)
MCV RBC AUTO: 100.8 FL (ref 78–100)
MCV RBC AUTO: 99.5 FL (ref 78–100)
MONOCYTES # BLD: 0.9 K/MCL (ref 0.3–0.9)
MONOCYTES # BLD: 1 K/MCL (ref 0.3–0.9)
MONOCYTES NFR BLD: 12 %
MONOCYTES NFR BLD: 12 %
NEUTROPHILS # BLD: 5 K/MCL (ref 1.8–7.7)
NEUTROPHILS # BLD: 5.9 K/MCL (ref 1.8–7.7)
NEUTROPHILS NFR BLD: 71 %
NEUTROPHILS NFR BLD: 72 %
NRBC BLD MANUAL-RTO: 0 /100 WBC
NRBC BLD MANUAL-RTO: 0 /100 WBC
PLATELET # BLD AUTO: 231 K/MCL (ref 140–450)
PLATELET # BLD AUTO: 297 K/MCL (ref 140–450)
POTASSIUM SERPL-SCNC: 4 MMOL/L (ref 3.4–5.1)
PROTHROMBIN TIME: 11.5 SEC (ref 9.7–11.8)
RBC # BLD: 3.87 MIL/MCL (ref 4–5.2)
RBC # BLD: 3.88 MIL/MCL (ref 4–5.2)
SODIUM SERPL-SCNC: 134 MMOL/L (ref 135–145)
WBC # BLD: 7.2 K/MCL (ref 4.2–11)
WBC # BLD: 8.2 K/MCL (ref 4.2–11)

## 2023-11-03 PROCEDURE — 99232 SBSQ HOSP IP/OBS MODERATE 35: CPT | Performed by: NURSE PRACTITIONER

## 2023-11-03 PROCEDURE — 10002800 HB RX 250 W HCPCS: Performed by: NURSE PRACTITIONER

## 2023-11-03 PROCEDURE — 10002807 HB RX 258: Performed by: SURGERY

## 2023-11-03 PROCEDURE — 85025 COMPLETE CBC W/AUTO DIFF WBC: CPT | Performed by: STUDENT IN AN ORGANIZED HEALTH CARE EDUCATION/TRAINING PROGRAM

## 2023-11-03 PROCEDURE — 70450 CT HEAD/BRAIN W/O DYE: CPT

## 2023-11-03 PROCEDURE — 36415 COLL VENOUS BLD VENIPUNCTURE: CPT | Performed by: HOSPITALIST

## 2023-11-03 PROCEDURE — 85730 THROMBOPLASTIN TIME PARTIAL: CPT | Performed by: STUDENT IN AN ORGANIZED HEALTH CARE EDUCATION/TRAINING PROGRAM

## 2023-11-03 PROCEDURE — 92610 EVALUATE SWALLOWING FUNCTION: CPT

## 2023-11-03 PROCEDURE — 80048 BASIC METABOLIC PNL TOTAL CA: CPT | Performed by: SURGERY

## 2023-11-03 PROCEDURE — 99291 CRITICAL CARE FIRST HOUR: CPT

## 2023-11-03 PROCEDURE — 10000008 HB ROOM CHARGE ICU OR CCU

## 2023-11-03 PROCEDURE — 85025 COMPLETE CBC W/AUTO DIFF WBC: CPT | Performed by: HOSPITALIST

## 2023-11-03 PROCEDURE — 10002800 HB RX 250 W HCPCS: Performed by: SURGERY

## 2023-11-03 PROCEDURE — 85610 PROTHROMBIN TIME: CPT | Performed by: STUDENT IN AN ORGANIZED HEALTH CARE EDUCATION/TRAINING PROGRAM

## 2023-11-03 RX ORDER — POTASSIUM CHLORIDE 14.9 MG/ML
20 INJECTION INTRAVENOUS ONCE
Status: COMPLETED | OUTPATIENT
Start: 2023-11-03 | End: 2023-11-03

## 2023-11-03 RX ORDER — POTASSIUM CHLORIDE 14.9 MG/ML
20 INJECTION INTRAVENOUS ONCE
Status: COMPLETED | OUTPATIENT
Start: 2023-11-03 | End: 2023-11-04

## 2023-11-03 RX ORDER — SODIUM CHLORIDE 9 MG/ML
INJECTION, SOLUTION INTRAVENOUS CONTINUOUS
Status: DISCONTINUED | OUTPATIENT
Start: 2023-11-03 | End: 2023-11-06

## 2023-11-03 RX ADMIN — HYDRALAZINE HYDROCHLORIDE 10 MG: 20 INJECTION, SOLUTION INTRAMUSCULAR; INTRAVENOUS at 21:09

## 2023-11-03 RX ADMIN — SODIUM CHLORIDE 500 ML: 9 INJECTION, SOLUTION INTRAVENOUS at 06:31

## 2023-11-03 RX ADMIN — POTASSIUM CHLORIDE 20 MEQ: 14.9 INJECTION, SOLUTION INTRAVENOUS at 08:47

## 2023-11-03 RX ADMIN — SODIUM CHLORIDE: 9 INJECTION, SOLUTION INTRAVENOUS at 06:31

## 2023-11-03 RX ADMIN — HYDRALAZINE HYDROCHLORIDE 10 MG: 20 INJECTION, SOLUTION INTRAMUSCULAR; INTRAVENOUS at 16:04

## 2023-11-03 RX ADMIN — SODIUM CHLORIDE: 9 INJECTION, SOLUTION INTRAVENOUS at 23:31

## 2023-11-03 RX ADMIN — LEVETIRACETAM 500 MG: 100 INJECTION, SOLUTION INTRAVENOUS at 21:09

## 2023-11-03 RX ADMIN — HYDRALAZINE HYDROCHLORIDE 10 MG: 20 INJECTION, SOLUTION INTRAMUSCULAR; INTRAVENOUS at 00:18

## 2023-11-03 RX ADMIN — LEVETIRACETAM 500 MG: 100 INJECTION, SOLUTION INTRAVENOUS at 08:43

## 2023-11-03 RX ADMIN — POTASSIUM CHLORIDE 20 MEQ: 14.9 INJECTION, SOLUTION INTRAVENOUS at 10:51

## 2023-11-03 ASSESSMENT — COGNITIVE AND FUNCTIONAL STATUS - GENERAL
FOLLOWS FAMILIAR CONVERSATION: UNABLE
REMEMBERING WHERE THINGS ARE: UNABLE
UNDERSTANDING 10 TO 15 MIN SPEECH: UNABLE
REMEMBERING 5 ERRANDS WITH NO LIST: UNABLE
APPLIED_COGNITIVE_RAW_SCORE: 6
APPLIED_COGNITIVE_CONVERTED_SCORE: 7.69
REMEMBERING TO TAKE MEDICATION: UNABLE
TAKING CARE OF COMPLICATED TASKS: UNABLE

## 2023-11-03 ASSESSMENT — PATIENT HEALTH QUESTIONNAIRE - PHQ9: IS PATIENT ABLE TO COMPLETE PHQ2 OR PHQ9: NO, PATIENT WILL NEVER BE ABLE TO COMPLETE

## 2023-11-04 ENCOUNTER — APPOINTMENT (OUTPATIENT)
Dept: CT IMAGING | Age: 63
DRG: 064 | End: 2023-11-04
Attending: PHYSICIAN ASSISTANT

## 2023-11-04 LAB
ANION GAP SERPL CALC-SCNC: 10 MMOL/L (ref 7–19)
BASOPHILS # BLD: 0.1 K/MCL (ref 0–0.3)
BASOPHILS NFR BLD: 1 %
BUN SERPL-MCNC: 15 MG/DL (ref 6–20)
BUN/CREAT SERPL: 31 (ref 7–25)
CALCIUM SERPL-MCNC: 8.8 MG/DL (ref 8.4–10.2)
CHLORIDE SERPL-SCNC: 108 MMOL/L (ref 97–110)
CO2 SERPL-SCNC: 22 MMOL/L (ref 21–32)
CREAT SERPL-MCNC: 0.48 MG/DL (ref 0.51–0.95)
DEPRECATED RDW RBC: 49.7 FL (ref 39–50)
EGFRCR SERPLBLD CKD-EPI 2021: >90 ML/MIN/{1.73_M2}
EOSINOPHIL # BLD: 0.1 K/MCL (ref 0–0.5)
EOSINOPHIL NFR BLD: 1 %
ERYTHROCYTE [DISTWIDTH] IN BLOOD: 13.5 % (ref 11–15)
FASTING DURATION TIME PATIENT: ABNORMAL H
GLUCOSE SERPL-MCNC: 97 MG/DL (ref 70–99)
HCT VFR BLD CALC: 38 % (ref 36–46.5)
HGB BLD-MCNC: 12.1 G/DL (ref 12–15.5)
IMM GRANULOCYTES # BLD AUTO: 0 K/MCL (ref 0–0.2)
IMM GRANULOCYTES # BLD: 1 %
LYMPHOCYTES # BLD: 1.6 K/MCL (ref 1–4)
LYMPHOCYTES NFR BLD: 23 %
MAGNESIUM SERPL-MCNC: 1.7 MG/DL (ref 1.7–2.4)
MCH RBC QN AUTO: 31.9 PG (ref 26–34)
MCHC RBC AUTO-ENTMCNC: 31.8 G/DL (ref 32–36.5)
MCV RBC AUTO: 100.3 FL (ref 78–100)
MONOCYTES # BLD: 1 K/MCL (ref 0.3–0.9)
MONOCYTES NFR BLD: 14 %
NEUTROPHILS # BLD: 4.3 K/MCL (ref 1.8–7.7)
NEUTROPHILS NFR BLD: 60 %
NRBC BLD MANUAL-RTO: 0 /100 WBC
PHOSPHATE SERPL-MCNC: 2.4 MG/DL (ref 2.4–4.7)
PLATELET # BLD AUTO: 306 K/MCL (ref 140–450)
POTASSIUM SERPL-SCNC: 3.5 MMOL/L (ref 3.4–5.1)
POTASSIUM SERPL-SCNC: 4.2 MMOL/L (ref 3.4–5.1)
RAINBOW EXTRA TUBES HOLD SPECIMEN: NORMAL
RBC # BLD: 3.79 MIL/MCL (ref 4–5.2)
SODIUM SERPL-SCNC: 136 MMOL/L (ref 135–145)
WBC # BLD: 7.1 K/MCL (ref 4.2–11)

## 2023-11-04 PROCEDURE — 10002807 HB RX 258: Performed by: SURGERY

## 2023-11-04 PROCEDURE — 36415 COLL VENOUS BLD VENIPUNCTURE: CPT | Performed by: HOSPITALIST

## 2023-11-04 PROCEDURE — 97535 SELF CARE MNGMENT TRAINING: CPT

## 2023-11-04 PROCEDURE — 10002800 HB RX 250 W HCPCS

## 2023-11-04 PROCEDURE — 99233 SBSQ HOSP IP/OBS HIGH 50: CPT | Performed by: NURSE PRACTITIONER

## 2023-11-04 PROCEDURE — 99291 CRITICAL CARE FIRST HOUR: CPT

## 2023-11-04 PROCEDURE — 92526 ORAL FUNCTION THERAPY: CPT

## 2023-11-04 PROCEDURE — 85025 COMPLETE CBC W/AUTO DIFF WBC: CPT | Performed by: HOSPITALIST

## 2023-11-04 PROCEDURE — 92523 SPEECH SOUND LANG COMPREHEN: CPT

## 2023-11-04 PROCEDURE — 97162 PT EVAL MOD COMPLEX 30 MIN: CPT

## 2023-11-04 PROCEDURE — 97167 OT EVAL HIGH COMPLEX 60 MIN: CPT

## 2023-11-04 PROCEDURE — 10000002 HB ROOM CHARGE MED SURG

## 2023-11-04 PROCEDURE — 83735 ASSAY OF MAGNESIUM: CPT

## 2023-11-04 PROCEDURE — 84100 ASSAY OF PHOSPHORUS: CPT

## 2023-11-04 PROCEDURE — 10002800 HB RX 250 W HCPCS: Performed by: NURSE PRACTITIONER

## 2023-11-04 PROCEDURE — 84132 ASSAY OF SERUM POTASSIUM: CPT

## 2023-11-04 PROCEDURE — 80048 BASIC METABOLIC PNL TOTAL CA: CPT | Performed by: HOSPITALIST

## 2023-11-04 PROCEDURE — 97530 THERAPEUTIC ACTIVITIES: CPT

## 2023-11-04 PROCEDURE — 70450 CT HEAD/BRAIN W/O DYE: CPT

## 2023-11-04 RX ORDER — AMOXICILLIN 250 MG
1 CAPSULE ORAL NIGHTLY
Status: DISCONTINUED | OUTPATIENT
Start: 2023-11-04 | End: 2023-11-05

## 2023-11-04 RX ORDER — SODIUM BICARBONATE 650 MG/1
650 TABLET ORAL PRN
Status: DISCONTINUED | OUTPATIENT
Start: 2023-11-04 | End: 2023-11-11 | Stop reason: HOSPADM

## 2023-11-04 RX ORDER — POTASSIUM CHLORIDE 14.9 MG/ML
20 INJECTION INTRAVENOUS ONCE
Status: COMPLETED | OUTPATIENT
Start: 2023-11-04 | End: 2023-11-04

## 2023-11-04 RX ADMIN — LEVETIRACETAM 500 MG: 100 INJECTION, SOLUTION INTRAVENOUS at 08:13

## 2023-11-04 RX ADMIN — POTASSIUM CHLORIDE 20 MEQ: 14.9 INJECTION, SOLUTION INTRAVENOUS at 08:16

## 2023-11-04 RX ADMIN — SODIUM CHLORIDE: 9 INJECTION, SOLUTION INTRAVENOUS at 20:33

## 2023-11-04 RX ADMIN — MAGNESIUM SULFATE HEPTAHYDRATE 2 G: 40 INJECTION, SOLUTION INTRAVENOUS at 08:18

## 2023-11-04 RX ADMIN — POTASSIUM CHLORIDE 20 MEQ: 14.9 INJECTION, SOLUTION INTRAVENOUS at 10:13

## 2023-11-04 RX ADMIN — SODIUM CHLORIDE: 9 INJECTION, SOLUTION INTRAVENOUS at 10:12

## 2023-11-04 RX ADMIN — LEVETIRACETAM 500 MG: 100 INJECTION, SOLUTION INTRAVENOUS at 20:03

## 2023-11-04 ASSESSMENT — PAIN SCALES - GENERAL: PAINLEVEL_OUTOF10: 0

## 2023-11-04 ASSESSMENT — COGNITIVE AND FUNCTIONAL STATUS - GENERAL
DAILY_ACTIVITY_RAW_SCORE: 7
HELP NEEDED FOR TOILETING: TOTAL
DAILY_ACTIVITY_CONVERTED_SCORE: 20.13
REMEMBERING WHERE THINGS ARE: UNABLE
HELP NEEDED DRESSING REGULAR UPPER BODY CLOTHING: TOTAL
BASIC_MOBILITY_CONVERTED_SCORE: 16.59
HELP NEEDED FOR PERSONAL GROOMING: A LOT
BASIC_MOBILITY_RAW_SCORE: 6
FOLLOWS FAMILIAR CONVERSATION: A LOT
APPLIED_COGNITIVE_RAW_SCORE: 7
REMEMBERING 5 ERRANDS WITH NO LIST: UNABLE
REMEMBERING TO TAKE MEDICATION: UNABLE
TAKING CARE OF COMPLICATED TASKS: UNABLE
HELP NEEDED DRESSING REGULAR LOWER BODY CLOTHING: TOTAL
UNDERSTANDING 10 TO 15 MIN SPEECH: UNABLE
HELP NEEDED FOR BATHING: TOTAL
APPLIED_COGNITIVE_CONVERTED_SCORE: 15.17

## 2023-11-04 ASSESSMENT — ACTIVITIES OF DAILY LIVING (ADL): HOME_MANAGEMENT_TIME_ENTRY: 20

## 2023-11-05 ENCOUNTER — APPOINTMENT (OUTPATIENT)
Dept: GENERAL RADIOLOGY | Age: 63
DRG: 064 | End: 2023-11-05
Attending: HOSPITALIST

## 2023-11-05 LAB
ANION GAP SERPL CALC-SCNC: 12 MMOL/L (ref 7–19)
BASOPHILS # BLD: 0.1 K/MCL (ref 0–0.3)
BASOPHILS NFR BLD: 1 %
BUN SERPL-MCNC: 10 MG/DL (ref 6–20)
BUN/CREAT SERPL: 26 (ref 7–25)
CALCIUM SERPL-MCNC: 8.7 MG/DL (ref 8.4–10.2)
CHLORIDE SERPL-SCNC: 104 MMOL/L (ref 97–110)
CO2 SERPL-SCNC: 22 MMOL/L (ref 21–32)
CREAT SERPL-MCNC: 0.38 MG/DL (ref 0.51–0.95)
DEPRECATED RDW RBC: 46.5 FL (ref 39–50)
EGFRCR SERPLBLD CKD-EPI 2021: >90 ML/MIN/{1.73_M2}
EOSINOPHIL # BLD: 0.1 K/MCL (ref 0–0.5)
EOSINOPHIL NFR BLD: 1 %
ERYTHROCYTE [DISTWIDTH] IN BLOOD: 13.1 % (ref 11–15)
FASTING DURATION TIME PATIENT: ABNORMAL H
GLUCOSE BLDC GLUCOMTR-MCNC: 89 MG/DL (ref 70–99)
GLUCOSE SERPL-MCNC: 80 MG/DL (ref 70–99)
HCT VFR BLD CALC: 37.4 % (ref 36–46.5)
HGB BLD-MCNC: 12.1 G/DL (ref 12–15.5)
IMM GRANULOCYTES # BLD AUTO: 0 K/MCL (ref 0–0.2)
IMM GRANULOCYTES # BLD: 1 %
LYMPHOCYTES # BLD: 2.3 K/MCL (ref 1–4)
LYMPHOCYTES NFR BLD: 29 %
MAGNESIUM SERPL-MCNC: 1.8 MG/DL (ref 1.7–2.4)
MCH RBC QN AUTO: 31.8 PG (ref 26–34)
MCHC RBC AUTO-ENTMCNC: 32.4 G/DL (ref 32–36.5)
MCV RBC AUTO: 98.2 FL (ref 78–100)
MONOCYTES # BLD: 1 K/MCL (ref 0.3–0.9)
MONOCYTES NFR BLD: 13 %
NEUTROPHILS # BLD: 4.3 K/MCL (ref 1.8–7.7)
NEUTROPHILS NFR BLD: 55 %
NRBC BLD MANUAL-RTO: 0 /100 WBC
PHOSPHATE SERPL-MCNC: 2.2 MG/DL (ref 2.4–4.7)
PLATELET # BLD AUTO: 293 K/MCL (ref 140–450)
POTASSIUM SERPL-SCNC: 3.5 MMOL/L (ref 3.4–5.1)
POTASSIUM SERPL-SCNC: 3.9 MMOL/L (ref 3.4–5.1)
RBC # BLD: 3.81 MIL/MCL (ref 4–5.2)
SODIUM SERPL-SCNC: 134 MMOL/L (ref 135–145)
WBC # BLD: 7.9 K/MCL (ref 4.2–11)

## 2023-11-05 PROCEDURE — 92526 ORAL FUNCTION THERAPY: CPT

## 2023-11-05 PROCEDURE — 10002807 HB RX 258: Performed by: SURGERY

## 2023-11-05 PROCEDURE — 10002803 HB RX 637: Performed by: INTERNAL MEDICINE

## 2023-11-05 PROCEDURE — 10002800 HB RX 250 W HCPCS: Performed by: NURSE PRACTITIONER

## 2023-11-05 PROCEDURE — 99233 SBSQ HOSP IP/OBS HIGH 50: CPT | Performed by: NURSE PRACTITIONER

## 2023-11-05 PROCEDURE — 13003289 HB OXYGEN THERAPY DAILY

## 2023-11-05 PROCEDURE — 10000002 HB ROOM CHARGE MED SURG

## 2023-11-05 PROCEDURE — 97530 THERAPEUTIC ACTIVITIES: CPT

## 2023-11-05 PROCEDURE — 36415 COLL VENOUS BLD VENIPUNCTURE: CPT | Performed by: HOSPITALIST

## 2023-11-05 PROCEDURE — 85025 COMPLETE CBC W/AUTO DIFF WBC: CPT | Performed by: HOSPITALIST

## 2023-11-05 PROCEDURE — 83735 ASSAY OF MAGNESIUM: CPT

## 2023-11-05 PROCEDURE — 80048 BASIC METABOLIC PNL TOTAL CA: CPT | Performed by: HOSPITALIST

## 2023-11-05 PROCEDURE — 71045 X-RAY EXAM CHEST 1 VIEW: CPT

## 2023-11-05 PROCEDURE — 84132 ASSAY OF SERUM POTASSIUM: CPT | Performed by: HOSPITALIST

## 2023-11-05 PROCEDURE — 84100 ASSAY OF PHOSPHORUS: CPT

## 2023-11-05 PROCEDURE — 10002803 HB RX 637: Performed by: HOSPITALIST

## 2023-11-05 PROCEDURE — 10002800 HB RX 250 W HCPCS: Performed by: SURGERY

## 2023-11-05 PROCEDURE — 10002800 HB RX 250 W HCPCS: Performed by: HOSPITALIST

## 2023-11-05 RX ORDER — ATORVASTATIN CALCIUM 20 MG/1
20 TABLET, FILM COATED ORAL EVERY EVENING
Status: DISCONTINUED | OUTPATIENT
Start: 2023-11-05 | End: 2023-11-11 | Stop reason: HOSPADM

## 2023-11-05 RX ORDER — POTASSIUM CHLORIDE 14.9 MG/ML
20 INJECTION INTRAVENOUS ONCE
Status: COMPLETED | OUTPATIENT
Start: 2023-11-05 | End: 2023-11-05

## 2023-11-05 RX ORDER — AMOXICILLIN 250 MG
1 CAPSULE ORAL 2 TIMES DAILY PRN
Status: DISCONTINUED | OUTPATIENT
Start: 2023-11-05 | End: 2023-11-11 | Stop reason: HOSPADM

## 2023-11-05 RX ORDER — DIVALPROEX SODIUM 125 MG/1
500 CAPSULE, COATED PELLETS ORAL EVERY 12 HOURS SCHEDULED
Status: DISCONTINUED | OUTPATIENT
Start: 2023-11-05 | End: 2023-11-11 | Stop reason: HOSPADM

## 2023-11-05 RX ORDER — FLUPHENAZINE HYDROCHLORIDE 5 MG/1
10 TABLET ORAL 2 TIMES DAILY
Status: DISCONTINUED | OUTPATIENT
Start: 2023-11-05 | End: 2023-11-11 | Stop reason: HOSPADM

## 2023-11-05 RX ORDER — PANTOPRAZOLE SODIUM 40 MG/1
40 TABLET, DELAYED RELEASE ORAL
Status: DISCONTINUED | OUTPATIENT
Start: 2023-11-06 | End: 2023-11-05

## 2023-11-05 RX ORDER — ACETAMINOPHEN 325 MG/1
650 TABLET ORAL EVERY 4 HOURS PRN
Status: DISCONTINUED | OUTPATIENT
Start: 2023-11-05 | End: 2023-11-11 | Stop reason: HOSPADM

## 2023-11-05 RX ORDER — BUTALBITAL, ACETAMINOPHEN AND CAFFEINE 50; 325; 40 MG/1; MG/1; MG/1
1 TABLET ORAL EVERY 4 HOURS PRN
Status: DISCONTINUED | OUTPATIENT
Start: 2023-11-05 | End: 2023-11-11 | Stop reason: HOSPADM

## 2023-11-05 RX ORDER — OLANZAPINE 5 MG/1
20 TABLET ORAL NIGHTLY
Status: DISCONTINUED | OUTPATIENT
Start: 2023-11-05 | End: 2023-11-11 | Stop reason: HOSPADM

## 2023-11-05 RX ORDER — SODIUM CHLORIDE 1 G/1
1 TABLET ORAL 2 TIMES DAILY
Status: DISCONTINUED | OUTPATIENT
Start: 2023-11-05 | End: 2023-11-06

## 2023-11-05 RX ORDER — FLUPHENAZINE HYDROCHLORIDE 5 MG/ML
10 SOLUTION, CONCENTRATE ORAL EVERY 12 HOURS SCHEDULED
Status: DISCONTINUED | OUTPATIENT
Start: 2023-11-05 | End: 2023-11-05

## 2023-11-05 RX ORDER — AMLODIPINE BESYLATE 5 MG/1
5 TABLET ORAL EVERY MORNING
Status: DISCONTINUED | OUTPATIENT
Start: 2023-11-05 | End: 2023-11-06

## 2023-11-05 RX ORDER — POTASSIUM CHLORIDE 1.5 G/1.58G
40 POWDER, FOR SOLUTION ORAL ONCE
Status: COMPLETED | OUTPATIENT
Start: 2023-11-05 | End: 2023-11-05

## 2023-11-05 RX ADMIN — FLUPHENAZINE HYDROCHLORIDE 10 MG: 5 TABLET, FILM COATED ORAL at 21:27

## 2023-11-05 RX ADMIN — DIVALPROEX SODIUM 500 MG: 125 CAPSULE ORAL at 21:27

## 2023-11-05 RX ADMIN — LEVETIRACETAM 500 MG: 100 INJECTION, SOLUTION INTRAVENOUS at 21:27

## 2023-11-05 RX ADMIN — AMLODIPINE BESYLATE 5 MG: 2.5 TABLET ORAL at 17:50

## 2023-11-05 RX ADMIN — POTASSIUM CHLORIDE 20 MEQ: 14.9 INJECTION, SOLUTION INTRAVENOUS at 09:32

## 2023-11-05 RX ADMIN — ONDANSETRON 4 MG: 2 INJECTION INTRAMUSCULAR; INTRAVENOUS at 14:09

## 2023-11-05 RX ADMIN — POTASSIUM CHLORIDE 20 MEQ: 14.9 INJECTION, SOLUTION INTRAVENOUS at 12:51

## 2023-11-05 RX ADMIN — SODIUM CHLORIDE TAB 1 GM 1000 MG: 1 TAB at 21:27

## 2023-11-05 RX ADMIN — HYDRALAZINE HYDROCHLORIDE 10 MG: 20 INJECTION, SOLUTION INTRAMUSCULAR; INTRAVENOUS at 23:21

## 2023-11-05 RX ADMIN — SERTRALINE HYDROCHLORIDE 50 MG: 50 TABLET, FILM COATED ORAL at 17:49

## 2023-11-05 RX ADMIN — OLANZAPINE 20 MG: 5 TABLET, FILM COATED ORAL at 21:27

## 2023-11-05 RX ADMIN — BUTALBITAL, ACETAMINOPHEN, AND CAFFEINE 1 TABLET: 325; 50; 40 TABLET ORAL at 17:50

## 2023-11-05 RX ADMIN — ATORVASTATIN CALCIUM 20 MG: 20 TABLET, FILM COATED ORAL at 17:50

## 2023-11-05 RX ADMIN — BUTALBITAL, ACETAMINOPHEN, AND CAFFEINE 1 TABLET: 325; 50; 40 TABLET ORAL at 23:27

## 2023-11-05 RX ADMIN — SODIUM CHLORIDE: 9 INJECTION, SOLUTION INTRAVENOUS at 05:10

## 2023-11-05 RX ADMIN — LEVETIRACETAM 500 MG: 100 INJECTION, SOLUTION INTRAVENOUS at 09:29

## 2023-11-05 RX ADMIN — POTASSIUM CHLORIDE 40 MEQ: 1.5 POWDER, FOR SOLUTION ORAL at 22:05

## 2023-11-05 RX ADMIN — MAGNESIUM SULFATE HEPTAHYDRATE 2 G: 40 INJECTION, SOLUTION INTRAVENOUS at 09:36

## 2023-11-05 RX ADMIN — SODIUM CHLORIDE: 9 INJECTION, SOLUTION INTRAVENOUS at 14:13

## 2023-11-05 ASSESSMENT — COGNITIVE AND FUNCTIONAL STATUS - GENERAL
TAKING CARE OF COMPLICATED TASKS: UNABLE
REMEMBERING WHERE THINGS ARE: UNABLE
REMEMBERING TO TAKE MEDICATION: UNABLE
APPLIED_COGNITIVE_RAW_SCORE: 9
UNDERSTANDING 10 TO 15 MIN SPEECH: A LOT
FOLLOWS FAMILIAR CONVERSATION: A LITTLE
BASIC_MOBILITY_RAW_SCORE: 8
APPLIED_COGNITIVE_CONVERTED_SCORE: 22.48
REMEMBERING 5 ERRANDS WITH NO LIST: UNABLE
BASIC_MOBILITY_CONVERTED_SCORE: 22.61

## 2023-11-05 ASSESSMENT — PAIN SCALES - GENERAL
PAINLEVEL_OUTOF10: 0

## 2023-11-05 ASSESSMENT — PAIN SCALES - PAIN ASSESSMENT IN ADVANCED DEMENTIA (PAINAD): BODYLANGUAGE: TENSE, DISTRESSED, FIDGETING

## 2023-11-06 ENCOUNTER — TELEPHONE (OUTPATIENT)
Dept: NEUROSURGERY | Age: 63
End: 2023-11-06

## 2023-11-06 ENCOUNTER — APPOINTMENT (OUTPATIENT)
Dept: GENERAL RADIOLOGY | Age: 63
DRG: 064 | End: 2023-11-06
Attending: HOSPITALIST

## 2023-11-06 LAB
ALBUMIN SERPL-MCNC: 3.3 G/DL (ref 3.6–5.1)
ALBUMIN/GLOB SERPL: 0.6 {RATIO} (ref 1–2.4)
ALP SERPL-CCNC: 91 UNITS/L (ref 45–117)
ALT SERPL-CCNC: 18 UNITS/L
ANION GAP SERPL CALC-SCNC: 10 MMOL/L (ref 7–19)
ANION GAP SERPL CALC-SCNC: 14 MMOL/L (ref 7–19)
APTT PPP: 27 SEC (ref 22–32)
AST SERPL-CCNC: 19 UNITS/L
BASOPHILS # BLD: 0.1 K/MCL (ref 0–0.3)
BASOPHILS NFR BLD: 1 %
BILIRUB SERPL-MCNC: 0.3 MG/DL (ref 0.2–1)
BUN SERPL-MCNC: 14 MG/DL (ref 6–20)
BUN SERPL-MCNC: 14 MG/DL (ref 6–20)
BUN/CREAT SERPL: 30 (ref 7–25)
BUN/CREAT SERPL: 30 (ref 7–25)
CALCIUM SERPL-MCNC: 9.4 MG/DL (ref 8.4–10.2)
CALCIUM SERPL-MCNC: 9.5 MG/DL (ref 8.4–10.2)
CHLORIDE SERPL-SCNC: 101 MMOL/L (ref 97–110)
CHLORIDE SERPL-SCNC: 102 MMOL/L (ref 97–110)
CO2 SERPL-SCNC: 20 MMOL/L (ref 21–32)
CO2 SERPL-SCNC: 23 MMOL/L (ref 21–32)
CREAT SERPL-MCNC: 0.46 MG/DL (ref 0.51–0.95)
CREAT SERPL-MCNC: 0.46 MG/DL (ref 0.51–0.95)
DEPRECATED RDW RBC: 47 FL (ref 39–50)
EGFRCR SERPLBLD CKD-EPI 2021: >90 ML/MIN/{1.73_M2}
EGFRCR SERPLBLD CKD-EPI 2021: >90 ML/MIN/{1.73_M2}
EOSINOPHIL # BLD: 0 K/MCL (ref 0–0.5)
EOSINOPHIL NFR BLD: 0 %
ERYTHROCYTE [DISTWIDTH] IN BLOOD: 13 % (ref 11–15)
FASTING DURATION TIME PATIENT: ABNORMAL H
FASTING DURATION TIME PATIENT: ABNORMAL H
GLOBULIN SER-MCNC: 5.2 G/DL (ref 2–4)
GLUCOSE BLDC GLUCOMTR-MCNC: 104 MG/DL (ref 70–99)
GLUCOSE BLDC GLUCOMTR-MCNC: 119 MG/DL (ref 70–99)
GLUCOSE SERPL-MCNC: 110 MG/DL (ref 70–99)
GLUCOSE SERPL-MCNC: 110 MG/DL (ref 70–99)
HCT VFR BLD CALC: 41.9 % (ref 36–46.5)
HGB BLD-MCNC: 13.7 G/DL (ref 12–15.5)
IMM GRANULOCYTES # BLD AUTO: 0.2 K/MCL (ref 0–0.2)
IMM GRANULOCYTES # BLD: 1 %
INR PPP: 1.1
LACTATE BLDV-SCNC: 0.8 MMOL/L (ref 0–2)
LYMPHOCYTES # BLD: 1.9 K/MCL (ref 1–4)
LYMPHOCYTES NFR BLD: 14 %
MAGNESIUM SERPL-MCNC: 2.1 MG/DL (ref 1.7–2.4)
MCH RBC QN AUTO: 32 PG (ref 26–34)
MCHC RBC AUTO-ENTMCNC: 32.7 G/DL (ref 32–36.5)
MCV RBC AUTO: 97.9 FL (ref 78–100)
MONOCYTES # BLD: 1.5 K/MCL (ref 0.3–0.9)
MONOCYTES NFR BLD: 11 %
NEUTROPHILS # BLD: 9.7 K/MCL (ref 1.8–7.7)
NEUTROPHILS NFR BLD: 73 %
NRBC BLD MANUAL-RTO: 0 /100 WBC
PHOSPHATE SERPL-MCNC: 2.8 MG/DL (ref 2.4–4.7)
PLATELET # BLD AUTO: 255 K/MCL (ref 140–450)
POTASSIUM SERPL-SCNC: 4.3 MMOL/L (ref 3.4–5.1)
POTASSIUM SERPL-SCNC: 4.3 MMOL/L (ref 3.4–5.1)
PROT SERPL-MCNC: 8.5 G/DL (ref 6.4–8.2)
PROTHROMBIN TIME: 11.4 SEC (ref 9.7–11.8)
RBC # BLD: 4.28 MIL/MCL (ref 4–5.2)
SODIUM SERPL-SCNC: 131 MMOL/L (ref 135–145)
SODIUM SERPL-SCNC: 131 MMOL/L (ref 135–145)
WBC # BLD: 13.4 K/MCL (ref 4.2–11)

## 2023-11-06 PROCEDURE — 83735 ASSAY OF MAGNESIUM: CPT

## 2023-11-06 PROCEDURE — 80053 COMPREHEN METABOLIC PANEL: CPT | Performed by: HOSPITALIST

## 2023-11-06 PROCEDURE — 10002803 HB RX 637

## 2023-11-06 PROCEDURE — 80048 BASIC METABOLIC PNL TOTAL CA: CPT | Performed by: HOSPITALIST

## 2023-11-06 PROCEDURE — 10000002 HB ROOM CHARGE MED SURG

## 2023-11-06 PROCEDURE — 85025 COMPLETE CBC W/AUTO DIFF WBC: CPT | Performed by: HOSPITALIST

## 2023-11-06 PROCEDURE — 71045 X-RAY EXAM CHEST 1 VIEW: CPT

## 2023-11-06 PROCEDURE — 36415 COLL VENOUS BLD VENIPUNCTURE: CPT | Performed by: HOSPITALIST

## 2023-11-06 PROCEDURE — 83605 ASSAY OF LACTIC ACID: CPT | Performed by: HOSPITALIST

## 2023-11-06 PROCEDURE — 10002800 HB RX 250 W HCPCS: Performed by: NURSE PRACTITIONER

## 2023-11-06 PROCEDURE — 10004651 HB RX, NO CHARGE ITEM: Performed by: HOSPITALIST

## 2023-11-06 PROCEDURE — 10002803 HB RX 637: Performed by: INTERNAL MEDICINE

## 2023-11-06 PROCEDURE — 10002803 HB RX 637: Performed by: NURSE PRACTITIONER

## 2023-11-06 PROCEDURE — 10002803 HB RX 637: Performed by: HOSPITALIST

## 2023-11-06 PROCEDURE — 85730 THROMBOPLASTIN TIME PARTIAL: CPT | Performed by: HOSPITALIST

## 2023-11-06 PROCEDURE — 85610 PROTHROMBIN TIME: CPT | Performed by: HOSPITALIST

## 2023-11-06 PROCEDURE — 97530 THERAPEUTIC ACTIVITIES: CPT

## 2023-11-06 PROCEDURE — 92526 ORAL FUNCTION THERAPY: CPT

## 2023-11-06 PROCEDURE — 97110 THERAPEUTIC EXERCISES: CPT

## 2023-11-06 PROCEDURE — 84100 ASSAY OF PHOSPHORUS: CPT

## 2023-11-06 RX ORDER — HYDRALAZINE HYDROCHLORIDE 20 MG/ML
10 INJECTION INTRAMUSCULAR; INTRAVENOUS ONCE
Status: COMPLETED | OUTPATIENT
Start: 2023-11-06 | End: 2023-11-06

## 2023-11-06 RX ORDER — LEVETIRACETAM 500 MG/5ML
500 INJECTION, SOLUTION, CONCENTRATE INTRAVENOUS ONCE
Status: DISCONTINUED | OUTPATIENT
Start: 2023-11-07 | End: 2023-11-08

## 2023-11-06 RX ORDER — LEVETIRACETAM 100 MG/ML
500 SOLUTION ORAL EVERY 12 HOURS SCHEDULED
Status: COMPLETED | OUTPATIENT
Start: 2023-11-06 | End: 2023-11-08

## 2023-11-06 RX ORDER — AMLODIPINE BESYLATE 5 MG/1
5 TABLET ORAL EVERY 12 HOURS
Status: DISCONTINUED | OUTPATIENT
Start: 2023-11-06 | End: 2023-11-11 | Stop reason: HOSPADM

## 2023-11-06 RX ORDER — SODIUM CHLORIDE 1 G/1
1 TABLET ORAL
Status: DISCONTINUED | OUTPATIENT
Start: 2023-11-06 | End: 2023-11-11 | Stop reason: HOSPADM

## 2023-11-06 RX ORDER — GUAIFENESIN 200 MG/10ML
200 LIQUID ORAL EVERY 6 HOURS SCHEDULED
Status: DISCONTINUED | OUTPATIENT
Start: 2023-11-06 | End: 2023-11-11 | Stop reason: HOSPADM

## 2023-11-06 RX ADMIN — SODIUM CHLORIDE TAB 1 GM 1000 MG: 1 TAB at 16:06

## 2023-11-06 RX ADMIN — HYDRALAZINE HYDROCHLORIDE 10 MG: 20 INJECTION, SOLUTION INTRAMUSCULAR; INTRAVENOUS at 03:50

## 2023-11-06 RX ADMIN — LEVETIRACETAM 500 MG: 100 SOLUTION ORAL at 22:22

## 2023-11-06 RX ADMIN — OLANZAPINE 20 MG: 5 TABLET, FILM COATED ORAL at 22:11

## 2023-11-06 RX ADMIN — AMLODIPINE BESYLATE 5 MG: 2.5 TABLET ORAL at 06:28

## 2023-11-06 RX ADMIN — LEVETIRACETAM 500 MG: 100 INJECTION, SOLUTION INTRAVENOUS at 08:35

## 2023-11-06 RX ADMIN — PANCRELIPASE 2 TABLET: 10440; 39150; 39150 TABLET ORAL at 23:02

## 2023-11-06 RX ADMIN — ATORVASTATIN CALCIUM 20 MG: 20 TABLET, FILM COATED ORAL at 16:06

## 2023-11-06 RX ADMIN — SODIUM CHLORIDE TAB 1 GM 1000 MG: 1 TAB at 12:42

## 2023-11-06 RX ADMIN — HYDRALAZINE HYDROCHLORIDE 10 MG: 20 INJECTION, SOLUTION INTRAMUSCULAR; INTRAVENOUS at 18:28

## 2023-11-06 RX ADMIN — DIVALPROEX SODIUM 500 MG: 125 CAPSULE ORAL at 08:34

## 2023-11-06 RX ADMIN — DIVALPROEX SODIUM 500 MG: 125 CAPSULE ORAL at 22:10

## 2023-11-06 RX ADMIN — SERTRALINE HYDROCHLORIDE 50 MG: 50 TABLET, FILM COATED ORAL at 08:34

## 2023-11-06 RX ADMIN — ACETAMINOPHEN 650 MG: 325 TABLET ORAL at 03:41

## 2023-11-06 RX ADMIN — AMLODIPINE BESYLATE 5 MG: 5 TABLET ORAL at 16:06

## 2023-11-06 RX ADMIN — ACETAMINOPHEN 650 MG: 325 TABLET ORAL at 08:34

## 2023-11-06 RX ADMIN — FLUPHENAZINE HYDROCHLORIDE 10 MG: 5 TABLET, FILM COATED ORAL at 08:34

## 2023-11-06 RX ADMIN — BUTALBITAL, ACETAMINOPHEN, AND CAFFEINE 1 TABLET: 325; 50; 40 TABLET ORAL at 06:28

## 2023-11-06 RX ADMIN — GUAIFENESIN 200 MG: 100 LIQUID ORAL at 16:06

## 2023-11-06 RX ADMIN — PANTOPRAZOLE 40 MG: 40 TABLET, DELAYED RELEASE ORAL at 06:28

## 2023-11-06 RX ADMIN — FLUPHENAZINE HYDROCHLORIDE 10 MG: 5 TABLET, FILM COATED ORAL at 22:12

## 2023-11-06 RX ADMIN — SODIUM BICARBONATE 650 MG: 650 TABLET ORAL at 23:01

## 2023-11-06 ASSESSMENT — COGNITIVE AND FUNCTIONAL STATUS - GENERAL
BASIC_MOBILITY_RAW_SCORE: 8
REMEMBERING WHERE THINGS ARE: UNABLE
UNDERSTANDING 10 TO 15 MIN SPEECH: UNABLE
REMEMBERING 5 ERRANDS WITH NO LIST: UNABLE
TAKING CARE OF COMPLICATED TASKS: UNABLE
BASIC_MOBILITY_CONVERTED_SCORE: 22.61
APPLIED_COGNITIVE_CONVERTED_SCORE: 7.69
FOLLOWS FAMILIAR CONVERSATION: UNABLE
REMEMBERING TO TAKE MEDICATION: UNABLE
APPLIED_COGNITIVE_RAW_SCORE: 6

## 2023-11-06 ASSESSMENT — PAIN SCALES - GENERAL: PAINLEVEL_OUTOF10: 0

## 2023-11-07 ENCOUNTER — APPOINTMENT (OUTPATIENT)
Dept: INFUSION THERAPY | Age: 63
End: 2023-11-07
Attending: INTERNAL MEDICINE

## 2023-11-07 ENCOUNTER — APPOINTMENT (OUTPATIENT)
Dept: GENERAL RADIOLOGY | Age: 63
DRG: 064 | End: 2023-11-07

## 2023-11-07 ENCOUNTER — APPOINTMENT (OUTPATIENT)
Dept: CT IMAGING | Age: 63
DRG: 064 | End: 2023-11-07
Attending: HOSPITALIST

## 2023-11-07 LAB
ALBUMIN SERPL-MCNC: 3.3 G/DL (ref 3.6–5.1)
ANION GAP SERPL CALC-SCNC: 12 MMOL/L (ref 7–19)
BASOPHILS # BLD: 0.1 K/MCL (ref 0–0.3)
BASOPHILS NFR BLD: 1 %
BUN SERPL-MCNC: 17 MG/DL (ref 6–20)
BUN/CREAT SERPL: 31 (ref 7–25)
CALCIUM SERPL-MCNC: 9.5 MG/DL (ref 8.4–10.2)
CHLORIDE SERPL-SCNC: 98 MMOL/L (ref 97–110)
CO2 SERPL-SCNC: 25 MMOL/L (ref 21–32)
CREAT SERPL-MCNC: 0.55 MG/DL (ref 0.51–0.95)
DEPRECATED RDW RBC: 46.5 FL (ref 39–50)
EGFRCR SERPLBLD CKD-EPI 2021: >90 ML/MIN/{1.73_M2}
EOSINOPHIL # BLD: 0 K/MCL (ref 0–0.5)
EOSINOPHIL NFR BLD: 0 %
ERYTHROCYTE [DISTWIDTH] IN BLOOD: 13.2 % (ref 11–15)
FASTING DURATION TIME PATIENT: ABNORMAL H
GLUCOSE BLDC GLUCOMTR-MCNC: 108 MG/DL (ref 70–99)
GLUCOSE BLDC GLUCOMTR-MCNC: 111 MG/DL (ref 70–99)
GLUCOSE BLDC GLUCOMTR-MCNC: 125 MG/DL (ref 70–99)
GLUCOSE BLDC GLUCOMTR-MCNC: 125 MG/DL (ref 70–99)
GLUCOSE SERPL-MCNC: 120 MG/DL (ref 70–99)
HCT VFR BLD CALC: 40.5 % (ref 36–46.5)
HGB BLD-MCNC: 13.7 G/DL (ref 12–15.5)
IMM GRANULOCYTES # BLD AUTO: 0.1 K/MCL (ref 0–0.2)
IMM GRANULOCYTES # BLD: 1 %
LYMPHOCYTES # BLD: 2.2 K/MCL (ref 1–4)
LYMPHOCYTES NFR BLD: 17 %
MAGNESIUM SERPL-MCNC: 2 MG/DL (ref 1.7–2.4)
MCH RBC QN AUTO: 32.5 PG (ref 26–34)
MCHC RBC AUTO-ENTMCNC: 33.8 G/DL (ref 32–36.5)
MCV RBC AUTO: 96 FL (ref 78–100)
MONOCYTES # BLD: 1.6 K/MCL (ref 0.3–0.9)
MONOCYTES NFR BLD: 13 %
NEUTROPHILS # BLD: 8.8 K/MCL (ref 1.8–7.7)
NEUTROPHILS NFR BLD: 68 %
NRBC BLD MANUAL-RTO: 0 /100 WBC
OSMOLALITY UR: 635 MOSM/KG (ref 50–1200)
PHOSPHATE SERPL-MCNC: 3.5 MG/DL (ref 2.4–4.7)
PLATELET # BLD AUTO: 366 K/MCL (ref 140–450)
POTASSIUM SERPL-SCNC: 4 MMOL/L (ref 3.4–5.1)
RBC # BLD: 4.22 MIL/MCL (ref 4–5.2)
SODIUM SERPL-SCNC: 131 MMOL/L (ref 135–145)
SODIUM UR-SCNC: 109 MMOL/L
WBC # BLD: 12.8 K/MCL (ref 4.2–11)

## 2023-11-07 PROCEDURE — 80069 RENAL FUNCTION PANEL: CPT | Performed by: INTERNAL MEDICINE

## 2023-11-07 PROCEDURE — 10002803 HB RX 637: Performed by: HOSPITALIST

## 2023-11-07 PROCEDURE — 83935 ASSAY OF URINE OSMOLALITY: CPT | Performed by: INTERNAL MEDICINE

## 2023-11-07 PROCEDURE — 10002800 HB RX 250 W HCPCS: Performed by: HOSPITALIST

## 2023-11-07 PROCEDURE — 36415 COLL VENOUS BLD VENIPUNCTURE: CPT | Performed by: HOSPITALIST

## 2023-11-07 PROCEDURE — 10002800 HB RX 250 W HCPCS: Performed by: NURSE PRACTITIONER

## 2023-11-07 PROCEDURE — 10002807 HB RX 258: Performed by: HOSPITALIST

## 2023-11-07 PROCEDURE — 70450 CT HEAD/BRAIN W/O DYE: CPT

## 2023-11-07 PROCEDURE — 74018 RADEX ABDOMEN 1 VIEW: CPT

## 2023-11-07 PROCEDURE — 10002803 HB RX 637: Performed by: NURSE PRACTITIONER

## 2023-11-07 PROCEDURE — 10002803 HB RX 637: Performed by: INTERNAL MEDICINE

## 2023-11-07 PROCEDURE — 10006031 HB ROOM CHARGE TELEMETRY

## 2023-11-07 PROCEDURE — 84300 ASSAY OF URINE SODIUM: CPT | Performed by: INTERNAL MEDICINE

## 2023-11-07 PROCEDURE — 83735 ASSAY OF MAGNESIUM: CPT | Performed by: INTERNAL MEDICINE

## 2023-11-07 PROCEDURE — 85025 COMPLETE CBC W/AUTO DIFF WBC: CPT | Performed by: HOSPITALIST

## 2023-11-07 RX ORDER — POTASSIUM CHLORIDE 1.5 G/1.58G
40 POWDER, FOR SOLUTION ORAL ONCE
Status: COMPLETED | OUTPATIENT
Start: 2023-11-07 | End: 2023-11-07

## 2023-11-07 RX ADMIN — DIVALPROEX SODIUM 500 MG: 125 CAPSULE ORAL at 23:09

## 2023-11-07 RX ADMIN — SODIUM CHLORIDE 1.5 G: 900 INJECTION INTRAVENOUS at 14:01

## 2023-11-07 RX ADMIN — PANTOPRAZOLE 40 MG: 40 TABLET, DELAYED RELEASE ORAL at 05:44

## 2023-11-07 RX ADMIN — OLANZAPINE 20 MG: 5 TABLET, FILM COATED ORAL at 23:28

## 2023-11-07 RX ADMIN — FLUPHENAZINE HYDROCHLORIDE 10 MG: 5 TABLET, FILM COATED ORAL at 09:07

## 2023-11-07 RX ADMIN — GUAIFENESIN 200 MG: 100 LIQUID ORAL at 11:45

## 2023-11-07 RX ADMIN — SODIUM CHLORIDE 1.5 G: 900 INJECTION INTRAVENOUS at 23:23

## 2023-11-07 RX ADMIN — SERTRALINE HYDROCHLORIDE 50 MG: 50 TABLET, FILM COATED ORAL at 09:08

## 2023-11-07 RX ADMIN — LEVETIRACETAM 500 MG: 100 SOLUTION ORAL at 09:07

## 2023-11-07 RX ADMIN — DIVALPROEX SODIUM 500 MG: 125 CAPSULE ORAL at 09:07

## 2023-11-07 RX ADMIN — GUAIFENESIN 200 MG: 100 LIQUID ORAL at 05:44

## 2023-11-07 RX ADMIN — LEVETIRACETAM 500 MG: 100 SOLUTION ORAL at 23:12

## 2023-11-07 RX ADMIN — SODIUM CHLORIDE TAB 1 GM 1000 MG: 1 TAB at 09:08

## 2023-11-07 RX ADMIN — GUAIFENESIN 200 MG: 100 LIQUID ORAL at 01:12

## 2023-11-07 RX ADMIN — AMLODIPINE BESYLATE 5 MG: 5 TABLET ORAL at 17:13

## 2023-11-07 RX ADMIN — ATORVASTATIN CALCIUM 20 MG: 20 TABLET, FILM COATED ORAL at 17:13

## 2023-11-07 RX ADMIN — AMLODIPINE BESYLATE 5 MG: 5 TABLET ORAL at 05:44

## 2023-11-07 RX ADMIN — HYDRALAZINE HYDROCHLORIDE 10 MG: 20 INJECTION, SOLUTION INTRAMUSCULAR; INTRAVENOUS at 00:24

## 2023-11-07 RX ADMIN — GUAIFENESIN 200 MG: 100 LIQUID ORAL at 17:13

## 2023-11-07 RX ADMIN — GUAIFENESIN 200 MG: 100 LIQUID ORAL at 23:12

## 2023-11-07 RX ADMIN — POTASSIUM CHLORIDE 40 MEQ: 1.5 POWDER, FOR SOLUTION ORAL at 09:08

## 2023-11-07 RX ADMIN — SODIUM CHLORIDE 1.5 G: 900 INJECTION INTRAVENOUS at 17:24

## 2023-11-07 RX ADMIN — FLUPHENAZINE HYDROCHLORIDE 10 MG: 5 TABLET, FILM COATED ORAL at 23:11

## 2023-11-07 RX ADMIN — SODIUM CHLORIDE TAB 1 GM 1000 MG: 1 TAB at 11:45

## 2023-11-07 RX ADMIN — SODIUM CHLORIDE TAB 1 GM 1000 MG: 1 TAB at 17:13

## 2023-11-07 ASSESSMENT — PAIN SCALES - PAIN ASSESSMENT IN ADVANCED DEMENTIA (PAINAD)
BODYLANGUAGE: RELAXED
FACIALEXPRESSION: SMILING OR INEXPRESSIVE
BREATHING: NORMAL
TOTALSCORE: 0
CONSOLABILITY: NO NEED TO CONSOLE
TOTALSCORE: 0
CONSOLABILITY: NO NEED TO CONSOLE
FACIALEXPRESSION: SMILING OR INEXPRESSIVE
BODYLANGUAGE: RELAXED
BODYLANGUAGE: RELAXED
BREATHING: NORMAL
CONSOLABILITY: NO NEED TO CONSOLE
TOTALSCORE: 0
FACIALEXPRESSION: SMILING OR INEXPRESSIVE
BREATHING: NORMAL

## 2023-11-08 ENCOUNTER — CLINICAL ABSTRACT (OUTPATIENT)
Dept: INFUSION THERAPY | Age: 63
End: 2023-11-08

## 2023-11-08 LAB
ANION GAP SERPL CALC-SCNC: 11 MMOL/L (ref 7–19)
BASOPHILS # BLD: 0.1 K/MCL (ref 0–0.3)
BASOPHILS NFR BLD: 1 %
BUN SERPL-MCNC: 14 MG/DL (ref 6–20)
BUN/CREAT SERPL: 30 (ref 7–25)
CALCIUM SERPL-MCNC: 9.1 MG/DL (ref 8.4–10.2)
CHLORIDE SERPL-SCNC: 97 MMOL/L (ref 97–110)
CO2 SERPL-SCNC: 26 MMOL/L (ref 21–32)
CREAT SERPL-MCNC: 0.46 MG/DL (ref 0.51–0.95)
DEPRECATED RDW RBC: 46.9 FL (ref 39–50)
EGFRCR SERPLBLD CKD-EPI 2021: >90 ML/MIN/{1.73_M2}
EOSINOPHIL # BLD: 0 K/MCL (ref 0–0.5)
EOSINOPHIL NFR BLD: 0 %
ERYTHROCYTE [DISTWIDTH] IN BLOOD: 13.2 % (ref 11–15)
FASTING DURATION TIME PATIENT: ABNORMAL H
GLUCOSE BLDC GLUCOMTR-MCNC: 114 MG/DL (ref 70–99)
GLUCOSE BLDC GLUCOMTR-MCNC: 122 MG/DL (ref 70–99)
GLUCOSE BLDC GLUCOMTR-MCNC: 129 MG/DL (ref 70–99)
GLUCOSE SERPL-MCNC: 123 MG/DL (ref 70–99)
HCT VFR BLD CALC: 39.3 % (ref 36–46.5)
HGB BLD-MCNC: 13 G/DL (ref 12–15.5)
IMM GRANULOCYTES # BLD AUTO: 0.1 K/MCL (ref 0–0.2)
IMM GRANULOCYTES # BLD: 1 %
LYMPHOCYTES # BLD: 2.3 K/MCL (ref 1–4)
LYMPHOCYTES NFR BLD: 21 %
MCH RBC QN AUTO: 31.8 PG (ref 26–34)
MCHC RBC AUTO-ENTMCNC: 33.1 G/DL (ref 32–36.5)
MCV RBC AUTO: 96.1 FL (ref 78–100)
MONOCYTES # BLD: 1.7 K/MCL (ref 0.3–0.9)
MONOCYTES NFR BLD: 15 %
NEUTROPHILS # BLD: 6.8 K/MCL (ref 1.8–7.7)
NEUTROPHILS NFR BLD: 62 %
NRBC BLD MANUAL-RTO: 0 /100 WBC
PLATELET # BLD AUTO: 316 K/MCL (ref 140–450)
POTASSIUM SERPL-SCNC: 3.6 MMOL/L (ref 3.4–5.1)
RBC # BLD: 4.09 MIL/MCL (ref 4–5.2)
SODIUM SERPL-SCNC: 130 MMOL/L (ref 135–145)
WBC # BLD: 11 K/MCL (ref 4.2–11)

## 2023-11-08 PROCEDURE — 10006031 HB ROOM CHARGE TELEMETRY

## 2023-11-08 PROCEDURE — 80048 BASIC METABOLIC PNL TOTAL CA: CPT | Performed by: HOSPITALIST

## 2023-11-08 PROCEDURE — 10002807 HB RX 258: Performed by: HOSPITALIST

## 2023-11-08 PROCEDURE — 10004651 HB RX, NO CHARGE ITEM: Performed by: HOSPITALIST

## 2023-11-08 PROCEDURE — 99223 1ST HOSP IP/OBS HIGH 75: CPT | Performed by: NURSE PRACTITIONER

## 2023-11-08 PROCEDURE — 10002803 HB RX 637: Performed by: INTERNAL MEDICINE

## 2023-11-08 PROCEDURE — 10002800 HB RX 250 W HCPCS: Performed by: NURSE PRACTITIONER

## 2023-11-08 PROCEDURE — 10002800 HB RX 250 W HCPCS: Performed by: HOSPITALIST

## 2023-11-08 PROCEDURE — 36415 COLL VENOUS BLD VENIPUNCTURE: CPT | Performed by: HOSPITALIST

## 2023-11-08 PROCEDURE — 99497 ADVNCD CARE PLAN 30 MIN: CPT | Performed by: NURSE PRACTITIONER

## 2023-11-08 PROCEDURE — 10002803 HB RX 637: Performed by: NURSE PRACTITIONER

## 2023-11-08 PROCEDURE — 99221 1ST HOSP IP/OBS SF/LOW 40: CPT | Performed by: INTERNAL MEDICINE

## 2023-11-08 PROCEDURE — 85025 COMPLETE CBC W/AUTO DIFF WBC: CPT | Performed by: HOSPITALIST

## 2023-11-08 PROCEDURE — 10002803 HB RX 637: Performed by: HOSPITALIST

## 2023-11-08 RX ORDER — POTASSIUM CHLORIDE 14.9 MG/ML
20 INJECTION INTRAVENOUS ONCE
Status: COMPLETED | OUTPATIENT
Start: 2023-11-08 | End: 2023-11-08

## 2023-11-08 RX ADMIN — GUAIFENESIN 200 MG: 100 LIQUID ORAL at 11:41

## 2023-11-08 RX ADMIN — SODIUM CHLORIDE TAB 1 GM 1000 MG: 1 TAB at 12:30

## 2023-11-08 RX ADMIN — AMLODIPINE BESYLATE 5 MG: 5 TABLET ORAL at 05:30

## 2023-11-08 RX ADMIN — SERTRALINE HYDROCHLORIDE 50 MG: 50 TABLET, FILM COATED ORAL at 09:42

## 2023-11-08 RX ADMIN — HYDRALAZINE HYDROCHLORIDE 10 MG: 20 INJECTION, SOLUTION INTRAMUSCULAR; INTRAVENOUS at 15:34

## 2023-11-08 RX ADMIN — LEVETIRACETAM 500 MG: 100 SOLUTION ORAL at 09:42

## 2023-11-08 RX ADMIN — FLUPHENAZINE HYDROCHLORIDE 10 MG: 5 TABLET, FILM COATED ORAL at 09:42

## 2023-11-08 RX ADMIN — LEVETIRACETAM 500 MG: 100 SOLUTION ORAL at 20:16

## 2023-11-08 RX ADMIN — GUAIFENESIN 200 MG: 100 LIQUID ORAL at 17:22

## 2023-11-08 RX ADMIN — POTASSIUM CHLORIDE 20 MEQ: 14.9 INJECTION, SOLUTION INTRAVENOUS at 12:27

## 2023-11-08 RX ADMIN — PANTOPRAZOLE 40 MG: 40 TABLET, DELAYED RELEASE ORAL at 05:31

## 2023-11-08 RX ADMIN — OLANZAPINE 20 MG: 5 TABLET, FILM COATED ORAL at 20:16

## 2023-11-08 RX ADMIN — GUAIFENESIN 200 MG: 100 LIQUID ORAL at 23:27

## 2023-11-08 RX ADMIN — GUAIFENESIN 200 MG: 100 LIQUID ORAL at 05:31

## 2023-11-08 RX ADMIN — SODIUM CHLORIDE 1.5 G: 900 INJECTION INTRAVENOUS at 05:38

## 2023-11-08 RX ADMIN — AMLODIPINE BESYLATE 5 MG: 5 TABLET ORAL at 17:22

## 2023-11-08 RX ADMIN — DIVALPROEX SODIUM 500 MG: 125 CAPSULE ORAL at 09:42

## 2023-11-08 RX ADMIN — ATORVASTATIN CALCIUM 20 MG: 20 TABLET, FILM COATED ORAL at 17:22

## 2023-11-08 RX ADMIN — SODIUM CHLORIDE TAB 1 GM 1000 MG: 1 TAB at 17:22

## 2023-11-08 RX ADMIN — ACETAMINOPHEN 650 MG: 325 TABLET ORAL at 14:49

## 2023-11-08 RX ADMIN — FLUPHENAZINE HYDROCHLORIDE 10 MG: 5 TABLET, FILM COATED ORAL at 20:16

## 2023-11-08 RX ADMIN — DIVALPROEX SODIUM 500 MG: 125 CAPSULE ORAL at 20:16

## 2023-11-08 RX ADMIN — SODIUM CHLORIDE 1.5 G: 900 INJECTION INTRAVENOUS at 11:46

## 2023-11-08 RX ADMIN — HYDRALAZINE HYDROCHLORIDE 10 MG: 20 INJECTION, SOLUTION INTRAMUSCULAR; INTRAVENOUS at 20:16

## 2023-11-08 RX ADMIN — SODIUM CHLORIDE 1.5 G: 900 INJECTION INTRAVENOUS at 23:27

## 2023-11-08 RX ADMIN — ACETAMINOPHEN 650 MG: 325 TABLET ORAL at 20:16

## 2023-11-08 RX ADMIN — POTASSIUM CHLORIDE 20 MEQ: 14.9 INJECTION, SOLUTION INTRAVENOUS at 10:29

## 2023-11-08 RX ADMIN — SODIUM CHLORIDE TAB 1 GM 1000 MG: 1 TAB at 09:42

## 2023-11-08 RX ADMIN — SODIUM CHLORIDE 1.5 G: 900 INJECTION INTRAVENOUS at 17:26

## 2023-11-08 ASSESSMENT — PAIN SCALES - PAIN ASSESSMENT IN ADVANCED DEMENTIA (PAINAD)
FACIALEXPRESSION: SMILING OR INEXPRESSIVE
BREATHING: NORMAL
FACIALEXPRESSION: SMILING OR INEXPRESSIVE
TOTALSCORE: 0
BREATHING: NORMAL
BODYLANGUAGE: RELAXED
BODYLANGUAGE: RELAXED
BREATHING: OCCASIONAL LABORED BREATHING, SHORT PERIOD OF HYPERVENTILATION
CONSOLABILITY: NO NEED TO CONSOLE

## 2023-11-08 ASSESSMENT — PAIN SCALES - WONG BAKER: WONGBAKER_NUMERICALRESPONSE: 3

## 2023-11-09 ENCOUNTER — ANESTHESIA EVENT (OUTPATIENT)
Dept: GASTROENTEROLOGY | Age: 63
DRG: 064 | End: 2023-11-09

## 2023-11-09 ENCOUNTER — APPOINTMENT (OUTPATIENT)
Dept: GASTROENTEROLOGY | Age: 63
DRG: 064 | End: 2023-11-09
Attending: INTERNAL MEDICINE

## 2023-11-09 ENCOUNTER — ANESTHESIA (OUTPATIENT)
Dept: GASTROENTEROLOGY | Age: 63
DRG: 064 | End: 2023-11-09

## 2023-11-09 LAB
ANION GAP SERPL CALC-SCNC: 9 MMOL/L (ref 7–19)
BASOPHILS # BLD: 0.1 K/MCL (ref 0–0.3)
BASOPHILS NFR BLD: 1 %
BUN SERPL-MCNC: 13 MG/DL (ref 6–20)
BUN/CREAT SERPL: 25 (ref 7–25)
CALCIUM SERPL-MCNC: 9.3 MG/DL (ref 8.4–10.2)
CHLORIDE SERPL-SCNC: 98 MMOL/L (ref 97–110)
CO2 SERPL-SCNC: 28 MMOL/L (ref 21–32)
CREAT SERPL-MCNC: 0.51 MG/DL (ref 0.51–0.95)
DEPRECATED RDW RBC: 47.1 FL (ref 39–50)
EGFRCR SERPLBLD CKD-EPI 2021: >90 ML/MIN/{1.73_M2}
EOSINOPHIL # BLD: 0 K/MCL (ref 0–0.5)
EOSINOPHIL NFR BLD: 0 %
ERYTHROCYTE [DISTWIDTH] IN BLOOD: 13.3 % (ref 11–15)
FASTING DURATION TIME PATIENT: ABNORMAL H
GLUCOSE BLDC GLUCOMTR-MCNC: 100 MG/DL (ref 70–99)
GLUCOSE BLDC GLUCOMTR-MCNC: 107 MG/DL (ref 70–99)
GLUCOSE BLDC GLUCOMTR-MCNC: 122 MG/DL (ref 70–99)
GLUCOSE SERPL-MCNC: 111 MG/DL (ref 70–99)
HCT VFR BLD CALC: 41.1 % (ref 36–46.5)
HGB BLD-MCNC: 13.7 G/DL (ref 12–15.5)
IMM GRANULOCYTES # BLD AUTO: 0.1 K/MCL (ref 0–0.2)
IMM GRANULOCYTES # BLD: 1 %
LYMPHOCYTES # BLD: 1.8 K/MCL (ref 1–4)
LYMPHOCYTES NFR BLD: 18 %
MCH RBC QN AUTO: 32.2 PG (ref 26–34)
MCHC RBC AUTO-ENTMCNC: 33.3 G/DL (ref 32–36.5)
MCV RBC AUTO: 96.5 FL (ref 78–100)
MONOCYTES # BLD: 1.2 K/MCL (ref 0.3–0.9)
MONOCYTES NFR BLD: 12 %
NEUTROPHILS # BLD: 6.9 K/MCL (ref 1.8–7.7)
NEUTROPHILS NFR BLD: 68 %
NRBC BLD MANUAL-RTO: 0 /100 WBC
PLATELET # BLD AUTO: 312 K/MCL (ref 140–450)
POTASSIUM SERPL-SCNC: 3.8 MMOL/L (ref 3.4–5.1)
RBC # BLD: 4.26 MIL/MCL (ref 4–5.2)
SODIUM SERPL-SCNC: 131 MMOL/L (ref 135–145)
WBC # BLD: 10.1 K/MCL (ref 4.2–11)

## 2023-11-09 PROCEDURE — 10002801 HB RX 250 W/O HCPCS: Performed by: ANESTHESIOLOGY

## 2023-11-09 PROCEDURE — 10002800 HB RX 250 W HCPCS: Performed by: ANESTHESIOLOGY

## 2023-11-09 PROCEDURE — 80048 BASIC METABOLIC PNL TOTAL CA: CPT | Performed by: HOSPITALIST

## 2023-11-09 PROCEDURE — 10006031 HB ROOM CHARGE TELEMETRY

## 2023-11-09 PROCEDURE — 10002803 HB RX 637: Performed by: HOSPITALIST

## 2023-11-09 PROCEDURE — 10002803 HB RX 637: Performed by: INTERNAL MEDICINE

## 2023-11-09 PROCEDURE — 13000001 HB PHASE II RECOVERY EA 30 MINUTES

## 2023-11-09 PROCEDURE — 43246 EGD PLACE GASTROSTOMY TUBE: CPT | Performed by: INTERNAL MEDICINE

## 2023-11-09 PROCEDURE — 36415 COLL VENOUS BLD VENIPUNCTURE: CPT | Performed by: HOSPITALIST

## 2023-11-09 PROCEDURE — 13000008 HB ANESTHESIA MAC OUTSIDE OR

## 2023-11-09 PROCEDURE — 10002800 HB RX 250 W HCPCS: Performed by: HOSPITALIST

## 2023-11-09 PROCEDURE — 10002807 HB RX 258: Performed by: HOSPITALIST

## 2023-11-09 PROCEDURE — 10002807 HB RX 258: Performed by: ANESTHESIOLOGY

## 2023-11-09 PROCEDURE — 0DH63UZ INSERTION OF FEEDING DEVICE INTO STOMACH, PERCUTANEOUS APPROACH: ICD-10-PCS | Performed by: INTERNAL MEDICINE

## 2023-11-09 PROCEDURE — 97530 THERAPEUTIC ACTIVITIES: CPT

## 2023-11-09 PROCEDURE — 13000028 HB GI MAJOR COMPLEX CASE S/U + 1ST 15 MIN

## 2023-11-09 PROCEDURE — 3E0G76Z INTRODUCTION OF NUTRITIONAL SUBSTANCE INTO UPPER GI, VIA NATURAL OR ARTIFICIAL OPENING: ICD-10-PCS | Performed by: INTERNAL MEDICINE

## 2023-11-09 PROCEDURE — 85025 COMPLETE CBC W/AUTO DIFF WBC: CPT | Performed by: HOSPITALIST

## 2023-11-09 PROCEDURE — 10004451 HB PACU RECOVERY 1ST 30 MINUTES

## 2023-11-09 PROCEDURE — 10002800 HB RX 250 W HCPCS: Performed by: NURSE PRACTITIONER

## 2023-11-09 PROCEDURE — 97110 THERAPEUTIC EXERCISES: CPT

## 2023-11-09 PROCEDURE — 13000029 HB GI MAJOR COMPLEX CASE EA ADD MINUTE

## 2023-11-09 PROCEDURE — 10006023 HB SUPPLY 272

## 2023-11-09 RX ORDER — LIDOCAINE HYDROCHLORIDE 20 MG/ML
INJECTION, SOLUTION EPIDURAL; INFILTRATION; INTRACAUDAL; PERINEURAL PRN
Status: DISCONTINUED | OUTPATIENT
Start: 2023-11-09 | End: 2023-11-09

## 2023-11-09 RX ORDER — POTASSIUM CHLORIDE 1.5 G/1.58G
40 POWDER, FOR SOLUTION ORAL ONCE
Status: COMPLETED | OUTPATIENT
Start: 2023-11-09 | End: 2023-11-09

## 2023-11-09 RX ORDER — PROPOFOL 10 MG/ML
INJECTION, EMULSION INTRAVENOUS PRN
Status: DISCONTINUED | OUTPATIENT
Start: 2023-11-09 | End: 2023-11-09

## 2023-11-09 RX ORDER — SODIUM CHLORIDE 9 MG/ML
INJECTION, SOLUTION INTRAVENOUS CONTINUOUS PRN
Status: DISCONTINUED | OUTPATIENT
Start: 2023-11-09 | End: 2023-11-09

## 2023-11-09 RX ADMIN — ATORVASTATIN CALCIUM 20 MG: 20 TABLET, FILM COATED ORAL at 17:15

## 2023-11-09 RX ADMIN — GUAIFENESIN 200 MG: 100 LIQUID ORAL at 13:27

## 2023-11-09 RX ADMIN — SODIUM CHLORIDE 1.5 G: 900 INJECTION INTRAVENOUS at 17:16

## 2023-11-09 RX ADMIN — PROPOFOL 30 MG: 10 INJECTION, EMULSION INTRAVENOUS at 11:49

## 2023-11-09 RX ADMIN — FLUPHENAZINE HYDROCHLORIDE 10 MG: 5 TABLET, FILM COATED ORAL at 08:08

## 2023-11-09 RX ADMIN — DIVALPROEX SODIUM 500 MG: 125 CAPSULE ORAL at 19:55

## 2023-11-09 RX ADMIN — SERTRALINE HYDROCHLORIDE 50 MG: 50 TABLET, FILM COATED ORAL at 08:09

## 2023-11-09 RX ADMIN — DIVALPROEX SODIUM 500 MG: 125 CAPSULE ORAL at 08:08

## 2023-11-09 RX ADMIN — SODIUM CHLORIDE 1.5 G: 900 INJECTION INTRAVENOUS at 23:50

## 2023-11-09 RX ADMIN — SODIUM CHLORIDE: 9 INJECTION, SOLUTION INTRAVENOUS at 11:46

## 2023-11-09 RX ADMIN — SODIUM CHLORIDE 1.5 G: 900 INJECTION INTRAVENOUS at 05:27

## 2023-11-09 RX ADMIN — AMLODIPINE BESYLATE 5 MG: 5 TABLET ORAL at 17:30

## 2023-11-09 RX ADMIN — GUAIFENESIN 200 MG: 100 LIQUID ORAL at 17:15

## 2023-11-09 RX ADMIN — FLUPHENAZINE HYDROCHLORIDE 10 MG: 5 TABLET, FILM COATED ORAL at 19:55

## 2023-11-09 RX ADMIN — POTASSIUM CHLORIDE 40 MEQ: 1.5 POWDER, FOR SOLUTION ORAL at 08:08

## 2023-11-09 RX ADMIN — PANTOPRAZOLE 40 MG: 40 TABLET, DELAYED RELEASE ORAL at 05:28

## 2023-11-09 RX ADMIN — AMLODIPINE BESYLATE 5 MG: 5 TABLET ORAL at 05:28

## 2023-11-09 RX ADMIN — SODIUM CHLORIDE TAB 1 GM 1000 MG: 1 TAB at 17:15

## 2023-11-09 RX ADMIN — PROPOFOL 100 MCG/KG/MIN: 10 INJECTION, EMULSION INTRAVENOUS at 11:49

## 2023-11-09 RX ADMIN — GUAIFENESIN 200 MG: 100 LIQUID ORAL at 23:50

## 2023-11-09 RX ADMIN — HYDRALAZINE HYDROCHLORIDE 10 MG: 20 INJECTION, SOLUTION INTRAMUSCULAR; INTRAVENOUS at 13:33

## 2023-11-09 RX ADMIN — GUAIFENESIN 200 MG: 100 LIQUID ORAL at 05:28

## 2023-11-09 RX ADMIN — SODIUM CHLORIDE 1.5 G: 900 INJECTION INTRAVENOUS at 13:31

## 2023-11-09 RX ADMIN — SODIUM CHLORIDE TAB 1 GM 1000 MG: 1 TAB at 08:08

## 2023-11-09 RX ADMIN — LABETALOL HYDROCHLORIDE 7.5 MG: 5 INJECTION, SOLUTION INTRAVENOUS at 11:59

## 2023-11-09 RX ADMIN — LIDOCAINE HYDROCHLORIDE 30 MG: 20 INJECTION, SOLUTION EPIDURAL; INFILTRATION; INTRACAUDAL; PERINEURAL at 11:49

## 2023-11-09 RX ADMIN — SODIUM CHLORIDE TAB 1 GM 1000 MG: 1 TAB at 13:27

## 2023-11-09 RX ADMIN — OLANZAPINE 20 MG: 5 TABLET, FILM COATED ORAL at 19:55

## 2023-11-09 SDOH — SOCIAL STABILITY: SOCIAL INSECURITY: RISK FACTORS: NEUROLOGICAL DISEASE

## 2023-11-09 SDOH — SOCIAL STABILITY: SOCIAL INSECURITY: RISK FACTORS: AGE

## 2023-11-09 ASSESSMENT — PAIN SCALES - WONG BAKER
WONGBAKER_NUMERICALRESPONSE: 0

## 2023-11-09 ASSESSMENT — COGNITIVE AND FUNCTIONAL STATUS - GENERAL
BASIC_MOBILITY_RAW_SCORE: 6
BASIC_MOBILITY_CONVERTED_SCORE: 16.59

## 2023-11-10 LAB
ANION GAP SERPL CALC-SCNC: 12 MMOL/L (ref 7–19)
BASOPHILS # BLD: 0.1 K/MCL (ref 0–0.3)
BASOPHILS NFR BLD: 1 %
BUN SERPL-MCNC: 16 MG/DL (ref 6–20)
BUN/CREAT SERPL: 34 (ref 7–25)
CALCIUM SERPL-MCNC: 9.1 MG/DL (ref 8.4–10.2)
CHLORIDE SERPL-SCNC: 103 MMOL/L (ref 97–110)
CO2 SERPL-SCNC: 23 MMOL/L (ref 21–32)
CREAT SERPL-MCNC: 0.47 MG/DL (ref 0.51–0.95)
DEPRECATED RDW RBC: 48.1 FL (ref 39–50)
EGFRCR SERPLBLD CKD-EPI 2021: >90 ML/MIN/{1.73_M2}
EOSINOPHIL # BLD: 0 K/MCL (ref 0–0.5)
EOSINOPHIL NFR BLD: 0 %
ERYTHROCYTE [DISTWIDTH] IN BLOOD: 13.5 % (ref 11–15)
FASTING DURATION TIME PATIENT: ABNORMAL H
GLUCOSE BLDC GLUCOMTR-MCNC: 101 MG/DL (ref 70–99)
GLUCOSE BLDC GLUCOMTR-MCNC: 109 MG/DL (ref 70–99)
GLUCOSE BLDC GLUCOMTR-MCNC: 110 MG/DL (ref 70–99)
GLUCOSE BLDC GLUCOMTR-MCNC: 112 MG/DL (ref 70–99)
GLUCOSE SERPL-MCNC: 107 MG/DL (ref 70–99)
HCT VFR BLD CALC: 40.8 % (ref 36–46.5)
HGB BLD-MCNC: 13.5 G/DL (ref 12–15.5)
IMM GRANULOCYTES # BLD AUTO: 0.1 K/MCL (ref 0–0.2)
IMM GRANULOCYTES # BLD: 1 %
LYMPHOCYTES # BLD: 1.8 K/MCL (ref 1–4)
LYMPHOCYTES NFR BLD: 17 %
MAGNESIUM SERPL-MCNC: 2.2 MG/DL (ref 1.7–2.4)
MCH RBC QN AUTO: 32.2 PG (ref 26–34)
MCHC RBC AUTO-ENTMCNC: 33.1 G/DL (ref 32–36.5)
MCV RBC AUTO: 97.4 FL (ref 78–100)
MONOCYTES # BLD: 1.2 K/MCL (ref 0.3–0.9)
MONOCYTES NFR BLD: 11 %
NEUTROPHILS # BLD: 8 K/MCL (ref 1.8–7.7)
NEUTROPHILS NFR BLD: 70 %
NRBC BLD MANUAL-RTO: 0 /100 WBC
PLATELET # BLD AUTO: 321 K/MCL (ref 140–450)
POTASSIUM SERPL-SCNC: 3.6 MMOL/L (ref 3.4–5.1)
POTASSIUM SERPL-SCNC: 4.2 MMOL/L (ref 3.4–5.1)
RBC # BLD: 4.19 MIL/MCL (ref 4–5.2)
SODIUM SERPL-SCNC: 134 MMOL/L (ref 135–145)
WBC # BLD: 11.1 K/MCL (ref 4.2–11)

## 2023-11-10 PROCEDURE — 97530 THERAPEUTIC ACTIVITIES: CPT

## 2023-11-10 PROCEDURE — 84132 ASSAY OF SERUM POTASSIUM: CPT | Performed by: HOSPITALIST

## 2023-11-10 PROCEDURE — 10002800 HB RX 250 W HCPCS: Performed by: HOSPITALIST

## 2023-11-10 PROCEDURE — 10002803 HB RX 637: Performed by: HOSPITALIST

## 2023-11-10 PROCEDURE — 99233 SBSQ HOSP IP/OBS HIGH 50: CPT | Performed by: INTERNAL MEDICINE

## 2023-11-10 PROCEDURE — 85025 COMPLETE CBC W/AUTO DIFF WBC: CPT | Performed by: HOSPITALIST

## 2023-11-10 PROCEDURE — 36415 COLL VENOUS BLD VENIPUNCTURE: CPT | Performed by: HOSPITALIST

## 2023-11-10 PROCEDURE — 83735 ASSAY OF MAGNESIUM: CPT | Performed by: HOSPITALIST

## 2023-11-10 PROCEDURE — 10002807 HB RX 258: Performed by: HOSPITALIST

## 2023-11-10 PROCEDURE — 80048 BASIC METABOLIC PNL TOTAL CA: CPT | Performed by: HOSPITALIST

## 2023-11-10 PROCEDURE — 99232 SBSQ HOSP IP/OBS MODERATE 35: CPT | Performed by: NURSE PRACTITIONER

## 2023-11-10 PROCEDURE — 97535 SELF CARE MNGMENT TRAINING: CPT

## 2023-11-10 PROCEDURE — 10002800 HB RX 250 W HCPCS: Performed by: NURSE PRACTITIONER

## 2023-11-10 PROCEDURE — 10002803 HB RX 637: Performed by: INTERNAL MEDICINE

## 2023-11-10 PROCEDURE — 10006031 HB ROOM CHARGE TELEMETRY

## 2023-11-10 PROCEDURE — 99497 ADVNCD CARE PLAN 30 MIN: CPT | Performed by: NURSE PRACTITIONER

## 2023-11-10 PROCEDURE — 13003289 HB OXYGEN THERAPY DAILY

## 2023-11-10 PROCEDURE — 97110 THERAPEUTIC EXERCISES: CPT

## 2023-11-10 RX ORDER — POTASSIUM CHLORIDE 1.5 G/1.58G
40 POWDER, FOR SOLUTION ORAL ONCE
Status: COMPLETED | OUTPATIENT
Start: 2023-11-10 | End: 2023-11-10

## 2023-11-10 RX ADMIN — GUAIFENESIN 200 MG: 100 LIQUID ORAL at 05:33

## 2023-11-10 RX ADMIN — GUAIFENESIN 200 MG: 100 LIQUID ORAL at 18:19

## 2023-11-10 RX ADMIN — POTASSIUM CHLORIDE 40 MEQ: 1.5 POWDER, FOR SOLUTION ORAL at 11:09

## 2023-11-10 RX ADMIN — GUAIFENESIN 200 MG: 100 LIQUID ORAL at 13:41

## 2023-11-10 RX ADMIN — SODIUM CHLORIDE TAB 1 GM 1000 MG: 1 TAB at 18:19

## 2023-11-10 RX ADMIN — AMLODIPINE BESYLATE 5 MG: 5 TABLET ORAL at 05:33

## 2023-11-10 RX ADMIN — PANTOPRAZOLE 40 MG: 40 TABLET, DELAYED RELEASE ORAL at 13:41

## 2023-11-10 RX ADMIN — SODIUM CHLORIDE TAB 1 GM 1000 MG: 1 TAB at 13:41

## 2023-11-10 RX ADMIN — DIVALPROEX SODIUM 500 MG: 125 CAPSULE ORAL at 11:09

## 2023-11-10 RX ADMIN — SERTRALINE HYDROCHLORIDE 50 MG: 50 TABLET, FILM COATED ORAL at 11:09

## 2023-11-10 RX ADMIN — SODIUM CHLORIDE TAB 1 GM 1000 MG: 1 TAB at 05:33

## 2023-11-10 RX ADMIN — DIVALPROEX SODIUM 500 MG: 125 CAPSULE ORAL at 22:37

## 2023-11-10 RX ADMIN — SODIUM CHLORIDE 1.5 G: 900 INJECTION INTRAVENOUS at 18:30

## 2023-11-10 RX ADMIN — SODIUM CHLORIDE 1.5 G: 900 INJECTION INTRAVENOUS at 13:50

## 2023-11-10 RX ADMIN — AMLODIPINE BESYLATE 5 MG: 5 TABLET ORAL at 18:19

## 2023-11-10 RX ADMIN — OLANZAPINE 20 MG: 5 TABLET, FILM COATED ORAL at 22:37

## 2023-11-10 RX ADMIN — BUTALBITAL, ACETAMINOPHEN, AND CAFFEINE 1 TABLET: 325; 50; 40 TABLET ORAL at 22:37

## 2023-11-10 RX ADMIN — FLUPHENAZINE HYDROCHLORIDE 10 MG: 5 TABLET, FILM COATED ORAL at 11:09

## 2023-11-10 RX ADMIN — FLUPHENAZINE HYDROCHLORIDE 10 MG: 5 TABLET, FILM COATED ORAL at 22:37

## 2023-11-10 RX ADMIN — ATORVASTATIN CALCIUM 20 MG: 20 TABLET, FILM COATED ORAL at 18:19

## 2023-11-10 RX ADMIN — HYDRALAZINE HYDROCHLORIDE 10 MG: 20 INJECTION, SOLUTION INTRAMUSCULAR; INTRAVENOUS at 16:21

## 2023-11-10 RX ADMIN — SODIUM CHLORIDE 1.5 G: 900 INJECTION INTRAVENOUS at 05:31

## 2023-11-10 ASSESSMENT — COGNITIVE AND FUNCTIONAL STATUS - GENERAL
HELP NEEDED DRESSING REGULAR UPPER BODY CLOTHING: TOTAL
DAILY_ACTIVITY_RAW_SCORE: 6
HELP NEEDED FOR BATHING: TOTAL
HELP NEEDED DRESSING REGULAR LOWER BODY CLOTHING: TOTAL
BASIC_MOBILITY_CONVERTED_SCORE: 16.59
BASIC_MOBILITY_RAW_SCORE: 6
HELP NEEDED FOR PERSONAL GROOMING: TOTAL
HELP NEEDED FOR TOILETING: TOTAL
DAILY_ACTIVITY_CONVERTED_SCORE: 17.06

## 2023-11-10 ASSESSMENT — ACTIVITIES OF DAILY LIVING (ADL): HOME_MANAGEMENT_TIME_ENTRY: 8

## 2023-11-10 ASSESSMENT — PAIN SCALES - WONG BAKER: WONGBAKER_NUMERICALRESPONSE: 0

## 2023-11-11 VITALS
RESPIRATION RATE: 16 BRPM | HEIGHT: 60 IN | TEMPERATURE: 98.1 F | BODY MASS INDEX: 25.45 KG/M2 | SYSTOLIC BLOOD PRESSURE: 137 MMHG | WEIGHT: 129.63 LBS | OXYGEN SATURATION: 95 % | DIASTOLIC BLOOD PRESSURE: 90 MMHG | HEART RATE: 85 BPM

## 2023-11-11 LAB
ANION GAP SERPL CALC-SCNC: 9 MMOL/L (ref 7–19)
BUN SERPL-MCNC: 23 MG/DL (ref 6–20)
BUN/CREAT SERPL: 43 (ref 7–25)
CALCIUM SERPL-MCNC: 9.8 MG/DL (ref 8.4–10.2)
CHLORIDE SERPL-SCNC: 106 MMOL/L (ref 97–110)
CO2 SERPL-SCNC: 27 MMOL/L (ref 21–32)
CREAT SERPL-MCNC: 0.54 MG/DL (ref 0.51–0.95)
DEPRECATED RDW RBC: 49.3 FL (ref 39–50)
EGFRCR SERPLBLD CKD-EPI 2021: >90 ML/MIN/{1.73_M2}
ERYTHROCYTE [DISTWIDTH] IN BLOOD: 13.5 % (ref 11–15)
FASTING DURATION TIME PATIENT: ABNORMAL H
GLUCOSE BLDC GLUCOMTR-MCNC: 120 MG/DL (ref 70–99)
GLUCOSE BLDC GLUCOMTR-MCNC: 126 MG/DL (ref 70–99)
GLUCOSE SERPL-MCNC: 126 MG/DL (ref 70–99)
HCT VFR BLD CALC: 42.2 % (ref 36–46.5)
HGB BLD-MCNC: 13.6 G/DL (ref 12–15.5)
MAGNESIUM SERPL-MCNC: 2.3 MG/DL (ref 1.7–2.4)
MCH RBC QN AUTO: 31.9 PG (ref 26–34)
MCHC RBC AUTO-ENTMCNC: 32.2 G/DL (ref 32–36.5)
MCV RBC AUTO: 99.1 FL (ref 78–100)
NRBC BLD MANUAL-RTO: 0 /100 WBC
PHOSPHATE SERPL-MCNC: 3.2 MG/DL (ref 2.4–4.7)
PLATELET # BLD AUTO: 338 K/MCL (ref 140–450)
POTASSIUM SERPL-SCNC: 3.6 MMOL/L (ref 3.4–5.1)
RAINBOW EXTRA TUBES HOLD SPECIMEN: NORMAL
RBC # BLD: 4.26 MIL/MCL (ref 4–5.2)
SODIUM SERPL-SCNC: 138 MMOL/L (ref 135–145)
WBC # BLD: 9.5 K/MCL (ref 4.2–11)

## 2023-11-11 PROCEDURE — 13003289 HB OXYGEN THERAPY DAILY

## 2023-11-11 PROCEDURE — 85027 COMPLETE CBC AUTOMATED: CPT | Performed by: HOSPITALIST

## 2023-11-11 PROCEDURE — 10002800 HB RX 250 W HCPCS: Performed by: HOSPITALIST

## 2023-11-11 PROCEDURE — 36415 COLL VENOUS BLD VENIPUNCTURE: CPT

## 2023-11-11 PROCEDURE — 10002807 HB RX 258: Performed by: HOSPITALIST

## 2023-11-11 PROCEDURE — 83735 ASSAY OF MAGNESIUM: CPT

## 2023-11-11 PROCEDURE — 84100 ASSAY OF PHOSPHORUS: CPT

## 2023-11-11 PROCEDURE — 80048 BASIC METABOLIC PNL TOTAL CA: CPT

## 2023-11-11 PROCEDURE — 10002803 HB RX 637: Performed by: HOSPITALIST

## 2023-11-11 PROCEDURE — 99232 SBSQ HOSP IP/OBS MODERATE 35: CPT | Performed by: INTERNAL MEDICINE

## 2023-11-11 PROCEDURE — 10002803 HB RX 637: Performed by: INTERNAL MEDICINE

## 2023-11-11 RX ORDER — ASCORBIC ACID 500 MG
500 TABLET ORAL DAILY
Status: ON HOLD | COMMUNITY
Start: 2023-11-11 | End: 2023-12-07

## 2023-11-11 RX ORDER — DIVALPROEX SODIUM 125 MG/1
500 CAPSULE, COATED PELLETS ORAL EVERY 12 HOURS SCHEDULED
Status: ON HOLD | COMMUNITY
Start: 2023-11-11 | End: 2023-12-07

## 2023-11-11 RX ORDER — AMLODIPINE BESYLATE 5 MG/1
5 TABLET ORAL EVERY 12 HOURS
Status: ON HOLD | COMMUNITY
Start: 2023-11-11 | End: 2023-12-07

## 2023-11-11 RX ORDER — FOLIC ACID 1 MG/1
1 TABLET ORAL DAILY
Status: ON HOLD | COMMUNITY
Start: 2023-11-11 | End: 2023-12-07

## 2023-11-11 RX ORDER — ATORVASTATIN CALCIUM 20 MG/1
20 TABLET, FILM COATED ORAL EVERY EVENING
Status: ON HOLD | COMMUNITY
Start: 2023-11-11 | End: 2023-12-07

## 2023-11-11 RX ORDER — AMOXICILLIN AND CLAVULANATE POTASSIUM 875; 125 MG/1; MG/1
1 TABLET, FILM COATED ORAL 2 TIMES DAILY
Qty: 4 TABLET | Refills: 0 | Status: ON HOLD
Start: 2023-11-11 | End: 2023-11-28 | Stop reason: HOSPADM

## 2023-11-11 RX ORDER — FLUPHENAZINE HYDROCHLORIDE 10 MG/1
10 TABLET ORAL 2 TIMES DAILY
Status: ON HOLD | COMMUNITY
Start: 2023-11-11 | End: 2023-11-28 | Stop reason: HOSPADM

## 2023-11-11 RX ORDER — ACETAMINOPHEN 325 MG/1
650 TABLET ORAL EVERY 4 HOURS PRN
Status: ON HOLD | COMMUNITY
Start: 2023-11-11 | End: 2023-12-07

## 2023-11-11 RX ORDER — SODIUM CHLORIDE 1 G/1
1 TABLET ORAL 2 TIMES DAILY
Status: ON HOLD | COMMUNITY
Start: 2023-11-11 | End: 2023-11-28 | Stop reason: HOSPADM

## 2023-11-11 RX ORDER — AMOXICILLIN 250 MG
1 CAPSULE ORAL 2 TIMES DAILY PRN
Status: ON HOLD | COMMUNITY
Start: 2023-11-11 | End: 2023-12-07

## 2023-11-11 RX ORDER — BUTALBITAL, ACETAMINOPHEN AND CAFFEINE 50; 325; 40 MG/1; MG/1; MG/1
1 TABLET ORAL EVERY 6 HOURS PRN
Qty: 15 TABLET | Refills: 0 | Status: ON HOLD
Start: 2023-11-11 | End: 2023-11-28 | Stop reason: HOSPADM

## 2023-11-11 RX ORDER — POTASSIUM CHLORIDE 1.5 G/1.58G
40 POWDER, FOR SOLUTION ORAL ONCE
Status: COMPLETED | OUTPATIENT
Start: 2023-11-11 | End: 2023-11-11

## 2023-11-11 RX ORDER — OLANZAPINE 20 MG/1
20 TABLET ORAL NIGHTLY
Status: ON HOLD | COMMUNITY
Start: 2023-11-11 | End: 2023-11-28

## 2023-11-11 RX ADMIN — SODIUM CHLORIDE TAB 1 GM 1000 MG: 1 TAB at 08:40

## 2023-11-11 RX ADMIN — POTASSIUM CHLORIDE 40 MEQ: 1.5 POWDER, FOR SOLUTION ORAL at 08:39

## 2023-11-11 RX ADMIN — FLUPHENAZINE HYDROCHLORIDE 10 MG: 5 TABLET, FILM COATED ORAL at 12:30

## 2023-11-11 RX ADMIN — SODIUM CHLORIDE 1.5 G: 900 INJECTION INTRAVENOUS at 06:30

## 2023-11-11 RX ADMIN — SODIUM CHLORIDE 1.5 G: 900 INJECTION INTRAVENOUS at 11:22

## 2023-11-11 RX ADMIN — SODIUM CHLORIDE TAB 1 GM 1000 MG: 1 TAB at 12:30

## 2023-11-11 RX ADMIN — DIVALPROEX SODIUM 500 MG: 125 CAPSULE ORAL at 11:02

## 2023-11-11 RX ADMIN — GUAIFENESIN 200 MG: 100 LIQUID ORAL at 02:00

## 2023-11-11 RX ADMIN — GUAIFENESIN 200 MG: 100 LIQUID ORAL at 06:30

## 2023-11-11 RX ADMIN — SODIUM CHLORIDE 1.5 G: 900 INJECTION INTRAVENOUS at 01:00

## 2023-11-11 RX ADMIN — AMLODIPINE BESYLATE 5 MG: 5 TABLET ORAL at 06:36

## 2023-11-11 RX ADMIN — GUAIFENESIN 200 MG: 100 LIQUID ORAL at 12:30

## 2023-11-11 RX ADMIN — PANTOPRAZOLE 40 MG: 40 TABLET, DELAYED RELEASE ORAL at 08:39

## 2023-11-11 RX ADMIN — SERTRALINE HYDROCHLORIDE 50 MG: 50 TABLET, FILM COATED ORAL at 11:02

## 2023-11-11 ASSESSMENT — PAIN SCALES - WONG BAKER: WONGBAKER_NUMERICALRESPONSE: 0

## 2023-11-12 ENCOUNTER — ANESTHESIA (OUTPATIENT)
Dept: SURGERY | Age: 63
End: 2023-11-12

## 2023-11-12 ENCOUNTER — HOSPITAL ENCOUNTER (INPATIENT)
Age: 63
DRG: 981 | End: 2023-11-12
Attending: EMERGENCY MEDICINE | Admitting: HOSPITALIST

## 2023-11-12 ENCOUNTER — ANESTHESIA EVENT (OUTPATIENT)
Dept: SURGERY | Age: 63
End: 2023-11-12

## 2023-11-12 ENCOUNTER — APPOINTMENT (OUTPATIENT)
Dept: GENERAL RADIOLOGY | Age: 63
DRG: 981 | End: 2023-11-12
Attending: EMERGENCY MEDICINE

## 2023-11-12 ENCOUNTER — APPOINTMENT (OUTPATIENT)
Dept: CT IMAGING | Age: 63
DRG: 981 | End: 2023-11-12
Attending: EMERGENCY MEDICINE

## 2023-11-12 DIAGNOSIS — K65.9 PERITONITIS (CMD): Primary | ICD-10-CM

## 2023-11-12 DIAGNOSIS — K66.8 PNEUMOPERITONEUM: ICD-10-CM

## 2023-11-12 DIAGNOSIS — I61.0 NONTRAUMATIC SUBCORTICAL HEMORRHAGE OF LEFT CEREBRAL HEMISPHERE (CMD): ICD-10-CM

## 2023-11-12 LAB
ABO + RH BLD: NORMAL
ALBUMIN SERPL-MCNC: 2.9 G/DL (ref 3.6–5.1)
ALBUMIN/GLOB SERPL: 0.5 {RATIO} (ref 1–2.4)
ALP SERPL-CCNC: 67 UNITS/L (ref 45–117)
ALT SERPL-CCNC: 16 UNITS/L
ANION GAP SERPL CALC-SCNC: 11 MMOL/L (ref 7–19)
APPEARANCE UR: CLEAR
APTT PPP: 31 SEC (ref 22–32)
AST SERPL-CCNC: 25 UNITS/L
BACTERIA #/AREA URNS HPF: ABNORMAL /HPF
BASOPHILS # BLD: 0.2 K/MCL (ref 0–0.3)
BASOPHILS NFR BLD: 1 %
BILIRUB SERPL-MCNC: 0.6 MG/DL (ref 0.2–1)
BILIRUB UR QL STRIP: NEGATIVE
BLD GP AB SCN SERPL QL GEL: NEGATIVE
BUN SERPL-MCNC: 35 MG/DL (ref 6–20)
BUN/CREAT SERPL: 58 (ref 7–25)
CALCIUM SERPL-MCNC: 9.7 MG/DL (ref 8.4–10.2)
CHLORIDE SERPL-SCNC: 105 MMOL/L (ref 97–110)
CO2 SERPL-SCNC: 27 MMOL/L (ref 21–32)
COLOR UR: YELLOW
CREAT SERPL-MCNC: 0.6 MG/DL (ref 0.51–0.95)
DEPRECATED RDW RBC: 49.2 FL (ref 39–50)
EGFRCR SERPLBLD CKD-EPI 2021: >90 ML/MIN/{1.73_M2}
EOSINOPHIL # BLD: 0.1 K/MCL (ref 0–0.5)
EOSINOPHIL NFR BLD: 1 %
ERYTHROCYTE [DISTWIDTH] IN BLOOD: 13.7 % (ref 11–15)
FASTING DURATION TIME PATIENT: ABNORMAL H
GLOBULIN SER-MCNC: 6 G/DL (ref 2–4)
GLUCOSE BLDC GLUCOMTR-MCNC: 114 MG/DL (ref 70–99)
GLUCOSE SERPL-MCNC: 152 MG/DL (ref 70–99)
GLUCOSE UR STRIP-MCNC: ABNORMAL MG/DL
HCT VFR BLD CALC: 49.9 % (ref 36–46.5)
HGB BLD-MCNC: 16.5 G/DL (ref 12–15.5)
HGB UR QL STRIP: NEGATIVE
HYALINE CASTS #/AREA URNS LPF: ABNORMAL /LPF
IMM GRANULOCYTES # BLD AUTO: 0.1 K/MCL (ref 0–0.2)
IMM GRANULOCYTES # BLD: 0 %
INR PPP: 1.1
KETONES UR STRIP-MCNC: ABNORMAL MG/DL
LACTATE BLDV-SCNC: 1.4 MMOL/L (ref 0–2)
LEUKOCYTE ESTERASE UR QL STRIP: ABNORMAL
LYMPHOCYTES # BLD: 0.8 K/MCL (ref 1–4)
LYMPHOCYTES NFR BLD: 4 %
MCH RBC QN AUTO: 32.1 PG (ref 26–34)
MCHC RBC AUTO-ENTMCNC: 33.1 G/DL (ref 32–36.5)
MCV RBC AUTO: 97.1 FL (ref 78–100)
MONOCYTES # BLD: 1 K/MCL (ref 0.3–0.9)
MONOCYTES NFR BLD: 5 %
MUCOUS THREADS URNS QL MICRO: PRESENT
NEUTROPHILS # BLD: 17.6 K/MCL (ref 1.8–7.7)
NEUTROPHILS NFR BLD: 89 %
NITRITE UR QL STRIP: NEGATIVE
NRBC BLD MANUAL-RTO: 0 /100 WBC
PH UR STRIP: 6 [PH] (ref 5–7)
PLATELET # BLD AUTO: 426 K/MCL (ref 140–450)
POTASSIUM SERPL-SCNC: 4.8 MMOL/L (ref 3.4–5.1)
PROCALCITONIN SERPL IA-MCNC: 7.01 NG/ML
PROT SERPL-MCNC: 8.9 G/DL (ref 6.4–8.2)
PROT UR STRIP-MCNC: 100 MG/DL
PROTHROMBIN TIME: 12 SEC (ref 9.7–11.8)
RAINBOW EXTRA TUBES HOLD SPECIMEN: NORMAL
RBC # BLD: 5.14 MIL/MCL (ref 4–5.2)
RBC #/AREA URNS HPF: ABNORMAL /HPF
SODIUM SERPL-SCNC: 138 MMOL/L (ref 135–145)
SP GR UR STRIP: 1.03 (ref 1–1.03)
SQUAMOUS #/AREA URNS HPF: ABNORMAL /HPF
TYPE AND SCREEN EXPIRATION DATE: NORMAL
UROBILINOGEN UR STRIP-MCNC: 2 MG/DL
WBC # BLD: 19.8 K/MCL (ref 4.2–11)
WBC #/AREA URNS HPF: ABNORMAL /HPF

## 2023-11-12 PROCEDURE — 80053 COMPREHEN METABOLIC PANEL: CPT | Performed by: EMERGENCY MEDICINE

## 2023-11-12 PROCEDURE — 10002801 HB RX 250 W/O HCPCS: Performed by: ANESTHESIOLOGY

## 2023-11-12 PROCEDURE — 87086 URINE CULTURE/COLONY COUNT: CPT | Performed by: EMERGENCY MEDICINE

## 2023-11-12 PROCEDURE — 10002801 HB RX 250 W/O HCPCS: Performed by: EMERGENCY MEDICINE

## 2023-11-12 PROCEDURE — 10002807 HB RX 258: Performed by: EMERGENCY MEDICINE

## 2023-11-12 PROCEDURE — 10002801 HB RX 250 W/O HCPCS: Performed by: SURGERY

## 2023-11-12 PROCEDURE — 10006023 HB SUPPLY 272: Performed by: SURGERY

## 2023-11-12 PROCEDURE — 10002807 HB RX 258: Performed by: SURGERY

## 2023-11-12 PROCEDURE — 10000008 HB ROOM CHARGE ICU OR CCU

## 2023-11-12 PROCEDURE — 13000003 HB ANESTHESIA  GENERAL EA ADD MINUTE: Performed by: SURGERY

## 2023-11-12 PROCEDURE — 85025 COMPLETE CBC W/AUTO DIFF WBC: CPT | Performed by: EMERGENCY MEDICINE

## 2023-11-12 PROCEDURE — 86901 BLOOD TYPING SEROLOGIC RH(D): CPT | Performed by: EMERGENCY MEDICINE

## 2023-11-12 PROCEDURE — 10002800 HB RX 250 W HCPCS: Performed by: ANESTHESIOLOGY

## 2023-11-12 PROCEDURE — 10002800 HB RX 250 W HCPCS: Performed by: SURGERY

## 2023-11-12 PROCEDURE — 13000002 HB ANESTHESIA  GENERAL  S/U + 1ST 15 MIN: Performed by: SURGERY

## 2023-11-12 PROCEDURE — 13000037 HB COMPLEX CASE EACH ADD MINUTE: Performed by: SURGERY

## 2023-11-12 PROCEDURE — 43830 GSTRST OPEN WO CONSTJ TUBE: CPT | Performed by: SURGERY

## 2023-11-12 PROCEDURE — 85610 PROTHROMBIN TIME: CPT | Performed by: EMERGENCY MEDICINE

## 2023-11-12 PROCEDURE — 43830 GSTRST OPEN WO CONSTJ TUBE: CPT | Performed by: STUDENT IN AN ORGANIZED HEALTH CARE EDUCATION/TRAINING PROGRAM

## 2023-11-12 PROCEDURE — 99223 1ST HOSP IP/OBS HIGH 75: CPT | Performed by: SURGERY

## 2023-11-12 PROCEDURE — 84145 PROCALCITONIN (PCT): CPT | Performed by: EMERGENCY MEDICINE

## 2023-11-12 PROCEDURE — 0DP60UZ REMOVAL OF FEEDING DEVICE FROM STOMACH, OPEN APPROACH: ICD-10-PCS | Performed by: SURGERY

## 2023-11-12 PROCEDURE — 83605 ASSAY OF LACTIC ACID: CPT | Performed by: EMERGENCY MEDICINE

## 2023-11-12 PROCEDURE — 10004651 HB RX, NO CHARGE ITEM: Performed by: EMERGENCY MEDICINE

## 2023-11-12 PROCEDURE — 10002805 HB CONTRAST AGENT: Performed by: EMERGENCY MEDICINE

## 2023-11-12 PROCEDURE — 13000036 HB COMPLEX  CASE S/U + 1ST 15 MIN: Performed by: SURGERY

## 2023-11-12 PROCEDURE — 87040 BLOOD CULTURE FOR BACTERIA: CPT | Performed by: EMERGENCY MEDICINE

## 2023-11-12 PROCEDURE — 85730 THROMBOPLASTIN TIME PARTIAL: CPT | Performed by: EMERGENCY MEDICINE

## 2023-11-12 PROCEDURE — 74177 CT ABD & PELVIS W/CONTRAST: CPT

## 2023-11-12 PROCEDURE — 99291 CRITICAL CARE FIRST HOUR: CPT | Performed by: SURGERY

## 2023-11-12 PROCEDURE — 99291 CRITICAL CARE FIRST HOUR: CPT

## 2023-11-12 PROCEDURE — 10002800 HB RX 250 W HCPCS

## 2023-11-12 PROCEDURE — 10002807 HB RX 258: Performed by: ANESTHESIOLOGY

## 2023-11-12 PROCEDURE — 81001 URINALYSIS AUTO W/SCOPE: CPT | Performed by: EMERGENCY MEDICINE

## 2023-11-12 PROCEDURE — 96365 THER/PROPH/DIAG IV INF INIT: CPT

## 2023-11-12 PROCEDURE — 71045 X-RAY EXAM CHEST 1 VIEW: CPT

## 2023-11-12 PROCEDURE — 93005 ELECTROCARDIOGRAM TRACING: CPT | Performed by: EMERGENCY MEDICINE

## 2023-11-12 PROCEDURE — 0DH60UZ INSERTION OF FEEDING DEVICE INTO STOMACH, OPEN APPROACH: ICD-10-PCS | Performed by: SURGERY

## 2023-11-12 PROCEDURE — 36415 COLL VENOUS BLD VENIPUNCTURE: CPT

## 2023-11-12 PROCEDURE — 10002800 HB RX 250 W HCPCS: Performed by: EMERGENCY MEDICINE

## 2023-11-12 RX ORDER — FLUCONAZOLE 2 MG/ML
200 INJECTION, SOLUTION INTRAVENOUS DAILY
Status: DISCONTINUED | OUTPATIENT
Start: 2023-11-13 | End: 2023-11-19

## 2023-11-12 RX ORDER — IPRATROPIUM BROMIDE AND ALBUTEROL SULFATE 2.5; .5 MG/3ML; MG/3ML
3 SOLUTION RESPIRATORY (INHALATION)
Status: DISCONTINUED | OUTPATIENT
Start: 2023-11-12 | End: 2023-11-28 | Stop reason: HOSPADM

## 2023-11-12 RX ORDER — DIPHENHYDRAMINE HYDROCHLORIDE 50 MG/ML
12.5 INJECTION INTRAMUSCULAR; INTRAVENOUS
Status: DISPENSED | OUTPATIENT
Start: 2023-11-12 | End: 2023-11-12

## 2023-11-12 RX ORDER — DEXAMETHASONE SODIUM PHOSPHATE 4 MG/ML
INJECTION, SOLUTION INTRA-ARTICULAR; INTRALESIONAL; INTRAMUSCULAR; INTRAVENOUS; SOFT TISSUE PRN
Status: DISCONTINUED | OUTPATIENT
Start: 2023-11-12 | End: 2023-11-12

## 2023-11-12 RX ORDER — HYDRALAZINE HYDROCHLORIDE 20 MG/ML
5 INJECTION INTRAMUSCULAR; INTRAVENOUS EVERY 10 MIN PRN
Status: DISCONTINUED | OUTPATIENT
Start: 2023-11-12 | End: 2023-11-12 | Stop reason: HOSPADM

## 2023-11-12 RX ORDER — ONDANSETRON 2 MG/ML
4 INJECTION INTRAMUSCULAR; INTRAVENOUS
Status: ACTIVE | OUTPATIENT
Start: 2023-11-12 | End: 2023-11-12

## 2023-11-12 RX ORDER — GLYCOPYRROLATE 0.2 MG/ML
INJECTION, SOLUTION INTRAMUSCULAR; INTRAVENOUS PRN
Status: DISCONTINUED | OUTPATIENT
Start: 2023-11-12 | End: 2023-11-12

## 2023-11-12 RX ORDER — SODIUM CHLORIDE, SODIUM LACTATE, POTASSIUM CHLORIDE, CALCIUM CHLORIDE 600; 310; 30; 20 MG/100ML; MG/100ML; MG/100ML; MG/100ML
INJECTION, SOLUTION INTRAVENOUS CONTINUOUS
Status: DISCONTINUED | OUTPATIENT
Start: 2023-11-12 | End: 2023-11-13

## 2023-11-12 RX ORDER — PROPOFOL 10 MG/ML
INJECTION, EMULSION INTRAVENOUS PRN
Status: DISCONTINUED | OUTPATIENT
Start: 2023-11-12 | End: 2023-11-12

## 2023-11-12 RX ORDER — NOREPINEPHRINE BITARTRATE/D5W 8 MG/250ML
0-30 PLASTIC BAG, INJECTION (ML) INTRAVENOUS CONTINUOUS
Status: DISCONTINUED | OUTPATIENT
Start: 2023-11-12 | End: 2023-11-13

## 2023-11-12 RX ORDER — DROPERIDOL 2.5 MG/ML
0.62 INJECTION, SOLUTION INTRAMUSCULAR; INTRAVENOUS
Status: ACTIVE | OUTPATIENT
Start: 2023-11-12 | End: 2023-11-12

## 2023-11-12 RX ORDER — NEOSTIGMINE METHYLSULFATE 4 MG/4 ML
SYRINGE (ML) INTRAVENOUS PRN
Status: DISCONTINUED | OUTPATIENT
Start: 2023-11-12 | End: 2023-11-12

## 2023-11-12 RX ORDER — ESOMEPRAZOLE MAGNESIUM 20 MG/1
20 GRANULE, DELAYED RELEASE ORAL
Status: ON HOLD | COMMUNITY
End: 2023-12-07

## 2023-11-12 RX ORDER — FLUCONAZOLE 2 MG/ML
400 INJECTION, SOLUTION INTRAVENOUS ONCE
Status: DISCONTINUED | OUTPATIENT
Start: 2023-11-12 | End: 2023-11-12 | Stop reason: ALTCHOICE

## 2023-11-12 RX ORDER — ONDANSETRON 2 MG/ML
INJECTION INTRAMUSCULAR; INTRAVENOUS PRN
Status: DISCONTINUED | OUTPATIENT
Start: 2023-11-12 | End: 2023-11-12

## 2023-11-12 RX ORDER — ROCURONIUM BROMIDE 10 MG/ML
INJECTION, SOLUTION INTRAVENOUS PRN
Status: DISCONTINUED | OUTPATIENT
Start: 2023-11-12 | End: 2023-11-12

## 2023-11-12 RX ORDER — FLUCONAZOLE 2 MG/ML
200 INJECTION, SOLUTION INTRAVENOUS DAILY
Status: DISCONTINUED | OUTPATIENT
Start: 2023-11-12 | End: 2023-11-12 | Stop reason: ALTCHOICE

## 2023-11-12 RX ORDER — LIDOCAINE HYDROCHLORIDE 20 MG/ML
INJECTION, SOLUTION INFILTRATION; PERINEURAL PRN
Status: DISCONTINUED | OUTPATIENT
Start: 2023-11-12 | End: 2023-11-12

## 2023-11-12 RX ORDER — 0.9 % SODIUM CHLORIDE 0.9 %
2 VIAL (ML) INJECTION EVERY 12 HOURS SCHEDULED
Status: DISCONTINUED | OUTPATIENT
Start: 2023-11-12 | End: 2023-11-28 | Stop reason: HOSPADM

## 2023-11-12 RX ORDER — SODIUM CHLORIDE, SODIUM LACTATE, POTASSIUM CHLORIDE, CALCIUM CHLORIDE 600; 310; 30; 20 MG/100ML; MG/100ML; MG/100ML; MG/100ML
INJECTION, SOLUTION INTRAVENOUS CONTINUOUS PRN
Status: DISCONTINUED | OUTPATIENT
Start: 2023-11-12 | End: 2023-11-12

## 2023-11-12 RX ORDER — SODIUM CHLORIDE, SODIUM LACTATE, POTASSIUM CHLORIDE, CALCIUM CHLORIDE 600; 310; 30; 20 MG/100ML; MG/100ML; MG/100ML; MG/100ML
INJECTION, SOLUTION INTRAVENOUS CONTINUOUS
Status: DISCONTINUED | OUTPATIENT
Start: 2023-11-12 | End: 2023-11-12 | Stop reason: HOSPADM

## 2023-11-12 RX ADMIN — FENTANYL CITRATE 25 MCG: 50 INJECTION INTRAMUSCULAR; INTRAVENOUS at 19:36

## 2023-11-12 RX ADMIN — FENTANYL CITRATE 50 MCG: 50 INJECTION INTRAMUSCULAR; INTRAVENOUS at 20:42

## 2023-11-12 RX ADMIN — MORPHINE SULFATE 2 MG: 2 INJECTION, SOLUTION INTRAMUSCULAR; INTRAVENOUS at 23:46

## 2023-11-12 RX ADMIN — SODIUM CHLORIDE, POTASSIUM CHLORIDE, SODIUM LACTATE AND CALCIUM CHLORIDE: 600; 310; 30; 20 INJECTION, SOLUTION INTRAVENOUS at 19:28

## 2023-11-12 RX ADMIN — SODIUM CHLORIDE, PRESERVATIVE FREE 2 ML: 5 INJECTION INTRAVENOUS at 14:40

## 2023-11-12 RX ADMIN — MORPHINE SULFATE 2 MG: 2 INJECTION, SOLUTION INTRAMUSCULAR; INTRAVENOUS at 21:57

## 2023-11-12 RX ADMIN — FENTANYL CITRATE 25 MCG: 50 INJECTION INTRAMUSCULAR; INTRAVENOUS at 20:28

## 2023-11-12 RX ADMIN — Medication 3 MG: at 20:37

## 2023-11-12 RX ADMIN — SODIUM CHLORIDE, POTASSIUM CHLORIDE, SODIUM LACTATE AND CALCIUM CHLORIDE 1000 ML: 600; 310; 30; 20 INJECTION, SOLUTION INTRAVENOUS at 15:58

## 2023-11-12 RX ADMIN — FENTANYL CITRATE 25 MCG: 50 INJECTION INTRAMUSCULAR; INTRAVENOUS at 19:51

## 2023-11-12 RX ADMIN — PROPOFOL 80 MG: 10 INJECTION, EMULSION INTRAVENOUS at 19:34

## 2023-11-12 RX ADMIN — ROCURONIUM BROMIDE 30 MG: 10 INJECTION INTRAVENOUS at 19:35

## 2023-11-12 RX ADMIN — DEXAMETHASONE SODIUM PHOSPHATE 4 MG: 4 INJECTION INTRA-ARTICULAR; INTRALESIONAL; INTRAMUSCULAR; INTRAVENOUS; SOFT TISSUE at 19:35

## 2023-11-12 RX ADMIN — CEFEPIME 2000 MG: 2 INJECTION, POWDER, FOR SOLUTION INTRAVENOUS at 22:56

## 2023-11-12 RX ADMIN — ONDANSETRON 4 MG: 2 INJECTION INTRAMUSCULAR; INTRAVENOUS at 20:37

## 2023-11-12 RX ADMIN — IOHEXOL 80 ML: 350 INJECTION, SOLUTION INTRAVENOUS at 14:03

## 2023-11-12 RX ADMIN — GLYCOPYRROLATE 0.4 MG: 0.2 INJECTION, SOLUTION INTRAMUSCULAR; INTRAVENOUS at 20:37

## 2023-11-12 RX ADMIN — SODIUM CHLORIDE, PRESERVATIVE FREE 2 ML: 5 INJECTION INTRAVENOUS at 21:58

## 2023-11-12 RX ADMIN — PIPERACILLIN SODIUM AND TAZOBACTAM SODIUM 4.5 G: 4; .5 INJECTION, POWDER, FOR SOLUTION INTRAVENOUS at 14:40

## 2023-11-12 RX ADMIN — LIDOCAINE HYDROCHLORIDE 3 ML: 20 INJECTION, SOLUTION INFILTRATION; PERINEURAL at 19:34

## 2023-11-12 RX ADMIN — WATER 1250 MG: 1 INJECTION INTRAMUSCULAR; INTRAVENOUS; SUBCUTANEOUS at 15:41

## 2023-11-12 RX ADMIN — METRONIDAZOLE 500 MG: 500 INJECTION, SOLUTION INTRAVENOUS at 21:57

## 2023-11-12 RX ADMIN — MICAFUNGIN SODIUM 100 MG: 100 INJECTION, POWDER, LYOPHILIZED, FOR SOLUTION INTRAVENOUS at 16:49

## 2023-11-12 RX ADMIN — SODIUM CHLORIDE, POTASSIUM CHLORIDE, SODIUM LACTATE AND CALCIUM CHLORIDE: 600; 310; 30; 20 INJECTION, SOLUTION INTRAVENOUS at 21:26

## 2023-11-12 RX ADMIN — FENTANYL CITRATE 25 MCG: 50 INJECTION INTRAMUSCULAR; INTRAVENOUS at 20:10

## 2023-11-12 RX ADMIN — SODIUM CHLORIDE, POTASSIUM CHLORIDE, SODIUM LACTATE AND CALCIUM CHLORIDE: 600; 310; 30; 20 INJECTION, SOLUTION INTRAVENOUS at 17:23

## 2023-11-12 SDOH — SOCIAL STABILITY: SOCIAL INSECURITY: RISK FACTORS: NEUROLOGICAL DISEASE

## 2023-11-12 SDOH — SOCIAL STABILITY: SOCIAL INSECURITY: RISK FACTORS: AGE

## 2023-11-12 ASSESSMENT — PAIN SCALES - PAIN ASSESSMENT IN ADVANCED DEMENTIA (PAINAD)
BREATHING: NORMAL
FACIALEXPRESSION: SMILING OR INEXPRESSIVE
CONSOLABILITY: NO NEED TO CONSOLE
BODYLANGUAGE: RELAXED
BODYLANGUAGE: TENSE, DISTRESSED, FIDGETING
CONSOLABILITY: DISTRACTED OR REASSURED BY VOICE OR TOUCH
FACIALEXPRESSION: SAD. FRIGHTENED. FROWNING
BREATHING: OCCASIONAL LABORED BREATHING, SHORT PERIOD OF HYPERVENTILATION
TOTALSCORE: 0
TOTALSCORE: 4

## 2023-11-12 ASSESSMENT — PAIN SCALES - GENERAL
PAINLEVEL_OUTOF10: 0
PAINLEVEL_OUTOF10: 6

## 2023-11-13 ENCOUNTER — APPOINTMENT (OUTPATIENT)
Dept: CT IMAGING | Age: 63
DRG: 981 | End: 2023-11-13
Attending: HOSPITALIST

## 2023-11-13 LAB
ANION GAP SERPL CALC-SCNC: 9 MMOL/L (ref 7–19)
BUN SERPL-MCNC: 25 MG/DL (ref 6–20)
BUN/CREAT SERPL: 51 (ref 7–25)
CALCIUM SERPL-MCNC: 8.7 MG/DL (ref 8.4–10.2)
CHLORIDE SERPL-SCNC: 112 MMOL/L (ref 97–110)
CO2 SERPL-SCNC: 26 MMOL/L (ref 21–32)
CREAT SERPL-MCNC: 0.49 MG/DL (ref 0.51–0.95)
DEPRECATED RDW RBC: 52.7 FL (ref 39–50)
EGFRCR SERPLBLD CKD-EPI 2021: >90 ML/MIN/{1.73_M2}
ERYTHROCYTE [DISTWIDTH] IN BLOOD: 13.8 % (ref 11–15)
FASTING DURATION TIME PATIENT: ABNORMAL H
GLUCOSE BLDC GLUCOMTR-MCNC: 89 MG/DL (ref 70–99)
GLUCOSE SERPL-MCNC: 120 MG/DL (ref 70–99)
HCT VFR BLD CALC: 45.1 % (ref 36–46.5)
HGB BLD-MCNC: 14.3 G/DL (ref 12–15.5)
MAGNESIUM SERPL-MCNC: 2.2 MG/DL (ref 1.7–2.4)
MCH RBC QN AUTO: 32.3 PG (ref 26–34)
MCHC RBC AUTO-ENTMCNC: 31.7 G/DL (ref 32–36.5)
MCV RBC AUTO: 101.8 FL (ref 78–100)
NRBC BLD MANUAL-RTO: 0 /100 WBC
PHOSPHATE SERPL-MCNC: 3 MG/DL (ref 2.4–4.7)
PLATELET # BLD AUTO: 357 K/MCL (ref 140–450)
POTASSIUM SERPL-SCNC: 3.6 MMOL/L (ref 3.4–5.1)
RBC # BLD: 4.43 MIL/MCL (ref 4–5.2)
SODIUM SERPL-SCNC: 143 MMOL/L (ref 135–145)
WBC # BLD: 17.6 K/MCL (ref 4.2–11)

## 2023-11-13 PROCEDURE — 96372 THER/PROPH/DIAG INJ SC/IM: CPT | Performed by: SURGERY

## 2023-11-13 PROCEDURE — 10002807 HB RX 258: Performed by: HOSPITALIST

## 2023-11-13 PROCEDURE — 99291 CRITICAL CARE FIRST HOUR: CPT

## 2023-11-13 PROCEDURE — 80048 BASIC METABOLIC PNL TOTAL CA: CPT | Performed by: SURGERY

## 2023-11-13 PROCEDURE — 10002803 HB RX 637: Performed by: SURGERY

## 2023-11-13 PROCEDURE — 36415 COLL VENOUS BLD VENIPUNCTURE: CPT | Performed by: SURGERY

## 2023-11-13 PROCEDURE — 85027 COMPLETE CBC AUTOMATED: CPT | Performed by: SURGERY

## 2023-11-13 PROCEDURE — 83735 ASSAY OF MAGNESIUM: CPT | Performed by: SURGERY

## 2023-11-13 PROCEDURE — 10000008 HB ROOM CHARGE ICU OR CCU

## 2023-11-13 PROCEDURE — 10002800 HB RX 250 W HCPCS: Performed by: SURGERY

## 2023-11-13 PROCEDURE — 10002807 HB RX 258: Performed by: SURGERY

## 2023-11-13 PROCEDURE — 84100 ASSAY OF PHOSPHORUS: CPT | Performed by: SURGERY

## 2023-11-13 PROCEDURE — 10002801 HB RX 250 W/O HCPCS: Performed by: SURGERY

## 2023-11-13 PROCEDURE — 13003289 HB OXYGEN THERAPY DAILY

## 2023-11-13 PROCEDURE — 70450 CT HEAD/BRAIN W/O DYE: CPT

## 2023-11-13 RX ORDER — POTASSIUM CHLORIDE 14.9 MG/ML
20 INJECTION INTRAVENOUS ONCE
Status: COMPLETED | OUTPATIENT
Start: 2023-11-13 | End: 2023-11-13

## 2023-11-13 RX ORDER — OXYCODONE HYDROCHLORIDE 5 MG/1
5 TABLET ORAL EVERY 4 HOURS PRN
Status: DISCONTINUED | OUTPATIENT
Start: 2023-11-13 | End: 2023-11-28 | Stop reason: HOSPADM

## 2023-11-13 RX ORDER — DEXTROSE, SODIUM CHLORIDE, SODIUM LACTATE, POTASSIUM CHLORIDE, AND CALCIUM CHLORIDE 5; .6; .31; .03; .02 G/100ML; G/100ML; G/100ML; G/100ML; G/100ML
INJECTION, SOLUTION INTRAVENOUS CONTINUOUS
Status: DISCONTINUED | OUTPATIENT
Start: 2023-11-13 | End: 2023-11-16

## 2023-11-13 RX ORDER — ENOXAPARIN SODIUM 100 MG/ML
40 INJECTION SUBCUTANEOUS EVERY 24 HOURS
Status: DISCONTINUED | OUTPATIENT
Start: 2023-11-13 | End: 2023-11-28 | Stop reason: HOSPADM

## 2023-11-13 RX ADMIN — VANCOMYCIN HYDROCHLORIDE 750 MG: 750 INJECTION, POWDER, LYOPHILIZED, FOR SOLUTION INTRAVENOUS at 06:10

## 2023-11-13 RX ADMIN — METRONIDAZOLE 500 MG: 500 INJECTION, SOLUTION INTRAVENOUS at 21:36

## 2023-11-13 RX ADMIN — ENOXAPARIN SODIUM 40 MG: 40 INJECTION SUBCUTANEOUS at 08:29

## 2023-11-13 RX ADMIN — CEFEPIME 2000 MG: 2 INJECTION, POWDER, FOR SOLUTION INTRAVENOUS at 14:06

## 2023-11-13 RX ADMIN — VANCOMYCIN HYDROCHLORIDE 750 MG: 750 INJECTION, POWDER, LYOPHILIZED, FOR SOLUTION INTRAVENOUS at 17:47

## 2023-11-13 RX ADMIN — CEFEPIME 2000 MG: 2 INJECTION, POWDER, FOR SOLUTION INTRAVENOUS at 06:05

## 2023-11-13 RX ADMIN — MORPHINE SULFATE 2 MG: 2 INJECTION, SOLUTION INTRAMUSCULAR; INTRAVENOUS at 06:14

## 2023-11-13 RX ADMIN — OXYCODONE HYDROCHLORIDE 5 MG: 5 TABLET ORAL at 08:29

## 2023-11-13 RX ADMIN — SODIUM CHLORIDE, SODIUM LACTATE, POTASSIUM CHLORIDE, CALCIUM CHLORIDE AND DEXTROSE MONOHYDRATE: 5; 600; 310; 30; 20 INJECTION, SOLUTION INTRAVENOUS at 15:00

## 2023-11-13 RX ADMIN — FLUCONAZOLE 200 MG: 2 INJECTION, SOLUTION INTRAVENOUS at 08:30

## 2023-11-13 RX ADMIN — SODIUM CHLORIDE, POTASSIUM CHLORIDE, SODIUM LACTATE AND CALCIUM CHLORIDE: 600; 310; 30; 20 INJECTION, SOLUTION INTRAVENOUS at 12:02

## 2023-11-13 RX ADMIN — CEFEPIME 2000 MG: 2 INJECTION, POWDER, FOR SOLUTION INTRAVENOUS at 22:26

## 2023-11-13 RX ADMIN — MORPHINE SULFATE 2 MG: 2 INJECTION, SOLUTION INTRAMUSCULAR; INTRAVENOUS at 02:38

## 2023-11-13 RX ADMIN — POTASSIUM CHLORIDE 20 MEQ: 14.9 INJECTION, SOLUTION INTRAVENOUS at 09:38

## 2023-11-13 RX ADMIN — METRONIDAZOLE 500 MG: 500 INJECTION, SOLUTION INTRAVENOUS at 13:10

## 2023-11-13 RX ADMIN — METRONIDAZOLE 500 MG: 500 INJECTION, SOLUTION INTRAVENOUS at 05:25

## 2023-11-13 RX ADMIN — POTASSIUM CHLORIDE 20 MEQ: 14.9 INJECTION, SOLUTION INTRAVENOUS at 12:02

## 2023-11-13 SDOH — HEALTH STABILITY: GENERAL: BECAUSE OF A PHYSICAL, MENTAL, OR EMOTIONAL CONDITION, DO YOU HAVE DIFFICULTY DOING ERRANDS ALONE?: YES

## 2023-11-13 SDOH — ECONOMIC STABILITY: GENERAL

## 2023-11-13 SDOH — ECONOMIC STABILITY: HOUSING INSECURITY: WHAT IS YOUR LIVING SITUATION TODAY?: LONG TERM CARE FACILITY

## 2023-11-13 SDOH — ECONOMIC STABILITY: HOUSING INSECURITY: WHAT IS YOUR LIVING SITUATION TODAY?: OTHER FACILITY RESIDENTS

## 2023-11-13 SDOH — HEALTH STABILITY: PHYSICAL HEALTH: DO YOU HAVE SERIOUS DIFFICULTY WALKING OR CLIMBING STAIRS?: YES

## 2023-11-13 SDOH — SOCIAL STABILITY: SOCIAL NETWORK

## 2023-11-13 SDOH — ECONOMIC STABILITY: FOOD INSECURITY: HOW OFTEN IN THE PAST 12 MONTHS WERE YOU WORRIED OR STRESSED ABOUT HAVING ENOUGH MONEY TO BUY NUTRITIOUS MEALS?: NEVER

## 2023-11-13 SDOH — HEALTH STABILITY: PHYSICAL HEALTH: DO YOU HAVE DIFFICULTY DRESSING OR BATHING?: YES

## 2023-11-13 SDOH — ECONOMIC STABILITY: HOUSING INSECURITY: ARE YOU WORRIED ABOUT LOSING YOUR HOUSING?: NO

## 2023-11-13 ASSESSMENT — ACTIVITIES OF DAILY LIVING (ADL)
TOILETING: NEEDS ASSISTANCE
ADL_BEFORE_ADMISSION: NEEDS/REQUIRES ASSISTANCE
RECENT_DECLINE_ADL: YES, ACUTE ILLNESS WITHOUT THERAPY NEEDS
DRESSING: NEEDS ASSISTANCE
ADL_SCORE: 6
BATHING: NEEDS ASSISTANCE
FEEDING: NEEDS ASSISTANCE
ADL_SHORT_OF_BREATH: NO

## 2023-11-13 ASSESSMENT — PAIN SCALES - PAIN ASSESSMENT IN ADVANCED DEMENTIA (PAINAD)
BREATHING: NORMAL
BREATHING: OCCASIONAL LABORED BREATHING, SHORT PERIOD OF HYPERVENTILATION
CONSOLABILITY: NO NEED TO CONSOLE
BREATHING: OCCASIONAL LABORED BREATHING, SHORT PERIOD OF HYPERVENTILATION
BODYLANGUAGE: RELAXED
TOTALSCORE: 0
BODYLANGUAGE: TENSE, DISTRESSED, FIDGETING
CONSOLABILITY: NO NEED TO CONSOLE
FACIALEXPRESSION: SAD. FRIGHTENED. FROWNING
BREATHING: NORMAL
TOTALSCORE: 0
TOTALSCORE: 1
FACIALEXPRESSION: SMILING OR INEXPRESSIVE
TOTALSCORE: 4
CONSOLABILITY: DISTRACTED OR REASSURED BY VOICE OR TOUCH
BODYLANGUAGE: RELAXED
BODYLANGUAGE: RELAXED
FACIALEXPRESSION: SMILING OR INEXPRESSIVE
BREATHING: OCCASIONAL LABORED BREATHING, SHORT PERIOD OF HYPERVENTILATION
FACIALEXPRESSION: SAD. FRIGHTENED. FROWNING
FACIALEXPRESSION: SMILING OR INEXPRESSIVE
TOTALSCORE: 4
FACIALEXPRESSION: SMILING OR INEXPRESSIVE
TOTALSCORE: 0
CONSOLABILITY: NO NEED TO CONSOLE
BREATHING: NORMAL
BODYLANGUAGE: TENSE, DISTRESSED, FIDGETING
CONSOLABILITY: NO NEED TO CONSOLE
CONSOLABILITY: DISTRACTED OR REASSURED BY VOICE OR TOUCH
BODYLANGUAGE: RELAXED

## 2023-11-13 ASSESSMENT — PATIENT HEALTH QUESTIONNAIRE - PHQ9
CLINICAL INTERPRETATION OF PHQ2 SCORE: NO FURTHER SCREENING NEEDED
2. FEELING DOWN, DEPRESSED OR HOPELESS: NOT AT ALL
SUM OF ALL RESPONSES TO PHQ9 QUESTIONS 1 AND 2: 0
IS PATIENT ABLE TO COMPLETE PHQ2 OR PHQ9: YES
1. LITTLE INTEREST OR PLEASURE IN DOING THINGS: NOT AT ALL
SUM OF ALL RESPONSES TO PHQ9 QUESTIONS 1 AND 2: 0

## 2023-11-13 ASSESSMENT — COGNITIVE AND FUNCTIONAL STATUS - GENERAL
BECAUSE OF A PHYSICAL, MENTAL, OR EMOTIONAL CONDITION, DO YOU HAVE SERIOUS DIFFICULTY CONCENTRATING, REMEMBERING OR MAKING DECISIONS: YES
DO YOU HAVE SERIOUS DIFFICULTY WALKING OR CLIMBING STAIRS: YES
DO YOU HAVE DIFFICULTY DRESSING OR BATHING: YES
BECAUSE OF A PHYSICAL, MENTAL, OR EMOTIONAL CONDITION, DO YOU HAVE DIFFICULTY DOING ERRANDS ALONE: YES

## 2023-11-13 ASSESSMENT — LIFESTYLE VARIABLES
AUDIT-C TOTAL SCORE: 0
HOW OFTEN DO YOU HAVE 6 OR MORE DRINKS ON ONE OCCASION: NEVER
HOW OFTEN DO YOU HAVE A DRINK CONTAINING ALCOHOL: NEVER
HOW MANY STANDARD DRINKS CONTAINING ALCOHOL DO YOU HAVE ON A TYPICAL DAY: 0,1 OR 2

## 2023-11-13 ASSESSMENT — COLUMBIA-SUICIDE SEVERITY RATING SCALE - C-SSRS: IS THE PATIENT ABLE TO COMPLETE C-SSRS: NO, DEFER TO LATER TIME

## 2023-11-14 ENCOUNTER — ANESTHESIA (OUTPATIENT)
Dept: SURGERY | Age: 63
End: 2023-11-14

## 2023-11-14 ENCOUNTER — APPOINTMENT (OUTPATIENT)
Dept: GENERAL RADIOLOGY | Age: 63
DRG: 981 | End: 2023-11-14

## 2023-11-14 ENCOUNTER — ANESTHESIA EVENT (OUTPATIENT)
Dept: SURGERY | Age: 63
End: 2023-11-14

## 2023-11-14 LAB
ANION GAP SERPL CALC-SCNC: 10 MMOL/L (ref 7–19)
ANION GAP SERPL CALC-SCNC: 6 MMOL/L (ref 7–19)
BACTERIA UR CULT: NO GROWTH
BUN SERPL-MCNC: 14 MG/DL (ref 6–20)
BUN SERPL-MCNC: 8 MG/DL (ref 6–20)
BUN/CREAT SERPL: 21 (ref 7–25)
BUN/CREAT SERPL: 37 (ref 7–25)
CALCIUM SERPL-MCNC: 8.7 MG/DL (ref 8.4–10.2)
CALCIUM SERPL-MCNC: 9.3 MG/DL (ref 8.4–10.2)
CHLORIDE SERPL-SCNC: 106 MMOL/L (ref 97–110)
CHLORIDE SERPL-SCNC: 111 MMOL/L (ref 97–110)
CO2 SERPL-SCNC: 28 MMOL/L (ref 21–32)
CO2 SERPL-SCNC: 28 MMOL/L (ref 21–32)
CREAT SERPL-MCNC: 0.38 MG/DL (ref 0.51–0.95)
CREAT SERPL-MCNC: 0.38 MG/DL (ref 0.51–0.95)
DEPRECATED RDW RBC: 50.8 FL (ref 39–50)
EGFRCR SERPLBLD CKD-EPI 2021: >90 ML/MIN/{1.73_M2}
EGFRCR SERPLBLD CKD-EPI 2021: >90 ML/MIN/{1.73_M2}
ERYTHROCYTE [DISTWIDTH] IN BLOOD: 13.6 % (ref 11–15)
FASTING DURATION TIME PATIENT: ABNORMAL H
FASTING DURATION TIME PATIENT: ABNORMAL H
GLUCOSE SERPL-MCNC: 128 MG/DL (ref 70–99)
GLUCOSE SERPL-MCNC: 129 MG/DL (ref 70–99)
HCT VFR BLD CALC: 35.1 % (ref 36–46.5)
HGB BLD-MCNC: 11 G/DL (ref 12–15.5)
MAGNESIUM SERPL-MCNC: 1.9 MG/DL (ref 1.7–2.4)
MCH RBC QN AUTO: 32 PG (ref 26–34)
MCHC RBC AUTO-ENTMCNC: 31.3 G/DL (ref 32–36.5)
MCV RBC AUTO: 102 FL (ref 78–100)
NRBC BLD MANUAL-RTO: 0 /100 WBC
PHOSPHATE SERPL-MCNC: 1.1 MG/DL (ref 2.4–4.7)
PHOSPHATE SERPL-MCNC: 3.9 MG/DL (ref 2.4–4.7)
PLATELET # BLD AUTO: 292 K/MCL (ref 140–450)
POTASSIUM SERPL-SCNC: 2.9 MMOL/L (ref 3.4–5.1)
POTASSIUM SERPL-SCNC: 3 MMOL/L (ref 3.4–5.1)
RBC # BLD: 3.44 MIL/MCL (ref 4–5.2)
SODIUM SERPL-SCNC: 141 MMOL/L (ref 135–145)
SODIUM SERPL-SCNC: 142 MMOL/L (ref 135–145)
VANCOMYCIN TROUGH SERPL-MCNC: 8.2 MCG/ML (ref 10–20)
WBC # BLD: 12.1 K/MCL (ref 4.2–11)

## 2023-11-14 PROCEDURE — 84100 ASSAY OF PHOSPHORUS: CPT

## 2023-11-14 PROCEDURE — 80048 BASIC METABOLIC PNL TOTAL CA: CPT | Performed by: SURGERY

## 2023-11-14 PROCEDURE — 49002 REOPENING OF ABDOMEN: CPT | Performed by: SURGERY

## 2023-11-14 PROCEDURE — 13000003 HB ANESTHESIA  GENERAL EA ADD MINUTE: Performed by: SURGERY

## 2023-11-14 PROCEDURE — 13000036 HB COMPLEX  CASE S/U + 1ST 15 MIN: Performed by: SURGERY

## 2023-11-14 PROCEDURE — 80048 BASIC METABOLIC PNL TOTAL CA: CPT

## 2023-11-14 PROCEDURE — 10002801 HB RX 250 W/O HCPCS: Performed by: ANESTHESIOLOGY

## 2023-11-14 PROCEDURE — 3E0G76Z INTRODUCTION OF NUTRITIONAL SUBSTANCE INTO UPPER GI, VIA NATURAL OR ARTIFICIAL OPENING: ICD-10-PCS | Performed by: SURGERY

## 2023-11-14 PROCEDURE — 84100 ASSAY OF PHOSPHORUS: CPT | Performed by: SURGERY

## 2023-11-14 PROCEDURE — 96372 THER/PROPH/DIAG INJ SC/IM: CPT | Performed by: SURGERY

## 2023-11-14 PROCEDURE — 10004451 HB PACU RECOVERY 1ST 30 MINUTES: Performed by: SURGERY

## 2023-11-14 PROCEDURE — 10002019 HB COUNTER RESP ASSESSMENT

## 2023-11-14 PROCEDURE — 10002805 HB CONTRAST AGENT

## 2023-11-14 PROCEDURE — 49465 FLUORO EXAM OF G/COLON TUBE: CPT

## 2023-11-14 PROCEDURE — 10002807 HB RX 258

## 2023-11-14 PROCEDURE — 13003289 HB OXYGEN THERAPY DAILY

## 2023-11-14 PROCEDURE — 85027 COMPLETE CBC AUTOMATED: CPT

## 2023-11-14 PROCEDURE — 80202 ASSAY OF VANCOMYCIN: CPT

## 2023-11-14 PROCEDURE — 10002800 HB RX 250 W HCPCS: Performed by: ANESTHESIOLOGY

## 2023-11-14 PROCEDURE — 10002800 HB RX 250 W HCPCS: Performed by: SURGERY

## 2023-11-14 PROCEDURE — 10004452 HB PACU ADDL 30 MINUTES: Performed by: SURGERY

## 2023-11-14 PROCEDURE — 0JQ80ZZ REPAIR ABDOMEN SUBCUTANEOUS TISSUE AND FASCIA, OPEN APPROACH: ICD-10-PCS | Performed by: SURGERY

## 2023-11-14 PROCEDURE — 10004651 HB RX, NO CHARGE ITEM: Performed by: EMERGENCY MEDICINE

## 2023-11-14 PROCEDURE — 99291 CRITICAL CARE FIRST HOUR: CPT | Performed by: SURGERY

## 2023-11-14 PROCEDURE — 10002801 HB RX 250 W/O HCPCS: Performed by: SURGERY

## 2023-11-14 PROCEDURE — 36415 COLL VENOUS BLD VENIPUNCTURE: CPT

## 2023-11-14 PROCEDURE — 0DJW0ZZ INSPECTION OF PERITONEUM, OPEN APPROACH: ICD-10-PCS | Performed by: SURGERY

## 2023-11-14 PROCEDURE — 10006023 HB SUPPLY 272: Performed by: SURGERY

## 2023-11-14 PROCEDURE — 83735 ASSAY OF MAGNESIUM: CPT

## 2023-11-14 PROCEDURE — 10002807 HB RX 258: Performed by: HOSPITALIST

## 2023-11-14 PROCEDURE — 10002801 HB RX 250 W/O HCPCS

## 2023-11-14 PROCEDURE — 10002807 HB RX 258: Performed by: ANESTHESIOLOGY

## 2023-11-14 PROCEDURE — 10004651 HB RX, NO CHARGE ITEM: Performed by: SURGERY

## 2023-11-14 PROCEDURE — 10000002 HB ROOM CHARGE MED SURG

## 2023-11-14 PROCEDURE — 10002807 HB RX 258: Performed by: SURGERY

## 2023-11-14 PROCEDURE — 13000037 HB COMPLEX CASE EACH ADD MINUTE: Performed by: SURGERY

## 2023-11-14 PROCEDURE — 10004180 HB COUNTER-TRANSPORT

## 2023-11-14 PROCEDURE — 10002800 HB RX 250 W HCPCS

## 2023-11-14 PROCEDURE — 13000002 HB ANESTHESIA  GENERAL  S/U + 1ST 15 MIN: Performed by: SURGERY

## 2023-11-14 RX ORDER — POTASSIUM CHLORIDE 14.9 MG/ML
20 INJECTION INTRAVENOUS ONCE
Status: COMPLETED | OUTPATIENT
Start: 2023-11-14 | End: 2023-11-14

## 2023-11-14 RX ORDER — DROPERIDOL 2.5 MG/ML
0.62 INJECTION, SOLUTION INTRAMUSCULAR; INTRAVENOUS
Status: DISCONTINUED | OUTPATIENT
Start: 2023-11-14 | End: 2023-11-14 | Stop reason: HOSPADM

## 2023-11-14 RX ORDER — METOCLOPRAMIDE HYDROCHLORIDE 5 MG/ML
5 INJECTION INTRAMUSCULAR; INTRAVENOUS EVERY 6 HOURS PRN
Status: DISCONTINUED | OUTPATIENT
Start: 2023-11-14 | End: 2023-11-14 | Stop reason: HOSPADM

## 2023-11-14 RX ORDER — PROCHLORPERAZINE EDISYLATE 5 MG/ML
5 INJECTION INTRAMUSCULAR; INTRAVENOUS EVERY 4 HOURS PRN
Status: DISCONTINUED | OUTPATIENT
Start: 2023-11-14 | End: 2023-11-14 | Stop reason: HOSPADM

## 2023-11-14 RX ORDER — ROCURONIUM BROMIDE 10 MG/ML
INJECTION, SOLUTION INTRAVENOUS PRN
Status: DISCONTINUED | OUTPATIENT
Start: 2023-11-14 | End: 2023-11-14

## 2023-11-14 RX ORDER — ONDANSETRON 2 MG/ML
4 INJECTION INTRAMUSCULAR; INTRAVENOUS 2 TIMES DAILY PRN
Status: DISCONTINUED | OUTPATIENT
Start: 2023-11-14 | End: 2023-11-14 | Stop reason: HOSPADM

## 2023-11-14 RX ORDER — PROPOFOL 10 MG/ML
INJECTION, EMULSION INTRAVENOUS PRN
Status: DISCONTINUED | OUTPATIENT
Start: 2023-11-14 | End: 2023-11-14

## 2023-11-14 RX ORDER — SODIUM CHLORIDE, SODIUM LACTATE, POTASSIUM CHLORIDE, CALCIUM CHLORIDE 600; 310; 30; 20 MG/100ML; MG/100ML; MG/100ML; MG/100ML
INJECTION, SOLUTION INTRAVENOUS CONTINUOUS PRN
Status: DISCONTINUED | OUTPATIENT
Start: 2023-11-14 | End: 2023-11-14

## 2023-11-14 RX ORDER — POTASSIUM CHLORIDE 14.9 MG/ML
20 INJECTION INTRAVENOUS ONCE
Status: DISCONTINUED | OUTPATIENT
Start: 2023-11-14 | End: 2023-11-15

## 2023-11-14 RX ADMIN — MORPHINE SULFATE 2 MG: 2 INJECTION, SOLUTION INTRAMUSCULAR; INTRAVENOUS at 16:49

## 2023-11-14 RX ADMIN — FENTANYL CITRATE 50 MCG: 50 INJECTION INTRAMUSCULAR; INTRAVENOUS at 18:28

## 2023-11-14 RX ADMIN — MORPHINE SULFATE 2 MG: 2 INJECTION, SOLUTION INTRAMUSCULAR; INTRAVENOUS at 06:05

## 2023-11-14 RX ADMIN — MORPHINE SULFATE 2 MG: 2 INJECTION, SOLUTION INTRAMUSCULAR; INTRAVENOUS at 02:10

## 2023-11-14 RX ADMIN — WATER 1250 MG: 1 INJECTION INTRAMUSCULAR; INTRAVENOUS; SUBCUTANEOUS at 21:26

## 2023-11-14 RX ADMIN — SODIUM CHLORIDE, SODIUM LACTATE, POTASSIUM CHLORIDE, CALCIUM CHLORIDE AND DEXTROSE MONOHYDRATE: 5; 600; 310; 30; 20 INJECTION, SOLUTION INTRAVENOUS at 02:21

## 2023-11-14 RX ADMIN — POTASSIUM CHLORIDE 20 MEQ: 14.9 INJECTION, SOLUTION INTRAVENOUS at 17:13

## 2023-11-14 RX ADMIN — PROPOFOL 80 MG: 10 INJECTION, EMULSION INTRAVENOUS at 18:11

## 2023-11-14 RX ADMIN — SUGAMMADEX 200 MG: 100 INJECTION, SOLUTION INTRAVENOUS at 18:31

## 2023-11-14 RX ADMIN — SODIUM CHLORIDE, PRESERVATIVE FREE 2 ML: 5 INJECTION INTRAVENOUS at 21:14

## 2023-11-14 RX ADMIN — CEFEPIME 2000 MG: 2 INJECTION, POWDER, FOR SOLUTION INTRAVENOUS at 05:27

## 2023-11-14 RX ADMIN — SODIUM CHLORIDE, SODIUM LACTATE, POTASSIUM CHLORIDE, CALCIUM CHLORIDE AND DEXTROSE MONOHYDRATE: 5; 600; 310; 30; 20 INJECTION, SOLUTION INTRAVENOUS at 12:19

## 2023-11-14 RX ADMIN — METRONIDAZOLE 500 MG: 500 INJECTION, SOLUTION INTRAVENOUS at 05:26

## 2023-11-14 RX ADMIN — POTASSIUM CHLORIDE 20 MEQ: 14.9 INJECTION, SOLUTION INTRAVENOUS at 06:38

## 2023-11-14 RX ADMIN — SODIUM CHLORIDE, PRESERVATIVE FREE 2 ML: 5 INJECTION INTRAVENOUS at 08:42

## 2023-11-14 RX ADMIN — WATER 1250 MG: 1 INJECTION INTRAMUSCULAR; INTRAVENOUS; SUBCUTANEOUS at 11:31

## 2023-11-14 RX ADMIN — ENOXAPARIN SODIUM 40 MG: 40 INJECTION SUBCUTANEOUS at 08:11

## 2023-11-14 RX ADMIN — CEFEPIME 2000 MG: 2 INJECTION, POWDER, FOR SOLUTION INTRAVENOUS at 22:51

## 2023-11-14 RX ADMIN — FLUCONAZOLE 200 MG: 2 INJECTION, SOLUTION INTRAVENOUS at 07:28

## 2023-11-14 RX ADMIN — MORPHINE SULFATE 2 MG: 2 INJECTION, SOLUTION INTRAMUSCULAR; INTRAVENOUS at 16:06

## 2023-11-14 RX ADMIN — CEFEPIME 2000 MG: 2 INJECTION, POWDER, FOR SOLUTION INTRAVENOUS at 14:23

## 2023-11-14 RX ADMIN — METRONIDAZOLE 500 MG: 500 INJECTION, SOLUTION INTRAVENOUS at 21:38

## 2023-11-14 RX ADMIN — SODIUM PHOSPHATE, MONOBASIC, MONOHYDRATE AND SODIUM PHOSPHATE, DIBASIC, ANHYDROUS 45 MMOL: 142; 276 INJECTION, SOLUTION INTRAVENOUS at 06:59

## 2023-11-14 RX ADMIN — IOHEXOL 50 ML: 350 INJECTION, SOLUTION INTRAVENOUS at 11:10

## 2023-11-14 RX ADMIN — SODIUM CHLORIDE, POTASSIUM CHLORIDE, SODIUM LACTATE AND CALCIUM CHLORIDE: 600; 310; 30; 20 INJECTION, SOLUTION INTRAVENOUS at 18:05

## 2023-11-14 RX ADMIN — POTASSIUM CHLORIDE 20 MEQ: 14.9 INJECTION, SOLUTION INTRAVENOUS at 15:08

## 2023-11-14 RX ADMIN — FENTANYL CITRATE 50 MCG: 0.05 INJECTION, SOLUTION INTRAMUSCULAR; INTRAVENOUS at 19:03

## 2023-11-14 RX ADMIN — METRONIDAZOLE 500 MG: 500 INJECTION, SOLUTION INTRAVENOUS at 14:23

## 2023-11-14 RX ADMIN — POTASSIUM CHLORIDE 20 MEQ: 14.9 INJECTION, SOLUTION INTRAVENOUS at 08:42

## 2023-11-14 RX ADMIN — FENTANYL CITRATE 50 MCG: 0.05 INJECTION, SOLUTION INTRAMUSCULAR; INTRAVENOUS at 19:13

## 2023-11-14 RX ADMIN — SODIUM CHLORIDE, SODIUM LACTATE, POTASSIUM CHLORIDE, CALCIUM CHLORIDE AND DEXTROSE MONOHYDRATE: 5; 600; 310; 30; 20 INJECTION, SOLUTION INTRAVENOUS at 21:30

## 2023-11-14 RX ADMIN — ROCURONIUM BROMIDE 30 MG: 10 INJECTION INTRAVENOUS at 18:11

## 2023-11-14 ASSESSMENT — PAIN SCALES - PAIN ASSESSMENT IN ADVANCED DEMENTIA (PAINAD)
BREATHING: OCCASIONAL LABORED BREATHING, SHORT PERIOD OF HYPERVENTILATION
BREATHING: NORMAL
FACIALEXPRESSION: SMILING OR INEXPRESSIVE
TOTALSCORE: 5
FACIALEXPRESSION: FACIAL GRIMACING
BODYLANGUAGE: RELAXED
CONSOLABILITY: NO NEED TO CONSOLE
BODYLANGUAGE: RELAXED
FACIALEXPRESSION: SMILING OR INEXPRESSIVE
BODYLANGUAGE: RELAXED
TOTALSCORE: 0
TOTALSCORE: 0
FACIALEXPRESSION: FACIAL GRIMACING
TOTALSCORE: 0
FACIALEXPRESSION: SMILING OR INEXPRESSIVE
FACIALEXPRESSION: SAD. FRIGHTENED. FROWNING
NEGVOCALIZATION: OCCASIONAL MOAN OR GROAN, LOW LEVELS OF SPEECH WITH A NEGATIVE OR DISAPPROVING QUALITY
TOTALSCORE: 3
TOTALSCORE: 5
BREATHING: NORMAL
BODYLANGUAGE: TENSE, DISTRESSED, FIDGETING
BODYLANGUAGE: TENSE, DISTRESSED, FIDGETING
BREATHING: OCCASIONAL LABORED BREATHING, SHORT PERIOD OF HYPERVENTILATION
BREATHING: OCCASIONAL LABORED BREATHING, SHORT PERIOD OF HYPERVENTILATION
CONSOLABILITY: NO NEED TO CONSOLE
BREATHING: NORMAL
CONSOLABILITY: NO NEED TO CONSOLE
NEGVOCALIZATION: OCCASIONAL MOAN OR GROAN, LOW LEVELS OF SPEECH WITH A NEGATIVE OR DISAPPROVING QUALITY
BODYLANGUAGE: TENSE, DISTRESSED, FIDGETING

## 2023-11-14 ASSESSMENT — PULMONARY FUNCTION TESTS
FEV1: 0
FEV1_PREDICTED: 0
FEV1/FVC: UNABLE TO OBTAIN, OR GREATER THAN 70%

## 2023-11-14 ASSESSMENT — PAIN SCALES - GENERAL: PAINLEVEL_OUTOF10: 0

## 2023-11-14 ASSESSMENT — PAIN SCALES - WONG BAKER: WONGBAKER_NUMERICALRESPONSE: 1

## 2023-11-15 LAB
ANION GAP SERPL CALC-SCNC: 8 MMOL/L (ref 7–19)
ATRIAL RATE (BPM): 121
BUN SERPL-MCNC: 5 MG/DL (ref 6–20)
BUN/CREAT SERPL: 15 (ref 7–25)
CALCIUM SERPL-MCNC: 8.1 MG/DL (ref 8.4–10.2)
CHLORIDE SERPL-SCNC: 100 MMOL/L (ref 97–110)
CO2 SERPL-SCNC: 31 MMOL/L (ref 21–32)
CREAT SERPL-MCNC: 0.34 MG/DL (ref 0.51–0.95)
DEPRECATED RDW RBC: 48.6 FL (ref 39–50)
EGFRCR SERPLBLD CKD-EPI 2021: >90 ML/MIN/{1.73_M2}
ERYTHROCYTE [DISTWIDTH] IN BLOOD: 13.2 % (ref 11–15)
FASTING DURATION TIME PATIENT: ABNORMAL H
GLUCOSE SERPL-MCNC: 149 MG/DL (ref 70–99)
HCT VFR BLD CALC: 32.8 % (ref 36–46.5)
HGB BLD-MCNC: 10.5 G/DL (ref 12–15.5)
MAGNESIUM SERPL-MCNC: 1.4 MG/DL (ref 1.7–2.4)
MCH RBC QN AUTO: 32 PG (ref 26–34)
MCHC RBC AUTO-ENTMCNC: 32 G/DL (ref 32–36.5)
MCV RBC AUTO: 100 FL (ref 78–100)
NRBC BLD MANUAL-RTO: 0 /100 WBC
P AXIS (DEGREES): 37
PHOSPHATE SERPL-MCNC: 1.5 MG/DL (ref 2.4–4.7)
PLATELET # BLD AUTO: 313 K/MCL (ref 140–450)
POTASSIUM SERPL-SCNC: 2.9 MMOL/L (ref 3.4–5.1)
PR-INTERVAL (MSEC): 154
QRS-INTERVAL (MSEC): 72
QT-INTERVAL (MSEC): 312
QTC: 443
R AXIS (DEGREES): -45
RBC # BLD: 3.28 MIL/MCL (ref 4–5.2)
REPORT TEXT: NORMAL
SODIUM SERPL-SCNC: 136 MMOL/L (ref 135–145)
T AXIS (DEGREES): 133
VENTRICULAR RATE EKG/MIN (BPM): 121
WBC # BLD: 12.9 K/MCL (ref 4.2–11)

## 2023-11-15 PROCEDURE — 13003291 HB INS MIDLINE W/GUIDE INCL CATH

## 2023-11-15 PROCEDURE — 83735 ASSAY OF MAGNESIUM: CPT | Performed by: SURGERY

## 2023-11-15 PROCEDURE — 80048 BASIC METABOLIC PNL TOTAL CA: CPT | Performed by: SURGERY

## 2023-11-15 PROCEDURE — 84100 ASSAY OF PHOSPHORUS: CPT | Performed by: SURGERY

## 2023-11-15 PROCEDURE — 10002803 HB RX 637: Performed by: HOSPITALIST

## 2023-11-15 PROCEDURE — 10002801 HB RX 250 W/O HCPCS: Performed by: SURGERY

## 2023-11-15 PROCEDURE — 10002801 HB RX 250 W/O HCPCS

## 2023-11-15 PROCEDURE — 10002800 HB RX 250 W HCPCS

## 2023-11-15 PROCEDURE — 10004281 HB COUNTER-STAFF TIME PER 15 MIN

## 2023-11-15 PROCEDURE — 10002800 HB RX 250 W HCPCS: Performed by: HOSPITALIST

## 2023-11-15 PROCEDURE — 99024 POSTOP FOLLOW-UP VISIT: CPT

## 2023-11-15 PROCEDURE — 97606 NEG PRS WND THER DME>50 SQCM: CPT

## 2023-11-15 PROCEDURE — 10004651 HB RX, NO CHARGE ITEM: Performed by: SURGERY

## 2023-11-15 PROCEDURE — 36415 COLL VENOUS BLD VENIPUNCTURE: CPT | Performed by: SURGERY

## 2023-11-15 PROCEDURE — 85027 COMPLETE CBC AUTOMATED: CPT | Performed by: SURGERY

## 2023-11-15 PROCEDURE — 10002800 HB RX 250 W HCPCS: Performed by: SURGERY

## 2023-11-15 PROCEDURE — 96372 THER/PROPH/DIAG INJ SC/IM: CPT | Performed by: SURGERY

## 2023-11-15 PROCEDURE — 10002807 HB RX 258: Performed by: HOSPITALIST

## 2023-11-15 PROCEDURE — 97161 PT EVAL LOW COMPLEX 20 MIN: CPT

## 2023-11-15 PROCEDURE — 13003289 HB OXYGEN THERAPY DAILY

## 2023-11-15 PROCEDURE — 10002807 HB RX 258: Performed by: SURGERY

## 2023-11-15 PROCEDURE — 10002801 HB RX 250 W/O HCPCS: Performed by: HOSPITALIST

## 2023-11-15 PROCEDURE — 10002807 HB RX 258

## 2023-11-15 PROCEDURE — 10000002 HB ROOM CHARGE MED SURG

## 2023-11-15 RX ORDER — ATORVASTATIN CALCIUM 20 MG/1
20 TABLET, FILM COATED ORAL EVERY EVENING
Status: DISCONTINUED | OUTPATIENT
Start: 2023-11-15 | End: 2023-11-28 | Stop reason: HOSPADM

## 2023-11-15 RX ORDER — AMLODIPINE BESYLATE 5 MG/1
5 TABLET ORAL 2 TIMES DAILY
Status: DISCONTINUED | OUTPATIENT
Start: 2023-11-15 | End: 2023-11-28 | Stop reason: HOSPADM

## 2023-11-15 RX ORDER — 0.9 % SODIUM CHLORIDE 0.9 %
10 VIAL (ML) INJECTION EVERY 12 HOURS SCHEDULED
Status: DISCONTINUED | OUTPATIENT
Start: 2023-11-15 | End: 2023-11-28 | Stop reason: HOSPADM

## 2023-11-15 RX ORDER — OLANZAPINE 5 MG/1
20 TABLET, ORALLY DISINTEGRATING ORAL NIGHTLY
Status: DISCONTINUED | OUTPATIENT
Start: 2023-11-15 | End: 2023-11-26

## 2023-11-15 RX ORDER — POTASSIUM CHLORIDE 14.9 MG/ML
20 INJECTION INTRAVENOUS ONCE
Status: COMPLETED | OUTPATIENT
Start: 2023-11-15 | End: 2023-11-16

## 2023-11-15 RX ORDER — LANOLIN ALCOHOL/MO/W.PET/CERES
400 CREAM (GRAM) TOPICAL ONCE
Status: COMPLETED | OUTPATIENT
Start: 2023-11-15 | End: 2023-11-15

## 2023-11-15 RX ORDER — POTASSIUM CHLORIDE 14.9 MG/ML
20 INJECTION INTRAVENOUS ONCE
Status: COMPLETED | OUTPATIENT
Start: 2023-11-15 | End: 2023-11-15

## 2023-11-15 RX ORDER — ACETAMINOPHEN 325 MG/1
650 TABLET ORAL EVERY 6 HOURS PRN
Status: DISCONTINUED | OUTPATIENT
Start: 2023-11-15 | End: 2023-11-28 | Stop reason: HOSPADM

## 2023-11-15 RX ORDER — DIVALPROEX SODIUM 125 MG/1
500 CAPSULE, COATED PELLETS ORAL EVERY 12 HOURS SCHEDULED
Status: DISCONTINUED | OUTPATIENT
Start: 2023-11-15 | End: 2023-11-15

## 2023-11-15 RX ORDER — 0.9 % SODIUM CHLORIDE 0.9 %
10 VIAL (ML) INJECTION PRN
Status: DISCONTINUED | OUTPATIENT
Start: 2023-11-15 | End: 2023-11-28 | Stop reason: HOSPADM

## 2023-11-15 RX ADMIN — MORPHINE SULFATE 2 MG: 2 INJECTION, SOLUTION INTRAMUSCULAR; INTRAVENOUS at 11:31

## 2023-11-15 RX ADMIN — OLANZAPINE 20 MG: 5 TABLET, ORALLY DISINTEGRATING ORAL at 22:16

## 2023-11-15 RX ADMIN — CEFEPIME 2000 MG: 2 INJECTION, POWDER, FOR SOLUTION INTRAVENOUS at 05:55

## 2023-11-15 RX ADMIN — SODIUM PHOSPHATE, MONOBASIC, MONOHYDRATE AND SODIUM PHOSPHATE, DIBASIC, ANHYDROUS 45 MMOL: 142; 276 INJECTION, SOLUTION INTRAVENOUS at 09:23

## 2023-11-15 RX ADMIN — SERTRALINE HYDROCHLORIDE 50 MG: 50 TABLET, FILM COATED ORAL at 13:48

## 2023-11-15 RX ADMIN — POTASSIUM CHLORIDE 20 MEQ: 14.9 INJECTION, SOLUTION INTRAVENOUS at 11:35

## 2023-11-15 RX ADMIN — MORPHINE SULFATE 2 MG: 2 INJECTION, SOLUTION INTRAMUSCULAR; INTRAVENOUS at 09:24

## 2023-11-15 RX ADMIN — METRONIDAZOLE 500 MG: 500 INJECTION, SOLUTION INTRAVENOUS at 06:06

## 2023-11-15 RX ADMIN — CEFEPIME 2000 MG: 2 INJECTION, POWDER, FOR SOLUTION INTRAVENOUS at 16:13

## 2023-11-15 RX ADMIN — SODIUM CHLORIDE, SODIUM LACTATE, POTASSIUM CHLORIDE, CALCIUM CHLORIDE AND DEXTROSE MONOHYDRATE: 5; 600; 310; 30; 20 INJECTION, SOLUTION INTRAVENOUS at 06:21

## 2023-11-15 RX ADMIN — SODIUM CHLORIDE, PRESERVATIVE FREE 2 ML: 5 INJECTION INTRAVENOUS at 09:31

## 2023-11-15 RX ADMIN — WATER 1250 MG: 1 INJECTION INTRAMUSCULAR; INTRAVENOUS; SUBCUTANEOUS at 22:08

## 2023-11-15 RX ADMIN — FLUCONAZOLE 200 MG: 2 INJECTION, SOLUTION INTRAVENOUS at 07:08

## 2023-11-15 RX ADMIN — Medication 400 MG: at 13:42

## 2023-11-15 RX ADMIN — VALPROIC ACID 500 MG: 250 SOLUTION ORAL at 14:14

## 2023-11-15 RX ADMIN — METRONIDAZOLE 500 MG: 500 INJECTION, SOLUTION INTRAVENOUS at 13:41

## 2023-11-15 RX ADMIN — CEFEPIME 2000 MG: 2 INJECTION, POWDER, FOR SOLUTION INTRAVENOUS at 22:14

## 2023-11-15 RX ADMIN — POTASSIUM CHLORIDE 20 MEQ: 14.9 INJECTION, SOLUTION INTRAVENOUS at 22:05

## 2023-11-15 RX ADMIN — WATER 1250 MG: 1 INJECTION INTRAMUSCULAR; INTRAVENOUS; SUBCUTANEOUS at 11:29

## 2023-11-15 RX ADMIN — VALPROIC ACID 500 MG: 250 SOLUTION ORAL at 22:16

## 2023-11-15 RX ADMIN — AMLODIPINE BESYLATE 5 MG: 5 TABLET ORAL at 13:48

## 2023-11-15 RX ADMIN — Medication 400 MG: at 17:29

## 2023-11-15 RX ADMIN — MORPHINE SULFATE 2 MG: 2 INJECTION, SOLUTION INTRAMUSCULAR; INTRAVENOUS at 22:16

## 2023-11-15 RX ADMIN — ATORVASTATIN CALCIUM 20 MG: 20 TABLET, FILM COATED ORAL at 17:29

## 2023-11-15 RX ADMIN — ENOXAPARIN SODIUM 40 MG: 40 INJECTION SUBCUTANEOUS at 09:28

## 2023-11-15 RX ADMIN — AMLODIPINE BESYLATE 5 MG: 5 TABLET ORAL at 22:16

## 2023-11-15 RX ADMIN — METRONIDAZOLE 500 MG: 500 INJECTION, SOLUTION INTRAVENOUS at 22:18

## 2023-11-15 ASSESSMENT — PAIN SCALES - PAIN ASSESSMENT IN ADVANCED DEMENTIA (PAINAD)
TOTALSCORE: 0
BREATHING: NORMAL
CONSOLABILITY: NO NEED TO CONSOLE
TOTALSCORE: 0
TOTALSCORE: 0
FACIALEXPRESSION: SMILING OR INEXPRESSIVE
CONSOLABILITY: NO NEED TO CONSOLE
BODYLANGUAGE: RELAXED
BREATHING: NORMAL
FACIALEXPRESSION: SMILING OR INEXPRESSIVE
FACIALEXPRESSION: SMILING OR INEXPRESSIVE
CONSOLABILITY: NO NEED TO CONSOLE
BREATHING: NORMAL
BODYLANGUAGE: RELAXED
BODYLANGUAGE: RELAXED

## 2023-11-15 ASSESSMENT — PAIN SCALES - GENERAL: PAINLEVEL_OUTOF10: 3

## 2023-11-16 ENCOUNTER — APPOINTMENT (OUTPATIENT)
Dept: GENERAL RADIOLOGY | Age: 63
DRG: 981 | End: 2023-11-16
Attending: HOSPITALIST

## 2023-11-16 LAB
ANION GAP SERPL CALC-SCNC: 10 MMOL/L (ref 7–19)
BUN SERPL-MCNC: 11 MG/DL (ref 6–20)
BUN/CREAT SERPL: 29 (ref 7–25)
CALCIUM SERPL-MCNC: 8.7 MG/DL (ref 8.4–10.2)
CHLORIDE SERPL-SCNC: 101 MMOL/L (ref 97–110)
CO2 SERPL-SCNC: 28 MMOL/L (ref 21–32)
CREAT SERPL-MCNC: 0.38 MG/DL (ref 0.51–0.95)
DEPRECATED RDW RBC: 47.9 FL (ref 39–50)
EGFRCR SERPLBLD CKD-EPI 2021: >90 ML/MIN/{1.73_M2}
ERYTHROCYTE [DISTWIDTH] IN BLOOD: 13.1 % (ref 11–15)
FASTING DURATION TIME PATIENT: ABNORMAL H
GLUCOSE SERPL-MCNC: 153 MG/DL (ref 70–99)
HCT VFR BLD CALC: 33.2 % (ref 36–46.5)
HGB BLD-MCNC: 10.6 G/DL (ref 12–15.5)
MAGNESIUM SERPL-MCNC: 1.5 MG/DL (ref 1.7–2.4)
MCH RBC QN AUTO: 31.8 PG (ref 26–34)
MCHC RBC AUTO-ENTMCNC: 31.9 G/DL (ref 32–36.5)
MCV RBC AUTO: 99.7 FL (ref 78–100)
NRBC BLD MANUAL-RTO: 0 /100 WBC
PHOSPHATE SERPL-MCNC: 2.3 MG/DL (ref 2.4–4.7)
PLATELET # BLD AUTO: 329 K/MCL (ref 140–450)
POTASSIUM SERPL-SCNC: 2.6 MMOL/L (ref 3.4–5.1)
POTASSIUM SERPL-SCNC: 3 MMOL/L (ref 3.4–5.1)
RAINBOW EXTRA TUBES HOLD SPECIMEN: NORMAL
RBC # BLD: 3.33 MIL/MCL (ref 4–5.2)
SODIUM SERPL-SCNC: 136 MMOL/L (ref 135–145)
VANCOMYCIN TROUGH SERPL-MCNC: 16.1 MCG/ML (ref 10–20)
WBC # BLD: 16.5 K/MCL (ref 4.2–11)

## 2023-11-16 PROCEDURE — 99024 POSTOP FOLLOW-UP VISIT: CPT | Performed by: NURSE PRACTITIONER

## 2023-11-16 PROCEDURE — 85027 COMPLETE CBC AUTOMATED: CPT | Performed by: HOSPITALIST

## 2023-11-16 PROCEDURE — 10002801 HB RX 250 W/O HCPCS: Performed by: SURGERY

## 2023-11-16 PROCEDURE — 10000002 HB ROOM CHARGE MED SURG

## 2023-11-16 PROCEDURE — 10002807 HB RX 258: Performed by: HOSPITALIST

## 2023-11-16 PROCEDURE — 83735 ASSAY OF MAGNESIUM: CPT | Performed by: HOSPITALIST

## 2023-11-16 PROCEDURE — 10002807 HB RX 258

## 2023-11-16 PROCEDURE — 96372 THER/PROPH/DIAG INJ SC/IM: CPT | Performed by: SURGERY

## 2023-11-16 PROCEDURE — 10002803 HB RX 637: Performed by: HOSPITALIST

## 2023-11-16 PROCEDURE — 10004651 HB RX, NO CHARGE ITEM: Performed by: HOSPITALIST

## 2023-11-16 PROCEDURE — 71045 X-RAY EXAM CHEST 1 VIEW: CPT

## 2023-11-16 PROCEDURE — 10002800 HB RX 250 W HCPCS

## 2023-11-16 PROCEDURE — 10002800 HB RX 250 W HCPCS: Performed by: SURGERY

## 2023-11-16 PROCEDURE — 80202 ASSAY OF VANCOMYCIN: CPT | Performed by: EMERGENCY MEDICINE

## 2023-11-16 PROCEDURE — 80048 BASIC METABOLIC PNL TOTAL CA: CPT | Performed by: HOSPITALIST

## 2023-11-16 PROCEDURE — 10002800 HB RX 250 W HCPCS: Performed by: HOSPITALIST

## 2023-11-16 PROCEDURE — 10002807 HB RX 258: Performed by: SURGERY

## 2023-11-16 PROCEDURE — 10002803 HB RX 637: Performed by: SURGERY

## 2023-11-16 PROCEDURE — 84100 ASSAY OF PHOSPHORUS: CPT | Performed by: HOSPITALIST

## 2023-11-16 PROCEDURE — 10004651 HB RX, NO CHARGE ITEM: Performed by: SURGERY

## 2023-11-16 PROCEDURE — 36415 COLL VENOUS BLD VENIPUNCTURE: CPT | Performed by: SURGERY

## 2023-11-16 PROCEDURE — 84132 ASSAY OF SERUM POTASSIUM: CPT | Performed by: HOSPITALIST

## 2023-11-16 PROCEDURE — 10002801 HB RX 250 W/O HCPCS

## 2023-11-16 RX ORDER — LANOLIN ALCOHOL/MO/W.PET/CERES
400 CREAM (GRAM) TOPICAL ONCE
Status: COMPLETED | OUTPATIENT
Start: 2023-11-16 | End: 2023-11-16

## 2023-11-16 RX ORDER — LANOLIN ALCOHOL/MO/W.PET/CERES
400 CREAM (GRAM) TOPICAL ONCE
Status: DISCONTINUED | OUTPATIENT
Start: 2023-11-16 | End: 2023-11-16

## 2023-11-16 RX ORDER — POTASSIUM CHLORIDE 1.5 G/1.58G
40 POWDER, FOR SOLUTION ORAL ONCE
Status: DISCONTINUED | OUTPATIENT
Start: 2023-11-16 | End: 2023-11-17 | Stop reason: SDUPTHER

## 2023-11-16 RX ORDER — POTASSIUM CHLORIDE 14.9 MG/ML
20 INJECTION INTRAVENOUS ONCE
Status: COMPLETED | OUTPATIENT
Start: 2023-11-16 | End: 2023-11-16

## 2023-11-16 RX ORDER — POTASSIUM CHLORIDE 1.5 G/1.58G
40 POWDER, FOR SOLUTION ORAL ONCE
Status: DISCONTINUED | OUTPATIENT
Start: 2023-11-16 | End: 2023-11-16

## 2023-11-16 RX ORDER — MAGNESIUM SULFATE 4 G/50ML
4 INJECTION INTRAVENOUS ONCE
Status: DISCONTINUED | OUTPATIENT
Start: 2023-11-16 | End: 2023-11-16

## 2023-11-16 RX ORDER — FOLIC ACID 1 MG/1
1 TABLET ORAL DAILY
Status: DISCONTINUED | OUTPATIENT
Start: 2023-11-16 | End: 2023-11-28 | Stop reason: HOSPADM

## 2023-11-16 RX ADMIN — SODIUM CHLORIDE, PRESERVATIVE FREE 10 ML: 5 INJECTION INTRAVENOUS at 09:00

## 2023-11-16 RX ADMIN — Medication 400 MG: at 11:00

## 2023-11-16 RX ADMIN — CEFEPIME 2000 MG: 2 INJECTION, POWDER, FOR SOLUTION INTRAVENOUS at 21:50

## 2023-11-16 RX ADMIN — POTASSIUM CHLORIDE 20 MEQ: 14.9 INJECTION, SOLUTION INTRAVENOUS at 18:04

## 2023-11-16 RX ADMIN — FLUCONAZOLE 200 MG: 2 INJECTION, SOLUTION INTRAVENOUS at 05:58

## 2023-11-16 RX ADMIN — POTASSIUM & SODIUM PHOSPHATES POWDER PACK 280-160-250 MG 1 PACKET: 280-160-250 PACK at 15:48

## 2023-11-16 RX ADMIN — PANTOPRAZOLE 40 MG: 40 TABLET, DELAYED RELEASE ORAL at 09:00

## 2023-11-16 RX ADMIN — METRONIDAZOLE 500 MG: 500 INJECTION, SOLUTION INTRAVENOUS at 06:00

## 2023-11-16 RX ADMIN — OXYCODONE HYDROCHLORIDE 5 MG: 5 TABLET ORAL at 11:27

## 2023-11-16 RX ADMIN — AMLODIPINE BESYLATE 5 MG: 5 TABLET ORAL at 09:00

## 2023-11-16 RX ADMIN — POTASSIUM & SODIUM PHOSPHATES POWDER PACK 280-160-250 MG 1 PACKET: 280-160-250 PACK at 21:19

## 2023-11-16 RX ADMIN — SODIUM CHLORIDE, PRESERVATIVE FREE 2 ML: 5 INJECTION INTRAVENOUS at 21:55

## 2023-11-16 RX ADMIN — VALPROIC ACID 500 MG: 250 SOLUTION ORAL at 09:00

## 2023-11-16 RX ADMIN — WATER 1250 MG: 1 INJECTION INTRAMUSCULAR; INTRAVENOUS; SUBCUTANEOUS at 08:40

## 2023-11-16 RX ADMIN — METRONIDAZOLE 500 MG: 500 INJECTION, SOLUTION INTRAVENOUS at 21:36

## 2023-11-16 RX ADMIN — ENOXAPARIN SODIUM 40 MG: 40 INJECTION SUBCUTANEOUS at 09:00

## 2023-11-16 RX ADMIN — OLANZAPINE 20 MG: 5 TABLET, ORALLY DISINTEGRATING ORAL at 21:55

## 2023-11-16 RX ADMIN — METRONIDAZOLE 500 MG: 500 INJECTION, SOLUTION INTRAVENOUS at 13:11

## 2023-11-16 RX ADMIN — CEFEPIME 2000 MG: 2 INJECTION, POWDER, FOR SOLUTION INTRAVENOUS at 13:10

## 2023-11-16 RX ADMIN — OXYCODONE HYDROCHLORIDE 5 MG: 5 TABLET ORAL at 21:19

## 2023-11-16 RX ADMIN — ACETAMINOPHEN 650 MG: 325 TABLET ORAL at 15:48

## 2023-11-16 RX ADMIN — CEFEPIME 2000 MG: 2 INJECTION, POWDER, FOR SOLUTION INTRAVENOUS at 06:04

## 2023-11-16 RX ADMIN — POTASSIUM CHLORIDE 20 MEQ: 14.9 INJECTION, SOLUTION INTRAVENOUS at 16:03

## 2023-11-16 RX ADMIN — WATER 1250 MG: 1 INJECTION INTRAMUSCULAR; INTRAVENOUS; SUBCUTANEOUS at 20:00

## 2023-11-16 RX ADMIN — ATORVASTATIN CALCIUM 20 MG: 20 TABLET, FILM COATED ORAL at 17:55

## 2023-11-16 RX ADMIN — FOLIC ACID 1 MG: 1 TABLET ORAL at 11:00

## 2023-11-16 RX ADMIN — SODIUM CHLORIDE, PRESERVATIVE FREE 2 ML: 5 INJECTION INTRAVENOUS at 09:00

## 2023-11-16 RX ADMIN — AMLODIPINE BESYLATE 5 MG: 5 TABLET ORAL at 21:20

## 2023-11-16 RX ADMIN — VALPROIC ACID 500 MG: 250 SOLUTION ORAL at 21:18

## 2023-11-16 RX ADMIN — SODIUM CHLORIDE, PRESERVATIVE FREE 10 ML: 5 INJECTION INTRAVENOUS at 21:55

## 2023-11-16 RX ADMIN — Medication 400 MG: at 19:00

## 2023-11-16 RX ADMIN — POTASSIUM & SODIUM PHOSPHATES POWDER PACK 280-160-250 MG 1 PACKET: 280-160-250 PACK at 11:28

## 2023-11-16 RX ADMIN — SERTRALINE HYDROCHLORIDE 50 MG: 50 TABLET, FILM COATED ORAL at 09:00

## 2023-11-16 ASSESSMENT — PAIN SCALES - PAIN ASSESSMENT IN ADVANCED DEMENTIA (PAINAD)
BODYLANGUAGE: TENSE, DISTRESSED, FIDGETING
BODYLANGUAGE: RELAXED
FACIALEXPRESSION: SMILING OR INEXPRESSIVE
NEGVOCALIZATION: OCCASIONAL MOAN OR GROAN, LOW LEVELS OF SPEECH WITH A NEGATIVE OR DISAPPROVING QUALITY
CONSOLABILITY: NO NEED TO CONSOLE
BREATHING: OCCASIONAL LABORED BREATHING, SHORT PERIOD OF HYPERVENTILATION
CONSOLABILITY: NO NEED TO CONSOLE
BREATHING: OCCASIONAL LABORED BREATHING, SHORT PERIOD OF HYPERVENTILATION
BODYLANGUAGE: RELAXED
FACIALEXPRESSION: SMILING OR INEXPRESSIVE
TOTALSCORE: 0
NEGVOCALIZATION: OCCASIONAL MOAN OR GROAN, LOW LEVELS OF SPEECH WITH A NEGATIVE OR DISAPPROVING QUALITY
BREATHING: NORMAL
TOTALSCORE: 2

## 2023-11-16 ASSESSMENT — PAIN SCALES - GENERAL
PAINLEVEL_OUTOF10: 0
PAINLEVEL_OUTOF10: 2

## 2023-11-16 ASSESSMENT — PAIN SCALES - WONG BAKER: WONGBAKER_NUMERICALRESPONSE: 0

## 2023-11-17 LAB
ANION GAP SERPL CALC-SCNC: 8 MMOL/L (ref 7–19)
APPEARANCE UR: CLEAR
BACTERIA BLD CULT: NORMAL
BACTERIA BLD CULT: NORMAL
BASOPHILS # BLD: 0.2 K/MCL (ref 0–0.3)
BASOPHILS NFR BLD: 1 %
BILIRUB UR QL STRIP: NEGATIVE
BUN SERPL-MCNC: 11 MG/DL (ref 6–20)
BUN/CREAT SERPL: 33 (ref 7–25)
C DIFF TOX B TCDB STL QL NAA+PROBE: NOT DETECTED
CALCIUM SERPL-MCNC: 8.4 MG/DL (ref 8.4–10.2)
CHLORIDE SERPL-SCNC: 103 MMOL/L (ref 97–110)
CO2 SERPL-SCNC: 29 MMOL/L (ref 21–32)
COLOR UR: ABNORMAL
CREAT SERPL-MCNC: 0.33 MG/DL (ref 0.51–0.95)
DEPRECATED RDW RBC: 46.8 FL (ref 39–50)
EGFRCR SERPLBLD CKD-EPI 2021: >90 ML/MIN/{1.73_M2}
EOSINOPHIL # BLD: 0.2 K/MCL (ref 0–0.5)
EOSINOPHIL NFR BLD: 1 %
ERYTHROCYTE [DISTWIDTH] IN BLOOD: 13.2 % (ref 11–15)
FASTING DURATION TIME PATIENT: ABNORMAL H
GLUCOSE SERPL-MCNC: 133 MG/DL (ref 70–99)
GLUCOSE UR STRIP-MCNC: NEGATIVE MG/DL
HCT VFR BLD CALC: 32.7 % (ref 36–46.5)
HGB BLD-MCNC: 10.6 G/DL (ref 12–15.5)
HGB UR QL STRIP: NEGATIVE
KETONES UR STRIP-MCNC: NEGATIVE MG/DL
LEUKOCYTE ESTERASE UR QL STRIP: NEGATIVE
LYMPHOCYTES # BLD: 2.2 K/MCL (ref 1–4)
LYMPHOCYTES NFR BLD: 9 %
MAGNESIUM SERPL-MCNC: 1.5 MG/DL (ref 1.7–2.4)
MCH RBC QN AUTO: 31.9 PG (ref 26–34)
MCHC RBC AUTO-ENTMCNC: 32.4 G/DL (ref 32–36.5)
MCV RBC AUTO: 98.5 FL (ref 78–100)
METAMYELOCYTES NFR BLD: 2 % (ref 0–2)
MONOCYTES # BLD: 1.8 K/MCL (ref 0.3–0.9)
MONOCYTES NFR BLD: 9 %
NEUTROPHILS # BLD: 15.2 K/MCL (ref 1.8–7.7)
NEUTS BAND NFR BLD: 3 % (ref 0–10)
NEUTS SEG NFR BLD: 73 %
NITRITE UR QL STRIP: NEGATIVE
NRBC BLD MANUAL-RTO: 0 /100 WBC
PH UR STRIP: 7.5 [PH] (ref 5–7)
PHOSPHATE SERPL-MCNC: 1.5 MG/DL (ref 2.4–4.7)
PHOSPHATE SERPL-MCNC: 4.2 MG/DL (ref 2.4–4.7)
PLAT MORPH BLD: NORMAL
PLATELET # BLD AUTO: 352 K/MCL (ref 140–450)
POTASSIUM SERPL-SCNC: 2.8 MMOL/L (ref 3.4–5.1)
POTASSIUM SERPL-SCNC: 3.4 MMOL/L (ref 3.4–5.1)
POTASSIUM SERPL-SCNC: 3.8 MMOL/L (ref 3.4–5.1)
PROT UR STRIP-MCNC: NEGATIVE MG/DL
RBC # BLD: 3.32 MIL/MCL (ref 4–5.2)
RBC MORPH BLD: NORMAL
SODIUM SERPL-SCNC: 137 MMOL/L (ref 135–145)
SODIUM SERPL-SCNC: 139 MMOL/L (ref 135–145)
SP GR UR STRIP: 1.01 (ref 1–1.03)
UROBILINOGEN UR STRIP-MCNC: 0.2 MG/DL
VARIANT LYMPHS NFR BLD: 2 % (ref 0–5)
WBC # BLD: 20 K/MCL (ref 4.2–11)
WBC MORPH BLD: NORMAL

## 2023-11-17 PROCEDURE — 10002800 HB RX 250 W HCPCS

## 2023-11-17 PROCEDURE — 10002801 HB RX 250 W/O HCPCS: Performed by: HOSPITALIST

## 2023-11-17 PROCEDURE — 97606 NEG PRS WND THER DME>50 SQCM: CPT

## 2023-11-17 PROCEDURE — 10002800 HB RX 250 W HCPCS: Performed by: HOSPITALIST

## 2023-11-17 PROCEDURE — 80048 BASIC METABOLIC PNL TOTAL CA: CPT | Performed by: HOSPITALIST

## 2023-11-17 PROCEDURE — 85027 COMPLETE CBC AUTOMATED: CPT | Performed by: HOSPITALIST

## 2023-11-17 PROCEDURE — 10002801 HB RX 250 W/O HCPCS

## 2023-11-17 PROCEDURE — 10002803 HB RX 637: Performed by: HOSPITALIST

## 2023-11-17 PROCEDURE — 84100 ASSAY OF PHOSPHORUS: CPT | Performed by: HOSPITALIST

## 2023-11-17 PROCEDURE — 96372 THER/PROPH/DIAG INJ SC/IM: CPT | Performed by: SURGERY

## 2023-11-17 PROCEDURE — 10002800 HB RX 250 W HCPCS: Performed by: SURGERY

## 2023-11-17 PROCEDURE — 83735 ASSAY OF MAGNESIUM: CPT | Performed by: HOSPITALIST

## 2023-11-17 PROCEDURE — 84295 ASSAY OF SERUM SODIUM: CPT | Performed by: HOSPITALIST

## 2023-11-17 PROCEDURE — 10002807 HB RX 258

## 2023-11-17 PROCEDURE — 13003289 HB OXYGEN THERAPY DAILY

## 2023-11-17 PROCEDURE — 36415 COLL VENOUS BLD VENIPUNCTURE: CPT | Performed by: HOSPITALIST

## 2023-11-17 PROCEDURE — 84132 ASSAY OF SERUM POTASSIUM: CPT | Performed by: HOSPITALIST

## 2023-11-17 PROCEDURE — 87493 C DIFF AMPLIFIED PROBE: CPT | Performed by: HOSPITALIST

## 2023-11-17 PROCEDURE — 10004651 HB RX, NO CHARGE ITEM: Performed by: SURGERY

## 2023-11-17 PROCEDURE — 10004180 HB COUNTER-TRANSPORT

## 2023-11-17 PROCEDURE — 10002807 HB RX 258: Performed by: HOSPITALIST

## 2023-11-17 PROCEDURE — 81003 URINALYSIS AUTO W/O SCOPE: CPT | Performed by: HOSPITALIST

## 2023-11-17 PROCEDURE — 10000002 HB ROOM CHARGE MED SURG

## 2023-11-17 PROCEDURE — 10002801 HB RX 250 W/O HCPCS: Performed by: SURGERY

## 2023-11-17 PROCEDURE — 10004651 HB RX, NO CHARGE ITEM: Performed by: HOSPITALIST

## 2023-11-17 RX ORDER — POTASSIUM CHLORIDE 14.9 MG/ML
20 INJECTION INTRAVENOUS ONCE
Status: COMPLETED | OUTPATIENT
Start: 2023-11-17 | End: 2023-11-17

## 2023-11-17 RX ORDER — MAGNESIUM SULFATE 4 G/50ML
4 INJECTION INTRAVENOUS ONCE
Status: COMPLETED | OUTPATIENT
Start: 2023-11-17 | End: 2023-11-17

## 2023-11-17 RX ORDER — POTASSIUM CHLORIDE 1.5 G/1.58G
40 POWDER, FOR SOLUTION ORAL ONCE
Status: DISCONTINUED | OUTPATIENT
Start: 2023-11-17 | End: 2023-11-17

## 2023-11-17 RX ORDER — POTASSIUM CHLORIDE 1.5 G/1.58G
40 POWDER, FOR SOLUTION ORAL ONCE
Status: DISCONTINUED | OUTPATIENT
Start: 2023-11-18 | End: 2023-11-19 | Stop reason: SDUPTHER

## 2023-11-17 RX ADMIN — POTASSIUM CHLORIDE 20 MEQ: 14.9 INJECTION, SOLUTION INTRAVENOUS at 05:48

## 2023-11-17 RX ADMIN — FLUCONAZOLE 200 MG: 2 INJECTION, SOLUTION INTRAVENOUS at 06:06

## 2023-11-17 RX ADMIN — MAGNESIUM SULFATE HEPTAHYDRATE 4 G: 80 INJECTION, SOLUTION INTRAVENOUS at 11:50

## 2023-11-17 RX ADMIN — ENOXAPARIN SODIUM 40 MG: 40 INJECTION SUBCUTANEOUS at 09:00

## 2023-11-17 RX ADMIN — METRONIDAZOLE 500 MG: 500 INJECTION, SOLUTION INTRAVENOUS at 23:41

## 2023-11-17 RX ADMIN — POTASSIUM PHOSPHATE, MONOBASIC POTASSIUM PHOSPHATE, DIBASIC 45 MMOL: 224; 236 INJECTION, SOLUTION, CONCENTRATE INTRAVENOUS at 13:04

## 2023-11-17 RX ADMIN — METRONIDAZOLE 500 MG: 500 INJECTION, SOLUTION INTRAVENOUS at 05:50

## 2023-11-17 RX ADMIN — VALPROIC ACID 500 MG: 250 SOLUTION ORAL at 09:00

## 2023-11-17 RX ADMIN — WATER 1250 MG: 1 INJECTION INTRAMUSCULAR; INTRAVENOUS; SUBCUTANEOUS at 08:00

## 2023-11-17 RX ADMIN — SERTRALINE HYDROCHLORIDE 50 MG: 50 TABLET, FILM COATED ORAL at 09:00

## 2023-11-17 RX ADMIN — MORPHINE SULFATE 2 MG: 2 INJECTION, SOLUTION INTRAMUSCULAR; INTRAVENOUS at 12:15

## 2023-11-17 RX ADMIN — PANTOPRAZOLE 40 MG: 40 TABLET, DELAYED RELEASE ORAL at 09:00

## 2023-11-17 RX ADMIN — CEFEPIME 2000 MG: 2 INJECTION, POWDER, FOR SOLUTION INTRAVENOUS at 13:06

## 2023-11-17 RX ADMIN — AMLODIPINE BESYLATE 5 MG: 5 TABLET ORAL at 09:00

## 2023-11-17 RX ADMIN — FOLIC ACID 1 MG: 1 TABLET ORAL at 09:00

## 2023-11-17 RX ADMIN — WATER 1250 MG: 1 INJECTION INTRAMUSCULAR; INTRAVENOUS; SUBCUTANEOUS at 23:23

## 2023-11-17 RX ADMIN — METRONIDAZOLE 500 MG: 500 INJECTION, SOLUTION INTRAVENOUS at 13:08

## 2023-11-17 RX ADMIN — CEFEPIME 2000 MG: 2 INJECTION, POWDER, FOR SOLUTION INTRAVENOUS at 05:47

## 2023-11-17 RX ADMIN — MORPHINE SULFATE 2 MG: 2 INJECTION, SOLUTION INTRAMUSCULAR; INTRAVENOUS at 03:01

## 2023-11-17 RX ADMIN — POTASSIUM CHLORIDE 20 MEQ: 14.9 INJECTION, SOLUTION INTRAVENOUS at 03:06

## 2023-11-17 RX ADMIN — CEFEPIME 2000 MG: 2 INJECTION, POWDER, FOR SOLUTION INTRAVENOUS at 23:27

## 2023-11-17 RX ADMIN — ATORVASTATIN CALCIUM 20 MG: 20 TABLET, FILM COATED ORAL at 17:33

## 2023-11-17 RX ADMIN — SODIUM CHLORIDE, PRESERVATIVE FREE 10 ML: 5 INJECTION INTRAVENOUS at 09:00

## 2023-11-17 RX ADMIN — SODIUM CHLORIDE, PRESERVATIVE FREE 2 ML: 5 INJECTION INTRAVENOUS at 09:00

## 2023-11-17 ASSESSMENT — PAIN SCALES - PAIN ASSESSMENT IN ADVANCED DEMENTIA (PAINAD)
CONSOLABILITY: NO NEED TO CONSOLE
NEGVOCALIZATION: OCCASIONAL MOAN OR GROAN, LOW LEVELS OF SPEECH WITH A NEGATIVE OR DISAPPROVING QUALITY
BODYLANGUAGE: RELAXED
FACIALEXPRESSION: SMILING OR INEXPRESSIVE
TOTALSCORE: 0
BREATHING: NORMAL
BODYLANGUAGE: RELAXED
BREATHING: NORMAL
CONSOLABILITY: NO NEED TO CONSOLE
BREATHING: OCCASIONAL LABORED BREATHING, SHORT PERIOD OF HYPERVENTILATION
TOTALSCORE: 0
BREATHING: NORMAL
BODYLANGUAGE: TENSE, DISTRESSED, FIDGETING
CONSOLABILITY: NO NEED TO CONSOLE
FACIALEXPRESSION: SMILING OR INEXPRESSIVE
CONSOLABILITY: NO NEED TO CONSOLE
BODYLANGUAGE: RELAXED

## 2023-11-17 ASSESSMENT — PAIN SCALES - WONG BAKER: WONGBAKER_NUMERICALRESPONSE: 0

## 2023-11-17 ASSESSMENT — PAIN SCALES - GENERAL: PAINLEVEL_OUTOF10: 0

## 2023-11-18 ENCOUNTER — APPOINTMENT (OUTPATIENT)
Dept: GENERAL RADIOLOGY | Age: 63
DRG: 981 | End: 2023-11-18
Attending: HOSPITALIST

## 2023-11-18 VITALS
BODY MASS INDEX: 25.71 KG/M2 | OXYGEN SATURATION: 90 % | RESPIRATION RATE: 12 BRPM | DIASTOLIC BLOOD PRESSURE: 86 MMHG | TEMPERATURE: 98.4 F | WEIGHT: 130.95 LBS | HEIGHT: 60 IN | HEART RATE: 77 BPM | SYSTOLIC BLOOD PRESSURE: 113 MMHG

## 2023-11-18 LAB
ANION GAP SERPL CALC-SCNC: 9 MMOL/L (ref 7–19)
BUN SERPL-MCNC: 11 MG/DL (ref 6–20)
BUN/CREAT SERPL: 35 (ref 7–25)
CALCIUM SERPL-MCNC: 8.5 MG/DL (ref 8.4–10.2)
CHLORIDE SERPL-SCNC: 104 MMOL/L (ref 97–110)
CO2 SERPL-SCNC: 30 MMOL/L (ref 21–32)
CREAT SERPL-MCNC: 0.31 MG/DL (ref 0.51–0.95)
DEPRECATED RDW RBC: 47.6 FL (ref 39–50)
EGFRCR SERPLBLD CKD-EPI 2021: >90 ML/MIN/{1.73_M2}
ERYTHROCYTE [DISTWIDTH] IN BLOOD: 13.3 % (ref 11–15)
FASTING DURATION TIME PATIENT: ABNORMAL H
GLUCOSE SERPL-MCNC: 99 MG/DL (ref 70–99)
HCT VFR BLD CALC: 32.2 % (ref 36–46.5)
HGB BLD-MCNC: 10.4 G/DL (ref 12–15.5)
MAGNESIUM SERPL-MCNC: 2.2 MG/DL (ref 1.7–2.4)
MCH RBC QN AUTO: 31.9 PG (ref 26–34)
MCHC RBC AUTO-ENTMCNC: 32.3 G/DL (ref 32–36.5)
MCV RBC AUTO: 98.8 FL (ref 78–100)
NRBC BLD MANUAL-RTO: 0 /100 WBC
PHOSPHATE SERPL-MCNC: 2.3 MG/DL (ref 2.4–4.7)
PLATELET # BLD AUTO: 349 K/MCL (ref 140–450)
POTASSIUM SERPL-SCNC: 3.8 MMOL/L (ref 3.4–5.1)
RAINBOW EXTRA TUBES HOLD SPECIMEN: NORMAL
RBC # BLD: 3.26 MIL/MCL (ref 4–5.2)
SODIUM SERPL-SCNC: 139 MMOL/L (ref 135–145)
WBC # BLD: 17.9 K/MCL (ref 4.2–11)

## 2023-11-18 PROCEDURE — 96372 THER/PROPH/DIAG INJ SC/IM: CPT | Performed by: SURGERY

## 2023-11-18 PROCEDURE — 10004651 HB RX, NO CHARGE ITEM: Performed by: SURGERY

## 2023-11-18 PROCEDURE — 85027 COMPLETE CBC AUTOMATED: CPT | Performed by: HOSPITALIST

## 2023-11-18 PROCEDURE — 10002800 HB RX 250 W HCPCS: Performed by: HOSPITALIST

## 2023-11-18 PROCEDURE — 84100 ASSAY OF PHOSPHORUS: CPT | Performed by: HOSPITALIST

## 2023-11-18 PROCEDURE — 10000002 HB ROOM CHARGE MED SURG

## 2023-11-18 PROCEDURE — 13003289 HB OXYGEN THERAPY DAILY

## 2023-11-18 PROCEDURE — 10002805 HB CONTRAST AGENT: Performed by: HOSPITALIST

## 2023-11-18 PROCEDURE — 10002807 HB RX 258: Performed by: HOSPITALIST

## 2023-11-18 PROCEDURE — 10002801 HB RX 250 W/O HCPCS

## 2023-11-18 PROCEDURE — 97535 SELF CARE MNGMENT TRAINING: CPT

## 2023-11-18 PROCEDURE — 10002807 HB RX 258

## 2023-11-18 PROCEDURE — 10004651 HB RX, NO CHARGE ITEM: Performed by: HOSPITALIST

## 2023-11-18 PROCEDURE — 97162 PT EVAL MOD COMPLEX 30 MIN: CPT

## 2023-11-18 PROCEDURE — 10002803 HB RX 637: Performed by: HOSPITALIST

## 2023-11-18 PROCEDURE — 49465 FLUORO EXAM OF G/COLON TUBE: CPT

## 2023-11-18 PROCEDURE — 10002800 HB RX 250 W HCPCS: Performed by: SURGERY

## 2023-11-18 PROCEDURE — 10002801 HB RX 250 W/O HCPCS: Performed by: SURGERY

## 2023-11-18 PROCEDURE — 10002800 HB RX 250 W HCPCS

## 2023-11-18 PROCEDURE — 83735 ASSAY OF MAGNESIUM: CPT | Performed by: HOSPITALIST

## 2023-11-18 PROCEDURE — 10002801 HB RX 250 W/O HCPCS: Performed by: HOSPITALIST

## 2023-11-18 PROCEDURE — 80048 BASIC METABOLIC PNL TOTAL CA: CPT | Performed by: HOSPITALIST

## 2023-11-18 PROCEDURE — 97166 OT EVAL MOD COMPLEX 45 MIN: CPT

## 2023-11-18 PROCEDURE — 97110 THERAPEUTIC EXERCISES: CPT

## 2023-11-18 PROCEDURE — 97530 THERAPEUTIC ACTIVITIES: CPT

## 2023-11-18 PROCEDURE — 99024 POSTOP FOLLOW-UP VISIT: CPT

## 2023-11-18 PROCEDURE — 36415 COLL VENOUS BLD VENIPUNCTURE: CPT | Performed by: HOSPITALIST

## 2023-11-18 RX ORDER — POTASSIUM CHLORIDE 14.9 MG/ML
20 INJECTION INTRAVENOUS ONCE
Status: DISCONTINUED | OUTPATIENT
Start: 2023-11-18 | End: 2023-11-18 | Stop reason: SDUPTHER

## 2023-11-18 RX ORDER — POTASSIUM CHLORIDE 14.9 MG/ML
20 INJECTION INTRAVENOUS ONCE
Status: COMPLETED | OUTPATIENT
Start: 2023-11-18 | End: 2023-11-18

## 2023-11-18 RX ADMIN — SODIUM CHLORIDE, PRESERVATIVE FREE 10 ML: 5 INJECTION INTRAVENOUS at 11:43

## 2023-11-18 RX ADMIN — POTASSIUM & SODIUM PHOSPHATES POWDER PACK 280-160-250 MG 1 PACKET: 280-160-250 PACK at 23:08

## 2023-11-18 RX ADMIN — CEFEPIME 2000 MG: 2 INJECTION, POWDER, FOR SOLUTION INTRAVENOUS at 15:25

## 2023-11-18 RX ADMIN — METRONIDAZOLE 500 MG: 500 INJECTION, SOLUTION INTRAVENOUS at 05:42

## 2023-11-18 RX ADMIN — SODIUM CHLORIDE, PRESERVATIVE FREE 2 ML: 5 INJECTION INTRAVENOUS at 23:11

## 2023-11-18 RX ADMIN — CEFEPIME 2000 MG: 2 INJECTION, POWDER, FOR SOLUTION INTRAVENOUS at 05:36

## 2023-11-18 RX ADMIN — SODIUM CHLORIDE, PRESERVATIVE FREE 10 ML: 5 INJECTION INTRAVENOUS at 23:11

## 2023-11-18 RX ADMIN — METRONIDAZOLE 500 MG: 500 INJECTION, SOLUTION INTRAVENOUS at 23:39

## 2023-11-18 RX ADMIN — METRONIDAZOLE 500 MG: 500 INJECTION, SOLUTION INTRAVENOUS at 15:17

## 2023-11-18 RX ADMIN — ENOXAPARIN SODIUM 40 MG: 40 INJECTION SUBCUTANEOUS at 11:39

## 2023-11-18 RX ADMIN — POTASSIUM CHLORIDE 20 MEQ: 14.9 INJECTION, SOLUTION INTRAVENOUS at 04:44

## 2023-11-18 RX ADMIN — SODIUM CHLORIDE, PRESERVATIVE FREE 2 ML: 5 INJECTION INTRAVENOUS at 11:40

## 2023-11-18 RX ADMIN — FOLIC ACID 1 MG: 1 TABLET ORAL at 11:39

## 2023-11-18 RX ADMIN — IOHEXOL 50 ML: 350 INJECTION, SOLUTION INTRAVENOUS at 10:17

## 2023-11-18 RX ADMIN — AMLODIPINE BESYLATE 5 MG: 5 TABLET ORAL at 11:39

## 2023-11-18 RX ADMIN — POTASSIUM CHLORIDE 20 MEQ: 14.9 INJECTION, SOLUTION INTRAVENOUS at 12:15

## 2023-11-18 RX ADMIN — CEFEPIME 2000 MG: 2 INJECTION, POWDER, FOR SOLUTION INTRAVENOUS at 22:38

## 2023-11-18 RX ADMIN — OLANZAPINE 20 MG: 5 TABLET, ORALLY DISINTEGRATING ORAL at 23:01

## 2023-11-18 RX ADMIN — DEXTROSE MONOHYDRATE 500 MG: 50 INJECTION, SOLUTION INTRAVENOUS at 00:24

## 2023-11-18 RX ADMIN — AMLODIPINE BESYLATE 5 MG: 5 TABLET ORAL at 23:01

## 2023-11-18 RX ADMIN — SERTRALINE HYDROCHLORIDE 50 MG: 50 TABLET, FILM COATED ORAL at 11:39

## 2023-11-18 RX ADMIN — WATER 1250 MG: 1 INJECTION INTRAMUSCULAR; INTRAVENOUS; SUBCUTANEOUS at 12:07

## 2023-11-18 RX ADMIN — POTASSIUM CHLORIDE 20 MEQ: 14.9 INJECTION, SOLUTION INTRAVENOUS at 02:33

## 2023-11-18 RX ADMIN — POTASSIUM & SODIUM PHOSPHATES POWDER PACK 280-160-250 MG 1 PACKET: 280-160-250 PACK at 15:30

## 2023-11-18 RX ADMIN — FLUCONAZOLE 200 MG: 2 INJECTION, SOLUTION INTRAVENOUS at 05:44

## 2023-11-18 RX ADMIN — PANTOPRAZOLE 40 MG: 40 TABLET, DELAYED RELEASE ORAL at 11:42

## 2023-11-18 RX ADMIN — POTASSIUM CHLORIDE 20 MEQ: 14.9 INJECTION, SOLUTION INTRAVENOUS at 15:10

## 2023-11-18 RX ADMIN — DEXTROSE MONOHYDRATE 500 MG: 50 INJECTION, SOLUTION INTRAVENOUS at 11:52

## 2023-11-18 ASSESSMENT — ACTIVITIES OF DAILY LIVING (ADL)
PRIOR_ADL: MODERATE ASSIST (MOD)
HOME_MANAGEMENT_TIME_ENTRY: 10

## 2023-11-18 ASSESSMENT — PAIN SCALES - PAIN ASSESSMENT IN ADVANCED DEMENTIA (PAINAD)
BREATHING: NORMAL
FACIALEXPRESSION: SMILING OR INEXPRESSIVE
TOTALSCORE: 1
BREATHING: NORMAL
FACIALEXPRESSION: SMILING OR INEXPRESSIVE
CONSOLABILITY: NO NEED TO CONSOLE
BODYLANGUAGE: TENSE, DISTRESSED, FIDGETING
TOTALSCORE: 0
CONSOLABILITY: NO NEED TO CONSOLE
BODYLANGUAGE: RELAXED

## 2023-11-18 ASSESSMENT — COGNITIVE AND FUNCTIONAL STATUS - GENERAL
DAILY_ACTIVITY_RAW_SCORE: 9
HELP NEEDED DRESSING REGULAR UPPER BODY CLOTHING: TOTAL
HELP NEEDED FOR PERSONAL GROOMING: A LOT
HELP NEEDED DRESSING REGULAR LOWER BODY CLOTHING: TOTAL
BASIC_MOBILITY_CONVERTED_SCORE: 16.59
DAILY_ACTIVITY_CONVERTED_SCORE: 25.33
HELP NEEDED FOR BATHING: TOTAL
BASIC_MOBILITY_RAW_SCORE: 6
HELP NEEDED FOR TOILETING: TOTAL

## 2023-11-18 ASSESSMENT — PAIN SCALES - WONG BAKER
WONGBAKER_NUMERICALRESPONSE: 0
WONGBAKER_NUMERICALRESPONSE: 0

## 2023-11-18 ASSESSMENT — PAIN SCALES - GENERAL
PAINLEVEL_OUTOF10: 0
PAINLEVEL_OUTOF10: 0

## 2023-11-19 LAB
ANION GAP SERPL CALC-SCNC: 9 MMOL/L (ref 7–19)
APTT PPP: 29 SEC (ref 22–32)
BUN SERPL-MCNC: 15 MG/DL (ref 6–20)
BUN/CREAT SERPL: 34 (ref 7–25)
CALCIUM SERPL-MCNC: 9 MG/DL (ref 8.4–10.2)
CHLORIDE SERPL-SCNC: 106 MMOL/L (ref 97–110)
CO2 SERPL-SCNC: 27 MMOL/L (ref 21–32)
CREAT SERPL-MCNC: 0.44 MG/DL (ref 0.51–0.95)
DEPRECATED RDW RBC: 49.6 FL (ref 39–50)
EGFRCR SERPLBLD CKD-EPI 2021: >90 ML/MIN/{1.73_M2}
ERYTHROCYTE [DISTWIDTH] IN BLOOD: 13.5 % (ref 11–15)
FASTING DURATION TIME PATIENT: ABNORMAL H
GLUCOSE SERPL-MCNC: 127 MG/DL (ref 70–99)
HCT VFR BLD CALC: 33.7 % (ref 36–46.5)
HGB BLD-MCNC: 10.7 G/DL (ref 12–15.5)
INR PPP: 1.1
LACTATE BLDV-SCNC: 1.1 MMOL/L (ref 0–2)
MCH RBC QN AUTO: 31.7 PG (ref 26–34)
MCHC RBC AUTO-ENTMCNC: 31.8 G/DL (ref 32–36.5)
MCV RBC AUTO: 99.7 FL (ref 78–100)
NRBC BLD MANUAL-RTO: 0 /100 WBC
PHOSPHATE SERPL-MCNC: 2.1 MG/DL (ref 2.4–4.7)
PLATELET # BLD AUTO: 415 K/MCL (ref 140–450)
POTASSIUM SERPL-SCNC: 3.7 MMOL/L (ref 3.4–5.1)
PROTHROMBIN TIME: 11.5 SEC (ref 9.7–11.8)
RBC # BLD: 3.38 MIL/MCL (ref 4–5.2)
SODIUM SERPL-SCNC: 138 MMOL/L (ref 135–145)
WBC # BLD: 14.2 K/MCL (ref 4.2–11)

## 2023-11-19 PROCEDURE — 99024 POSTOP FOLLOW-UP VISIT: CPT

## 2023-11-19 PROCEDURE — 10004651 HB RX, NO CHARGE ITEM: Performed by: HOSPITALIST

## 2023-11-19 PROCEDURE — 10002803 HB RX 637: Performed by: HOSPITALIST

## 2023-11-19 PROCEDURE — 85610 PROTHROMBIN TIME: CPT | Performed by: HOSPITALIST

## 2023-11-19 PROCEDURE — 10002800 HB RX 250 W HCPCS

## 2023-11-19 PROCEDURE — 96372 THER/PROPH/DIAG INJ SC/IM: CPT | Performed by: SURGERY

## 2023-11-19 PROCEDURE — 10002801 HB RX 250 W/O HCPCS: Performed by: SURGERY

## 2023-11-19 PROCEDURE — 83605 ASSAY OF LACTIC ACID: CPT | Performed by: HOSPITALIST

## 2023-11-19 PROCEDURE — 10004651 HB RX, NO CHARGE ITEM: Performed by: SURGERY

## 2023-11-19 PROCEDURE — 85027 COMPLETE CBC AUTOMATED: CPT | Performed by: HOSPITALIST

## 2023-11-19 PROCEDURE — 84100 ASSAY OF PHOSPHORUS: CPT | Performed by: HOSPITALIST

## 2023-11-19 PROCEDURE — 10002803 HB RX 637: Performed by: NURSE PRACTITIONER

## 2023-11-19 PROCEDURE — 10002807 HB RX 258: Performed by: HOSPITALIST

## 2023-11-19 PROCEDURE — 10000002 HB ROOM CHARGE MED SURG

## 2023-11-19 PROCEDURE — 10002801 HB RX 250 W/O HCPCS: Performed by: HOSPITALIST

## 2023-11-19 PROCEDURE — 85730 THROMBOPLASTIN TIME PARTIAL: CPT | Performed by: HOSPITALIST

## 2023-11-19 PROCEDURE — 10002800 HB RX 250 W HCPCS: Performed by: SURGERY

## 2023-11-19 PROCEDURE — 10002803 HB RX 637: Performed by: SURGERY

## 2023-11-19 PROCEDURE — 36415 COLL VENOUS BLD VENIPUNCTURE: CPT | Performed by: HOSPITALIST

## 2023-11-19 PROCEDURE — 80048 BASIC METABOLIC PNL TOTAL CA: CPT | Performed by: HOSPITALIST

## 2023-11-19 PROCEDURE — 10002800 HB RX 250 W HCPCS: Performed by: HOSPITALIST

## 2023-11-19 PROCEDURE — 10002801 HB RX 250 W/O HCPCS

## 2023-11-19 PROCEDURE — 10002807 HB RX 258

## 2023-11-19 RX ORDER — FLUCONAZOLE 200 MG/1
200 TABLET ORAL DAILY
Status: COMPLETED | OUTPATIENT
Start: 2023-11-20 | End: 2023-11-28

## 2023-11-19 RX ORDER — POTASSIUM CHLORIDE 1.5 G/1.58G
40 POWDER, FOR SOLUTION ORAL ONCE
Status: CANCELLED | OUTPATIENT
Start: 2023-11-19 | End: 2023-11-19

## 2023-11-19 RX ORDER — METRONIDAZOLE 500 MG/1
500 TABLET ORAL 3 TIMES DAILY
Status: COMPLETED | OUTPATIENT
Start: 2023-11-19 | End: 2023-11-28

## 2023-11-19 RX ORDER — POTASSIUM CHLORIDE 1.5 G/1.58G
40 POWDER, FOR SOLUTION ORAL ONCE
Status: COMPLETED | OUTPATIENT
Start: 2023-11-19 | End: 2023-11-19

## 2023-11-19 RX ADMIN — CEFEPIME 2000 MG: 2 INJECTION, POWDER, FOR SOLUTION INTRAVENOUS at 13:10

## 2023-11-19 RX ADMIN — ENOXAPARIN SODIUM 40 MG: 40 INJECTION SUBCUTANEOUS at 08:47

## 2023-11-19 RX ADMIN — METRONIDAZOLE 500 MG: 500 TABLET, FILM COATED ORAL at 13:28

## 2023-11-19 RX ADMIN — POTASSIUM & SODIUM PHOSPHATES POWDER PACK 280-160-250 MG 1 PACKET: 280-160-250 PACK at 13:28

## 2023-11-19 RX ADMIN — OXYCODONE HYDROCHLORIDE 5 MG: 5 TABLET ORAL at 15:45

## 2023-11-19 RX ADMIN — DEXTROSE MONOHYDRATE 500 MG: 50 INJECTION, SOLUTION INTRAVENOUS at 03:20

## 2023-11-19 RX ADMIN — AMLODIPINE BESYLATE 5 MG: 5 TABLET ORAL at 21:10

## 2023-11-19 RX ADMIN — ATORVASTATIN CALCIUM 20 MG: 20 TABLET, FILM COATED ORAL at 17:57

## 2023-11-19 RX ADMIN — DEXTROSE MONOHYDRATE 500 MG: 50 INJECTION, SOLUTION INTRAVENOUS at 14:13

## 2023-11-19 RX ADMIN — METRONIDAZOLE 500 MG: 500 TABLET, FILM COATED ORAL at 21:10

## 2023-11-19 RX ADMIN — POTASSIUM & SODIUM PHOSPHATES POWDER PACK 280-160-250 MG 1 PACKET: 280-160-250 PACK at 08:47

## 2023-11-19 RX ADMIN — CEFEPIME 2000 MG: 2 INJECTION, POWDER, FOR SOLUTION INTRAVENOUS at 21:10

## 2023-11-19 RX ADMIN — FLUCONAZOLE 200 MG: 2 INJECTION, SOLUTION INTRAVENOUS at 07:42

## 2023-11-19 RX ADMIN — SODIUM CHLORIDE, PRESERVATIVE FREE 10 ML: 5 INJECTION INTRAVENOUS at 21:10

## 2023-11-19 RX ADMIN — WATER 1250 MG: 1 INJECTION INTRAMUSCULAR; INTRAVENOUS; SUBCUTANEOUS at 10:41

## 2023-11-19 RX ADMIN — WATER 1250 MG: 1 INJECTION INTRAMUSCULAR; INTRAVENOUS; SUBCUTANEOUS at 21:55

## 2023-11-19 RX ADMIN — PANTOPRAZOLE 40 MG: 40 TABLET, DELAYED RELEASE ORAL at 08:55

## 2023-11-19 RX ADMIN — SODIUM CHLORIDE, PRESERVATIVE FREE 10 ML: 5 INJECTION INTRAVENOUS at 08:56

## 2023-11-19 RX ADMIN — AMLODIPINE BESYLATE 5 MG: 5 TABLET ORAL at 08:46

## 2023-11-19 RX ADMIN — SODIUM CHLORIDE, PRESERVATIVE FREE 2 ML: 5 INJECTION INTRAVENOUS at 08:56

## 2023-11-19 RX ADMIN — POTASSIUM CHLORIDE 40 MEQ: 1.5 POWDER, FOR SOLUTION ORAL at 08:46

## 2023-11-19 RX ADMIN — FOLIC ACID 1 MG: 1 TABLET ORAL at 08:47

## 2023-11-19 RX ADMIN — METRONIDAZOLE 500 MG: 500 INJECTION, SOLUTION INTRAVENOUS at 06:31

## 2023-11-19 RX ADMIN — CEFEPIME 2000 MG: 2 INJECTION, POWDER, FOR SOLUTION INTRAVENOUS at 05:52

## 2023-11-19 RX ADMIN — OLANZAPINE 20 MG: 5 TABLET, ORALLY DISINTEGRATING ORAL at 21:10

## 2023-11-19 RX ADMIN — SERTRALINE HYDROCHLORIDE 50 MG: 50 TABLET, FILM COATED ORAL at 08:46

## 2023-11-19 RX ADMIN — WATER 1250 MG: 1 INJECTION INTRAMUSCULAR; INTRAVENOUS; SUBCUTANEOUS at 00:39

## 2023-11-19 ASSESSMENT — PAIN SCALES - PAIN ASSESSMENT IN ADVANCED DEMENTIA (PAINAD)
BODYLANGUAGE: RELAXED
TOTALSCORE: 0
TOTALSCORE: 0
BREATHING: NORMAL
BODYLANGUAGE: RELAXED
FACIALEXPRESSION: SMILING OR INEXPRESSIVE
BREATHING: NORMAL
CONSOLABILITY: NO NEED TO CONSOLE
CONSOLABILITY: NO NEED TO CONSOLE
FACIALEXPRESSION: SMILING OR INEXPRESSIVE

## 2023-11-20 ENCOUNTER — APPOINTMENT (OUTPATIENT)
Dept: CT IMAGING | Age: 63
DRG: 981 | End: 2023-11-20

## 2023-11-20 LAB
ANION GAP SERPL CALC-SCNC: 10 MMOL/L (ref 7–19)
BUN SERPL-MCNC: 20 MG/DL (ref 6–20)
BUN/CREAT SERPL: 44 (ref 7–25)
CALCIUM SERPL-MCNC: 9.2 MG/DL (ref 8.4–10.2)
CHLORIDE SERPL-SCNC: 107 MMOL/L (ref 97–110)
CO2 SERPL-SCNC: 27 MMOL/L (ref 21–32)
CREAT SERPL-MCNC: 0.45 MG/DL (ref 0.51–0.95)
DEPRECATED RDW RBC: 51 FL (ref 39–50)
EGFRCR SERPLBLD CKD-EPI 2021: >90 ML/MIN/{1.73_M2}
ERYTHROCYTE [DISTWIDTH] IN BLOOD: 13.7 % (ref 11–15)
FASTING DURATION TIME PATIENT: ABNORMAL H
GLUCOSE SERPL-MCNC: 126 MG/DL (ref 70–99)
HCT VFR BLD CALC: 34.5 % (ref 36–46.5)
HGB BLD-MCNC: 10.9 G/DL (ref 12–15.5)
LIPASE SERPL-CCNC: 92 UNITS/L (ref 15–77)
MCH RBC QN AUTO: 32.1 PG (ref 26–34)
MCHC RBC AUTO-ENTMCNC: 31.6 G/DL (ref 32–36.5)
MCV RBC AUTO: 101.5 FL (ref 78–100)
NRBC BLD MANUAL-RTO: 0 /100 WBC
PHOSPHATE SERPL-MCNC: 3.3 MG/DL (ref 2.4–4.7)
PLATELET # BLD AUTO: 485 K/MCL (ref 140–450)
POTASSIUM SERPL-SCNC: 4 MMOL/L (ref 3.4–5.1)
RBC # BLD: 3.4 MIL/MCL (ref 4–5.2)
SODIUM SERPL-SCNC: 140 MMOL/L (ref 135–145)
WBC # BLD: 18.6 K/MCL (ref 4.2–11)

## 2023-11-20 PROCEDURE — 36415 COLL VENOUS BLD VENIPUNCTURE: CPT | Performed by: HOSPITALIST

## 2023-11-20 PROCEDURE — 10002807 HB RX 258: Performed by: HOSPITALIST

## 2023-11-20 PROCEDURE — 10002803 HB RX 637: Performed by: NURSE PRACTITIONER

## 2023-11-20 PROCEDURE — 80048 BASIC METABOLIC PNL TOTAL CA: CPT | Performed by: HOSPITALIST

## 2023-11-20 PROCEDURE — 10002800 HB RX 250 W HCPCS: Performed by: HOSPITALIST

## 2023-11-20 PROCEDURE — 10002801 HB RX 250 W/O HCPCS: Performed by: HOSPITALIST

## 2023-11-20 PROCEDURE — 10002800 HB RX 250 W HCPCS

## 2023-11-20 PROCEDURE — 10002803 HB RX 637: Performed by: HOSPITALIST

## 2023-11-20 PROCEDURE — 10000002 HB ROOM CHARGE MED SURG

## 2023-11-20 PROCEDURE — 97535 SELF CARE MNGMENT TRAINING: CPT

## 2023-11-20 PROCEDURE — 10004651 HB RX, NO CHARGE ITEM: Performed by: HOSPITALIST

## 2023-11-20 PROCEDURE — 96372 THER/PROPH/DIAG INJ SC/IM: CPT | Performed by: SURGERY

## 2023-11-20 PROCEDURE — 10004651 HB RX, NO CHARGE ITEM: Performed by: SURGERY

## 2023-11-20 PROCEDURE — 10002807 HB RX 258

## 2023-11-20 PROCEDURE — 83690 ASSAY OF LIPASE: CPT | Performed by: HOSPITALIST

## 2023-11-20 PROCEDURE — 74177 CT ABD & PELVIS W/CONTRAST: CPT

## 2023-11-20 PROCEDURE — 10002803 HB RX 637: Performed by: SURGERY

## 2023-11-20 PROCEDURE — 10002805 HB CONTRAST AGENT

## 2023-11-20 PROCEDURE — 99024 POSTOP FOLLOW-UP VISIT: CPT

## 2023-11-20 PROCEDURE — 10002801 HB RX 250 W/O HCPCS

## 2023-11-20 PROCEDURE — 10002800 HB RX 250 W HCPCS: Performed by: SURGERY

## 2023-11-20 PROCEDURE — 84100 ASSAY OF PHOSPHORUS: CPT | Performed by: HOSPITALIST

## 2023-11-20 PROCEDURE — 85027 COMPLETE CBC AUTOMATED: CPT | Performed by: HOSPITALIST

## 2023-11-20 RX ORDER — POTASSIUM CHLORIDE 1.5 G/1.58G
40 POWDER, FOR SOLUTION ORAL ONCE
Status: COMPLETED | OUTPATIENT
Start: 2023-11-20 | End: 2023-11-20

## 2023-11-20 RX ADMIN — SODIUM CHLORIDE, PRESERVATIVE FREE 10 ML: 5 INJECTION INTRAVENOUS at 10:09

## 2023-11-20 RX ADMIN — FOLIC ACID 1 MG: 1 TABLET ORAL at 10:07

## 2023-11-20 RX ADMIN — OXYCODONE HYDROCHLORIDE 5 MG: 5 TABLET ORAL at 18:18

## 2023-11-20 RX ADMIN — CEFEPIME 2000 MG: 2 INJECTION, POWDER, FOR SOLUTION INTRAVENOUS at 21:04

## 2023-11-20 RX ADMIN — ATORVASTATIN CALCIUM 20 MG: 20 TABLET, FILM COATED ORAL at 18:16

## 2023-11-20 RX ADMIN — SERTRALINE HYDROCHLORIDE 50 MG: 50 TABLET, FILM COATED ORAL at 10:07

## 2023-11-20 RX ADMIN — AMLODIPINE BESYLATE 5 MG: 5 TABLET ORAL at 10:07

## 2023-11-20 RX ADMIN — DEXTROSE MONOHYDRATE 500 MG: 50 INJECTION, SOLUTION INTRAVENOUS at 03:00

## 2023-11-20 RX ADMIN — METRONIDAZOLE 500 MG: 500 TABLET, FILM COATED ORAL at 13:46

## 2023-11-20 RX ADMIN — OLANZAPINE 20 MG: 5 TABLET, ORALLY DISINTEGRATING ORAL at 21:00

## 2023-11-20 RX ADMIN — WATER 1250 MG: 1 INJECTION INTRAMUSCULAR; INTRAVENOUS; SUBCUTANEOUS at 22:03

## 2023-11-20 RX ADMIN — METRONIDAZOLE 500 MG: 500 TABLET, FILM COATED ORAL at 21:00

## 2023-11-20 RX ADMIN — AMLODIPINE BESYLATE 5 MG: 5 TABLET ORAL at 21:01

## 2023-11-20 RX ADMIN — SODIUM CHLORIDE, PRESERVATIVE FREE 10 ML: 5 INJECTION INTRAVENOUS at 21:01

## 2023-11-20 RX ADMIN — DEXTROSE MONOHYDRATE 500 MG: 50 INJECTION, SOLUTION INTRAVENOUS at 16:02

## 2023-11-20 RX ADMIN — CEFEPIME 2000 MG: 2 INJECTION, POWDER, FOR SOLUTION INTRAVENOUS at 05:35

## 2023-11-20 RX ADMIN — POTASSIUM CHLORIDE 40 MEQ: 1.5 POWDER, FOR SOLUTION ORAL at 13:45

## 2023-11-20 RX ADMIN — PANTOPRAZOLE 40 MG: 40 TABLET, DELAYED RELEASE ORAL at 10:08

## 2023-11-20 RX ADMIN — ENOXAPARIN SODIUM 40 MG: 40 INJECTION SUBCUTANEOUS at 10:08

## 2023-11-20 RX ADMIN — IOHEXOL 75 ML: 350 INJECTION, SOLUTION INTRAVENOUS at 16:57

## 2023-11-20 RX ADMIN — SODIUM CHLORIDE, PRESERVATIVE FREE 2 ML: 5 INJECTION INTRAVENOUS at 10:07

## 2023-11-20 RX ADMIN — WATER 1250 MG: 1 INJECTION INTRAMUSCULAR; INTRAVENOUS; SUBCUTANEOUS at 10:11

## 2023-11-20 RX ADMIN — METRONIDAZOLE 500 MG: 500 TABLET, FILM COATED ORAL at 10:07

## 2023-11-20 RX ADMIN — ACETAMINOPHEN 650 MG: 325 TABLET ORAL at 13:51

## 2023-11-20 RX ADMIN — CEFEPIME 2000 MG: 2 INJECTION, POWDER, FOR SOLUTION INTRAVENOUS at 13:49

## 2023-11-20 RX ADMIN — FLUCONAZOLE 200 MG: 200 TABLET ORAL at 10:07

## 2023-11-20 ASSESSMENT — PAIN SCALES - PAIN ASSESSMENT IN ADVANCED DEMENTIA (PAINAD)
FACIALEXPRESSION: SMILING OR INEXPRESSIVE
BREATHING: NORMAL
CONSOLABILITY: NO NEED TO CONSOLE
BODYLANGUAGE: RELAXED
BREATHING: NORMAL
TOTALSCORE: 0
TOTALSCORE: 0
BODYLANGUAGE: RELAXED
FACIALEXPRESSION: SMILING OR INEXPRESSIVE
CONSOLABILITY: NO NEED TO CONSOLE

## 2023-11-20 ASSESSMENT — ACTIVITIES OF DAILY LIVING (ADL): HOME_MANAGEMENT_TIME_ENTRY: 23

## 2023-11-21 LAB
DEPRECATED RDW RBC: 50.8 FL (ref 39–50)
ERYTHROCYTE [DISTWIDTH] IN BLOOD: 13.7 % (ref 11–15)
HCT VFR BLD CALC: 34 % (ref 36–46.5)
HGB BLD-MCNC: 10.7 G/DL (ref 12–15.5)
INR PPP: 1.1
MCH RBC QN AUTO: 31.9 PG (ref 26–34)
MCHC RBC AUTO-ENTMCNC: 31.5 G/DL (ref 32–36.5)
MCV RBC AUTO: 101.5 FL (ref 78–100)
NRBC BLD MANUAL-RTO: 0 /100 WBC
PLATELET # BLD AUTO: 428 K/MCL (ref 140–450)
POTASSIUM SERPL-SCNC: 3.9 MMOL/L (ref 3.4–5.1)
PROTHROMBIN TIME: 11.9 SEC (ref 9.7–11.8)
RAINBOW EXTRA TUBES HOLD SPECIMEN: NORMAL
RBC # BLD: 3.35 MIL/MCL (ref 4–5.2)
WBC # BLD: 18.6 K/MCL (ref 4.2–11)

## 2023-11-21 PROCEDURE — 10002803 HB RX 637: Performed by: NURSE PRACTITIONER

## 2023-11-21 PROCEDURE — 36415 COLL VENOUS BLD VENIPUNCTURE: CPT | Performed by: HOSPITALIST

## 2023-11-21 PROCEDURE — 10002803 HB RX 637: Performed by: HOSPITALIST

## 2023-11-21 PROCEDURE — 10002800 HB RX 250 W HCPCS: Performed by: HOSPITALIST

## 2023-11-21 PROCEDURE — 10004651 HB RX, NO CHARGE ITEM: Performed by: HOSPITALIST

## 2023-11-21 PROCEDURE — 97606 NEG PRS WND THER DME>50 SQCM: CPT

## 2023-11-21 PROCEDURE — 10002800 HB RX 250 W HCPCS: Performed by: SURGERY

## 2023-11-21 PROCEDURE — 85027 COMPLETE CBC AUTOMATED: CPT

## 2023-11-21 PROCEDURE — 97530 THERAPEUTIC ACTIVITIES: CPT

## 2023-11-21 PROCEDURE — 10002801 HB RX 250 W/O HCPCS

## 2023-11-21 PROCEDURE — 10000002 HB ROOM CHARGE MED SURG

## 2023-11-21 PROCEDURE — 99024 POSTOP FOLLOW-UP VISIT: CPT | Performed by: NURSE PRACTITIONER

## 2023-11-21 PROCEDURE — 10002801 HB RX 250 W/O HCPCS: Performed by: HOSPITALIST

## 2023-11-21 PROCEDURE — 84132 ASSAY OF SERUM POTASSIUM: CPT | Performed by: HOSPITALIST

## 2023-11-21 PROCEDURE — 10002807 HB RX 258: Performed by: HOSPITALIST

## 2023-11-21 PROCEDURE — 10002807 HB RX 258

## 2023-11-21 PROCEDURE — 96372 THER/PROPH/DIAG INJ SC/IM: CPT | Performed by: SURGERY

## 2023-11-21 PROCEDURE — 97110 THERAPEUTIC EXERCISES: CPT

## 2023-11-21 PROCEDURE — 10004651 HB RX, NO CHARGE ITEM: Performed by: SURGERY

## 2023-11-21 PROCEDURE — 10002800 HB RX 250 W HCPCS

## 2023-11-21 PROCEDURE — 85610 PROTHROMBIN TIME: CPT | Performed by: HOSPITALIST

## 2023-11-21 RX ORDER — POTASSIUM CHLORIDE 1.5 G/1.58G
40 POWDER, FOR SOLUTION ORAL ONCE
Status: COMPLETED | OUTPATIENT
Start: 2023-11-21 | End: 2023-11-21

## 2023-11-21 RX ADMIN — SODIUM CHLORIDE, PRESERVATIVE FREE 10 ML: 5 INJECTION INTRAVENOUS at 21:05

## 2023-11-21 RX ADMIN — ENOXAPARIN SODIUM 40 MG: 40 INJECTION SUBCUTANEOUS at 09:56

## 2023-11-21 RX ADMIN — FOLIC ACID 1 MG: 1 TABLET ORAL at 09:56

## 2023-11-21 RX ADMIN — ACETAMINOPHEN 650 MG: 325 TABLET ORAL at 18:15

## 2023-11-21 RX ADMIN — WATER 1250 MG: 1 INJECTION INTRAMUSCULAR; INTRAVENOUS; SUBCUTANEOUS at 21:50

## 2023-11-21 RX ADMIN — SERTRALINE HYDROCHLORIDE 50 MG: 50 TABLET, FILM COATED ORAL at 09:55

## 2023-11-21 RX ADMIN — AMLODIPINE BESYLATE 5 MG: 5 TABLET ORAL at 09:55

## 2023-11-21 RX ADMIN — DEXTROSE MONOHYDRATE 500 MG: 50 INJECTION, SOLUTION INTRAVENOUS at 14:50

## 2023-11-21 RX ADMIN — SODIUM CHLORIDE, PRESERVATIVE FREE 10 ML: 5 INJECTION INTRAVENOUS at 09:50

## 2023-11-21 RX ADMIN — AMLODIPINE BESYLATE 5 MG: 5 TABLET ORAL at 21:05

## 2023-11-21 RX ADMIN — POTASSIUM CHLORIDE 40 MEQ: 1.5 POWDER, FOR SOLUTION ORAL at 11:28

## 2023-11-21 RX ADMIN — SODIUM CHLORIDE, PRESERVATIVE FREE 2 ML: 5 INJECTION INTRAVENOUS at 09:58

## 2023-11-21 RX ADMIN — METRONIDAZOLE 500 MG: 500 TABLET, FILM COATED ORAL at 09:56

## 2023-11-21 RX ADMIN — METRONIDAZOLE 500 MG: 500 TABLET, FILM COATED ORAL at 13:32

## 2023-11-21 RX ADMIN — FLUCONAZOLE 200 MG: 200 TABLET ORAL at 09:55

## 2023-11-21 RX ADMIN — METRONIDAZOLE 500 MG: 500 TABLET, FILM COATED ORAL at 21:05

## 2023-11-21 RX ADMIN — ACETAMINOPHEN 650 MG: 325 TABLET ORAL at 11:50

## 2023-11-21 RX ADMIN — ATORVASTATIN CALCIUM 20 MG: 20 TABLET, FILM COATED ORAL at 18:09

## 2023-11-21 RX ADMIN — CEFEPIME 2000 MG: 2 INJECTION, POWDER, FOR SOLUTION INTRAVENOUS at 06:03

## 2023-11-21 RX ADMIN — PANTOPRAZOLE 40 MG: 40 TABLET, DELAYED RELEASE ORAL at 09:56

## 2023-11-21 RX ADMIN — CEFEPIME 2000 MG: 2 INJECTION, POWDER, FOR SOLUTION INTRAVENOUS at 13:33

## 2023-11-21 RX ADMIN — WATER 1250 MG: 1 INJECTION INTRAMUSCULAR; INTRAVENOUS; SUBCUTANEOUS at 09:54

## 2023-11-21 RX ADMIN — OLANZAPINE 20 MG: 5 TABLET, ORALLY DISINTEGRATING ORAL at 21:05

## 2023-11-21 RX ADMIN — CEFEPIME 2000 MG: 2 INJECTION, POWDER, FOR SOLUTION INTRAVENOUS at 21:11

## 2023-11-21 RX ADMIN — DEXTROSE MONOHYDRATE 500 MG: 50 INJECTION, SOLUTION INTRAVENOUS at 04:11

## 2023-11-21 ASSESSMENT — PAIN SCALES - PAIN ASSESSMENT IN ADVANCED DEMENTIA (PAINAD)
BODYLANGUAGE: RELAXED
FACIALEXPRESSION: SMILING OR INEXPRESSIVE
BREATHING: NORMAL
TOTALSCORE: 0
CONSOLABILITY: NO NEED TO CONSOLE
CONSOLABILITY: NO NEED TO CONSOLE
BODYLANGUAGE: RELAXED
TOTALSCORE: 0
BREATHING: NORMAL
FACIALEXPRESSION: SMILING OR INEXPRESSIVE

## 2023-11-21 ASSESSMENT — COGNITIVE AND FUNCTIONAL STATUS - GENERAL
HELP NEEDED FOR PERSONAL GROOMING: A LOT
HELP NEEDED FOR BATHING: TOTAL
HELP NEEDED DRESSING REGULAR UPPER BODY CLOTHING: TOTAL
HELP NEEDED DRESSING REGULAR LOWER BODY CLOTHING: TOTAL
BASIC_MOBILITY_RAW_SCORE: 6
BASIC_MOBILITY_CONVERTED_SCORE: 16.59
HELP NEEDED FOR TOILETING: TOTAL
DAILY_ACTIVITY_RAW_SCORE: 9
DAILY_ACTIVITY_CONVERTED_SCORE: 25.33

## 2023-11-22 LAB
ALBUMIN SERPL-MCNC: 2.1 G/DL (ref 3.6–5.1)
ALBUMIN/GLOB SERPL: 0.4 {RATIO} (ref 1–2.4)
ALP SERPL-CCNC: 67 UNITS/L (ref 45–117)
ALT SERPL-CCNC: 24 UNITS/L
ANION GAP SERPL CALC-SCNC: 8 MMOL/L (ref 7–19)
AST SERPL-CCNC: 27 UNITS/L
BILIRUB SERPL-MCNC: 0.2 MG/DL (ref 0.2–1)
BUN SERPL-MCNC: 16 MG/DL (ref 6–20)
BUN/CREAT SERPL: 34 (ref 7–25)
CALCIUM SERPL-MCNC: 9.3 MG/DL (ref 8.4–10.2)
CHLORIDE SERPL-SCNC: 103 MMOL/L (ref 97–110)
CO2 SERPL-SCNC: 29 MMOL/L (ref 21–32)
CREAT SERPL-MCNC: 0.47 MG/DL (ref 0.51–0.95)
DEPRECATED RDW RBC: 50.1 FL (ref 39–50)
EGFRCR SERPLBLD CKD-EPI 2021: >90 ML/MIN/{1.73_M2}
ERYTHROCYTE [DISTWIDTH] IN BLOOD: 13.7 % (ref 11–15)
FASTING DURATION TIME PATIENT: ABNORMAL H
GLOBULIN SER-MCNC: 5.4 G/DL (ref 2–4)
GLUCOSE SERPL-MCNC: 111 MG/DL (ref 70–99)
HCT VFR BLD CALC: 32.4 % (ref 36–46.5)
HGB BLD-MCNC: 10.4 G/DL (ref 12–15.5)
MCH RBC QN AUTO: 32.2 PG (ref 26–34)
MCHC RBC AUTO-ENTMCNC: 32.1 G/DL (ref 32–36.5)
MCV RBC AUTO: 100.3 FL (ref 78–100)
NRBC BLD MANUAL-RTO: 0 /100 WBC
PLATELET # BLD AUTO: 474 K/MCL (ref 140–450)
POTASSIUM SERPL-SCNC: 4.1 MMOL/L (ref 3.4–5.1)
PROT SERPL-MCNC: 7.5 G/DL (ref 6.4–8.2)
RBC # BLD: 3.23 MIL/MCL (ref 4–5.2)
SODIUM SERPL-SCNC: 136 MMOL/L (ref 135–145)
VANCOMYCIN TROUGH SERPL-MCNC: 26.4 MCG/ML (ref 10–20)
WBC # BLD: 13 K/MCL (ref 4.2–11)

## 2023-11-22 PROCEDURE — 10002803 HB RX 637: Performed by: NURSE PRACTITIONER

## 2023-11-22 PROCEDURE — 36415 COLL VENOUS BLD VENIPUNCTURE: CPT

## 2023-11-22 PROCEDURE — 10002807 HB RX 258: Performed by: HOSPITALIST

## 2023-11-22 PROCEDURE — 10002800 HB RX 250 W HCPCS: Performed by: HOSPITALIST

## 2023-11-22 PROCEDURE — 10002801 HB RX 250 W/O HCPCS: Performed by: HOSPITALIST

## 2023-11-22 PROCEDURE — 10004651 HB RX, NO CHARGE ITEM: Performed by: SURGERY

## 2023-11-22 PROCEDURE — 80053 COMPREHEN METABOLIC PANEL: CPT

## 2023-11-22 PROCEDURE — 10002800 HB RX 250 W HCPCS: Performed by: SURGERY

## 2023-11-22 PROCEDURE — 10000002 HB ROOM CHARGE MED SURG

## 2023-11-22 PROCEDURE — 99024 POSTOP FOLLOW-UP VISIT: CPT | Performed by: NURSE PRACTITIONER

## 2023-11-22 PROCEDURE — 10004651 HB RX, NO CHARGE ITEM: Performed by: HOSPITALIST

## 2023-11-22 PROCEDURE — 10002803 HB RX 637: Performed by: HOSPITALIST

## 2023-11-22 PROCEDURE — 80202 ASSAY OF VANCOMYCIN: CPT

## 2023-11-22 PROCEDURE — 85027 COMPLETE CBC AUTOMATED: CPT

## 2023-11-22 PROCEDURE — 96372 THER/PROPH/DIAG INJ SC/IM: CPT | Performed by: SURGERY

## 2023-11-22 RX ADMIN — ENOXAPARIN SODIUM 40 MG: 40 INJECTION SUBCUTANEOUS at 08:44

## 2023-11-22 RX ADMIN — AMLODIPINE BESYLATE 5 MG: 5 TABLET ORAL at 08:41

## 2023-11-22 RX ADMIN — FOLIC ACID 1 MG: 1 TABLET ORAL at 08:41

## 2023-11-22 RX ADMIN — METRONIDAZOLE 500 MG: 500 TABLET, FILM COATED ORAL at 14:45

## 2023-11-22 RX ADMIN — FLUCONAZOLE 200 MG: 200 TABLET ORAL at 08:41

## 2023-11-22 RX ADMIN — CEFEPIME 2000 MG: 2 INJECTION, POWDER, FOR SOLUTION INTRAVENOUS at 06:12

## 2023-11-22 RX ADMIN — AMLODIPINE BESYLATE 5 MG: 5 TABLET ORAL at 20:51

## 2023-11-22 RX ADMIN — SODIUM CHLORIDE, PRESERVATIVE FREE 10 ML: 5 INJECTION INTRAVENOUS at 08:53

## 2023-11-22 RX ADMIN — SERTRALINE HYDROCHLORIDE 50 MG: 50 TABLET, FILM COATED ORAL at 08:40

## 2023-11-22 RX ADMIN — DEXTROSE MONOHYDRATE 500 MG: 50 INJECTION, SOLUTION INTRAVENOUS at 03:28

## 2023-11-22 RX ADMIN — SODIUM CHLORIDE, PRESERVATIVE FREE 10 ML: 5 INJECTION INTRAVENOUS at 21:01

## 2023-11-22 RX ADMIN — PANTOPRAZOLE 40 MG: 40 TABLET, DELAYED RELEASE ORAL at 08:42

## 2023-11-22 RX ADMIN — CEFEPIME 2000 MG: 2 INJECTION, POWDER, FOR SOLUTION INTRAVENOUS at 21:05

## 2023-11-22 RX ADMIN — SODIUM CHLORIDE, PRESERVATIVE FREE 2 ML: 5 INJECTION INTRAVENOUS at 21:02

## 2023-11-22 RX ADMIN — OLANZAPINE 20 MG: 5 TABLET, ORALLY DISINTEGRATING ORAL at 20:51

## 2023-11-22 RX ADMIN — SODIUM CHLORIDE, PRESERVATIVE FREE 2 ML: 5 INJECTION INTRAVENOUS at 08:56

## 2023-11-22 RX ADMIN — METRONIDAZOLE 500 MG: 500 TABLET, FILM COATED ORAL at 20:52

## 2023-11-22 RX ADMIN — CEFEPIME 2000 MG: 2 INJECTION, POWDER, FOR SOLUTION INTRAVENOUS at 14:38

## 2023-11-22 RX ADMIN — ATORVASTATIN CALCIUM 20 MG: 20 TABLET, FILM COATED ORAL at 18:30

## 2023-11-22 RX ADMIN — METRONIDAZOLE 500 MG: 500 TABLET, FILM COATED ORAL at 08:41

## 2023-11-22 RX ADMIN — DEXTROSE MONOHYDRATE 500 MG: 50 INJECTION, SOLUTION INTRAVENOUS at 14:55

## 2023-11-22 ASSESSMENT — PAIN SCALES - PAIN ASSESSMENT IN ADVANCED DEMENTIA (PAINAD)
TOTALSCORE: 0
CONSOLABILITY: NO NEED TO CONSOLE
TOTALSCORE: 0
FACIALEXPRESSION: SMILING OR INEXPRESSIVE
BREATHING: NORMAL
BODYLANGUAGE: RELAXED
CONSOLABILITY: NO NEED TO CONSOLE
BODYLANGUAGE: RELAXED
BREATHING: NORMAL
FACIALEXPRESSION: SMILING OR INEXPRESSIVE

## 2023-11-22 ASSESSMENT — PAIN SCALES - WONG BAKER: WONGBAKER_NUMERICALRESPONSE: 0

## 2023-11-22 ASSESSMENT — PAIN SCALES - GENERAL: PAINLEVEL_OUTOF10: 0

## 2023-11-23 PROCEDURE — 10004651 HB RX, NO CHARGE ITEM: Performed by: HOSPITALIST

## 2023-11-23 PROCEDURE — 10002800 HB RX 250 W HCPCS: Performed by: HOSPITALIST

## 2023-11-23 PROCEDURE — 10004651 HB RX, NO CHARGE ITEM: Performed by: SURGERY

## 2023-11-23 PROCEDURE — 10002803 HB RX 637: Performed by: NURSE PRACTITIONER

## 2023-11-23 PROCEDURE — 10002800 HB RX 250 W HCPCS: Performed by: SURGERY

## 2023-11-23 PROCEDURE — 10002807 HB RX 258: Performed by: HOSPITALIST

## 2023-11-23 PROCEDURE — 10002801 HB RX 250 W/O HCPCS: Performed by: HOSPITALIST

## 2023-11-23 PROCEDURE — 10002803 HB RX 637: Performed by: HOSPITALIST

## 2023-11-23 PROCEDURE — 10000002 HB ROOM CHARGE MED SURG

## 2023-11-23 PROCEDURE — 96372 THER/PROPH/DIAG INJ SC/IM: CPT | Performed by: SURGERY

## 2023-11-23 RX ORDER — FLUCONAZOLE 200 MG/1
200 TABLET ORAL DAILY
Qty: 5 TABLET | Refills: 0 | Status: SHIPPED
Start: 2023-11-23 | End: 2023-11-28 | Stop reason: HOSPADM

## 2023-11-23 RX ORDER — METRONIDAZOLE 500 MG/1
500 TABLET ORAL 3 TIMES DAILY
Qty: 15 TABLET | Refills: 0 | Status: SHIPPED
Start: 2023-11-23 | End: 2023-11-28 | Stop reason: HOSPADM

## 2023-11-23 RX ADMIN — ATORVASTATIN CALCIUM 20 MG: 20 TABLET, FILM COATED ORAL at 18:17

## 2023-11-23 RX ADMIN — SERTRALINE HYDROCHLORIDE 50 MG: 50 TABLET, FILM COATED ORAL at 10:03

## 2023-11-23 RX ADMIN — SODIUM CHLORIDE, PRESERVATIVE FREE 10 ML: 5 INJECTION INTRAVENOUS at 22:37

## 2023-11-23 RX ADMIN — FOLIC ACID 1 MG: 1 TABLET ORAL at 10:03

## 2023-11-23 RX ADMIN — ACETAMINOPHEN 650 MG: 325 TABLET ORAL at 15:10

## 2023-11-23 RX ADMIN — CEFEPIME 2000 MG: 2 INJECTION, POWDER, FOR SOLUTION INTRAVENOUS at 22:42

## 2023-11-23 RX ADMIN — CEFEPIME 2000 MG: 2 INJECTION, POWDER, FOR SOLUTION INTRAVENOUS at 14:51

## 2023-11-23 RX ADMIN — DEXTROSE MONOHYDRATE 500 MG: 50 INJECTION, SOLUTION INTRAVENOUS at 03:21

## 2023-11-23 RX ADMIN — AMLODIPINE BESYLATE 5 MG: 5 TABLET ORAL at 22:32

## 2023-11-23 RX ADMIN — SODIUM CHLORIDE, PRESERVATIVE FREE 2 ML: 5 INJECTION INTRAVENOUS at 10:04

## 2023-11-23 RX ADMIN — METRONIDAZOLE 500 MG: 500 TABLET, FILM COATED ORAL at 15:00

## 2023-11-23 RX ADMIN — SODIUM CHLORIDE, PRESERVATIVE FREE 10 ML: 5 INJECTION INTRAVENOUS at 10:02

## 2023-11-23 RX ADMIN — CEFEPIME 2000 MG: 2 INJECTION, POWDER, FOR SOLUTION INTRAVENOUS at 05:34

## 2023-11-23 RX ADMIN — FLUCONAZOLE 200 MG: 200 TABLET ORAL at 10:06

## 2023-11-23 RX ADMIN — PANTOPRAZOLE 40 MG: 40 TABLET, DELAYED RELEASE ORAL at 10:06

## 2023-11-23 RX ADMIN — OLANZAPINE 20 MG: 5 TABLET, ORALLY DISINTEGRATING ORAL at 22:31

## 2023-11-23 RX ADMIN — SODIUM CHLORIDE, PRESERVATIVE FREE 2 ML: 5 INJECTION INTRAVENOUS at 22:37

## 2023-11-23 RX ADMIN — METRONIDAZOLE 500 MG: 500 TABLET, FILM COATED ORAL at 10:04

## 2023-11-23 RX ADMIN — AMLODIPINE BESYLATE 5 MG: 5 TABLET ORAL at 10:03

## 2023-11-23 RX ADMIN — DEXTROSE MONOHYDRATE 500 MG: 50 INJECTION, SOLUTION INTRAVENOUS at 15:24

## 2023-11-23 RX ADMIN — ENOXAPARIN SODIUM 40 MG: 40 INJECTION SUBCUTANEOUS at 10:04

## 2023-11-23 RX ADMIN — METRONIDAZOLE 500 MG: 500 TABLET, FILM COATED ORAL at 22:32

## 2023-11-23 ASSESSMENT — PAIN SCALES - PAIN ASSESSMENT IN ADVANCED DEMENTIA (PAINAD)
BREATHING: NORMAL
CONSOLABILITY: NO NEED TO CONSOLE
BODYLANGUAGE: RELAXED
CONSOLABILITY: NO NEED TO CONSOLE
TOTALSCORE: 0
BREATHING: OCCASIONAL LABORED BREATHING, SHORT PERIOD OF HYPERVENTILATION
TOTALSCORE: 0
BODYLANGUAGE: RELAXED
BREATHING: NORMAL
BREATHING: NORMAL
FACIALEXPRESSION: SMILING OR INEXPRESSIVE
FACIALEXPRESSION: SMILING OR INEXPRESSIVE

## 2023-11-24 PROCEDURE — 10002800 HB RX 250 W HCPCS: Performed by: SURGERY

## 2023-11-24 PROCEDURE — 10000002 HB ROOM CHARGE MED SURG

## 2023-11-24 PROCEDURE — 10004651 HB RX, NO CHARGE ITEM: Performed by: SURGERY

## 2023-11-24 PROCEDURE — 10002803 HB RX 637: Performed by: HOSPITALIST

## 2023-11-24 PROCEDURE — 96372 THER/PROPH/DIAG INJ SC/IM: CPT | Performed by: SURGERY

## 2023-11-24 PROCEDURE — 97606 NEG PRS WND THER DME>50 SQCM: CPT

## 2023-11-24 PROCEDURE — 10002807 HB RX 258: Performed by: HOSPITALIST

## 2023-11-24 PROCEDURE — 10002800 HB RX 250 W HCPCS: Performed by: HOSPITALIST

## 2023-11-24 PROCEDURE — 10004651 HB RX, NO CHARGE ITEM: Performed by: HOSPITALIST

## 2023-11-24 PROCEDURE — 10002803 HB RX 637: Performed by: NURSE PRACTITIONER

## 2023-11-24 PROCEDURE — 10002801 HB RX 250 W/O HCPCS: Performed by: HOSPITALIST

## 2023-11-24 RX ADMIN — DEXTROSE MONOHYDRATE 500 MG: 50 INJECTION, SOLUTION INTRAVENOUS at 15:50

## 2023-11-24 RX ADMIN — SODIUM CHLORIDE, PRESERVATIVE FREE 2 ML: 5 INJECTION INTRAVENOUS at 21:06

## 2023-11-24 RX ADMIN — AMLODIPINE BESYLATE 5 MG: 5 TABLET ORAL at 08:44

## 2023-11-24 RX ADMIN — FOLIC ACID 1 MG: 1 TABLET ORAL at 08:44

## 2023-11-24 RX ADMIN — SODIUM CHLORIDE 25 ML: 9 INJECTION, SOLUTION INTRAVENOUS at 14:12

## 2023-11-24 RX ADMIN — SODIUM CHLORIDE, PRESERVATIVE FREE 10 ML: 5 INJECTION INTRAVENOUS at 21:07

## 2023-11-24 RX ADMIN — CEFEPIME 2000 MG: 2 INJECTION, POWDER, FOR SOLUTION INTRAVENOUS at 06:08

## 2023-11-24 RX ADMIN — CEFEPIME 2000 MG: 2 INJECTION, POWDER, FOR SOLUTION INTRAVENOUS at 21:09

## 2023-11-24 RX ADMIN — SERTRALINE HYDROCHLORIDE 50 MG: 50 TABLET, FILM COATED ORAL at 08:44

## 2023-11-24 RX ADMIN — ATORVASTATIN CALCIUM 20 MG: 20 TABLET, FILM COATED ORAL at 17:47

## 2023-11-24 RX ADMIN — ENOXAPARIN SODIUM 40 MG: 40 INJECTION SUBCUTANEOUS at 08:43

## 2023-11-24 RX ADMIN — SODIUM CHLORIDE 25 ML: 9 INJECTION, SOLUTION INTRAVENOUS at 15:48

## 2023-11-24 RX ADMIN — METRONIDAZOLE 500 MG: 500 TABLET, FILM COATED ORAL at 20:56

## 2023-11-24 RX ADMIN — METRONIDAZOLE 500 MG: 500 TABLET, FILM COATED ORAL at 08:44

## 2023-11-24 RX ADMIN — DEXTROSE MONOHYDRATE 500 MG: 50 INJECTION, SOLUTION INTRAVENOUS at 03:54

## 2023-11-24 RX ADMIN — AMLODIPINE BESYLATE 5 MG: 5 TABLET ORAL at 21:00

## 2023-11-24 RX ADMIN — PANTOPRAZOLE 40 MG: 40 TABLET, DELAYED RELEASE ORAL at 14:20

## 2023-11-24 RX ADMIN — CEFEPIME 2000 MG: 2 INJECTION, POWDER, FOR SOLUTION INTRAVENOUS at 14:12

## 2023-11-24 RX ADMIN — SODIUM CHLORIDE, PRESERVATIVE FREE 10 ML: 5 INJECTION INTRAVENOUS at 09:00

## 2023-11-24 RX ADMIN — OLANZAPINE 20 MG: 5 TABLET, ORALLY DISINTEGRATING ORAL at 20:56

## 2023-11-24 RX ADMIN — METRONIDAZOLE 500 MG: 500 TABLET, FILM COATED ORAL at 14:20

## 2023-11-24 RX ADMIN — FLUCONAZOLE 200 MG: 200 TABLET ORAL at 08:44

## 2023-11-24 ASSESSMENT — PAIN SCALES - PAIN ASSESSMENT IN ADVANCED DEMENTIA (PAINAD)
CONSOLABILITY: NO NEED TO CONSOLE
BREATHING: NORMAL
TOTALSCORE: 0
BODYLANGUAGE: RELAXED
BODYLANGUAGE: RELAXED
BREATHING: NORMAL
FACIALEXPRESSION: SMILING OR INEXPRESSIVE
TOTALSCORE: 0
FACIALEXPRESSION: SMILING OR INEXPRESSIVE
CONSOLABILITY: NO NEED TO CONSOLE

## 2023-11-25 ENCOUNTER — APPOINTMENT (OUTPATIENT)
Dept: GENERAL RADIOLOGY | Age: 63
DRG: 981 | End: 2023-11-25
Attending: INTERNAL MEDICINE

## 2023-11-25 LAB
ANION GAP SERPL CALC-SCNC: 9 MMOL/L (ref 7–19)
BASOPHILS # BLD: 0.1 K/MCL (ref 0–0.3)
BASOPHILS NFR BLD: 1 %
BUN SERPL-MCNC: 22 MG/DL (ref 6–20)
BUN/CREAT SERPL: 47 (ref 7–25)
C DIFF TOX B TCDB STL QL NAA+PROBE: NOT DETECTED
CALCIUM SERPL-MCNC: 9.4 MG/DL (ref 8.4–10.2)
CHLORIDE SERPL-SCNC: 103 MMOL/L (ref 97–110)
CO2 SERPL-SCNC: 25 MMOL/L (ref 21–32)
CREAT SERPL-MCNC: 0.47 MG/DL (ref 0.51–0.95)
DEPRECATED RDW RBC: 50.4 FL (ref 39–50)
EGFRCR SERPLBLD CKD-EPI 2021: >90 ML/MIN/{1.73_M2}
EOSINOPHIL # BLD: 0 K/MCL (ref 0–0.5)
EOSINOPHIL NFR BLD: 0 %
ERYTHROCYTE [DISTWIDTH] IN BLOOD: 13.7 % (ref 11–15)
FASTING DURATION TIME PATIENT: ABNORMAL H
GLUCOSE SERPL-MCNC: 127 MG/DL (ref 70–99)
HCT VFR BLD CALC: 29.9 % (ref 36–46.5)
HGB BLD-MCNC: 9.4 G/DL (ref 12–15.5)
IMM GRANULOCYTES # BLD AUTO: 0.3 K/MCL (ref 0–0.2)
IMM GRANULOCYTES # BLD: 3 %
LYMPHOCYTES # BLD: 1.2 K/MCL (ref 1–4)
LYMPHOCYTES NFR BLD: 11 %
MCH RBC QN AUTO: 31.4 PG (ref 26–34)
MCHC RBC AUTO-ENTMCNC: 31.4 G/DL (ref 32–36.5)
MCV RBC AUTO: 100 FL (ref 78–100)
MONOCYTES # BLD: 0.8 K/MCL (ref 0.3–0.9)
MONOCYTES NFR BLD: 8 %
NEUTROPHILS # BLD: 8.4 K/MCL (ref 1.8–7.7)
NEUTROPHILS NFR BLD: 77 %
NRBC BLD MANUAL-RTO: 0 /100 WBC
PLATELET # BLD AUTO: 534 K/MCL (ref 140–450)
POTASSIUM SERPL-SCNC: 4.2 MMOL/L (ref 3.4–5.1)
RBC # BLD: 2.99 MIL/MCL (ref 4–5.2)
SODIUM SERPL-SCNC: 133 MMOL/L (ref 135–145)
WBC # BLD: 10.8 K/MCL (ref 4.2–11)

## 2023-11-25 PROCEDURE — 10002807 HB RX 258: Performed by: HOSPITALIST

## 2023-11-25 PROCEDURE — 97535 SELF CARE MNGMENT TRAINING: CPT

## 2023-11-25 PROCEDURE — 10004651 HB RX, NO CHARGE ITEM: Performed by: SURGERY

## 2023-11-25 PROCEDURE — 10002803 HB RX 637: Performed by: SURGERY

## 2023-11-25 PROCEDURE — 10002803 HB RX 637: Performed by: INTERNAL MEDICINE

## 2023-11-25 PROCEDURE — 96372 THER/PROPH/DIAG INJ SC/IM: CPT | Performed by: SURGERY

## 2023-11-25 PROCEDURE — 10002803 HB RX 637: Performed by: HOSPITALIST

## 2023-11-25 PROCEDURE — 10002800 HB RX 250 W HCPCS: Performed by: SURGERY

## 2023-11-25 PROCEDURE — 10000002 HB ROOM CHARGE MED SURG

## 2023-11-25 PROCEDURE — 80048 BASIC METABOLIC PNL TOTAL CA: CPT | Performed by: INTERNAL MEDICINE

## 2023-11-25 PROCEDURE — 10002800 HB RX 250 W HCPCS: Performed by: INTERNAL MEDICINE

## 2023-11-25 PROCEDURE — 10002807 HB RX 258: Performed by: INTERNAL MEDICINE

## 2023-11-25 PROCEDURE — 36415 COLL VENOUS BLD VENIPUNCTURE: CPT | Performed by: INTERNAL MEDICINE

## 2023-11-25 PROCEDURE — 10002803 HB RX 637: Performed by: NURSE PRACTITIONER

## 2023-11-25 PROCEDURE — 10004651 HB RX, NO CHARGE ITEM: Performed by: HOSPITALIST

## 2023-11-25 PROCEDURE — 87493 C DIFF AMPLIFIED PROBE: CPT | Performed by: INTERNAL MEDICINE

## 2023-11-25 PROCEDURE — 71045 X-RAY EXAM CHEST 1 VIEW: CPT

## 2023-11-25 PROCEDURE — 85025 COMPLETE CBC W/AUTO DIFF WBC: CPT | Performed by: INTERNAL MEDICINE

## 2023-11-25 PROCEDURE — 10002800 HB RX 250 W HCPCS: Performed by: HOSPITALIST

## 2023-11-25 PROCEDURE — 10002801 HB RX 250 W/O HCPCS: Performed by: HOSPITALIST

## 2023-11-25 RX ADMIN — METRONIDAZOLE 500 MG: 500 TABLET, FILM COATED ORAL at 08:59

## 2023-11-25 RX ADMIN — ENOXAPARIN SODIUM 40 MG: 40 INJECTION SUBCUTANEOUS at 08:59

## 2023-11-25 RX ADMIN — ATORVASTATIN CALCIUM 20 MG: 20 TABLET, FILM COATED ORAL at 18:16

## 2023-11-25 RX ADMIN — OXYCODONE HYDROCHLORIDE 5 MG: 5 TABLET ORAL at 10:32

## 2023-11-25 RX ADMIN — AMLODIPINE BESYLATE 5 MG: 5 TABLET ORAL at 08:59

## 2023-11-25 RX ADMIN — SODIUM CHLORIDE, PRESERVATIVE FREE 2 ML: 5 INJECTION INTRAVENOUS at 08:00

## 2023-11-25 RX ADMIN — SODIUM CHLORIDE, PRESERVATIVE FREE 10 ML: 5 INJECTION INTRAVENOUS at 21:53

## 2023-11-25 RX ADMIN — VALPROIC ACID 500 MG: 250 SOLUTION ORAL at 21:44

## 2023-11-25 RX ADMIN — FOLIC ACID 1 MG: 1 TABLET ORAL at 08:58

## 2023-11-25 RX ADMIN — SODIUM CHLORIDE, PRESERVATIVE FREE 2 ML: 5 INJECTION INTRAVENOUS at 21:53

## 2023-11-25 RX ADMIN — FLUCONAZOLE 200 MG: 200 TABLET ORAL at 08:58

## 2023-11-25 RX ADMIN — CEFEPIME 2000 MG: 2 INJECTION, POWDER, FOR SOLUTION INTRAVENOUS at 07:02

## 2023-11-25 RX ADMIN — DEXTROSE MONOHYDRATE 500 MG: 50 INJECTION, SOLUTION INTRAVENOUS at 02:45

## 2023-11-25 RX ADMIN — SODIUM CHLORIDE, PRESERVATIVE FREE 10 ML: 5 INJECTION INTRAVENOUS at 08:00

## 2023-11-25 RX ADMIN — CEFEPIME 2000 MG: 2 INJECTION, POWDER, FOR SOLUTION INTRAVENOUS at 14:31

## 2023-11-25 RX ADMIN — SODIUM CHLORIDE 25 ML: 9 INJECTION, SOLUTION INTRAVENOUS at 14:30

## 2023-11-25 RX ADMIN — OLANZAPINE 20 MG: 5 TABLET, ORALLY DISINTEGRATING ORAL at 21:45

## 2023-11-25 RX ADMIN — AMLODIPINE BESYLATE 5 MG: 5 TABLET ORAL at 21:44

## 2023-11-25 RX ADMIN — METRONIDAZOLE 500 MG: 500 TABLET, FILM COATED ORAL at 21:45

## 2023-11-25 RX ADMIN — METRONIDAZOLE 500 MG: 500 TABLET, FILM COATED ORAL at 14:26

## 2023-11-25 RX ADMIN — PANTOPRAZOLE 40 MG: 40 TABLET, DELAYED RELEASE ORAL at 08:59

## 2023-11-25 RX ADMIN — SERTRALINE HYDROCHLORIDE 50 MG: 50 TABLET, FILM COATED ORAL at 08:59

## 2023-11-25 RX ADMIN — CEFEPIME 2000 MG: 2 INJECTION, POWDER, FOR SOLUTION INTRAVENOUS at 21:53

## 2023-11-25 ASSESSMENT — PAIN SCALES - PAIN ASSESSMENT IN ADVANCED DEMENTIA (PAINAD)
TOTALSCORE: 6
CONSOLABILITY: NO NEED TO CONSOLE
TOTALSCORE: 1
BREATHING: OCCASIONAL LABORED BREATHING, SHORT PERIOD OF HYPERVENTILATION
BODYLANGUAGE: TENSE, DISTRESSED, FIDGETING
FACIALEXPRESSION: FACIAL GRIMACING
BREATHING: OCCASIONAL LABORED BREATHING, SHORT PERIOD OF HYPERVENTILATION
FACIALEXPRESSION: SMILING OR INEXPRESSIVE
CONSOLABILITY: NO NEED TO CONSOLE
CONSOLABILITY: DISTRACTED OR REASSURED BY VOICE OR TOUCH
FACIALEXPRESSION: SMILING OR INEXPRESSIVE
TOTALSCORE: 0
BREATHING: NORMAL
BODYLANGUAGE: RELAXED
BODYLANGUAGE: RELAXED
NEGVOCALIZATION: OCCASIONAL MOAN OR GROAN, LOW LEVELS OF SPEECH WITH A NEGATIVE OR DISAPPROVING QUALITY

## 2023-11-25 ASSESSMENT — COGNITIVE AND FUNCTIONAL STATUS - GENERAL
HELP NEEDED DRESSING REGULAR LOWER BODY CLOTHING: TOTAL
HELP NEEDED FOR PERSONAL GROOMING: A LOT
DAILY_ACTIVITY_RAW_SCORE: 7
HELP NEEDED FOR BATHING: TOTAL
DAILY_ACTIVITY_CONVERTED_SCORE: 20.13
HELP NEEDED DRESSING REGULAR UPPER BODY CLOTHING: TOTAL
HELP NEEDED FOR TOILETING: TOTAL

## 2023-11-25 ASSESSMENT — ACTIVITIES OF DAILY LIVING (ADL): HOME_MANAGEMENT_TIME_ENTRY: 25

## 2023-11-26 ENCOUNTER — APPOINTMENT (OUTPATIENT)
Dept: ULTRASOUND IMAGING | Age: 63
DRG: 981 | End: 2023-11-26
Attending: INTERNAL MEDICINE

## 2023-11-26 ENCOUNTER — APPOINTMENT (OUTPATIENT)
Dept: CT IMAGING | Age: 63
DRG: 981 | End: 2023-11-26
Attending: INTERNAL MEDICINE

## 2023-11-26 LAB
ANION GAP SERPL CALC-SCNC: 8 MMOL/L (ref 7–19)
BASOPHILS # BLD: 0.1 K/MCL (ref 0–0.3)
BASOPHILS NFR BLD: 1 %
BUN SERPL-MCNC: 25 MG/DL (ref 6–20)
BUN/CREAT SERPL: 54 (ref 7–25)
CALCIUM SERPL-MCNC: 9.1 MG/DL (ref 8.4–10.2)
CHLORIDE SERPL-SCNC: 102 MMOL/L (ref 97–110)
CO2 SERPL-SCNC: 26 MMOL/L (ref 21–32)
CREAT SERPL-MCNC: 0.46 MG/DL (ref 0.51–0.95)
D DIMER PPP FEU-MCNC: 4.03 MG/L (FEU)
DEPRECATED RDW RBC: 51.1 FL (ref 39–50)
EGFRCR SERPLBLD CKD-EPI 2021: >90 ML/MIN/{1.73_M2}
EOSINOPHIL # BLD: 0 K/MCL (ref 0–0.5)
EOSINOPHIL NFR BLD: 0 %
ERYTHROCYTE [DISTWIDTH] IN BLOOD: 13.9 % (ref 11–15)
FASTING DURATION TIME PATIENT: ABNORMAL H
GLUCOSE SERPL-MCNC: 129 MG/DL (ref 70–99)
HCT VFR BLD CALC: 29.6 % (ref 36–46.5)
HGB BLD-MCNC: 9.2 G/DL (ref 12–15.5)
IMM GRANULOCYTES # BLD AUTO: 0.3 K/MCL (ref 0–0.2)
IMM GRANULOCYTES # BLD: 3 %
LYMPHOCYTES # BLD: 1.2 K/MCL (ref 1–4)
LYMPHOCYTES NFR BLD: 13 %
MCH RBC QN AUTO: 31.3 PG (ref 26–34)
MCHC RBC AUTO-ENTMCNC: 31.1 G/DL (ref 32–36.5)
MCV RBC AUTO: 100.7 FL (ref 78–100)
MONOCYTES # BLD: 0.9 K/MCL (ref 0.3–0.9)
MONOCYTES NFR BLD: 11 %
NEUTROPHILS # BLD: 6.3 K/MCL (ref 1.8–7.7)
NEUTROPHILS NFR BLD: 72 %
NRBC BLD MANUAL-RTO: 0 /100 WBC
PLATELET # BLD AUTO: 518 K/MCL (ref 140–450)
POTASSIUM SERPL-SCNC: 4.3 MMOL/L (ref 3.4–5.1)
RBC # BLD: 2.94 MIL/MCL (ref 4–5.2)
SODIUM SERPL-SCNC: 132 MMOL/L (ref 135–145)
WBC # BLD: 8.8 K/MCL (ref 4.2–11)

## 2023-11-26 PROCEDURE — 93970 EXTREMITY STUDY: CPT

## 2023-11-26 PROCEDURE — 99223 1ST HOSP IP/OBS HIGH 75: CPT | Performed by: INTERNAL MEDICINE

## 2023-11-26 PROCEDURE — 10002803 HB RX 637: Performed by: HOSPITALIST

## 2023-11-26 PROCEDURE — 10004651 HB RX, NO CHARGE ITEM: Performed by: SURGERY

## 2023-11-26 PROCEDURE — 10002803 HB RX 637: Performed by: PSYCHIATRY & NEUROLOGY

## 2023-11-26 PROCEDURE — 96372 THER/PROPH/DIAG INJ SC/IM: CPT | Performed by: SURGERY

## 2023-11-26 PROCEDURE — 10002800 HB RX 250 W HCPCS: Performed by: INTERNAL MEDICINE

## 2023-11-26 PROCEDURE — 10002800 HB RX 250 W HCPCS: Performed by: SURGERY

## 2023-11-26 PROCEDURE — 10004651 HB RX, NO CHARGE ITEM: Performed by: HOSPITALIST

## 2023-11-26 PROCEDURE — 36415 COLL VENOUS BLD VENIPUNCTURE: CPT | Performed by: INTERNAL MEDICINE

## 2023-11-26 PROCEDURE — 85379 FIBRIN DEGRADATION QUANT: CPT | Performed by: INTERNAL MEDICINE

## 2023-11-26 PROCEDURE — 10002805 HB CONTRAST AGENT: Performed by: INTERNAL MEDICINE

## 2023-11-26 PROCEDURE — 10002803 HB RX 637: Performed by: NURSE PRACTITIONER

## 2023-11-26 PROCEDURE — 10000002 HB ROOM CHARGE MED SURG

## 2023-11-26 PROCEDURE — 80048 BASIC METABOLIC PNL TOTAL CA: CPT | Performed by: INTERNAL MEDICINE

## 2023-11-26 PROCEDURE — 10002803 HB RX 637: Performed by: INTERNAL MEDICINE

## 2023-11-26 PROCEDURE — 97606 NEG PRS WND THER DME>50 SQCM: CPT

## 2023-11-26 PROCEDURE — 85025 COMPLETE CBC W/AUTO DIFF WBC: CPT | Performed by: INTERNAL MEDICINE

## 2023-11-26 PROCEDURE — 10002807 HB RX 258: Performed by: INTERNAL MEDICINE

## 2023-11-26 PROCEDURE — 71275 CT ANGIOGRAPHY CHEST: CPT

## 2023-11-26 PROCEDURE — 10002803 HB RX 637: Performed by: SURGERY

## 2023-11-26 RX ORDER — OLANZAPINE 5 MG/1
10 TABLET, ORALLY DISINTEGRATING ORAL NIGHTLY
Status: DISCONTINUED | OUTPATIENT
Start: 2023-11-26 | End: 2023-11-28 | Stop reason: HOSPADM

## 2023-11-26 RX ADMIN — FLUCONAZOLE 200 MG: 200 TABLET ORAL at 08:41

## 2023-11-26 RX ADMIN — SODIUM CHLORIDE, PRESERVATIVE FREE 2 ML: 5 INJECTION INTRAVENOUS at 08:42

## 2023-11-26 RX ADMIN — AMLODIPINE BESYLATE 5 MG: 5 TABLET ORAL at 21:26

## 2023-11-26 RX ADMIN — CEFEPIME 2000 MG: 2 INJECTION, POWDER, FOR SOLUTION INTRAVENOUS at 21:51

## 2023-11-26 RX ADMIN — CEFEPIME 2000 MG: 2 INJECTION, POWDER, FOR SOLUTION INTRAVENOUS at 05:36

## 2023-11-26 RX ADMIN — OLANZAPINE 10 MG: 5 TABLET, ORALLY DISINTEGRATING ORAL at 21:26

## 2023-11-26 RX ADMIN — ATORVASTATIN CALCIUM 20 MG: 20 TABLET, FILM COATED ORAL at 18:59

## 2023-11-26 RX ADMIN — METRONIDAZOLE 500 MG: 500 TABLET, FILM COATED ORAL at 14:58

## 2023-11-26 RX ADMIN — FOLIC ACID 1 MG: 1 TABLET ORAL at 08:41

## 2023-11-26 RX ADMIN — AMLODIPINE BESYLATE 5 MG: 5 TABLET ORAL at 08:41

## 2023-11-26 RX ADMIN — ENOXAPARIN SODIUM 40 MG: 40 INJECTION SUBCUTANEOUS at 08:41

## 2023-11-26 RX ADMIN — VALPROIC ACID 500 MG: 250 SOLUTION ORAL at 08:41

## 2023-11-26 RX ADMIN — IOHEXOL 75 ML: 350 INJECTION, SOLUTION INTRAVENOUS at 13:46

## 2023-11-26 RX ADMIN — METRONIDAZOLE 500 MG: 500 TABLET, FILM COATED ORAL at 08:41

## 2023-11-26 RX ADMIN — SODIUM CHLORIDE, PRESERVATIVE FREE 2 ML: 5 INJECTION INTRAVENOUS at 21:33

## 2023-11-26 RX ADMIN — SODIUM CHLORIDE, PRESERVATIVE FREE 10 ML: 5 INJECTION INTRAVENOUS at 08:42

## 2023-11-26 RX ADMIN — METRONIDAZOLE 500 MG: 500 TABLET, FILM COATED ORAL at 21:26

## 2023-11-26 RX ADMIN — SERTRALINE HYDROCHLORIDE 50 MG: 50 TABLET, FILM COATED ORAL at 08:41

## 2023-11-26 RX ADMIN — VALPROIC ACID 500 MG: 250 SOLUTION ORAL at 21:26

## 2023-11-26 RX ADMIN — OXYCODONE HYDROCHLORIDE 5 MG: 5 TABLET ORAL at 09:41

## 2023-11-26 RX ADMIN — CEFEPIME 2000 MG: 2 INJECTION, POWDER, FOR SOLUTION INTRAVENOUS at 14:56

## 2023-11-26 RX ADMIN — PANTOPRAZOLE 40 MG: 40 TABLET, DELAYED RELEASE ORAL at 08:41

## 2023-11-26 RX ADMIN — SODIUM CHLORIDE, PRESERVATIVE FREE 10 ML: 5 INJECTION INTRAVENOUS at 21:34

## 2023-11-26 ASSESSMENT — PAIN SCALES - PAIN ASSESSMENT IN ADVANCED DEMENTIA (PAINAD)
FACIALEXPRESSION: SAD. FRIGHTENED. FROWNING
BODYLANGUAGE: TENSE, DISTRESSED, FIDGETING
BREATHING: OCCASIONAL LABORED BREATHING, SHORT PERIOD OF HYPERVENTILATION
BREATHING: OCCASIONAL LABORED BREATHING, SHORT PERIOD OF HYPERVENTILATION
BODYLANGUAGE: RELAXED
CONSOLABILITY: UNABLE TO CONSOLE, DISTRACT OR REASSURE
TOTALSCORE: 6
CONSOLABILITY: NO NEED TO CONSOLE
NEGVOCALIZATION: OCCASIONAL MOAN OR GROAN, LOW LEVELS OF SPEECH WITH A NEGATIVE OR DISAPPROVING QUALITY
TOTALSCORE: 1
FACIALEXPRESSION: SMILING OR INEXPRESSIVE

## 2023-11-26 ASSESSMENT — PAIN SCALES - WONG BAKER: WONGBAKER_NUMERICALRESPONSE: 0

## 2023-11-27 ENCOUNTER — APPOINTMENT (OUTPATIENT)
Dept: CT IMAGING | Age: 63
DRG: 981 | End: 2023-11-27
Attending: PSYCHIATRY & NEUROLOGY

## 2023-11-27 LAB
ANION GAP SERPL CALC-SCNC: 10 MMOL/L (ref 7–19)
BASOPHILS # BLD: 0.1 K/MCL (ref 0–0.3)
BASOPHILS NFR BLD: 1 %
BUN SERPL-MCNC: 26 MG/DL (ref 6–20)
BUN/CREAT SERPL: 54 (ref 7–25)
CALCIUM SERPL-MCNC: 9.7 MG/DL (ref 8.4–10.2)
CHLORIDE SERPL-SCNC: 103 MMOL/L (ref 97–110)
CO2 SERPL-SCNC: 28 MMOL/L (ref 21–32)
CREAT SERPL-MCNC: 0.48 MG/DL (ref 0.51–0.95)
DEPRECATED RDW RBC: 51.4 FL (ref 39–50)
EGFRCR SERPLBLD CKD-EPI 2021: >90 ML/MIN/{1.73_M2}
EOSINOPHIL # BLD: 0.1 K/MCL (ref 0–0.5)
EOSINOPHIL NFR BLD: 1 %
ERYTHROCYTE [DISTWIDTH] IN BLOOD: 14.1 % (ref 11–15)
FASTING DURATION TIME PATIENT: ABNORMAL H
GLUCOSE SERPL-MCNC: 115 MG/DL (ref 70–99)
HCT VFR BLD CALC: 30.2 % (ref 36–46.5)
HGB BLD-MCNC: 9.4 G/DL (ref 12–15.5)
IMM GRANULOCYTES # BLD AUTO: 0.2 K/MCL (ref 0–0.2)
IMM GRANULOCYTES # BLD: 3 %
LYMPHOCYTES # BLD: 1.1 K/MCL (ref 1–4)
LYMPHOCYTES NFR BLD: 12 %
MCH RBC QN AUTO: 31.3 PG (ref 26–34)
MCHC RBC AUTO-ENTMCNC: 31.1 G/DL (ref 32–36.5)
MCV RBC AUTO: 100.7 FL (ref 78–100)
MONOCYTES # BLD: 0.9 K/MCL (ref 0.3–0.9)
MONOCYTES NFR BLD: 10 %
NEUTROPHILS # BLD: 6.8 K/MCL (ref 1.8–7.7)
NEUTROPHILS NFR BLD: 73 %
NRBC BLD MANUAL-RTO: 0 /100 WBC
PLATELET # BLD AUTO: 523 K/MCL (ref 140–450)
POTASSIUM SERPL-SCNC: 4.7 MMOL/L (ref 3.4–5.1)
RBC # BLD: 3 MIL/MCL (ref 4–5.2)
SODIUM SERPL-SCNC: 136 MMOL/L (ref 135–145)
VALPROATE SERPL-MCNC: 61 MCG/ML (ref 50–125)
WBC # BLD: 9.2 K/MCL (ref 4.2–11)

## 2023-11-27 PROCEDURE — 10002803 HB RX 637: Performed by: INTERNAL MEDICINE

## 2023-11-27 PROCEDURE — 80164 ASSAY DIPROPYLACETIC ACD TOT: CPT | Performed by: PSYCHIATRY & NEUROLOGY

## 2023-11-27 PROCEDURE — 80048 BASIC METABOLIC PNL TOTAL CA: CPT | Performed by: INTERNAL MEDICINE

## 2023-11-27 PROCEDURE — 10004651 HB RX, NO CHARGE ITEM: Performed by: SURGERY

## 2023-11-27 PROCEDURE — 70450 CT HEAD/BRAIN W/O DYE: CPT

## 2023-11-27 PROCEDURE — 10002800 HB RX 250 W HCPCS: Performed by: SURGERY

## 2023-11-27 PROCEDURE — 10002803 HB RX 637: Performed by: NURSE PRACTITIONER

## 2023-11-27 PROCEDURE — 10002807 HB RX 258: Performed by: INTERNAL MEDICINE

## 2023-11-27 PROCEDURE — 36415 COLL VENOUS BLD VENIPUNCTURE: CPT | Performed by: INTERNAL MEDICINE

## 2023-11-27 PROCEDURE — 96372 THER/PROPH/DIAG INJ SC/IM: CPT | Performed by: SURGERY

## 2023-11-27 PROCEDURE — 10002800 HB RX 250 W HCPCS: Performed by: INTERNAL MEDICINE

## 2023-11-27 PROCEDURE — 99233 SBSQ HOSP IP/OBS HIGH 50: CPT | Performed by: INTERNAL MEDICINE

## 2023-11-27 PROCEDURE — 10002803 HB RX 637: Performed by: PSYCHIATRY & NEUROLOGY

## 2023-11-27 PROCEDURE — 85025 COMPLETE CBC W/AUTO DIFF WBC: CPT | Performed by: INTERNAL MEDICINE

## 2023-11-27 PROCEDURE — 10004651 HB RX, NO CHARGE ITEM: Performed by: HOSPITALIST

## 2023-11-27 PROCEDURE — 10000002 HB ROOM CHARGE MED SURG

## 2023-11-27 PROCEDURE — 10002803 HB RX 637: Performed by: HOSPITALIST

## 2023-11-27 RX ADMIN — ENOXAPARIN SODIUM 40 MG: 40 INJECTION SUBCUTANEOUS at 09:00

## 2023-11-27 RX ADMIN — SODIUM CHLORIDE, PRESERVATIVE FREE 2 ML: 5 INJECTION INTRAVENOUS at 20:01

## 2023-11-27 RX ADMIN — SODIUM CHLORIDE, PRESERVATIVE FREE 2 ML: 5 INJECTION INTRAVENOUS at 09:00

## 2023-11-27 RX ADMIN — VALPROIC ACID 500 MG: 250 SOLUTION ORAL at 20:03

## 2023-11-27 RX ADMIN — FLUCONAZOLE 200 MG: 200 TABLET ORAL at 09:00

## 2023-11-27 RX ADMIN — VALPROIC ACID 500 MG: 250 SOLUTION ORAL at 09:00

## 2023-11-27 RX ADMIN — ATORVASTATIN CALCIUM 20 MG: 20 TABLET, FILM COATED ORAL at 18:00

## 2023-11-27 RX ADMIN — CEFEPIME 2000 MG: 2 INJECTION, POWDER, FOR SOLUTION INTRAVENOUS at 21:35

## 2023-11-27 RX ADMIN — SODIUM CHLORIDE, PRESERVATIVE FREE 10 ML: 5 INJECTION INTRAVENOUS at 09:00

## 2023-11-27 RX ADMIN — SODIUM CHLORIDE, PRESERVATIVE FREE 10 ML: 5 INJECTION INTRAVENOUS at 20:10

## 2023-11-27 RX ADMIN — PANTOPRAZOLE 40 MG: 40 TABLET, DELAYED RELEASE ORAL at 09:00

## 2023-11-27 RX ADMIN — SERTRALINE HYDROCHLORIDE 50 MG: 50 TABLET, FILM COATED ORAL at 09:00

## 2023-11-27 RX ADMIN — OLANZAPINE 10 MG: 5 TABLET, ORALLY DISINTEGRATING ORAL at 20:03

## 2023-11-27 RX ADMIN — METRONIDAZOLE 500 MG: 500 TABLET, FILM COATED ORAL at 20:03

## 2023-11-27 RX ADMIN — AMLODIPINE BESYLATE 5 MG: 5 TABLET ORAL at 20:03

## 2023-11-27 RX ADMIN — AMLODIPINE BESYLATE 5 MG: 5 TABLET ORAL at 09:00

## 2023-11-27 RX ADMIN — CEFEPIME 2000 MG: 2 INJECTION, POWDER, FOR SOLUTION INTRAVENOUS at 06:11

## 2023-11-27 RX ADMIN — METRONIDAZOLE 500 MG: 500 TABLET, FILM COATED ORAL at 09:00

## 2023-11-27 RX ADMIN — FOLIC ACID 1 MG: 1 TABLET ORAL at 09:00

## 2023-11-27 RX ADMIN — METRONIDAZOLE 500 MG: 500 TABLET, FILM COATED ORAL at 13:22

## 2023-11-27 RX ADMIN — CEFEPIME 2000 MG: 2 INJECTION, POWDER, FOR SOLUTION INTRAVENOUS at 13:21

## 2023-11-27 ASSESSMENT — PAIN SCALES - WONG BAKER
WONGBAKER_NUMERICALRESPONSE: 0
WONGBAKER_NUMERICALRESPONSE: 0

## 2023-11-28 VITALS
WEIGHT: 125 LBS | HEART RATE: 110 BPM | OXYGEN SATURATION: 92 % | TEMPERATURE: 99 F | SYSTOLIC BLOOD PRESSURE: 124 MMHG | HEIGHT: 60 IN | RESPIRATION RATE: 18 BRPM | DIASTOLIC BLOOD PRESSURE: 84 MMHG | BODY MASS INDEX: 24.54 KG/M2

## 2023-11-28 PROCEDURE — 97535 SELF CARE MNGMENT TRAINING: CPT

## 2023-11-28 PROCEDURE — 10002803 HB RX 637: Performed by: HOSPITALIST

## 2023-11-28 PROCEDURE — 97110 THERAPEUTIC EXERCISES: CPT

## 2023-11-28 PROCEDURE — 10004651 HB RX, NO CHARGE ITEM: Performed by: HOSPITALIST

## 2023-11-28 PROCEDURE — 10002800 HB RX 250 W HCPCS: Performed by: INTERNAL MEDICINE

## 2023-11-28 PROCEDURE — 97530 THERAPEUTIC ACTIVITIES: CPT

## 2023-11-28 PROCEDURE — 10002803 HB RX 637: Performed by: NURSE PRACTITIONER

## 2023-11-28 PROCEDURE — 99233 SBSQ HOSP IP/OBS HIGH 50: CPT | Performed by: INTERNAL MEDICINE

## 2023-11-28 PROCEDURE — 10004651 HB RX, NO CHARGE ITEM: Performed by: SURGERY

## 2023-11-28 PROCEDURE — 10002803 HB RX 637: Performed by: INTERNAL MEDICINE

## 2023-11-28 PROCEDURE — 96372 THER/PROPH/DIAG INJ SC/IM: CPT | Performed by: SURGERY

## 2023-11-28 PROCEDURE — 10002807 HB RX 258: Performed by: INTERNAL MEDICINE

## 2023-11-28 PROCEDURE — 10002800 HB RX 250 W HCPCS: Performed by: SURGERY

## 2023-11-28 RX ORDER — OLANZAPINE 10 MG/1
10 TABLET ORAL NIGHTLY
Qty: 30 TABLET | Refills: 0 | Status: ON HOLD | COMMUNITY
Start: 2023-11-28 | End: 2023-12-07

## 2023-11-28 RX ADMIN — SERTRALINE HYDROCHLORIDE 50 MG: 50 TABLET, FILM COATED ORAL at 08:22

## 2023-11-28 RX ADMIN — FLUCONAZOLE 200 MG: 200 TABLET ORAL at 08:23

## 2023-11-28 RX ADMIN — ACETAMINOPHEN 650 MG: 325 TABLET ORAL at 08:23

## 2023-11-28 RX ADMIN — VALPROIC ACID 500 MG: 250 SOLUTION ORAL at 08:22

## 2023-11-28 RX ADMIN — SODIUM CHLORIDE, PRESERVATIVE FREE 10 ML: 5 INJECTION INTRAVENOUS at 09:14

## 2023-11-28 RX ADMIN — ENOXAPARIN SODIUM 40 MG: 40 INJECTION SUBCUTANEOUS at 08:23

## 2023-11-28 RX ADMIN — METRONIDAZOLE 500 MG: 500 TABLET, FILM COATED ORAL at 08:22

## 2023-11-28 RX ADMIN — FOLIC ACID 1 MG: 1 TABLET ORAL at 08:23

## 2023-11-28 RX ADMIN — AMLODIPINE BESYLATE 5 MG: 5 TABLET ORAL at 08:23

## 2023-11-28 RX ADMIN — SODIUM CHLORIDE, PRESERVATIVE FREE 2 ML: 5 INJECTION INTRAVENOUS at 08:28

## 2023-11-28 RX ADMIN — CEFEPIME 2000 MG: 2 INJECTION, POWDER, FOR SOLUTION INTRAVENOUS at 05:04

## 2023-11-28 RX ADMIN — CEFEPIME 2000 MG: 2 INJECTION, POWDER, FOR SOLUTION INTRAVENOUS at 13:00

## 2023-11-28 RX ADMIN — PANTOPRAZOLE 40 MG: 40 TABLET, DELAYED RELEASE ORAL at 08:22

## 2023-11-28 ASSESSMENT — COGNITIVE AND FUNCTIONAL STATUS - GENERAL
DAILY_ACTIVITY_RAW_SCORE: 6
BASIC_MOBILITY_RAW_SCORE: 6
HELP NEEDED DRESSING REGULAR LOWER BODY CLOTHING: TOTAL
HELP NEEDED DRESSING REGULAR UPPER BODY CLOTHING: TOTAL
HELP NEEDED FOR PERSONAL GROOMING: TOTAL
HELP NEEDED FOR BATHING: TOTAL
DAILY_ACTIVITY_CONVERTED_SCORE: 17.06
BASIC_MOBILITY_CONVERTED_SCORE: 16.59
HELP NEEDED FOR TOILETING: TOTAL

## 2023-11-28 ASSESSMENT — PAIN SCALES - PAIN ASSESSMENT IN ADVANCED DEMENTIA (PAINAD)
BREATHING: OCCASIONAL LABORED BREATHING, SHORT PERIOD OF HYPERVENTILATION
CONSOLABILITY: NO NEED TO CONSOLE
FACIALEXPRESSION: SMILING OR INEXPRESSIVE
TOTALSCORE: 1
BODYLANGUAGE: RELAXED

## 2023-11-28 ASSESSMENT — ACTIVITIES OF DAILY LIVING (ADL): HOME_MANAGEMENT_TIME_ENTRY: 8

## 2023-11-30 ENCOUNTER — CLINICAL ABSTRACT (OUTPATIENT)
Dept: INFUSION THERAPY | Age: 63
End: 2023-11-30

## 2023-12-05 ENCOUNTER — APPOINTMENT (OUTPATIENT)
Dept: CT IMAGING | Age: 63
End: 2023-12-05
Attending: STUDENT IN AN ORGANIZED HEALTH CARE EDUCATION/TRAINING PROGRAM

## 2023-12-05 ENCOUNTER — APPOINTMENT (OUTPATIENT)
Dept: GENERAL RADIOLOGY | Age: 63
End: 2023-12-05
Attending: STUDENT IN AN ORGANIZED HEALTH CARE EDUCATION/TRAINING PROGRAM

## 2023-12-05 ENCOUNTER — APPOINTMENT (OUTPATIENT)
Dept: ULTRASOUND IMAGING | Age: 63
End: 2023-12-05
Attending: INTERNAL MEDICINE

## 2023-12-05 ENCOUNTER — HOSPITAL ENCOUNTER (OUTPATIENT)
Age: 63
Setting detail: OBSERVATION
LOS: 1 days | Discharge: HOSPICE | End: 2023-12-07
Attending: STUDENT IN AN ORGANIZED HEALTH CARE EDUCATION/TRAINING PROGRAM | Admitting: INTERNAL MEDICINE

## 2023-12-05 DIAGNOSIS — E87.0 HYPERNATREMIA: ICD-10-CM

## 2023-12-05 DIAGNOSIS — R65.21 SEPTIC SHOCK (CMD): Primary | ICD-10-CM

## 2023-12-05 DIAGNOSIS — K65.9 PERITONITIS (CMD): ICD-10-CM

## 2023-12-05 DIAGNOSIS — A41.9 SEPTIC SHOCK (CMD): Primary | ICD-10-CM

## 2023-12-05 DIAGNOSIS — I26.99 ACUTE PULMONARY EMBOLISM, UNSPECIFIED PULMONARY EMBOLISM TYPE, UNSPECIFIED WHETHER ACUTE COR PULMONALE PRESENT (CMD): ICD-10-CM

## 2023-12-05 DIAGNOSIS — I61.0 NONTRAUMATIC SUBCORTICAL HEMORRHAGE OF LEFT CEREBRAL HEMISPHERE (CMD): ICD-10-CM

## 2023-12-05 DIAGNOSIS — R53.81 DEBILITY: ICD-10-CM

## 2023-12-05 DIAGNOSIS — J18.9 PNEUMONIA DUE TO INFECTIOUS ORGANISM, UNSPECIFIED LATERALITY, UNSPECIFIED PART OF LUNG: ICD-10-CM

## 2023-12-05 LAB
ALBUMIN SERPL-MCNC: 2.5 G/DL (ref 3.6–5.1)
ALBUMIN/GLOB SERPL: 0.3 {RATIO} (ref 1–2.4)
ALP SERPL-CCNC: 62 UNITS/L (ref 45–117)
ALT SERPL-CCNC: 25 UNITS/L
ANION GAP SERPL CALC-SCNC: 8 MMOL/L (ref 7–19)
APPEARANCE UR: ABNORMAL
APTT PPP: 26 SEC (ref 22–32)
AST SERPL-CCNC: 27 UNITS/L
ATRIAL RATE (BPM): 110
BACTERIA #/AREA URNS HPF: ABNORMAL /HPF
BASOPHILS # BLD: 0.2 K/MCL (ref 0–0.3)
BASOPHILS NFR BLD: 1 %
BILIRUB SERPL-MCNC: 0.2 MG/DL (ref 0.2–1)
BILIRUB UR QL STRIP: NEGATIVE
BUN SERPL-MCNC: 134 MG/DL (ref 6–20)
BUN/CREAT SERPL: 122 (ref 7–25)
C DIFF TOX B TCDB STL QL NAA+PROBE: NOT DETECTED
CALCIUM SERPL-MCNC: 9.9 MG/DL (ref 8.4–10.2)
CHLORIDE SERPL-SCNC: 120 MMOL/L (ref 97–110)
CO2 SERPL-SCNC: 36 MMOL/L (ref 21–32)
COLOR UR: YELLOW
CREAT SERPL-MCNC: 1.1 MG/DL (ref 0.51–0.95)
DEPRECATED RDW RBC: 59.8 FL (ref 39–50)
EGFRCR SERPLBLD CKD-EPI 2021: 57 ML/MIN/{1.73_M2}
ERYTHROCYTE [DISTWIDTH] IN BLOOD: 15.2 % (ref 11–15)
FASTING DURATION TIME PATIENT: ABNORMAL H
GLOBULIN SER-MCNC: 7.3 G/DL (ref 2–4)
GLUCOSE SERPL-MCNC: 175 MG/DL (ref 70–99)
GLUCOSE UR STRIP-MCNC: NEGATIVE MG/DL
HCT VFR BLD CALC: 34.8 % (ref 36–46.5)
HGB BLD-MCNC: 12.1 G/DL (ref 12–15.5)
HGB UR QL STRIP: NEGATIVE
HYALINE CASTS #/AREA URNS LPF: ABNORMAL /LPF
INR PPP: 1.2
KETONES UR STRIP-MCNC: NEGATIVE MG/DL
LACTATE BLDV-SCNC: 2.5 MMOL/L (ref 0–2)
LACTATE BLDV-SCNC: 2.6 MMOL/L (ref 0–2)
LEUKOCYTE ESTERASE UR QL STRIP: ABNORMAL
LYMPHOCYTES # BLD: 2.1 K/MCL (ref 1–4)
LYMPHOCYTES NFR BLD: 10 %
MCH RBC QN AUTO: 31 PG (ref 26–34)
MCHC RBC AUTO-ENTMCNC: 34.8 G/DL (ref 32–36.5)
MCV RBC AUTO: 89.2 FL (ref 78–100)
METAMYELOCYTES NFR BLD: 2 % (ref 0–2)
MONOCYTES # BLD: 2.3 K/MCL (ref 0.3–0.9)
MONOCYTES NFR BLD: 11 %
MUCOUS THREADS URNS QL MICRO: PRESENT
NEUTROPHILS # BLD: 15.9 K/MCL (ref 1.8–7.7)
NEUTS BAND NFR BLD: 1 % (ref 0–10)
NEUTS SEG NFR BLD: 75 %
NITRITE UR QL STRIP: NEGATIVE
NRBC BLD MANUAL-RTO: 0 /100 WBC
NT-PROBNP SERPL-MCNC: 332 PG/ML
P AXIS (DEGREES): 17
PH UR STRIP: 5.5 [PH] (ref 5–7)
PLAT MORPH BLD: NORMAL
PLATELET # BLD AUTO: 928 K/MCL (ref 140–450)
POLYCHROMASIA BLD QL SMEAR: ABNORMAL
POTASSIUM SERPL-SCNC: 4.7 MMOL/L (ref 3.4–5.1)
PR-INTERVAL (MSEC): 122
PROCALCITONIN SERPL IA-MCNC: 0.23 NG/ML
PROT SERPL-MCNC: 9.8 G/DL (ref 6.4–8.2)
PROT UR STRIP-MCNC: 30 MG/DL
PROTHROMBIN TIME: 12.3 SEC (ref 9.7–11.8)
QRS-INTERVAL (MSEC): 64
QT-INTERVAL (MSEC): 348
QTC: 471
R AXIS (DEGREES): -33
RBC # BLD: 3.9 MIL/MCL (ref 4–5.2)
RBC #/AREA URNS HPF: ABNORMAL /HPF
REPORT TEXT: NORMAL
SODIUM SERPL-SCNC: 159 MMOL/L (ref 135–145)
SP GR UR STRIP: 1.02 (ref 1–1.03)
SQUAMOUS #/AREA URNS HPF: ABNORMAL /HPF
T AXIS (DEGREES): 93
TRANS CELLS #/AREA URNS HPF: ABNORMAL /HPF
TROPONIN I SERPL DL<=0.01 NG/ML-MCNC: 16 NG/L
UROBILINOGEN UR STRIP-MCNC: 0.2 MG/DL
VENTRICULAR RATE EKG/MIN (BPM): 110
WBC # BLD: 20.9 K/MCL (ref 4.2–11)
WBC #/AREA URNS HPF: >100 /HPF
WBC MORPH BLD: NORMAL

## 2023-12-05 PROCEDURE — 10004651 HB RX, NO CHARGE ITEM: Performed by: STUDENT IN AN ORGANIZED HEALTH CARE EDUCATION/TRAINING PROGRAM

## 2023-12-05 PROCEDURE — 71045 X-RAY EXAM CHEST 1 VIEW: CPT

## 2023-12-05 PROCEDURE — 10002801 HB RX 250 W/O HCPCS: Performed by: STUDENT IN AN ORGANIZED HEALTH CARE EDUCATION/TRAINING PROGRAM

## 2023-12-05 PROCEDURE — 96365 THER/PROPH/DIAG IV INF INIT: CPT

## 2023-12-05 PROCEDURE — 70450 CT HEAD/BRAIN W/O DYE: CPT

## 2023-12-05 PROCEDURE — 85730 THROMBOPLASTIN TIME PARTIAL: CPT | Performed by: STUDENT IN AN ORGANIZED HEALTH CARE EDUCATION/TRAINING PROGRAM

## 2023-12-05 PROCEDURE — 87077 CULTURE AEROBIC IDENTIFY: CPT | Performed by: STUDENT IN AN ORGANIZED HEALTH CARE EDUCATION/TRAINING PROGRAM

## 2023-12-05 PROCEDURE — 74177 CT ABD & PELVIS W/CONTRAST: CPT

## 2023-12-05 PROCEDURE — 93970 EXTREMITY STUDY: CPT

## 2023-12-05 PROCEDURE — G0378 HOSPITAL OBSERVATION PER HR: HCPCS

## 2023-12-05 PROCEDURE — 10002807 HB RX 258: Performed by: STUDENT IN AN ORGANIZED HEALTH CARE EDUCATION/TRAINING PROGRAM

## 2023-12-05 PROCEDURE — 81001 URINALYSIS AUTO W/SCOPE: CPT | Performed by: STUDENT IN AN ORGANIZED HEALTH CARE EDUCATION/TRAINING PROGRAM

## 2023-12-05 PROCEDURE — 93005 ELECTROCARDIOGRAM TRACING: CPT | Performed by: STUDENT IN AN ORGANIZED HEALTH CARE EDUCATION/TRAINING PROGRAM

## 2023-12-05 PROCEDURE — 36415 COLL VENOUS BLD VENIPUNCTURE: CPT

## 2023-12-05 PROCEDURE — 96367 TX/PROPH/DG ADDL SEQ IV INF: CPT

## 2023-12-05 PROCEDURE — 84484 ASSAY OF TROPONIN QUANT: CPT | Performed by: STUDENT IN AN ORGANIZED HEALTH CARE EDUCATION/TRAINING PROGRAM

## 2023-12-05 PROCEDURE — 83880 ASSAY OF NATRIURETIC PEPTIDE: CPT | Performed by: STUDENT IN AN ORGANIZED HEALTH CARE EDUCATION/TRAINING PROGRAM

## 2023-12-05 PROCEDURE — 87040 BLOOD CULTURE FOR BACTERIA: CPT | Performed by: STUDENT IN AN ORGANIZED HEALTH CARE EDUCATION/TRAINING PROGRAM

## 2023-12-05 PROCEDURE — 10002800 HB RX 250 W HCPCS: Performed by: STUDENT IN AN ORGANIZED HEALTH CARE EDUCATION/TRAINING PROGRAM

## 2023-12-05 PROCEDURE — 84145 PROCALCITONIN (PCT): CPT | Performed by: STUDENT IN AN ORGANIZED HEALTH CARE EDUCATION/TRAINING PROGRAM

## 2023-12-05 PROCEDURE — 71275 CT ANGIOGRAPHY CHEST: CPT

## 2023-12-05 PROCEDURE — 80053 COMPREHEN METABOLIC PANEL: CPT | Performed by: STUDENT IN AN ORGANIZED HEALTH CARE EDUCATION/TRAINING PROGRAM

## 2023-12-05 PROCEDURE — 83605 ASSAY OF LACTIC ACID: CPT | Performed by: STUDENT IN AN ORGANIZED HEALTH CARE EDUCATION/TRAINING PROGRAM

## 2023-12-05 PROCEDURE — 85027 COMPLETE CBC AUTOMATED: CPT | Performed by: STUDENT IN AN ORGANIZED HEALTH CARE EDUCATION/TRAINING PROGRAM

## 2023-12-05 PROCEDURE — 10002805 HB CONTRAST AGENT: Performed by: STUDENT IN AN ORGANIZED HEALTH CARE EDUCATION/TRAINING PROGRAM

## 2023-12-05 PROCEDURE — 10002807 HB RX 258: Performed by: INTERNAL MEDICINE

## 2023-12-05 PROCEDURE — 99285 EMERGENCY DEPT VISIT HI MDM: CPT

## 2023-12-05 PROCEDURE — 87493 C DIFF AMPLIFIED PROBE: CPT | Performed by: STUDENT IN AN ORGANIZED HEALTH CARE EDUCATION/TRAINING PROGRAM

## 2023-12-05 PROCEDURE — 85610 PROTHROMBIN TIME: CPT | Performed by: STUDENT IN AN ORGANIZED HEALTH CARE EDUCATION/TRAINING PROGRAM

## 2023-12-05 RX ORDER — AMOXICILLIN 250 MG
2 CAPSULE ORAL 2 TIMES DAILY PRN
Status: DISCONTINUED | OUTPATIENT
Start: 2023-12-05 | End: 2023-12-07 | Stop reason: HOSPADM

## 2023-12-05 RX ORDER — BISACODYL 10 MG
10 SUPPOSITORY, RECTAL RECTAL DAILY PRN
Status: DISCONTINUED | OUTPATIENT
Start: 2023-12-05 | End: 2023-12-07 | Stop reason: HOSPADM

## 2023-12-05 RX ORDER — ONDANSETRON 4 MG/1
4 TABLET, ORALLY DISINTEGRATING ORAL EVERY 12 HOURS PRN
Status: DISCONTINUED | OUTPATIENT
Start: 2023-12-05 | End: 2023-12-07 | Stop reason: HOSPADM

## 2023-12-05 RX ORDER — MORPHINE SULFATE 20 MG/ML
5 SOLUTION ORAL
Status: DISCONTINUED | OUTPATIENT
Start: 2023-12-05 | End: 2023-12-07 | Stop reason: HOSPADM

## 2023-12-05 RX ORDER — METOCLOPRAMIDE 5 MG/1
5 TABLET ORAL EVERY 6 HOURS PRN
Status: DISCONTINUED | OUTPATIENT
Start: 2023-12-05 | End: 2023-12-07 | Stop reason: HOSPADM

## 2023-12-05 RX ORDER — LORAZEPAM 2 MG/ML
0.5 INJECTION INTRAMUSCULAR
Status: DISCONTINUED | OUTPATIENT
Start: 2023-12-05 | End: 2023-12-07 | Stop reason: HOSPADM

## 2023-12-05 RX ORDER — ONDANSETRON 2 MG/ML
4 INJECTION INTRAMUSCULAR; INTRAVENOUS EVERY 12 HOURS PRN
Status: DISCONTINUED | OUTPATIENT
Start: 2023-12-05 | End: 2023-12-07 | Stop reason: HOSPADM

## 2023-12-05 RX ORDER — MORPHINE SULFATE 15 MG/1
7.5 TABLET ORAL
Status: DISCONTINUED | OUTPATIENT
Start: 2023-12-05 | End: 2023-12-07 | Stop reason: HOSPADM

## 2023-12-05 RX ORDER — MAGNESIUM HYDROXIDE/ALUMINUM HYDROXICE/SIMETHICONE 120; 1200; 1200 MG/30ML; MG/30ML; MG/30ML
30 SUSPENSION ORAL PRN
Status: DISCONTINUED | OUTPATIENT
Start: 2023-12-05 | End: 2023-12-07 | Stop reason: HOSPADM

## 2023-12-05 RX ORDER — 0.9 % SODIUM CHLORIDE 0.9 %
2 VIAL (ML) INJECTION EVERY 12 HOURS SCHEDULED
Status: DISCONTINUED | OUTPATIENT
Start: 2023-12-05 | End: 2023-12-07 | Stop reason: HOSPADM

## 2023-12-05 RX ORDER — SCOLOPAMINE TRANSDERMAL SYSTEM 1 MG/1
1 PATCH, EXTENDED RELEASE TRANSDERMAL
Status: DISCONTINUED | OUTPATIENT
Start: 2023-12-05 | End: 2023-12-07 | Stop reason: HOSPADM

## 2023-12-05 RX ORDER — DEXTROSE MONOHYDRATE 50 MG/ML
INJECTION, SOLUTION INTRAVENOUS CONTINUOUS
Status: DISCONTINUED | OUTPATIENT
Start: 2023-12-05 | End: 2023-12-05

## 2023-12-05 RX ORDER — LIDOCAINE HYDROCHLORIDE 20 MG/ML
10 JELLY TOPICAL PRN
Status: DISCONTINUED | OUTPATIENT
Start: 2023-12-05 | End: 2023-12-07 | Stop reason: HOSPADM

## 2023-12-05 RX ORDER — LORAZEPAM 0.5 MG/1
0.5 TABLET ORAL
Status: DISCONTINUED | OUTPATIENT
Start: 2023-12-05 | End: 2023-12-07 | Stop reason: HOSPADM

## 2023-12-05 RX ORDER — SODIUM CHLORIDE 9 MG/ML
INJECTION, SOLUTION INTRAVENOUS CONTINUOUS
Status: DISCONTINUED | OUTPATIENT
Start: 2023-12-05 | End: 2023-12-06

## 2023-12-05 RX ORDER — GLYCOPYRROLATE 0.2 MG/ML
0.2 INJECTION, SOLUTION INTRAMUSCULAR; INTRAVENOUS EVERY 4 HOURS PRN
Status: DISCONTINUED | OUTPATIENT
Start: 2023-12-05 | End: 2023-12-07 | Stop reason: HOSPADM

## 2023-12-05 RX ORDER — CARBOXYMETHYLCELLULOSE SODIUM 5 MG/ML
1 SOLUTION/ DROPS OPHTHALMIC PRN
Status: DISCONTINUED | OUTPATIENT
Start: 2023-12-05 | End: 2023-12-07 | Stop reason: HOSPADM

## 2023-12-05 RX ORDER — LACTULOSE 10 G/15ML
20 SOLUTION ORAL DAILY PRN
Status: DISCONTINUED | OUTPATIENT
Start: 2023-12-05 | End: 2023-12-07 | Stop reason: HOSPADM

## 2023-12-05 RX ORDER — METOCLOPRAMIDE HYDROCHLORIDE 5 MG/ML
5 INJECTION INTRAMUSCULAR; INTRAVENOUS EVERY 6 HOURS PRN
Status: DISCONTINUED | OUTPATIENT
Start: 2023-12-05 | End: 2023-12-07 | Stop reason: HOSPADM

## 2023-12-05 RX ADMIN — PIPERACILLIN SODIUM AND TAZOBACTAM SODIUM 3.38 G: 3; .375 INJECTION, POWDER, FOR SOLUTION INTRAVENOUS at 11:44

## 2023-12-05 RX ADMIN — DOXYCYCLINE 100 MG: 100 INJECTION, POWDER, LYOPHILIZED, FOR SOLUTION INTRAVENOUS at 15:38

## 2023-12-05 RX ADMIN — SODIUM CHLORIDE: 9 INJECTION, SOLUTION INTRAVENOUS at 21:47

## 2023-12-05 RX ADMIN — SODIUM CHLORIDE, PRESERVATIVE FREE 2 ML: 5 INJECTION INTRAVENOUS at 21:46

## 2023-12-05 RX ADMIN — IOHEXOL 80 ML: 350 INJECTION, SOLUTION INTRAVENOUS at 13:00

## 2023-12-05 RX ADMIN — DEXTROSE MONOHYDRATE: 50 INJECTION, SOLUTION INTRAVENOUS at 17:45

## 2023-12-05 RX ADMIN — SODIUM CHLORIDE 1000 ML: 9 INJECTION, SOLUTION INTRAVENOUS at 11:37

## 2023-12-05 RX ADMIN — SODIUM CHLORIDE 1000 ML: 9 INJECTION, SOLUTION INTRAVENOUS at 13:34

## 2023-12-05 RX ADMIN — VANCOMYCIN HYDROCHLORIDE 1000 MG: 1 INJECTION, POWDER, LYOPHILIZED, FOR SOLUTION INTRAVENOUS at 12:24

## 2023-12-05 RX ADMIN — SODIUM CHLORIDE 500 ML: 9 INJECTION, SOLUTION INTRAVENOUS at 11:45

## 2023-12-05 SDOH — ECONOMIC STABILITY: FOOD INSECURITY: HOW OFTEN IN THE PAST 12 MONTHS WERE YOU WORRIED OR STRESSED ABOUT HAVING ENOUGH MONEY TO BUY NUTRITIOUS MEALS?: NEVER

## 2023-12-05 SDOH — ECONOMIC STABILITY: GENERAL

## 2023-12-05 SDOH — HEALTH STABILITY: PHYSICAL HEALTH: DO YOU HAVE DIFFICULTY DRESSING OR BATHING?: YES

## 2023-12-05 SDOH — ECONOMIC STABILITY: HOUSING INSECURITY: ARE YOU WORRIED ABOUT LOSING YOUR HOUSING?: NO

## 2023-12-05 SDOH — HEALTH STABILITY: GENERAL: BECAUSE OF A PHYSICAL, MENTAL, OR EMOTIONAL CONDITION, DO YOU HAVE DIFFICULTY DOING ERRANDS ALONE?: YES

## 2023-12-05 SDOH — ECONOMIC STABILITY: HOUSING INSECURITY: WHAT IS YOUR LIVING SITUATION TODAY?: LONG TERM CARE FACILITY

## 2023-12-05 SDOH — SOCIAL STABILITY: SOCIAL NETWORK

## 2023-12-05 SDOH — ECONOMIC STABILITY: HOUSING INSECURITY: WHAT IS YOUR LIVING SITUATION TODAY?: OTHER FACILITY RESIDENTS

## 2023-12-05 SDOH — HEALTH STABILITY: PHYSICAL HEALTH: DO YOU HAVE SERIOUS DIFFICULTY WALKING OR CLIMBING STAIRS?: YES

## 2023-12-05 ASSESSMENT — ACTIVITIES OF DAILY LIVING (ADL)
ADL_BEFORE_ADMISSION: NEEDS/REQUIRES ASSISTANCE
ADL_SCORE: 0
DRESSING: DEPENDENT
TOILETING: DEPENDENT
RECENT_DECLINE_ADL: YES, DECLINE IN AMBULATION/TRANSFERRING, COLLABORATE WITH PROVIDER (T)
FEEDING: DEPENDENT
BATHING: DEPENDENT
ADL_SHORT_OF_BREATH: NO

## 2023-12-05 ASSESSMENT — LIFESTYLE VARIABLES
ALCOHOL_USE_STATUS: NO OR LOW RISK WITH VALIDATED TOOL
AUDIT-C TOTAL SCORE: 0
HOW MANY STANDARD DRINKS CONTAINING ALCOHOL DO YOU HAVE ON A TYPICAL DAY: 0,1 OR 2
HOW OFTEN DO YOU HAVE A DRINK CONTAINING ALCOHOL: NEVER
HOW OFTEN DO YOU HAVE 6 OR MORE DRINKS ON ONE OCCASION: NEVER

## 2023-12-05 ASSESSMENT — PATIENT HEALTH QUESTIONNAIRE - PHQ9: IS PATIENT ABLE TO COMPLETE PHQ2 OR PHQ9: NO, PATIENT WILL NEVER BE ABLE TO COMPLETE

## 2023-12-05 ASSESSMENT — COLUMBIA-SUICIDE SEVERITY RATING SCALE - C-SSRS: IS THE PATIENT ABLE TO COMPLETE C-SSRS: NO, DEFER TO LATER TIME

## 2023-12-06 LAB
ANION GAP SERPL CALC-SCNC: 5 MMOL/L (ref 7–19)
BUN SERPL-MCNC: 56 MG/DL (ref 6–20)
BUN/CREAT SERPL: 98 (ref 7–25)
CALCIUM SERPL-MCNC: 8.5 MG/DL (ref 8.4–10.2)
CHLORIDE SERPL-SCNC: 133 MMOL/L (ref 97–110)
CO2 SERPL-SCNC: 33 MMOL/L (ref 21–32)
CREAT SERPL-MCNC: 0.57 MG/DL (ref 0.51–0.95)
DEPRECATED RDW RBC: 62.8 FL (ref 39–50)
EGFRCR SERPLBLD CKD-EPI 2021: >90 ML/MIN/{1.73_M2}
EOSINOPHIL # BLD: 0.8 K/MCL (ref 0–0.5)
EOSINOPHIL NFR BLD: 8 %
ERYTHROCYTE [DISTWIDTH] IN BLOOD: 15.2 % (ref 11–15)
FASTING DURATION TIME PATIENT: ABNORMAL H
GLUCOSE SERPL-MCNC: 114 MG/DL (ref 70–99)
HCT VFR BLD CALC: 28.8 % (ref 36–46.5)
HGB BLD-MCNC: 8.8 G/DL (ref 12–15.5)
LYMPHOCYTES # BLD: 1.5 K/MCL (ref 1–4)
LYMPHOCYTES NFR BLD: 14 %
MACROCYTES BLD QL SMEAR: ABNORMAL
MAGNESIUM SERPL-MCNC: 2.6 MG/DL (ref 1.7–2.4)
MCH RBC QN AUTO: 31.1 PG (ref 26–34)
MCHC RBC AUTO-ENTMCNC: 30.5 G/DL (ref 32–36.5)
MCV RBC AUTO: 101.8 FL (ref 78–100)
METAMYELOCYTES NFR BLD: 1 % (ref 0–2)
MONOCYTES # BLD: 0.5 K/MCL (ref 0.3–0.9)
MONOCYTES NFR BLD: 5 %
MYELOCYTES # BLD MANUAL: 1 %
NEUTROPHILS # BLD: 7.5 K/MCL (ref 1.8–7.7)
NEUTS BAND NFR BLD: 2 % (ref 0–10)
NEUTS SEG NFR BLD: 69 %
NRBC BLD MANUAL-RTO: 0 /100 WBC
PHOSPHATE SERPL-MCNC: 2.4 MG/DL (ref 2.4–4.7)
PLAT MORPH BLD: NORMAL
PLATELET # BLD AUTO: 492 K/MCL (ref 140–450)
POTASSIUM SERPL-SCNC: 3.8 MMOL/L (ref 3.4–5.1)
RBC # BLD: 2.83 MIL/MCL (ref 4–5.2)
SODIUM SERPL-SCNC: 167 MMOL/L (ref 135–145)
WBC # BLD: 10.6 K/MCL (ref 4.2–11)
WBC MORPH BLD: NORMAL

## 2023-12-06 PROCEDURE — 10002801 HB RX 250 W/O HCPCS: Performed by: INTERNAL MEDICINE

## 2023-12-06 PROCEDURE — 10002803 HB RX 637: Performed by: INTERNAL MEDICINE

## 2023-12-06 PROCEDURE — G0378 HOSPITAL OBSERVATION PER HR: HCPCS

## 2023-12-06 PROCEDURE — 85027 COMPLETE CBC AUTOMATED: CPT | Performed by: INTERNAL MEDICINE

## 2023-12-06 PROCEDURE — 10002807 HB RX 258: Performed by: INTERNAL MEDICINE

## 2023-12-06 PROCEDURE — 83735 ASSAY OF MAGNESIUM: CPT | Performed by: INTERNAL MEDICINE

## 2023-12-06 PROCEDURE — 36415 COLL VENOUS BLD VENIPUNCTURE: CPT | Performed by: INTERNAL MEDICINE

## 2023-12-06 PROCEDURE — G0316 PROLONGED IP OR OBS CARE EM SERVICE BEYOND PRIM SERVICE EA ADD 15 MIN: HCPCS | Performed by: NURSE PRACTITIONER

## 2023-12-06 PROCEDURE — 96375 TX/PRO/DX INJ NEW DRUG ADDON: CPT

## 2023-12-06 PROCEDURE — 80048 BASIC METABOLIC PNL TOTAL CA: CPT | Performed by: INTERNAL MEDICINE

## 2023-12-06 PROCEDURE — 84100 ASSAY OF PHOSPHORUS: CPT | Performed by: INTERNAL MEDICINE

## 2023-12-06 PROCEDURE — 10004651 HB RX, NO CHARGE ITEM: Performed by: INTERNAL MEDICINE

## 2023-12-06 PROCEDURE — 99223 1ST HOSP IP/OBS HIGH 75: CPT | Performed by: NURSE PRACTITIONER

## 2023-12-06 RX ORDER — ACETAMINOPHEN 325 MG/1
650 TABLET ORAL EVERY 4 HOURS PRN
Status: DISCONTINUED | OUTPATIENT
Start: 2023-12-06 | End: 2023-12-07 | Stop reason: HOSPADM

## 2023-12-06 RX ORDER — DEXTROSE MONOHYDRATE 50 MG/ML
INJECTION, SOLUTION INTRAVENOUS CONTINUOUS
Status: DISCONTINUED | OUTPATIENT
Start: 2023-12-06 | End: 2023-12-07 | Stop reason: HOSPADM

## 2023-12-06 RX ORDER — CEFAZOLIN SODIUM/WATER 1 G/10 ML
1000 SYRINGE (ML) INTRAVENOUS DAILY
Status: DISCONTINUED | OUTPATIENT
Start: 2023-12-06 | End: 2023-12-07

## 2023-12-06 RX ADMIN — DOXYCYCLINE 100 MG: 100 INJECTION, POWDER, LYOPHILIZED, FOR SOLUTION INTRAVENOUS at 10:02

## 2023-12-06 RX ADMIN — PANTOPRAZOLE 40 MG: 40 TABLET, DELAYED RELEASE ORAL at 10:01

## 2023-12-06 RX ADMIN — DEXTROSE MONOHYDRATE: 50 INJECTION, SOLUTION INTRAVENOUS at 16:00

## 2023-12-06 RX ADMIN — ACETAMINOPHEN 650 MG: 325 TABLET ORAL at 16:48

## 2023-12-06 ASSESSMENT — PAIN SCALES - PAIN ASSESSMENT IN ADVANCED DEMENTIA (PAINAD)
FACIALEXPRESSION: SMILING OR INEXPRESSIVE
BREATHING: NORMAL
TOTALSCORE: 0
BODYLANGUAGE: RELAXED
CONSOLABILITY: NO NEED TO CONSOLE

## 2023-12-06 ASSESSMENT — COGNITIVE AND FUNCTIONAL STATUS - GENERAL: BECAUSE OF A PHYSICAL, MENTAL, OR EMOTIONAL CONDITION, DO YOU HAVE DIFFICULTY DOING ERRANDS ALONE: YES

## 2023-12-07 VITALS
SYSTOLIC BLOOD PRESSURE: 134 MMHG | OXYGEN SATURATION: 95 % | BODY MASS INDEX: 22.22 KG/M2 | DIASTOLIC BLOOD PRESSURE: 89 MMHG | TEMPERATURE: 96.1 F | RESPIRATION RATE: 20 BRPM | HEART RATE: 69 BPM | WEIGHT: 110.23 LBS | HEIGHT: 59 IN

## 2023-12-07 PROBLEM — I61.9 HEMORRHAGIC CEREBROVASCULAR ACCIDENT (CVA) (CMD): Status: ACTIVE | Noted: 2023-01-01

## 2023-12-07 LAB — RAINBOW EXTRA TUBES HOLD SPECIMEN: NORMAL

## 2023-12-07 PROCEDURE — 10002807 HB RX 258: Performed by: INTERNAL MEDICINE

## 2023-12-07 PROCEDURE — G0378 HOSPITAL OBSERVATION PER HR: HCPCS

## 2023-12-07 RX ORDER — LORAZEPAM 2 MG/ML
0.5 INJECTION INTRAMUSCULAR
Status: CANCELLED | OUTPATIENT
Start: 2023-12-07

## 2023-12-07 RX ORDER — ACETAMINOPHEN 650 MG/1
650 SUPPOSITORY RECTAL EVERY 4 HOURS PRN
Status: CANCELLED | OUTPATIENT
Start: 2023-12-07

## 2023-12-07 RX ORDER — ONDANSETRON 2 MG/ML
4 INJECTION INTRAMUSCULAR; INTRAVENOUS EVERY 12 HOURS PRN
Status: CANCELLED | OUTPATIENT
Start: 2023-12-07

## 2023-12-07 RX ORDER — LIDOCAINE HYDROCHLORIDE 20 MG/ML
10 JELLY TOPICAL PRN
Status: CANCELLED | OUTPATIENT
Start: 2023-12-07

## 2023-12-07 RX ORDER — SCOLOPAMINE TRANSDERMAL SYSTEM 1 MG/1
1 PATCH, EXTENDED RELEASE TRANSDERMAL
Status: CANCELLED | OUTPATIENT
Start: 2023-12-07

## 2023-12-07 RX ORDER — GLYCOPYRROLATE 0.2 MG/ML
0.2 INJECTION, SOLUTION INTRAMUSCULAR; INTRAVENOUS EVERY 4 HOURS PRN
Status: CANCELLED | OUTPATIENT
Start: 2023-12-07

## 2023-12-07 RX ORDER — 0.9 % SODIUM CHLORIDE 0.9 %
2 VIAL (ML) INJECTION EVERY 12 HOURS SCHEDULED
Status: CANCELLED | OUTPATIENT
Start: 2023-12-07

## 2023-12-07 RX ORDER — CARBOXYMETHYLCELLULOSE SODIUM 5 MG/ML
1 SOLUTION/ DROPS OPHTHALMIC PRN
Status: CANCELLED | OUTPATIENT
Start: 2023-12-07

## 2023-12-07 RX ORDER — ACETAMINOPHEN 325 MG/1
650 TABLET ORAL EVERY 4 HOURS PRN
Status: CANCELLED | OUTPATIENT
Start: 2023-12-07

## 2023-12-07 RX ORDER — BISACODYL 10 MG
10 SUPPOSITORY, RECTAL RECTAL DAILY PRN
Status: CANCELLED | OUTPATIENT
Start: 2023-12-07

## 2023-12-07 RX ADMIN — DEXTROSE MONOHYDRATE: 50 INJECTION, SOLUTION INTRAVENOUS at 04:55

## 2023-12-07 ASSESSMENT — PAIN SCALES - PAIN ASSESSMENT IN ADVANCED DEMENTIA (PAINAD)
FACIALEXPRESSION: SMILING OR INEXPRESSIVE
TOTALSCORE: 0
CONSOLABILITY: NO NEED TO CONSOLE
BREATHING: NORMAL
BODYLANGUAGE: RELAXED

## 2023-12-09 LAB
BACTERIA BLD CULT: NORMAL
BACTERIA BLD CULT: NORMAL
BACTERIA UR CULT: ABNORMAL
BACTERIA UR CULT: ABNORMAL

## 2023-12-10 LAB
BACTERIA BLD CULT: NORMAL
BACTERIA BLD CULT: NORMAL

## 2024-12-23 NOTE — MR AVS SNAPSHOT
Caller: Shaw Rodriguez    Relationship: Self    Best call back number: [phone]     What was the call regarding:   PATIENT WOULD LIKE FOR HIS REFERRAL TO THE ENDOCRINOLOGIST TO BE SENT TO ONE IN [facility] OR [facility] SINCE THESE ARE A LOT CLOSER TO HIM    After Visit Summary   4/8/2020    Olivier Bolden    MRN: [id number]           Visit Information     Date & Time  4/8/2020  3:00 PM Provider   [address] - Infusion Dept.  Phone  [phone]      Your Aydee TABLET BY MOUTH EVERY 4 HOURS AS NEEDED FOR PAIN. DO NOT TAKE MORE THAN 6 PILLS PER DAY. OMEPRAZOLE 40 MG Oral Capsule Delayed Release TAKE 1 CAPSULE BY MOUTH EVERY DAY    Calcium Citrate (CITRACAL OR) Take 2 capsules by mouth daily.     Cholecalciferol 4/20/2020 3:00 PM EM CC INFRN [address] - Infusion    4/21/2020 2:30 PM EM CC INFRN [address] - Infusion    4/22/2020 4:00 PM EM CC INFRN [address] - Infusion    4/23/2020 2:00 PM EM CC If you receive a survey from [employer], please take a few minutes to complete it and provide feedback. We strive to deliver the best patient experience and are looking for ways to make improvements. Your feedback will help us do so.  For more information EMERGENCY ROOM Life-threatening emergencies needing immediate intervention at a hospital emergency room.  Average cost  $2,300*   *Cost varies based on your insurance coverage  For more information about hours, locations or appointment options available at

## (undated) DEVICE — ENCORE® LATEX ACCLAIM SIZE 8.5, STERILE LATEX POWDER-FREE SURGICAL GLOVE: Brand: ENCORE

## (undated) DEVICE — TOTAL KNEE: Brand: MEDLINE INDUSTRIES, INC.

## (undated) DEVICE — SOL  .9 3000ML

## (undated) DEVICE — SUTURE VICRYL 2-0 CT-1

## (undated) DEVICE — 2.3MM X 19.0MM TAPERED ROUTER

## (undated) DEVICE — HOOD: Brand: FLYTE

## (undated) DEVICE — GAMMEX® PI HYBRID SIZE 8, STERILE POWDER-FREE SURGICAL GLOVE, POLYISOPRENE AND NEOPRENE BLEND: Brand: GAMMEX

## (undated) DEVICE — GLOVE SURG 7.5 PROTEXIS LF BLUE PF SMTH BEAD CUFF INTLK STRL

## (undated) DEVICE — SUTURE PRMHND 2-0 30IN SILK BRAID TIES 10 STRN

## (undated) DEVICE — STERILE TETRA-FLEX CF, ELASTIC BANDAGE, 4" X 5.5YD: Brand: TETRA-FLEX™CF

## (undated) DEVICE — SUTURE ETHILON MTPS 3-0 PS2 18IN MONO NABSB BLK 1669H

## (undated) DEVICE — POLAR CARE CUBE COOLING UNIT

## (undated) DEVICE — SUTURE PRMHND SUTUPAK 3-0 30IN SILK BRAID TIES 10

## (undated) DEVICE — GAMMEX® PI HYBRID SIZE 7.5, STERILE POWDER-FREE SURGICAL GLOVE, POLYISOPRENE AND NEOPRENE BLEND: Brand: GAMMEX

## (undated) DEVICE — FIBERWIRE 2.0 AR-7220

## (undated) DEVICE — Device

## (undated) DEVICE — 4.0MM EGG

## (undated) DEVICE — DRESSING BORDER AQUACEL 4X10

## (undated) DEVICE — DUAL CUT SAGITTAL BLADE

## (undated) DEVICE — DRAPE C-ARM UNIVERSAL

## (undated) DEVICE — 3M™ IOBAN™ 2 ANTIMICROBIAL INCISE DRAPE 6640EZ: Brand: IOBAN™ 2

## (undated) DEVICE — 2T11 #2 PDO 36 X 36: Brand: 2T11 #2 PDO 36 X 36

## (undated) DEVICE — POUCH INST 11X7IN 2 ADH STRIP 2 CMPRT STRL STRDRP PLASTIC

## (undated) DEVICE — DRESSING 10X4IN ANMC SAFETAC

## (undated) DEVICE — GAMMEX® PI HYBRID SIZE 6, STERILE POWDER-FREE SURGICAL GLOVE, POLYISOPRENE AND NEOPRENE BLEND: Brand: GAMMEX

## (undated) DEVICE — GAMMEX® PI HYBRID SIZE 6.5, STERILE POWDER-FREE SURGICAL GLOVE, POLYISOPRENE AND NEOPRENE BLEND: Brand: GAMMEX

## (undated) DEVICE — SUTURE ETHIBOND 1 CT-1

## (undated) DEVICE — NEEDLE SPINAL 20X3-1/2 YELLOW

## (undated) DEVICE — ZIMMER® STERILE DISPOSABLE TOURNIQUET CUFF WITH PLC, DUAL PORT, SINGLE BLADDER, 30 IN. (76 CM)

## (undated) DEVICE — GAUZE SPONGES,12 PLY: Brand: CURITY

## (undated) DEVICE — SUTURE STRATAFIX PDS PLUS 45CM

## (undated) DEVICE — BLADE SAW SAGITTAL 19.5

## (undated) DEVICE — DRAPE SRG 70X60IN SPLT U IMPRV

## (undated) DEVICE — CHLORAPREP ORANGE TINT 10.5ML

## (undated) DEVICE — INTENDED FOR TISSUE SEPARATION, AND OTHER PROCEDURES THAT REQUIRE A SHARP SURGICAL BLADE TO PUNCTURE OR CUT.: Brand: BARD-PARKER ® STAINLESS STEEL BLADES

## (undated) DEVICE — CONTAINER SPEC STR 4OZ GRY LID

## (undated) DEVICE — DEV STRATAFIX PDS + SUTR 0 CTX

## (undated) DEVICE — MEDI-VAC NON-CONDUCTIVE SUCTION TUBING: Brand: CARDINAL HEALTH

## (undated) DEVICE — CULTURE TUBE ANAEROBIC

## (undated) DEVICE — DRAPE SHEET LG

## (undated) DEVICE — 6.0MM ACORN

## (undated) DEVICE — GAMMEX® NON-LATEX PI ORTHO SIZE 8.5, STERILE POLYISOPRENE POWDER-FREE SURGICAL GLOVE: Brand: GAMMEX

## (undated) DEVICE — BOWL CEMENT MIX QUICK-VAC

## (undated) DEVICE — 3M™ STERI-DRAPE™ INSTRUMENT POUCH 1018: Brand: STERI-DRAPE™

## (undated) DEVICE — SUTURE VICRYL 2-0 CT-2

## (undated) DEVICE — BATTERY

## (undated) DEVICE — Device: Brand: POWER-FLO®

## (undated) DEVICE — NEEDLE HPO 25GA 1.5IN REG WALL REG BVL LL HUB DEHP-FR STRL

## (undated) DEVICE — SPONGE LAPAROTOMY 18X18IN STERILE 5 PK

## (undated) DEVICE — SYRINGE MNJCT 10ML LF STRL REG

## (undated) DEVICE — SUTURE VLOC 90 3-0 23\" 0034

## (undated) DEVICE — 60 ML SYRINGE LUER-LOCK TIP: Brand: MONOJECT

## (undated) DEVICE — SOL  .9 1000ML BTL

## (undated) DEVICE — T5 HOOD WITH PEEL AWAY FACE SHIELD

## (undated) DEVICE — KNEE IMMOBILIZER: Brand: DEROYAL

## (undated) DEVICE — 4.0MM EGG BUR

## (undated) DEVICE — 48MM HEADED SCREW-Z

## (undated) DEVICE — SYRINGE 50ML GRAD N-PYRG DEHP-FR PVC FREE STRL CATHTP LF

## (undated) DEVICE — COTTON UNDERCAST PADDING,REGULAR FINISH: Brand: WEBRIL

## (undated) DEVICE — GAMMEX® PI HYBRID SIZE 8.5, STERILE POWDER-FREE SURGICAL GLOVE, POLYISOPRENE AND NEOPRENE BLEND: Brand: GAMMEX

## (undated) DEVICE — PADDING 4YDX6IN CTTN STRL WBRL

## (undated) DEVICE — DERMABOND LIQUID ADHESIVE

## (undated) DEVICE — SOLUTION IRR 1000ML 0.9% NACL PLASTIC POUR BTL ISTNC N-PYRG

## (undated) DEVICE — 3M™ IOBAN™ 2 ANTIMICROBIAL INCISE DRAPE 6650EZ: Brand: IOBAN™ 2

## (undated) DEVICE — INTENDED TO BE USED TO OCCLUDE, RETRACT AND IDENTIFY ARTERIES, VEINS, TENDONS AND NERVES IN SURGICAL PROCEDURES: Brand: STERION®  VESSEL LOOP

## (undated) DEVICE — Device: Brand: STABLECUT®

## (undated) DEVICE — SUTURE MONOCRYL 2-0 Y945H

## (undated) DEVICE — TRAY FOLEY BDX 16F STATLOCK

## (undated) DEVICE — BAG DRN 4000ML LG CAPACITY RND TRDRP CNTR ENTRY SMPL PORT

## (undated) DEVICE — GOWN SURG SM MED STD L3 NONREINFORCE SET IN SLV STRL LF DISP

## (undated) DEVICE — CHLORAPREP 26ML APPLICATOR

## (undated) DEVICE — GLOVE SURG 6.5 PROTEXIS LF CRM PF BEAD CUFF STRL PLISPRN

## (undated) DEVICE — SUTURE VICRYL 3-0 SH

## (undated) DEVICE — GLOVE SURG 7.5 PROTEXIS LF CRM PF SMTH BEAD CUFF STRL

## (undated) DEVICE — TUBE GSTRM 20FR WHT MIC RADOPQ UNV FEED PORT CNCT INTERNAL

## (undated) DEVICE — SPECIALTY ARM SLING: Brand: DEROYAL

## (undated) DEVICE — SOL H2O 1000ML BTL

## (undated) DEVICE — GAMMEX® NON-LATEX PI ORTHO SIZE 8, STERILE POLYISOPRENE POWDER-FREE SURGICAL GLOVE: Brand: GAMMEX

## (undated) DEVICE — Device: Brand: K-WIRE THREADED TIP

## (undated) DEVICE — PROXIMATE SKIN STAPLERS (35 WIDE) CONTAINS 35 STAINLESS STEEL STAPLES (FIXED HEAD): Brand: PROXIMATE

## (undated) DEVICE — SYRINGE 10ML LP FX MALE LL FIT THUMB RING MED NS ANGIO

## (undated) DEVICE — Device: Brand: DRILL BIT

## (undated) DEVICE — TOWEL OR BLU 16X26 STRL

## (undated) DEVICE — SUTURE PDS2 0 CTX 60IN MONO LOOP ABS VIOL

## (undated) DEVICE — FAN SPRAY KIT: Brand: PULSAVAC®

## (undated) DEVICE — INTENT TO BE USED WITH SUTURE MATERIAL FOR TISSUE CLOSURE: Brand: RICHARD-ALLAN® NEEDLE 3/8 CIRCLE TROCAR

## (undated) DEVICE — ELECTRODE BALL 5MM DBL-511

## (undated) DEVICE — SUTURE VICRYL 3-0 SH 27IN BRAID COAT ABS UNDYED J416H

## (undated) DEVICE — 20 ML SYRINGE LUER-LOCK TIP: Brand: MONOJECT

## (undated) DEVICE — SUTURE PRMHND 2-0 SH 18IN SILK CNTRL RELS BRAID 8 STRN NABSB C012D

## (undated) DEVICE — CANISTER WND DRN 500ML INFOVAC GEL TUBE CLAMP CNCT VAC ULTA

## (undated) DEVICE — ZIMMER® STERILE DISPOSABLE TOURNIQUET CUFF WITH PLC, DUAL PORT, SINGLE BLADDER, 34 IN. (86 CM)

## (undated) DEVICE — BANDAGE ROLL,100% COTTON, 6 PLY, LARGE: Brand: KERLIX

## (undated) DEVICE — SUTURE VICRYL 0 CP-1

## (undated) DEVICE — PAD ABD 9X5IN CELLULOSE ABS NONWOVEN HDRPHB BACK SEAL EDGE

## (undated) DEVICE — CULTURE COLLECT/TRANSPORT SYS

## (undated) DEVICE — ABDOMINAL PAD: Brand: CURITY

## (undated) DEVICE — BANDAGE GAUZE BLK2 4.1YDX4.5IN 6 PLY OPEN WEAVE TIGHT FNSH

## (undated) DEVICE — PAD THRP 16IN WRPON MU LNG STM

## (undated) DEVICE — ELECTRODE PT RTN C30- LB 9FT CORD NONIRRITATE NONSENSITIZE

## (undated) DEVICE — STAPLER SKIN 3.9X6.9MM WIDE 35 CNT FX HEAD RCHT STRL LF

## (undated) DEVICE — KIT DRSG 18X12.5CM THK3.2CM MED VAC GRANUFOAM DISP

## (undated) DEVICE — PRECISION (9.0 X 0.51 X 31.0MM)

## (undated) DEVICE — SUTURE VICRYL 2-0 SH 27IN BRAID COAT ABS VIOL J317H

## (undated) DEVICE — 9534HP TRANSPARENT DRSG W/FRAME: Brand: 3M™ TEGADERM™

## (undated) DEVICE — 2% CHLORHEXIDINE SKIN PREP ORANGE 26ML

## (undated) DEVICE — SUPER SPONGES,MEDIUM: Brand: KERLIX

## (undated) DEVICE — DRILL BIT 2.7MM 2142-27-070

## (undated) DEVICE — GOWN SURG LG L3 NONREINFORCE SET IN SLV STRL LF DISP BLUE

## (undated) DEVICE — SPLINT PRECUT SYNTH 5X30

## (undated) DEVICE — 3M™ STERI-STRIP™ REINFORCED ADHESIVE SKIN CLOSURES, R1547, 1/2 IN X 4 IN (12 MM X 100 MM), 6 STRIPS/ENVELOPE: Brand: 3M™ STERI-STRIP™

## (undated) DEVICE — FLEXIBLE YANKAUER,MEDIUM TIP, NO VACUUM CONTROL: Brand: ARGYLE

## (undated) DEVICE — BACTISURE SOL WND CLNSG 1

## (undated) DEVICE — DRAPE .75 SHT FNFLD 76X52IN SURG CNVRT STRL LF DISP TIBURON

## (undated) DEVICE — SUTURE PDS + 0 CTX 60IN ANBCTRL LOOP ABS VIOL PDP990G

## (undated) DEVICE — STANDARD HYPODERMIC NEEDLE,ALUMINUM HUB: Brand: MONOJECT

## (undated) DEVICE — HANDPIECE SCT POOLE STRL LF DISP

## (undated) DEVICE — WATER STRL 1000ML PLASTIC POUR BTL LF

## (undated) DEVICE — SUTURE MONOCRYL 4-0 Y935H

## (undated) DEVICE — 3.2MM ROUND FAST CUTTING BUR

## (undated) DEVICE — GLOVE SURG 7 PROTEXIS LF BLUE PF SMTH BEAD CUFF INTLK STRL

## (undated) DEVICE — WRAP COOLING KNEE W/ICE PILLOW

## (undated) DEVICE — BLADE SURG 15 STRL PRSNA + PLMR

## (undated) DEVICE — SPLINT PRECUT ORTHOGLASS 3X12

## (undated) DEVICE — NON-ADHERENT STRIPS,OIL EMULSION: Brand: CURITY

## (undated) DEVICE — GOWN SURG XL L3 NONREINFORCE SET IN SLV STRL LF DISP BLUE

## (undated) DEVICE — REPROCESS TOURN 60-7070-103

## (undated) DEVICE — UPPER EXTREMITY: Brand: MEDLINE INDUSTRIES, INC.

## (undated) DEVICE — SUTURE PRMHND 3-0 SH 18IN SILK CNTRL RELS BRAID 8 STRN NABSB C013D

## (undated) DEVICE — SUTURE ETHILON 4-0 1667G

## (undated) DEVICE — SYRINGE 10ML GRAD N-PYRG DEHP-FR PVC FREE STRL MED LF DISP

## (undated) DEVICE — SUTURE VICRYL 0 18IN BRAID TIES 12 STRN COAT ABS VIOL J906G

## (undated) DEVICE — KIT DRN 1/8IN PVC 3 SPRG EVAC

## (undated) DEVICE — COVER CLAMP 5X38IN SFGRP FABRIC RADOPQ

## (undated) DEVICE — GLOVE SURG 7 PROTEXIS LF CRM PF BEAD CUFF STRL PLISPRN 12IN

## (undated) DEVICE — 25MM FEMALE SCREW-Z

## (undated) DEVICE — TRAY CATH CMP CR LUBRI-SIL IC STLK SURESTEP 16FR FOLEY URMTR

## (undated) DEVICE — SUTURE PRMHND 0 SH 30IN SILK BRAID NABSB BLK K834H

## (undated) DEVICE — BONE WAX W31G

## (undated) NOTE — MR AVS SNAPSHOT
After Visit Summary   4/22/2020    Natasha Rush    MRN: [id number]           Visit Information     Date & Time  4/22/2020  4:30 PM Provider   [address] - Infusion Dept.  Phone  [phone]      Your Vi Multiple Vitamins-Minerals (MULTIVITAL) Oral Tab Take 1 tablet by mouth daily. Benztropine Mesylate 1 MG Oral Tab Take 0.5 mg by mouth 2 (two) times daily.       DIVALPROEX SODIUM, MIGRAINE, (DEPAKOTE ER) 500 MG Oral Tablet SR 24 Hr Take 1 tablet by zia 5/15/2020 2:30 PM EM CC INFRN [address] - Infusion    5/16/2020 8:30 AM EM CC INFRN [address] - Infusion    5/17/2020 7:30 AM EM CC INFRN [address] - Infusion    5/18/2020 3:00 PM EM CC non-emergency, consider your options before heading to an ER. VIDEO VISITS  Visit face-to-face with a [facility] physician or   YURI using your mobile device or computer   using [employer].    e-VISITS  Communicate with a [facility] Physician or YURI online.  The physician jaycob

## (undated) NOTE — MR AVS SNAPSHOT
After Visit Summary   4/12/2020    Rigo Blanca    MRN: [id number]           Visit Information     Date & Time  4/12/2020  8:00 AM Provider  [facility] [address] - Infusion Dept.  Phone  [phone]      Your Vi (two) times daily. Take in between meals, not with meals    acetaminophen (ACETAMINOPHEN EXTRA STRENGTH) 500 MG Oral Tab TAKE 1 TABLET BY MOUTH EVERY 4 HOURS AS NEEDED FOR PAIN. DO NOT TAKE MORE THAN 6 PILLS PER DAY.     OMEPRAZOLE 40 MG Oral Capsule [place] 4/30/2020 2:30 PM EM CC INFRN [address] - Infusion    5/1/2020 2:30 PM EM CC INFRN [address] - Infusion    5/2/2020 9:00 AM EM CC INFRN [address] - Infusion    5/3/2020 8:15 AM EM CC INF 5/24/2020 7:30 AM EM CC INFRN [address] - Infusion    5/25/2020 7:30 AM EM CC INFRN [address] - Infusion    5/26/2020 2:00 PM EM CC INFRN [address] - Infusion    5/27/2020 1:00 PM EM CC attention.  Average cost  $70*   [facility]  Monday – Friday  8:00 am – 8:00 pm   Saturday – Sunday  8:00 am – 4:00 pm    WALK-IN CARE  Primary Care Providers  Treatment for acute illness   or injury that are   non-lif

## (undated) NOTE — MR AVS SNAPSHOT
After Visit Summary   4/2/2020    Jeovany Keating    MRN: [id number]           Visit Information     Date & Time  4/2/2020  3:00 PM Provider  EM [address] - Infusion Dept.  Phone  [phone]      Your Aydee acetaminophen (ACETAMINOPHEN EXTRA STRENGTH) 500 MG Oral Tab TAKE 1 TABLET BY MOUTH EVERY 4 HOURS AS NEEDED FOR PAIN. DO NOT TAKE MORE THAN 6 PILLS PER DAY.     OMEPRAZOLE 40 MG Oral Capsule Delayed Release TAKE 1 CAPSULE BY MOUTH EVERY DAY    Calcium Citr 4/14/2020 1:00 PM [facility] INFECTIOUS DISEASE    4/14/2020 3:00 PM EM CC INFRN [address] - Infusion    4/15/2020 4:00 PM EM CC INFRN [address] - Infusion    4/16/2020 1:00 PM EM CC INFRN [address] 5/7/2020 2:00 PM EM CC INFRN [address] - Infusion    6/10/2020 1:20 PM [facility]    10/22/2020 1:00 PM [address] [place] [address] now offers Video Visits through [address] for adult and $70*       [address]   Monday – Friday  10:00 am – 10:00 pm   Saturday – Sunday  10:00 am – 4:00 pm     [address]   Monday – Friday  4:00 pm – 10:00 pm   Saturday – Sunday

## (undated) NOTE — IP AVS SNAPSHOT
[facility]            (For Outpatient Use Only) Initial Admit Date: 10/29/2020   Inpt/Obs Admit Date: Inpt: 10/29/20 / Obs: N/A   Discharge Date:    Guillermina Jacobo:  [de-identified]   MRN: [de-identified]   CSN: [id number]   CEID: [id number]        E Group Number:  Insurance Type:    Subscriber Name:  Subscriber :    Subscriber ID:  Pt Rel to Subscriber:    Hospital Account Financial Class: Medicare Advantage    2020

## (undated) NOTE — MR AVS SNAPSHOT
After Visit Summary   4/27/2020    David Viera    MRN: [id number]           Visit Information     Date & Time  4/27/2020  3:00 PM Provider  EM CC [address] Department  [address] [facility] Infusion Dept.  Phone  [phone]      Your Vi OMEPRAZOLE 40 MG Oral Capsule Delayed Release TAKE 1 CAPSULE BY MOUTH EVERY DAY    Calcium Citrate (CITRACAL OR) Take 2 capsules by mouth daily. Cholecalciferol (D3 VITAMIN OR) Take 1 tablet by mouth daily.     Ascorbic Acid (VITAMIN C) 1000 MG Oral Tab 5/19/2020 2:30 PM EM CC INFRN [address] - Infusion    5/20/2020 3:30 PM EM CC INFRN [address] - Infusion    5/21/2020 2:00 PM EM CC INFRN [address] - Infusion    5/22/2020 2:00 PM EM CC Communicate with a [facility] Physician or YURI online. The physician will respond and provide   a treatment plan within a few hours.  ONLINE VISIT  Primary Care Providers  Treatment for mild illness or injury that does not require immediate attention VIDEO VISITS

## (undated) NOTE — MR AVS SNAPSHOT
After Visit Summary   5/5/2020    Ross Larios    MRN: [id number]           Visit Information     Date & Time  5/5/2020  2:30 PM Provider   [address] - Infusion Dept.  Phone  [phone]      Your Aydee (two) times daily. Take in between meals, not with meals    OMEPRAZOLE 40 MG Oral Capsule Delayed Release TAKE 1 CAPSULE BY MOUTH EVERY DAY    Calcium Citrate (CITRACAL OR) Take 2 capsules by mouth daily.     Cholecalciferol (D3 VITAMIN OR) Take 1 tablet by 5/22/2020 2:00 PM EM CC INFRN [address] [facility] [address] [facility] [address] - Infusion    5/23/2020 9:00 AM EM CC INFRN [address] - Infusion    5/24/2020 7:30 AM EM CC INFRN [address] - Infusion    5/25/2020 7:30 AM EM CC SAME DAY APPOINTMENTS   Available at primary care offices    AFTER HOURS CARE  [place]  OFFICE VISIT   Primary Care Providers  Treatment for mild illness or injury that does not require immediate attention.  Average cost  $70*   [facility]

## (undated) NOTE — MR AVS SNAPSHOT
After Visit Summary   5/3/2020    Parth Trejo    MRN: [id number]           Visit Information     Date & Time  5/3/2020  8:15 AM Provider  EM [facility] [address] Dept.  Phone  [phone]      Your Aydee (two) times daily. Take in between meals, not with meals    OMEPRAZOLE 40 MG Oral Capsule Delayed Release TAKE 1 CAPSULE BY MOUTH EVERY DAY    Calcium Citrate (CITRACAL OR) Take 2 capsules by mouth daily.     Cholecalciferol (D3 VITAMIN OR) Take 1 tablet by 5/27/2020 1:00 PM EM CC INFRN [address] - Infusion    5/28/2020 2:00 PM EM CC INFRN [address] - Infusion    6/3/2020 10:40 AM [facility]    6/10/2020 1:20 PM Sadia Lizama [facility] Saturday – Sunday  8:00 am – 4:00 pm    WALK-IN CARE  Primary Care Providers  Treatment for acute illness   or injury that are   non-life-threatening.   Also available by appointment     Average cost  $70*       [facility]    Anderson Velez

## (undated) NOTE — MR AVS SNAPSHOT
After Visit Summary   5/4/2020    Aleshia Andrade    MRN: [id number]           Visit Information     Date & Time  5/4/2020  2:00 PM Provider  [facility] [address] [place] [address] - Infusion Dept.  Phone  [phone]      Your Aydee (two) times daily. Take in between meals, not with meals    OMEPRAZOLE 40 MG Oral Capsule Delayed Release TAKE 1 CAPSULE BY MOUTH EVERY DAY    Calcium Citrate (CITRACAL OR) Take 2 capsules by mouth daily.     Cholecalciferol (D3 VITAMIN OR) Take 1 tablet by 5/11/2020 3:00 PM EM CC INFRN [address] - Infusion    5/12/2020 1:20 PM Etienne, [facility] INFECTIOUS DISEASE    5/12/2020 4:00 PM EM CC INFRN [address] - Infusion    5/13/2020 3:00 PM EM CC INFRN [address]. complete it and provide feedback. We strive to deliver the best patient experience and are looking for ways to make improvements. Your feedback will help us do so. For more information on [employer], please visit [email or web address] at a hospital emergency room. Average cost  $2,300*   *Cost varies based on your insurance coverage  For more information about hours, locations or appointment options available at [facility],   visit [email or web address] or call [phone].  (

## (undated) NOTE — ED AVS SNAPSHOT
Kimberly Schroeder   MRN: [id number]    Department:  [facility] Emergency Department   Date of Visit:  2/28/2020           Disclosure     Insurance plans vary and the physician(s) referred by the ER may not be covered by your plan.  Please contact yo CARE PHYSICIAN AT ONCE OR RETURN IMMEDIATELY TO THE EMERGENCY DEPARTMENT. If you have been prescribed any medication(s), please fill your prescription right away and begin taking the medication(s) as directed.   If you believe that any of the medications

## (undated) NOTE — MR AVS SNAPSHOT
After Visit Summary   5/13/2020    Ross Larios    MRN: [id number]           Visit Information     Date & Time  5/13/2020  3:00 PM Provider  [facility] [address] [place] [address] - Infusion Dept.  Phone  [phone]      Your Vi (two) times daily. Take in between meals, not with meals    OMEPRAZOLE 40 MG Oral Capsule Delayed Release TAKE 1 CAPSULE BY MOUTH EVERY DAY    Calcium Citrate (CITRACAL OR) Take 2 capsules by mouth daily.     Cholecalciferol (D3 VITAMIN OR) Take 1 tablet by Injury & Illness are never convenient. If you are dealing with a   non-emergency, consider your options before heading to an ER.   VIDEO VISITS  Visit face-to-face with a [facility] physician or   YURI using your mobile device or computer   using [employer].    [employer]

## (undated) NOTE — IP AVS SNAPSHOT
Patient Demographics     Address  [address] [address] Phone  [phone] [facility]) *Preferred*  [phone] [facility])      Emergency Contact(s)     Name Relation Home Work Jessica Caceres Self Mother [phone] Take 1 tablet (5 mg total) by mouth once daily. Resume when systolic blood pressure is greater than 140   RADHA MCCORMACK NP         aspirin 81 MG Chew  Next dose due: Tomorrow morning      Take 81 mg by mouth daily.           atorvastatin 10 MG Tabs  Commo Sennosides 17.2 MG Tabs  Next dose due: Tonight       Take 1 tablet (17.2 mg total) by mouth nightly. Sejal Stephen MD         sodium chloride 1 g Tabs  Next dose due:   Tonight with dinner      Take 1 tablet (1 g total) by mouth 2 (two) times Recent Vital Signs       Most Recent Value   Vitals  (!) 138/107 Filed at 06/04/2020 0914   Pulse  76 Filed at 06/04/2020 0914   Resp  17 Filed at 06/04/2020 0914   Temp  98.2 °F (36.8 °C) Filed at 06/04/2020 0914   SpO2  99 % Filed at 06/04/2020 8295 ASSESSMENT / PLAN:     Ms. Noam Dobbins is a 62 yo F with PMH of bipolar, schizophrenia, RA[GV.1], recent septic arthritis[GV. 2] who presented with dizziness and headache, found to have acute cerebellar CVA    Acute cerebellar CVA  - CT with[GV.1] moderately large Time spent with patient: 70 mins with > 50% spent counseling patient face to face[GV.2]    HPI     History of Present Illness:      Ms. Lynne Rogers is a 64 yo F with PMH of bipolar, schizophrenia, RA who presented with dizziness and headache. Symptoms started[GV. • HIP REPLACEMENT SURGERY  5/12/11    R RICHARD    • HIP REPLACEMENT SURGERY  2/21/13    L RICHARD   • INCISION AND DRAINAGE Left     knee with liner exchange    • KNEE REPLACEMENT SURGERY Left 09/26/2019    @ [facility]   • KNEE TOTAL REPLACEMENT Left 9/26/2019    Perfo CV: RRR, no murmurs  ABD: Soft, non-tender, non-distended, +BS  MSK:[GV.1] L knee with valgus deformity, knee warm to touch[GV.2]  Neuro: Grossly normal, CN intact, sensory intact  Psych: Affect- normal  SKIN: warm, dry  EXT: no edema    Diagnostic Data: Mild chronic white matter ischemic changes.   CEREBELLUM: Moderately large right cerebellar infarct involving the inferior 2/3 of the right cerebellar hemisphere and right cerebellar tonsil with vasogenic edema, sulcal effacement and mass effect upon the in Finalized by (CST): Favian Lund MD on 6/01/2020 at 6:31 PM[GV.1]                Electronically signed by Matt Back MD on 6/2/2020 10:00 AM   Attribution Harrison    GV. 1 - Matt Back MD on 6/2/2020  8:56 AM  GV. 2 - Matt Back MD • High cholesterol    • MRSA (methicillin resistant Staphylococcus aureus) infection 5/20/2013   • OSTEOARTHRITIS     hands and right hip   • Osteoarthritis    • OTHER DISEASES     bipolar disorder   • Prolonged Q-T interval on ECG 1/12/2016   • Rheumatoid Peanuts                 RASH    Comment:C/o breaking out in white heads    Medications:    Current Facility-Administered Medications:   •  0.9% NaCl infusion, , Intravenous, Continuous  •  acetaminophen (TYLENOL) tab 650 mg, 650 mg, Oral, Q4H PRN **OR** ac 06/02/20 0200 149/84 — — 63 12 96 % — —   06/02/20 0000 117/82 98.5 °F (36.9 °C) Temporal 69 13 95 % — —   06/01/20 2300 (!) 155/92 — — 68 21 96 % — —   06/01/20 2215 130/79 — — 67 13 95 % — —   06/01/20 2145 128/84 — — 61 12 95 % — —   06/01/20 2045 (!) 1 edema, sulcal effacement and minimal mass effect upon the inferolateral margin of the right 4th ventricle. 2. No acute intracranial hemorrhage. 3. Mild chronic white matter microvascular ischemia.     Antibiotics Reviewed:  Cubicin[MT. 1] course complete[ PHYSICAL THERAPY ASSESSMENT     Pt seen daily. Pt educated on deep breathing,relaxation and energy conservation technique. Min a for bed mobility and transfer with RW;provided cuing for gait pattern as well as for postural awareness. Ther ex. Cooperative and -   Climbing 3-5 steps with a railing?: A Lot     AM-PAC Score:  Raw Score: 17   Approx Degree of Impairment: 50.57%   Standardized Score (AM-PAC Scale): 42.13   CMS Modifier (G-Code): CK    FUNCTIONAL ABILITY STATUS  Gait Assessment   Gait Assistance: Min Type of Evaluation: Initial   Physician Order: PT Eval and Treat    Presenting Problem: acute cerebellar CVA R  Reason for Therapy: Mobility Dysfunction and Discharge Planning    PHYSICAL THERAPY ASSESSMENT   Patient is a 63year old female admitted 6/1/20 PT Treatment Plan: Bed mobility; Body mechanics; Patient education;Gait training;Balance training;Transfer training  Rehab Potential : Good  Frequency (Obs): 5x/week       PHYSICAL THERAPY MEDICAL/SOCIAL HISTORY   Problem List  Principal Problem:    Acute CV • KNEE TOTAL REVISION Left 11/21/2019    Performed by Tawanna Kenny MD at [address]   • OTHER Right 04/17/2019    total elbow replacement   • OTHER SURGICAL HISTORY  5/12/13    REMOVAL OF HARDWARE L HIP WITH INSERTION OF CEMENT SPACER       HOME SI OCCUPATIONAL THERAPY TREATMENT NOTE - INPATIENT        Room Number: 336/336-A           Presenting Problem: (acute RT cerebellar infarct)    Problem List  Principal Problem:    Acute CVA (cerebrovascular accident) ([facility] [address]      OCCUPATIONAL THERAPY ASSESSMENT Ratin           ACTIVITY TOLERANCE                         O2 SATURATIONS                ACTIVITIES OF DAILY LIVING ASSESSMENT  AM-PAC ‘6-Clicks’ Inpatient Daily Activity Short Form  How much help from another person does the patient currently need…  -        Goals  on: 20  Frequency: 5x/week[BA. 1]         Attribution Harrison    BA. 1 - Lui Ochoa on 6/3/2020  1:47 PM               Occupational Therapy Note signed by Keyonna May OT at 2020  2:10 PM  Version 1 of 1    Author:  Darlyn Mendez Pt able to locate clock and read accurately when cues for a second attempt.         Patient does not have the physical skills to return to prior living environment upon D/C from the hospital. Anticipate patient will benefit from continued skilled occuaption Past Surgical History  Past Surgical History:   Procedure Laterality Date   • ELBOW TOTAL REPLACEMENT Right 3/2/2020    Performed by Moe Burton MD at [address] [place] [address] OR   • ELBOW TOTAL REPLACEMENT Right 4/17/2019    Performed by Moe Burton MD at [address] [place] [address] OR   • Alert and O to person, place, and date using visual cue in room independently. Pt demo impaired STM.     Behavioral/Emotional/Social: pleasant, cooperative      ACTIVITIES OF DAILY LIVING ASSESSMENT  AM-PAC ‘6-Clicks’ Inpatient Daily Activity Short Form SPEECH DAILY NOTE - INPATIENT    ASSESSMENT & PLAN   ASSESSMENT  PPE REQUIRED. THIS THERAPIST WORE GLOVES, DROPLET MASK, AND GOWN FOR DURATION OF EVALUATION. HANDS WASHED UPON ENTRANCE/EXIT.     SLP f/u for ongoing meal assessment per recommendations of Interdisciplinary Communication: Discussed with RN            SPEECH GOALS  Goal #1 SLP will complete education on cognitive-communication strategies (WRAP, safety, etc.) for safety during daily tasks.    SLP introduced pt to WRAP strategies to increase mem Kenalog Per 10mg, Bursa/Joint Inj 02/14/19     Kenalog Per 10mg, Bursa/Joint Inj 11/15/18     Kenalog Per 10mg, Bursa/Joint Inj 08/02/18     Kenalog Per 10mg, Bursa/Joint Inj 08/02/18     Kenalog Per 10mg, Bursa/Joint Inj 06/04/15     Kenalog, Inj  03/23/

## (undated) NOTE — MR AVS SNAPSHOT
After Visit Summary   5/6/2020    Jodie Stewart    MRN: [id number]           Visit Information     Date & Time  5/6/2020  3:00 PM Provider  [facility] [address] - Infusion Dept.  Phone  [phone]      Your Aydee (two) times daily. Take in between meals, not with meals    OMEPRAZOLE 40 MG Oral Capsule Delayed Release TAKE 1 CAPSULE BY MOUTH EVERY DAY    Calcium Citrate (CITRACAL OR) Take 2 capsules by mouth daily.     Cholecalciferol (D3 VITAMIN OR) Take 1 tablet by 5/22/2020 2:00 PM EM CC IN[facility]N [address] - Infusion    5/23/2020 9:00 AM EM CC IN[facility]N [address] - Infusion    5/24/2020 7:30 AM EM CC INFRN [address] - Infusion    5/25/2020 7:30 AM EM CC SAME DAY APPOINTMENTS   Available at primary care offices    AFTER HOURS CARE  [place]  OFFICE VISIT   Primary Care Providers  Treatment for mild illness or injury that does not require immediate attention.  Average cost  $70*   [facility] [place]

## (undated) NOTE — MR AVS SNAPSHOT
After Visit Summary   4/14/2020    Shashi Uribe    MRN: [id number]           Visit Information     Date & Time  4/14/2020  3:00 PM Provider   [address] - Infusion Dept.  Phone  [phone]      Your Vi Cholecalciferol (D3 VITAMIN OR) Take 1 tablet by mouth daily. Ascorbic Acid (VITAMIN C) 1000 MG Oral Tab Take 1,000 mg by mouth daily. Multiple Vitamins-Minerals (MULTIVITAL) Oral Tab Take 1 tablet by mouth daily.     Benztropine Mesylate 1 MG Oral T 5/6/2020 3:00 PM EM CC INFRN [address] - Infusion    5/7/2020 11:00 AM Casandra BaileyEN NPV RHEUM    5/7/2020 1:30 PM EM CC INFRN [address] - Infusion    5/8/2020 1:30 PM EM CC INFRN [address] [facility] now offers Video Visits through [address] for adult and pediatric patients. Video Visits are available Monday - Friday for many common conditions such as allergies, colds, cough, fever, rash, sore throat, headache and pink eye.   The cost for a Video Vi [address]   Monday – Friday  4:00 pm – 10:00 pm   Saturday – Sunday  10:00 am – 4:00 pm  WALK-IN CARE  Emergency Medicine Providers  Conditions needing urgent attention, but are   non-life-threatening.     Also available by appointment Average cost  $120*

## (undated) NOTE — MR AVS SNAPSHOT
After Visit Summary   4/6/2020    Kim Bryant    MRN: [id number]           Visit Information     Date & Time  4/6/2020  3:00 PM Provider  EM [facility] [address] - Infusion Dept.  Phone  [phone]      Your Aydee acetaminophen (ACETAMINOPHEN EXTRA STRENGTH) 500 MG Oral Tab TAKE 1 TABLET BY MOUTH EVERY 4 HOURS AS NEEDED FOR PAIN. DO NOT TAKE MORE THAN 6 PILLS PER DAY.     OMEPRAZOLE 40 MG Oral Capsule Delayed Release TAKE 1 CAPSULE BY MOUTH EVERY DAY    Calcium Citr 4/15/2020 1:00 PM [facility]    4/15/2020 4:00 PM EM CC INFRN [address] - Infusion    4/16/2020 1:00 PM EM CC INFRN [address] - Infusion    4/17/2020 1:30 PM EM CC INFRN 5 Connie Levy. 6/10/2020 1:20 PM Julien Nicholson [facility] NEPHROLOGY    10/22/2020 1:00 PM [address] now offers Video Visits through [address] for adult and pediatric patients.   Video Visits are available Monday - Friday for many com [address]   Monday – Friday  10:00 am – 10:00 pm   Saturday – Sunday  10:00 am – 4:00 pm     [address]   Monday – Friday  4:00 pm – 10:00 pm   Saturday – Sunday  10:00 am – 4:00 pm  WALK-IN CARE  Emergency Medicine Providers  Conditions

## (undated) NOTE — MR AVS SNAPSHOT
After Visit Summary   4/15/2020    Jay Fabian    MRN: [id number]           Visit Information     Date & Time  4/15/2020  4:00 PM Provider   [address] - Infusion Dept.  Phone  [phone]      Your Vi TABLET BY MOUTH EVERY 4 HOURS AS NEEDED FOR PAIN. DO NOT TAKE MORE THAN 6 PILLS PER DAY. OMEPRAZOLE 40 MG Oral Capsule Delayed Release TAKE 1 CAPSULE BY MOUTH EVERY DAY    Calcium Citrate (CITRACAL OR) Take 2 capsules by mouth daily.     Cholecalciferol 5/9/2020 8:00 AM EM CC INFRN [address] - Infusion    5/10/2020 7:45 AM EM CC INFRN [address] - Infusion    5/11/2020 3:00 PM EM CC INFRN [address] - Infusion    5/12/2020 1:20 PM Etienne headache and pink eye. The cost for a Video Visit is currently $35.         If you receive a survey from [employer], please take a few minutes to complete it and provide feedback.  We strive to deliver the best patient experience and are looking for ways Also available by appointment Average cost  $120*     EMERGENCY ROOM Life-threatening emergencies needing immediate intervention at a hospital emergency room.  Average cost  $2,300*   *Cost varies based on your insurance coverage  For more information about

## (undated) NOTE — MR AVS SNAPSHOT
After Visit Summary   5/21/2020    Tayla Boewrs    MRN: [id number]           Visit Information     Date & Time  5/21/2020  2:00 PM Provider   [address] - Infusion Dept.  Phone  [phone]      Your Vi (two) times daily. Take in between meals, not with meals    OMEPRAZOLE 40 MG Oral Capsule Delayed Release TAKE 1 CAPSULE BY MOUTH EVERY DAY    Calcium Citrate (CITRACAL OR) Take 2 capsules by mouth daily.     Cholecalciferol (D3 VITAMIN OR) Take 1 tablet by complete it and provide feedback. We strive to deliver the best patient experience and are looking for ways to make improvements. Your feedback will help us do so. For more information on [employer], please visit [email or web address] at a hospital emergency room. Average cost  $2,300*   *Cost varies based on your insurance coverage  For more information about hours, locations or appointment options available at [address] [place] [address],   visit [email or web address] or call [phone]. (

## (undated) NOTE — MR AVS SNAPSHOT
After Visit Summary   5/8/2020    Deltajessica Osorio    MRN: [id number]           Visit Information     Date & Time  5/8/2020  1:30 PM Provider   [address] [facility] Dept.  Phone  [phone]      Your Aydee (two) times daily. Take in between meals, not with meals    OMEPRAZOLE 40 MG Oral Capsule Delayed Release TAKE 1 CAPSULE BY MOUTH EVERY DAY    Calcium Citrate (CITRACAL OR) Take 2 capsules by mouth daily.     Cholecalciferol (D3 VITAMIN OR) Take 1 tablet by 5/24/2020 7:30 AM EM CC INFRN [address] - Infusion    5/25/2020 7:30 AM EM CC INFRN [address] - Infusion    5/26/2020 2:00 PM EM CC INFRN [address] - Infusion    5/27/2020 1:00 PM EM CC [place]  OFFICE VISIT   Primary Care Providers  Treatment for mild illness or injury that does not require immediate attention.  Average cost  $70*   [facility]  Monday – Friday  8:00 am – 8:00 pm   Saturday – Sunday

## (undated) NOTE — MR AVS SNAPSHOT
After Visit Summary   5/12/2020    Kimberly Schroeder    MRN: [id number]           Visit Information     Date & Time  5/12/2020  4:00 PM Provider  EM CC INFRN 2 Department  [facility] Infusion Dept.  Phone  [phone]      Your Vi (two) times daily. Take in between meals, not with meals    OMEPRAZOLE 40 MG Oral Capsule Delayed Release TAKE 1 CAPSULE BY MOUTH EVERY DAY    Calcium Citrate (CITRACAL OR) Take 2 capsules by mouth daily.     Cholecalciferol (D3 VITAMIN OR) Take 1 tablet by Injury & Illness are never convenient. If you are dealing with a   non-emergency, consider your options before heading to an ER.   VIDEO VISITS  Visit face-to-face with a [facility] physician or   YURI using your mobile device or computer   using [employer].    [employer]

## (undated) NOTE — IP AVS SNAPSHOT
[facility]            (For Outpatient Use Only) Initial Admit Date: 9/26/2019   Inpt/Obs Admit Date: Inpt: 9/26/19 / Obs: N/A   Discharge Date:    Liliana Proper:  [de-identified]   MRN: [de-identified]   CSN: [id number]   CEID: [id number]        [facility] Hospital Account Financial Class: Medicare Advantage    September 30, 2019

## (undated) NOTE — IP AVS SNAPSHOT
Patient Demographics     Address  [address] [place] [address] Phone  [phone] [facility])  [phone] (Work)  [phone] (Mobile) *Preferred*      Emergency Contact(s)     Name Relation Home Work Mobile    Dustin Corona Mother   1-11 TAKE 2 CAPS THE DAY PRIOR TO SURGERY, 2 CAPS THE MORNING OF SURGERY WITH A SIP OF WATER, 1 CAP DAILY AFTER SURGERY   Shannon Bejarano MD         CITRACAL OR  Next dose due:  TOMORROW MORNING      Take 2 capsules by mouth daily.           D3 VITAMIN OR  N Take 1,000 mg by mouth daily.                 Where to Get Your Medications      These medications were sent to [place] [address] [address], [address] [place] [address] Road AT [address] [facility], [phone], [phone]  [address] [place] [address], Nenita Santiago Patient's Most Recent Weight       Most Recent Value   Patient Weight  41.4 kg (91 lb 4.8 oz)      Lab Results Last 24 Hours    No matching results found     Microbiology Results (All)     None         H&P - H&P Note      H&P signed by Lia Fierro MD at 5 Other                   OTHER (SEE COMMENTS)    Comment:METAL JEWELRY CAUSES ITCHY RASH---can't specify             what metal?  Peanuts                 RASH    Comment:C/o breaking out in white heads     Home Medications:    Outpatient Medications Marked Benztropine Mesylate 1 MG Oral Tab Take 0.5 mg by mouth 2 (two) times daily. Disp:  Rfl:    FluPHENAZine HCl (PROLIXIN) 2.5 MG Oral Tab Take 5 mg by mouth 2 (two) times daily with meals.    Disp:  Rfl:    DIVALPROEX SODIUM, MIGRAINE, (DEPAKOTE ER) 500 MG No results for input(s): WBC, HGB, MCV, PLT, BAND, INR in the last 168 hours.     Invalid input(s): LYM#, MONO#, BASOS#, EOSIN#    Recent Labs   Lab 09/24/19  1148   *   K 3.76   CL 89*   CO2 28.2   BUN 5.0*   CREATSERUM 0.30*   GLU 98   CA 9.9 :  Erika Jackson PTA (Physical Therapy Assistant)       PHYSICAL THERAPY KNEE TREATMENT NOTE - INPATIENT     Room Number: 423/423-A             Presenting Problem: pt with severe end stage left knee OA ---pt is s/p left TKA.  pt is WBAT left LE AM-PAC '6-Clicks' INPATIENT SHORT FORM - BASIC MOBILITY  How much difficulty does the patient currently have. ..  -   Turning over in bed (including adjusting bedclothes, sheets and blankets)?: A Little   -   Sitting down on and standing up from a chair pumps and quad sets only at this time per the instructions provided in preparation for discharge. Update exercises as appropriate    Goal #6  Current Status IN PROGRESS[RM.2]          Attribution Key    RM. 1 - Sarah Bhakta, PTA on 9/30/2019  1:00 PM impairment may benefit from subacute rehab facility once medically cleared. DISCHARGE RECOMMENDATIONS[RM.1]  PT Discharge Recommendations: Sub-acute rehabilitation[RM.2]    PLAN[RM. 1]  PT Treatment Plan: Bed mobility; Body mechanics; Patient education;Gai Pattern: L Decreased stance time  Stoop/Curb Assistance: Not tested  Comment : -[RM.2]        Exercises AM Session   Ankle Pumps 20 reps   Quad Sets 20 reps   Glut Sets 20 reps   Hip Abd/Add 0 reps   Heel slides 0 reps   Saq 0 reps   Seated marches 20 reps Room Number: 423/423-A             Presenting Problem: pt with severe end stage left knee OA ---pt is s/p left TKA. pt is WBAT left LE post sx . PMH sign  for Bipolar / Schizophrenia   B RICHARD and Right elbow replacement sx .      Problem List  Active Problem I like to work with you. You so nice to me.     OBJECTIVE  Precautions: Bed/chair alarm;Limb alert - left(after PM session  per ortho no knee flexion )    WEIGHT BEARING STATUS  Weight Bearing Restriction: L lower extremity           L Lower Extremity: Jerome Extension stretch  1x 1x         Knee ROM      L Knee Flexion (degrees): 95     L Knee Extension (degrees): -12    Patient End of Session: Needs met; In bed;RN aware of session/findings; All patient questions and concerns addressed; Alarm set    CURRENT GOALS Kenalog Per 10mg, Bursa/Joint Inj 11/15/18     Kenalog Per 10mg, Bursa/Joint Inj 08/02/18     Kenalog Per 10mg, Bursa/Joint Inj 08/02/18     Kenalog Per 10mg, Bursa/Joint Inj 06/04/15     Kenalog, Inj  03/23/15     Pneumococcal (Prevnar 13) 04/13/15     P

## (undated) NOTE — MR AVS SNAPSHOT
After Visit Summary   4/25/2020    Cristofer Lloyd    MRN: [id number]           Visit Information     Date & Time  4/25/2020 10:00 AM Provider  EM CC INFRN [address] [facility] Infusion Dept.  Phone  [phone]      Your Vi OMEPRAZOLE 40 MG Oral Capsule Delayed Release TAKE 1 CAPSULE BY MOUTH EVERY DAY    Calcium Citrate (CITRACAL OR) Take 2 capsules by mouth daily. Cholecalciferol (D3 VITAMIN OR) Take 1 tablet by mouth daily.     Ascorbic Acid (VITAMIN C) 1000 MG Oral Tab 5/28/2020 2:00 PM EM CC INFRN [address] [facility] [address] - Infusion    6/3/2020 10:40 AM Cortney Chan [facility]    6/10/2020 1:20 PM Fabien Connell NEPHROLOGY    6/30/2020 2:30 PM Andre Marc [facility]    8/14/2020 Treatment for acute illness   or injury that are   non-life-threatening.   Also available by appointment     Average cost  $70*       [address]   Monday – Friday  10:00 am – 1

## (undated) NOTE — MR AVS SNAPSHOT
After Visit Summary   4/29/2020    Jay Fabian    MRN: [id number]           Visit Information     Date & Time  4/29/2020  3:00 PM Provider  [facility] [address] [place] [address] - Infusion Dept.  Phone  [phone]      Your Vi (two) times daily. Take in between meals, not with meals    OMEPRAZOLE 40 MG Oral Capsule Delayed Release TAKE 1 CAPSULE BY MOUTH EVERY DAY    Calcium Citrate (CITRACAL OR) Take 2 capsules by mouth daily.     Cholecalciferol (D3 VITAMIN OR) Take 1 tablet by 5/14/2020 3:00 PM EM CC INFRN [address] - Infusion    5/15/2020 2:30 PM EM CC INFRN [address] - Infusion    5/16/2020 8:30 AM EM CC INFRN [address] - Infusion    5/17/2020 7:30 AM EM CC Injury & Illness are never convenient. If you are dealing with a   non-emergency, consider your options before heading to an ER.   VIDEO VISITS  Visit face-to-face with a [facility] physician or   YURI using your mobile device or computer   using [employer].    [email or web address]

## (undated) NOTE — IP AVS SNAPSHOT
[facility]            (For Outpatient Use Only) Initial Admit Date: 6/1/2020   Inpt/Obs Admit Date: Inpt: 6/1/20 / Obs: N/A   Discharge Date:    Vaibhav Binet:  [de-identified]   MRN: [de-identified]   CSN: [id number]   CEID: [id number]        [facility] Subscriber ID:  Pt Rel to Subscriber:    Hospital Account Financial Class: Medicare Advantage    June 4, 2020

## (undated) NOTE — MR AVS SNAPSHOT
After Visit Summary   4/7/2020    Peg Blank    MRN: [id number]           Visit Information     Date & Time  4/7/2020  3:00 PM Provider  [facility] [address] - Infusion Dept.  Phone  [phone]      Your Aydee Ascorbic Acid (VITAMIN C) 1000 MG Oral Tab Take 1,000 mg by mouth daily. Multiple Vitamins-Minerals (MULTIVITAL) Oral Tab Take 1 tablet by mouth daily. Benztropine Mesylate 1 MG Oral Tab Take 0.5 mg by mouth 2 (two) times daily.       FluPHENAZine HC 4/23/2020 2:00 PM EM CC INFRN [address] - Infusion    4/24/2020 2:00 PM EM CC INFRN [address] - Infusion    4/25/2020 9:30 AM EM CC INFRN [address] - Infusion    4/26/2020 7:30 AM EM CC will help us do so. For more information on [employer], please visit [email or web address]                   DO YOU KNOW WHERE TO GO? Injury & Illness are never convenient.  If you are dealing with a   non-emergency, consider your o available at [facility],   visit [email or web address] or call [phone]. ([phone])

## (undated) NOTE — ED AVS SNAPSHOT
Ross Larios   MRN: [id number]    Department:  [facility] Emergency Department   Date of Visit:  3/7/2020           Disclosure     Insurance plans vary and the physician(s) referred by the ER may not be covered by your plan.  Please contact you CARE PHYSICIAN AT ONCE OR RETURN IMMEDIATELY TO THE EMERGENCY DEPARTMENT. If you have been prescribed any medication(s), please fill your prescription right away and begin taking the medication(s) as directed.   If you believe that any of the medications

## (undated) NOTE — IP AVS SNAPSHOT
[facility]            (For Outpatient Use Only) Initial Admit Date: 4/16/2020   Inpt/Obs Admit Date: Inpt: 4/16/20 / Obs: N/A   Discharge Date:    William Pouch:  [de-identified]   MRN: [de-identified]   CSN: [id number]   CEID: [id number]        [facility]K Subscriber ID:  Pt Rel to Subscriber:    Hospital Account Financial Class: Medicare Advantage    April 21, 2020

## (undated) NOTE — MR AVS SNAPSHOT
After Visit Summary   4/10/2020    Ashley Alvarez    MRN: [id number]           Visit Information     Date & Time  4/10/2020  3:00 PM Provider   [address] [place] [address] - Infusion Dept.  Phone  [phone]      Your Vi (two) times daily. Take in between meals, not with meals    acetaminophen (ACETAMINOPHEN EXTRA STRENGTH) 500 MG Oral Tab TAKE 1 TABLET BY MOUTH EVERY 4 HOURS AS NEEDED FOR PAIN. DO NOT TAKE MORE THAN 6 PILLS PER DAY.     OMEPRAZOLE 40 MG Oral Capsule Delaye 5/3/2020 8:15 AM EM CC INFRN [address] - Infusion    5/4/2020 2:00 PM EM CC INFRN [address] - Infusion    5/5/2020 2:30 PM EM CC INFRN [address] - Infusion    5/6/2020 3:00 PM EM CC INFR 5/27/2020 1:00 PM EM CC INFRN [address] - Infusion    5/28/2020 2:00 PM EM CC INFRN [address] - Infusion    6/10/2020 1:20 PM Mati TAI NEPHROLOGY    10/22/2020 1:00 PM Vince Alegre or injury that are   non-life-threatening.   Also available by appointment     Average cost  $70*       [address]   Monday – Friday  10:00 am – 10:00 pm   Saturday – Sunday  1

## (undated) NOTE — LETTER
[address], [place]  Authorization for Invasive Procedures  1.  I hereby authorize Tripp Talley, my physician and whomever may be designated as the doctor's assistant, to perform the following operation and/or procedure:   Col fever and allergic reactions, hemolytic reactions, transmission of disease such as hepatitis, AIDS, cytomegalovirus (CMV), and flluid overload.  In the event that I wish to have autologous transfusions of my own blood, or a directed donor transfusion, I mario alberto Signature of Patient:  ________________________________________________ Date: _________Time: _________    Responsible person in case of minor or unconscious: _____________________________Relationship: ____________     Witness Signature: ___________________

## (undated) NOTE — MR AVS SNAPSHOT
After Visit Summary   4/13/2020    Natasha Rush    MRN: [id number]           Visit Information     Date & Time  4/13/2020  4:00 PM Provider   [address] - Infusion Dept.  Phone  [phone]      Your Vi acetaminophen (ACETAMINOPHEN EXTRA STRENGTH) 500 MG Oral Tab TAKE 1 TABLET BY MOUTH EVERY 4 HOURS AS NEEDED FOR PAIN. DO NOT TAKE MORE THAN 6 PILLS PER DAY.     OMEPRAZOLE 40 MG Oral Capsule Delayed Release TAKE 1 CAPSULE BY MOUTH EVERY DAY    Calcium Citr 4/16/2020 1:00 PM EM CC INFRN [address] - Infusion    4/17/2020 1:30 PM EM CC INFRN [address] - Infusion    4/18/2020 8:30 AM EM CC INFRN 2 [address] - Infusion    4/19/2020 7:45 AM EM CC [facility] now offers Video Visits through [address] for adult and pediatric patients. Video Visits are available Monday - Friday for many common conditions such as allergies, colds, cough, fever, rash, sore throat, headache and pink eye.   The cost for a Video Vi [address]   Monday – Friday  4:00 pm – 10:00 pm   Saturday – Sunday  10:00 am – 4:00 pm  WALK-IN CARE  Emergency Medicine Providers  Conditions needing urgent attention, but are   non-life-threatening.     Also available by appointment Average cost  $120*

## (undated) NOTE — MR AVS SNAPSHOT
After Visit Summary   4/24/2020    Parth Trejo    MRN: [id number]           Visit Information     Date & Time  4/24/2020  4:00 PM Provider  EM CC INFRN 2 Department  [facility] Dept.  Phone  [phone]      Your Vi Ferrous Sulfate (IRON) 325 (65 Fe) MG Oral Tab Take 325 mg by mouth 2 (two) times daily.  Take in between meals, not with meals    OMEPRAZOLE 40 MG Oral Capsule Delayed Release TAKE 1 CAPSULE BY MOUTH EVERY DAY    Calcium Citrate (CITRACAL OR) Take 2 capsu 5/26/2020 2:00 PM EM CC INFRN [address] - Infusion    5/27/2020 1:00 PM EM CC INFRN [address] - Infusion    5/28/2020 2:00 PM EM CC INFRN [address] - Infusion    6/3/2020 10:40 AM [facility]  Monday – Friday  8:00 am – 8:00 pm   Saturday – Sunday  8:00 am – 4:00 pm    WALK-IN CARE  Primary Care Providers  Treatment for acute illness   or injury that are   non-life-threatening.   Also available by appointment

## (undated) NOTE — IP AVS SNAPSHOT
Patient Demographics     Address  [address] [place] [id number] Phone  [phone] [facility])  [phone] (Work)  [phone] (Mobile) *Preferred*      Emergency Contact(s)     Name Relation Home Work Mobile    Lyndon Silva TAKE 1 TABLET(10 MG) BY MOUTH EVERY NIGHT   Lela Mckeon MD         Benztropine Mesylate 0.5 MG Tabs  Commonly known as: COGENTIN              CITRACAL OR  Next dose due: Tomorrow morning      Take 2 capsules by mouth daily.           D3 VITAMIN OR  Nex Omeprazole 40 MG Cpdr  Next dose due: Tomorrow morning      Take 1 capsule (40 mg total) by mouth daily.    Amrita Geronimo MD         Senna 8.6 MG Tabs  Commonly known as: SENOKOT  Next dose due: tonight      TAKE 2 TABLETS BY MOUTH NIGHTLY   Amrita Geronimo MD [id number] Pantoprazole Sodium (PROTONIX) EC tab 40 mg 11/02/20 0547 Given      [id number] Senna (SENOKOT) tab 17.2 mg 11/01/20 2059 Given      [id number] Warfarin Sodium (COUMADIN) tab 2.5 mg 11/01/20 2100 Given      [id number] acetaminophen (TYLENOL) tab 65 Potassium 4.1 3.5 - 5.1 mmol/L — [facility])   Chloride 103 98 - 112 mmol/L — [facility])   CO2 28.0 21.0 - 32.0 mmol/L — [facility]   Anion Gap 6 0 - 18 mmol/L — [facility])   BUN 16 7 - 18 mg/dL [facility])   Creatin cardiac valves. 2.5 - 3.5 Mechanical prosthetic cardiac valves.               CBC WITH DIFFERENTIAL WITH PLATELET [[id number]] (Abnormal)  Resulted: 11/02/20 0813, Result status: Final result   Ordering provider: Cyrus Caruso MD  11/01/20 2300 Resulting la Related Notes: Original Note by Jen Sanches MD (Physician) filed at 10/29/2020  6:04 PM         [facility] Hospitalist H&P       CC:[TN.1] Knee replacement[TN.2]      PCP: Afua Ordoñez MD    History of Present Illness: Ms. Lila Fitzgerald is a 6[TN.1]10[TN.2] yo F with PMH o • KNEE REPLACEMENT SURGERY Left 09/26/2019    @ [facility]- has had 5 replacements   • KNEE TOTAL REPLACEMENT Left 9/26/2019    Performed by Vinny Mg MD at [facility] OR   • [address] Left 4/16/2020    Performed by Vinny Mg MD at [facility] •  Benztropine Mesylate 0.5 MG Oral Tab, , Disp: , Rfl:     •  amLODIPine Besylate 5 MG Oral Tab, Take 1 tablet (5 mg total) by mouth once daily.  To be added to the packs, Disp: 90 tablet, Rfl: 3    •  Omeprazole 40 MG Oral Capsule Delayed Release, Take 1 Review of Systems  Comprehensive ROS reviewed and negative except for what's stated above.      OBJECTIVE:  /88   Pulse 74   Temp 97.4 °F (36.3 °C)   Resp 15   Ht 4' 11\" (1.499 m)   Wt 116 lb (52.6 kg)   LMP  (LMP Unknown)   SpO2 98%   BMI 23.43 kg/m Ms. Parra Stands is a 6[TN.1]10[TN.2] yo F with PMH of bipolar, schizophrenia, HTN, RA, recent septic arthritis, hs of cerebellar CVA[TN.1], antiphospholipid ab syn on coumadin[TN.3], poss cognitive impairment, who presents for left TKA.[TN.2]       Left TKA  - ora Author: Niraj Lozada MD Service: Hospitalist Author Type: Physician    Filed: 10/29/2020  6:04 PM Date of Service: 10/29/2020  4:42 PM Status: Signed    : Niraj Lozada MD (Physician)    Related Notes: Addendum by Niraj Lozada MD (Physician) filed at 10/29 Performed by Teresita Dimas MD at [facility] OR   • HIP REPLACEMENT SURGERY  5/12/11    R RICHARD    • HIP REPLACEMENT SURGERY  2/21/13    L RICHARD   • INCISION AND DRAINAGE Left     knee with liner exchange    • KNEE REPLACEMENT SURGERY Left 09/26/2019    @ •  OLANZapine 20 MG Oral Tab, Take 1 tablet (20 mg total) by mouth nightly. Take in conjuction with 5 mg tablet for a total of 25 mg nightly., Disp: 90 tablet, Rfl: 0    •  OLANZapine 5 MG Oral Tab, Take 1 tablet (5 mg total) by mouth nightly.  Take in conj Packs/day: 1.00        Years: 15.00        Pack years: 13        Quit date: 2000        Years since quittin.5      Smokeless tobacco: Never Used    Alcohol use: No      Alcohol/week: 0.0 standard drinks       Fam Hx  Family History   Problem CONCLUSION:  Status post revision of a left total knee arthroplasty including removal of the antibiotic cement spacer and placement of a long stemmed constrained arthroplasty.     Dictated by (CST): Mary Copeland MD on 10/29/2020 at 4:35 PM     Heena Summers Answering Service number: [phone][KIA.9]      Electronically signed by Niraj Lozada MD on 10/29/2020  6:04 PM   Attribution Harrison    TN. 1 - Niraj Lozada MD on 10/29/2020  4:42 PM  TN. 2 - Niraj Lozada MD on 10/29/2020  4:51 PM  TN. 3 - Niraj Lozada MD on 10/29/202 In my interview with the patient, she said she is not usually so sleepy. She claims that her psychiatric symptoms are well controlled with current medications.   She denies auditory or visual hallucinations, but later, said that she hears command hallucina MENTAL STATUS EXAMINATION:  The patient is a white female in a hospital gown, sitting in the chair next to her bed, with speech that is somewhat slow but normal volume and little eye contact.   She could barely keep her eyes open at all, and was frequently INITIAL TREATMENT RECOMMENDATIONS:  The patient seems to be fairly stable with her current psychiatric medications.   Her excess sedation seems to be related to the addition of Norco.  Because she needs both, I would not alter her current medications, just Pt is received in the bed and was cleared for therapy session. . PPE's donned by the therapist of gloves,goggles and mask. Pt also donned mask to AMB in the hallway.  Pt is min A with bed mobility and required assist with her L LE during the transfer to the AM-PAC '6-Clicks' INPATIENT SHORT FORM - BASIC MOBILITY  How much difficulty does the patient currently have. ..  -   Turning over in bed (including adjusting bedclothes, sheets and blankets)?: A Little   -   Sitting down on and standing up from a chair wit Current Status In progress   Goal #6 Patient independently performs home exercise program for ROM/strengthening per the instructions provided in preparation for discharge. Goal #6  Current Status In progress[RM. 1]          Attribution Key    RM. 1 - Destiney The patient's Approx Degree of Impairment: 50.57% has been calculated based on documentation in the [facility] '6 clicks' Inpatient Basic Mobility Short Form.   Research supports that patients with this level of impairment may benefit from subacute rehab once me Gait Assistance: Minimum assistance  Distance (ft): 20'  Assistive Device: Rolling walker  Pattern: L Decreased stance time  Stoop/Curb Assistance: Not tested  Comment : Pt was cleared to AMB to the chair only per RN.            Knee ROM        Patient End Infected prosthetic knee joint ([facility]    Dislocation of hip, posterior, right, closed ([facility]      PHYSICAL THERAPY ASSESSMENT     Patient in bed  agree for therapy activity, HOMERO Kemp present and approved session.  Patient Min A for left leg supine to sit -   Turning over in bed (including adjusting bedclothes, sheets and blankets)?: A Little   -   Sitting down on and standing up from a chair with arms (e.g., wheelchair, bedside commode, etc.): A Little   -   Moving from lying on back to sitting on the side Goal #5  AROM 0 degrees extension to 95 degrees flexion     Goal #5   Current Status In progress   Goal #6 Patient independently performs home exercise program for ROM/strengthening per the instructions provided in preparation for discharge.    Goal #6  Cur Orders received, chart review complete. RN approved patient participation. Patient is a[ST.3 62year old[ST.2] female admitted 10/29/2020 for infected prosthetic knee joint, pt seen POD#2 s/p revision L TKA, wbat. [ST.1] She has hx of schizophrenia, CVA, a Infected prosthetic knee joint (HCC)    Dislocation of hip, posterior, right, closed (HCC)[ST.2]      Past Medical History[ST.1]  Past Medical History:   Diagnosis Date   • Bipolar affective ([facility]    • Blood disorder    • C. difficile colitis 05/20/2013 Performed by Martha Ken MD at [facility] MAIN OR   • KNEE TOTAL REVISION Left 11/21/2019    Performed by Martha Ken MD at [address] [facility] [address]   • OTHER Right 04/17/2019    total elbow replacement   • OTHER SURGICAL HISTORY  5/12/13    REMOVAL OF HARDWA CMS Modifier (G-Code): CK[ST.2]    FUNCTIONAL TRANSFER ASSESSMENT[ST.1]  Supine to Sit : Minimum assistance  Sit to Stand: Minimum assistance[ST. 2]  Toilet Transfer:[ST.1] NT - pt declines[ST. 3]   Shower Transfer:[ST.1] NT[ST.3]   Chair Transfer:[ST.1] min No notes of this type exist for this encounter.      Immunizations     Name Date      Depo-Medrol 40mg Inj 02/15/18     Depo-Medrol 40mg Inj 11/14/12     Depo-Medrol 40mg Inj 10/03/12     INFLUENZA 10/14/20     INFLUENZA 10/24/19     INFLUENZA 09/30/18

## (undated) NOTE — MR AVS SNAPSHOT
After Visit Summary   5/18/2020    Ashley Alvarez    MRN: [id number]           Visit Information     Date & Time  5/18/2020  3:00 PM Provider  [facility] [address] [place] [address] - Infusion Dept.  Phone  [phone]      Your Vi (two) times daily. Take in between meals, not with meals    OMEPRAZOLE 40 MG Oral Capsule Delayed Release TAKE 1 CAPSULE BY MOUTH EVERY DAY    Calcium Citrate (CITRACAL OR) Take 2 capsules by mouth daily.     Cholecalciferol (D3 VITAMIN OR) Take 1 tablet by 5/26/2020 2:00 PM EM CC INFRN [address] - Infusion    5/27/2020 1:00 PM EM CC INFRN [address] - Infusion    5/28/2020 2:00 PM EM CC INFRN [address] - Infusion    6/1/2020 2:40 PM Etienne, Treatment for mild illness or injury that does not require immediate attention.  Average cost  $70*   [facility]  Monday – Friday  8:00 am – 8:00 pm   Saturday – Sunday  8:00 am – 4:00 pm    WALK-IN CARE  Primary Care

## (undated) NOTE — LETTER
September 9, 2020          [address]  [id number]          Dear Kassie Rush,        Your medication, Fluphenazine, has been denied. Dr. Patt Monsalve does not prescribe this type of medication.         Sincerely,

## (undated) NOTE — MR AVS SNAPSHOT
After Visit Summary   5/2/2020    Parth Trejo    MRN: [id number]           Visit Information     Date & Time  5/2/2020  9:00 AM Provider   [address] [facility] - Infusion Dept.  Phone  [phone]      Your Aydee (two) times daily. Take in between meals, not with meals    OMEPRAZOLE 40 MG Oral Capsule Delayed Release TAKE 1 CAPSULE BY MOUTH EVERY DAY    Calcium Citrate (CITRACAL OR) Take 2 capsules by mouth daily.     Cholecalciferol (D3 VITAMIN OR) Take 1 tablet by 5/19/2020 2:30 PM EM CC INFRN [address] - Infusion    5/20/2020 3:30 PM EM CC INFRN [address] - Infusion    5/21/2020 2:00 PM EM CC INFRN [address] - Infusion    5/22/2020 2:00 PM EM CC Primary Care Providers  Treatment for mild illness or injury that does not require immediate attention VIDEO VISITS  Average cost  $35*    e-VISTS  Average cost  $35*     SAME DAY APPOINTMENTS   Available at primary care offices    [address]

## (undated) NOTE — MR AVS SNAPSHOT
After Visit Summary   4/11/2020    Tayla Bowers    MRN: [id number]           Visit Information     Date & Time  4/11/2020  9:30 AM Provider  EM CC ARYANN 2 Department  [facility] - Infusion Dept.  Phone  [phone]      Your Vi acetaminophen (ACETAMINOPHEN EXTRA STRENGTH) 500 MG Oral Tab TAKE 1 TABLET BY MOUTH EVERY 4 HOURS AS NEEDED FOR PAIN. DO NOT TAKE MORE THAN 6 PILLS PER DAY.     OMEPRAZOLE 40 MG Oral Capsule Delayed Release TAKE 1 CAPSULE BY MOUTH EVERY DAY    Calcium Citr 4/23/2020 2:00 PM EM CC INFRN [address] [facility] [address] - Infusion    4/24/2020 2:00 PM EM CC INFRN [address] [facility] [address] - Infusion    4/25/2020 9:30 AM EM CC INFRN 2 [address] [facility] [address] - Infusion    4/26/2020 7:30 AM EM CC will help us do so. For more information on [employer], please visit [email or web address]. [email or web address]                   DO YOU KNOW WHERE TO GO? Injury & Illness are never convenient.  If you are dealing with a   non-emergency, consider your o available at [facility],   visit [email or web address] or call [phone]. ([phone])

## (undated) NOTE — MR AVS SNAPSHOT
After Visit Summary   4/28/2020    Olivier Bolden    MRN: [id number]           Visit Information     Date & Time  4/28/2020  2:00 PM Provider   [address] [facility] [address] - Infusion Dept.  Phone  [phone]      Your Vi (two) times daily. Take in between meals, not with meals    OMEPRAZOLE 40 MG Oral Capsule Delayed Release TAKE 1 CAPSULE BY MOUTH EVERY DAY    Calcium Citrate (CITRACAL OR) Take 2 capsules by mouth daily.     Cholecalciferol (D3 VITAMIN OR) Take 1 tablet by 5/27/2020 1:00 PM EM CC INFRN [address] [facility] [address] [facility] [address] - Infusion    5/28/2020 2:00 PM EM CC INFRN [address] [facility] [address] - Infusion    6/3/2020 10:40 AM Leesa Chan [facility]    6/10/2020 1:20 PM [facility] Saturday – Sunday  8:00 am – 4:00 pm    WALK-IN CARE  Primary Care Providers  Treatment for acute illness   or injury that are   non-life-threatening.   Also available by appointment     Average cost  $70*       [facility]    Patricia Fuentes

## (undated) NOTE — MR AVS SNAPSHOT
After Visit Summary   4/30/2020    Rigo Blanca    MRN: [id number]           Visit Information     Date & Time  4/30/2020  4:00 PM Provider  EM CC INFRN 2 Department  [facility]  Phone  [phone]      Your Vi (two) times daily. Take in between meals, not with meals    OMEPRAZOLE 40 MG Oral Capsule Delayed Release TAKE 1 CAPSULE BY MOUTH EVERY DAY    Calcium Citrate (CITRACAL OR) Take 2 capsules by mouth daily.     Cholecalciferol (D3 VITAMIN OR) Take 1 tablet by 5/18/2020 3:00 PM EM CC INFRN [address] - Infusion    5/19/2020 2:30 PM EM CC INFRN [address] - Infusion    5/20/2020 3:30 PM EM CC INFRN [address] - Infusion    5/21/2020 2:00 PM EM CC Communicate with a [facility] Physician or YURI online. The physician will respond and provide   a treatment plan within a few hours.  ONLINE VISIT  Primary Care Providers  Treatment for mild illness or injury that does not require immediate attention VIDEO VISITS

## (undated) NOTE — ED AVS SNAPSHOT
Ebonie Arriola   MRN: [id number]    Department:  [facility] Emergency Department   Date of Visit:  2/24/2020           Disclosure     Insurance plans vary and the physician(s) referred by the ER may not be covered by your plan.  Please contact yo CARE PHYSICIAN AT ONCE OR RETURN IMMEDIATELY TO THE EMERGENCY DEPARTMENT. If you have been prescribed any medication(s), please fill your prescription right away and begin taking the medication(s) as directed.   If you believe that any of the medications